# Patient Record
Sex: MALE | Race: WHITE | Employment: OTHER | URBAN - METROPOLITAN AREA
[De-identification: names, ages, dates, MRNs, and addresses within clinical notes are randomized per-mention and may not be internally consistent; named-entity substitution may affect disease eponyms.]

---

## 2017-01-11 ENCOUNTER — TELEPHONE (OUTPATIENT)
Dept: CARDIOLOGY CLINIC | Age: 77
End: 2017-01-11

## 2017-02-28 ENCOUNTER — TELEPHONE (OUTPATIENT)
Dept: CARDIOLOGY CLINIC | Age: 77
End: 2017-02-28

## 2017-03-08 ENCOUNTER — TELEPHONE (OUTPATIENT)
Dept: CARDIOLOGY CLINIC | Age: 77
End: 2017-03-08

## 2017-03-08 DIAGNOSIS — R79.82 CRP ELEVATED: Primary | ICD-10-CM

## 2017-03-09 ENCOUNTER — TELEPHONE (OUTPATIENT)
Dept: CARDIOLOGY CLINIC | Age: 77
End: 2017-03-09

## 2017-03-09 RX ORDER — EVOLOCUMAB 140 MG/ML
INJECTION, SOLUTION SUBCUTANEOUS
Qty: 6 PEN | Refills: 3 | Status: SHIPPED | OUTPATIENT
Start: 2017-03-09 | End: 2018-03-27 | Stop reason: SDUPTHER

## 2017-03-10 ENCOUNTER — TELEPHONE (OUTPATIENT)
Dept: CARDIOLOGY CLINIC | Age: 77
End: 2017-03-10

## 2017-03-16 RX ORDER — NITROGLYCERIN 0.4 MG/1
0.4 TABLET SUBLINGUAL EVERY 5 MIN PRN
Qty: 25 TABLET | Refills: 3 | Status: SHIPPED | OUTPATIENT
Start: 2017-03-16 | End: 2018-06-01 | Stop reason: SDUPTHER

## 2017-05-12 ENCOUNTER — OFFICE VISIT (OUTPATIENT)
Dept: CARDIOLOGY CLINIC | Age: 77
End: 2017-05-12

## 2017-05-12 VITALS
DIASTOLIC BLOOD PRESSURE: 56 MMHG | BODY MASS INDEX: 26.82 KG/M2 | HEIGHT: 72 IN | HEART RATE: 80 BPM | WEIGHT: 198 LBS | SYSTOLIC BLOOD PRESSURE: 124 MMHG

## 2017-05-12 DIAGNOSIS — E78.2 MIXED HYPERLIPIDEMIA: Chronic | ICD-10-CM

## 2017-05-12 DIAGNOSIS — Z01.818 PRE-OP TESTING: Primary | ICD-10-CM

## 2017-05-12 DIAGNOSIS — I25.10 CORONARY ARTERY DISEASE INVOLVING NATIVE CORONARY ARTERY OF NATIVE HEART WITHOUT ANGINA PECTORIS: Chronic | ICD-10-CM

## 2017-05-12 PROCEDURE — 93000 ELECTROCARDIOGRAM COMPLETE: CPT | Performed by: INTERNAL MEDICINE

## 2017-05-12 PROCEDURE — 99214 OFFICE O/P EST MOD 30 MIN: CPT | Performed by: INTERNAL MEDICINE

## 2017-07-31 DIAGNOSIS — E78.2 MIXED HYPERLIPIDEMIA: ICD-10-CM

## 2017-07-31 RX ORDER — CLOPIDOGREL BISULFATE 75 MG/1
75 TABLET ORAL DAILY
Qty: 90 TABLET | Refills: 3 | Status: SHIPPED | OUTPATIENT
Start: 2017-07-31 | End: 2018-07-14 | Stop reason: SDUPTHER

## 2017-08-31 ENCOUNTER — TELEPHONE (OUTPATIENT)
Dept: CARDIOLOGY CLINIC | Age: 77
End: 2017-08-31

## 2017-08-31 DIAGNOSIS — I25.10 CORONARY ARTERY DISEASE INVOLVING NATIVE CORONARY ARTERY OF NATIVE HEART WITHOUT ANGINA PECTORIS: Primary | Chronic | ICD-10-CM

## 2017-09-08 ENCOUNTER — HOSPITAL ENCOUNTER (OUTPATIENT)
Dept: CARDIOLOGY | Facility: CLINIC | Age: 77
Discharge: HOME | End: 2017-09-08
Attending: INTERNAL MEDICINE | Admitting: INTERNAL MEDICINE

## 2017-09-08 LAB
LV EF: 69 %
LVEF MODALITY: NORMAL

## 2017-09-13 ENCOUNTER — TELEPHONE (OUTPATIENT)
Dept: CARDIOLOGY CLINIC | Age: 77
End: 2017-09-13

## 2017-09-14 ENCOUNTER — OFFICE VISIT (OUTPATIENT)
Dept: CARDIOLOGY CLINIC | Age: 77
End: 2017-09-14

## 2017-09-14 VITALS
HEART RATE: 77 BPM | OXYGEN SATURATION: 97 % | BODY MASS INDEX: 27.19 KG/M2 | DIASTOLIC BLOOD PRESSURE: 76 MMHG | WEIGHT: 200.5 LBS | SYSTOLIC BLOOD PRESSURE: 122 MMHG

## 2017-09-14 DIAGNOSIS — I10 ESSENTIAL HYPERTENSION: ICD-10-CM

## 2017-09-14 DIAGNOSIS — E78.2 MIXED HYPERLIPIDEMIA: ICD-10-CM

## 2017-09-14 DIAGNOSIS — I25.10 CORONARY ARTERY DISEASE INVOLVING NATIVE CORONARY ARTERY OF NATIVE HEART WITHOUT ANGINA PECTORIS: Primary | ICD-10-CM

## 2017-09-14 PROCEDURE — 99213 OFFICE O/P EST LOW 20 MIN: CPT | Performed by: INTERNAL MEDICINE

## 2017-11-10 ENCOUNTER — TELEPHONE (OUTPATIENT)
Dept: CARDIOLOGY CLINIC | Age: 77
End: 2017-11-10

## 2018-03-07 ENCOUNTER — TELEPHONE (OUTPATIENT)
Dept: CARDIOLOGY CLINIC | Age: 78
End: 2018-03-07

## 2018-03-13 ENCOUNTER — OFFICE VISIT (OUTPATIENT)
Dept: CARDIOLOGY CLINIC | Age: 78
End: 2018-03-13

## 2018-03-13 VITALS
WEIGHT: 211 LBS | SYSTOLIC BLOOD PRESSURE: 132 MMHG | HEIGHT: 72 IN | OXYGEN SATURATION: 97 % | DIASTOLIC BLOOD PRESSURE: 60 MMHG | BODY MASS INDEX: 28.58 KG/M2 | HEART RATE: 80 BPM

## 2018-03-13 DIAGNOSIS — E78.2 MIXED HYPERLIPIDEMIA: Chronic | ICD-10-CM

## 2018-03-13 DIAGNOSIS — I25.10 CORONARY ARTERY DISEASE INVOLVING NATIVE CORONARY ARTERY OF NATIVE HEART WITHOUT ANGINA PECTORIS: Primary | Chronic | ICD-10-CM

## 2018-03-13 DIAGNOSIS — I10 ESSENTIAL HYPERTENSION: Chronic | ICD-10-CM

## 2018-03-13 PROCEDURE — 99214 OFFICE O/P EST MOD 30 MIN: CPT | Performed by: INTERNAL MEDICINE

## 2018-03-13 NOTE — PROGRESS NOTES
Peak HR:106 bpm       HR/BP product:[id number]  Peak BP:164/83 mmHg  Predicted HR: 146 bpm  % of predicted HR: 73  Test duration: 4 min    CATH: 9/13/12  1. Known CAD without restenosis to previous RCA stents or without   progression and disease from previous assessment 2 years prior. 2. Patent SVG to diagonal branch; patent SVG to OM branch; known atretic   LIMA to LAD. 3. Well-preserved LV systolic function. Carotid doppler: 12/15/14  Summary    1. There is moderate heterogeneous plaque seen in the right internal carotid artery with an estimated diameter reduction of <50%. 2. There is minimal homogeneous plaque seen in the left internal carotid  artery with an estimated diameter reduction of <50%. 3. The vertebral arteries are patent with antegrade flow     Stress:2/18/16  Summary  There is no evidence of stress induced ischemia. The inferior wall is mildly  diminished at both stress and rest c/w possible diaphragm attenuation  artifact. Hyperdynamic LV systolic function with FY>89% with uniform wall  motion. Past Medical History:   has a past medical history of CAD (coronary artery disease); CAD (coronary artery disease); COPD (chronic obstructive pulmonary disease) (HCC); COPD (chronic obstructive pulmonary disease) ([address] GERD (gastroesophageal reflux disease); HTN (hypertension); Hyperlipidemia; IBS (irritable bowel syndrome); and Stented coronary artery. Surgical History:   has a past surgical history that includes Coronary artery bypass graft (2000); Coronary angioplasty with stent (2005); Carotid endarterectomy (01/2014); Colonoscopy (2004); Upper gastrointestinal endoscopy (2004); Cholecystectomy; Cardiac catheterization (2009); Upper gastrointestinal endoscopy (7/15/14); and Diagnostic Cardiac Cath Lab Procedure. Social History:   reports that he quit smoking about 22 years ago. His smoking use included Cigarettes. He has a 15.00 pack-year smoking history.  He has never used smokeless discharge or sore throat  Allergy and Immunology ROS: negative for - hives or itchy/watery eyes  Hematological and Lymphatic ROS: negative for - jaundice or night sweats  Endocrine ROS: negative for - mood swings or temperature intolerance  Breast ROS: deferred  Respiratory ROS: negative for - hemoptysis or stridor  Cardiovascular ROS: negative for - chest pain, dyspnea on exertion or palpitations  Gastrointestinal ROS: no abdominal pain, change in bowel habits, or black or bloody stools  Genito-Urinary ROS: no dysuria, trouble voiding, or hematuria  Musculoskeletal ROS: negative for - gait disturbance, joint pain or joint stiffness  Neurological ROS: negative for - seizures or speech problems  Dermatological ROS: negative for - rash or skin lesion changes     Physical Examination:    Vitals:    03/13/18 1349   BP: 132/60   Pulse: 80   SpO2: 97%    Weight: 211 lb (95.7 kg)     Wt Readings from Last 3 Encounters:   03/13/18 211 lb (95.7 kg)   09/14/17 200 lb 8 oz (90.9 kg)   05/12/17 198 lb (89.8 kg)         General Appearance:  Alert, cooperative, no distress, appears stated age   Head:  Normocephalic, without obvious abnormality, atraumatic   Eyes:  PERRL, conjunctiva/corneas clear       Nose: Nares normal, no drainage or sinus tenderness   Throat: Lips, mucosa, and tongue normal   Neck: Supple, symmetrical, trachea midline, no adenopathy, thyroid: not enlarged, symmetric, no tenderness/mass/nodules, no carotid bruit or JVD       Lungs:   Clear to auscultation bilaterally, respirations unlabored   Chest Wall:  No tenderness or deformity   Heart:  Regular rhythm and normal rate; S1, S2 are normal; no murmur noted; no rub or gallop   Abdomen:   Soft, non-tender, bowel sounds active all four quadrants,  no masses, no organomegaly           Extremities: Extremities normal, atraumatic, no cyanosis or edema   Pulses: 2+ and symmetric   Skin: Skin color, texture, turgor normal, no rashes or lesions   Pysch: Normal mood

## 2018-03-27 NOTE — TELEPHONE ENCOUNTER
Patient's wife called requesting a new prescription for Repatha and prior authorization as of 4/19 to be called to [id number] @ [phone] option 1

## 2018-06-01 RX ORDER — NITROGLYCERIN 0.4 MG/1
0.4 TABLET SUBLINGUAL EVERY 5 MIN PRN
Qty: 25 TABLET | Refills: 3 | Status: SHIPPED | OUTPATIENT
Start: 2018-06-01

## 2018-06-22 ENCOUNTER — TELEPHONE (OUTPATIENT)
Dept: CARDIOLOGY CLINIC | Age: 78
End: 2018-06-22

## 2018-06-22 DIAGNOSIS — I25.10 CORONARY ARTERY DISEASE INVOLVING NATIVE CORONARY ARTERY OF NATIVE HEART WITHOUT ANGINA PECTORIS: Primary | Chronic | ICD-10-CM

## 2018-06-22 DIAGNOSIS — E78.49 OTHER HYPERLIPIDEMIA: Chronic | ICD-10-CM

## 2018-06-25 ENCOUNTER — HOSPITAL ENCOUNTER (OUTPATIENT)
Dept: OTHER | Age: 78
Discharge: HOME | End: 2018-06-25
Attending: INTERNAL MEDICINE | Admitting: INTERNAL MEDICINE

## 2018-06-25 DIAGNOSIS — E78.49 OTHER HYPERLIPIDEMIA: Chronic | ICD-10-CM

## 2018-06-25 DIAGNOSIS — I25.10 CORONARY ARTERY DISEASE INVOLVING NATIVE CORONARY ARTERY OF NATIVE HEART WITHOUT ANGINA PECTORIS: Chronic | ICD-10-CM

## 2018-06-25 LAB
ALBUMIN SERPL-MCNC: 4.4 G/DL (ref 3.4–5)
ALP BLD-CCNC: 88 U/L (ref 40–129)
ALT SERPL-CCNC: 12 U/L (ref 10–40)
AST SERPL-CCNC: 20 U/L (ref 15–37)
BILIRUB SERPL-MCNC: 0.6 MG/DL (ref 0–1)
BILIRUBIN DIRECT: <0.2 MG/DL (ref 0–0.3)
BILIRUBIN, INDIRECT: NORMAL MG/DL (ref 0–1)
CHOLESTEROL, FASTING: 109 MG/DL (ref 0–199)
HDLC SERPL-MCNC: 52 MG/DL (ref 40–60)
LDL CHOLESTEROL CALCULATED: 43 MG/DL
TOTAL PROTEIN: 7.5 G/DL (ref 6.4–8.2)
TRIGLYCERIDE, FASTING: 69 MG/DL (ref 0–150)
VLDLC SERPL CALC-MCNC: 14 MG/DL

## 2018-06-26 ENCOUNTER — TELEPHONE (OUTPATIENT)
Dept: CARDIOLOGY CLINIC | Age: 78
End: 2018-06-26

## 2018-07-14 DIAGNOSIS — E78.2 MIXED HYPERLIPIDEMIA: ICD-10-CM

## 2018-07-16 RX ORDER — CLOPIDOGREL BISULFATE 75 MG/1
TABLET ORAL
Qty: 90 TABLET | Refills: 2 | Status: SHIPPED | OUTPATIENT
Start: 2018-07-16 | End: 2019-05-01 | Stop reason: SDUPTHER

## 2018-07-27 ENCOUNTER — TELEPHONE (OUTPATIENT)
Dept: CARDIOLOGY CLINIC | Age: 78
End: 2018-07-27

## 2019-03-06 ENCOUNTER — TELEPHONE (OUTPATIENT)
Dept: CARDIOLOGY CLINIC | Age: 79
End: 2019-03-06

## 2019-03-06 DIAGNOSIS — I25.10 CORONARY ARTERY DISEASE INVOLVING NATIVE CORONARY ARTERY OF NATIVE HEART WITHOUT ANGINA PECTORIS: Primary | ICD-10-CM

## 2019-03-11 ENCOUNTER — HOSPITAL ENCOUNTER (OUTPATIENT)
Age: 79
Discharge: HOME | End: 2019-03-11
Payer: COMMERCIAL

## 2019-03-11 DIAGNOSIS — I25.10 CORONARY ARTERY DISEASE INVOLVING NATIVE CORONARY ARTERY OF NATIVE HEART WITHOUT ANGINA PECTORIS: ICD-10-CM

## 2019-03-11 LAB
ALBUMIN SERPL-MCNC: 3.9 G/DL (ref 3.4–5)
ALP BLD-CCNC: 96 U/L (ref 40–129)
ALT SERPL-CCNC: 12 U/L (ref 10–40)
AST SERPL-CCNC: 15 U/L (ref 15–37)
BILIRUB SERPL-MCNC: 0.4 MG/DL (ref 0–1)
BILIRUBIN DIRECT: <0.2 MG/DL (ref 0–0.3)
BILIRUBIN, INDIRECT: NORMAL MG/DL (ref 0–1)
CHOLESTEROL, TOTAL: 108 MG/DL (ref 0–199)
HDLC SERPL-MCNC: 55 MG/DL (ref 40–60)
LDL CHOLESTEROL CALCULATED: 38 MG/DL
TOTAL PROTEIN: 7.1 G/DL (ref 6.4–8.2)
TRIGL SERPL-MCNC: 77 MG/DL (ref 0–150)
VLDLC SERPL CALC-MCNC: 15 MG/DL

## 2019-03-11 PROCEDURE — 80076 HEPATIC FUNCTION PANEL: CPT

## 2019-03-11 PROCEDURE — 80061 LIPID PANEL: CPT

## 2019-03-11 PROCEDURE — 36415 COLL VENOUS BLD VENIPUNCTURE: CPT

## 2019-03-12 ENCOUNTER — TELEPHONE (OUTPATIENT)
Dept: CARDIOLOGY CLINIC | Age: 79
End: 2019-03-12

## 2019-03-13 ENCOUNTER — OFFICE VISIT (OUTPATIENT)
Dept: CARDIOLOGY CLINIC | Age: 79
End: 2019-03-13
Payer: COMMERCIAL

## 2019-03-13 VITALS
SYSTOLIC BLOOD PRESSURE: 110 MMHG | DIASTOLIC BLOOD PRESSURE: 78 MMHG | WEIGHT: 215.6 LBS | HEIGHT: 73 IN | BODY MASS INDEX: 28.57 KG/M2 | HEART RATE: 83 BPM | OXYGEN SATURATION: 96 %

## 2019-03-13 DIAGNOSIS — I10 ESSENTIAL HYPERTENSION: Chronic | ICD-10-CM

## 2019-03-13 DIAGNOSIS — I77.9 BILATERAL CAROTID ARTERY DISEASE, UNSPECIFIED TYPE (HCC): Chronic | ICD-10-CM

## 2019-03-13 DIAGNOSIS — E78.49 OTHER HYPERLIPIDEMIA: Chronic | ICD-10-CM

## 2019-03-13 DIAGNOSIS — I25.10 CORONARY ARTERY DISEASE INVOLVING NATIVE CORONARY ARTERY OF NATIVE HEART WITHOUT ANGINA PECTORIS: Primary | Chronic | ICD-10-CM

## 2019-03-13 PROCEDURE — 99214 OFFICE O/P EST MOD 30 MIN: CPT | Performed by: INTERNAL MEDICINE

## 2019-05-01 DIAGNOSIS — E78.2 MIXED HYPERLIPIDEMIA: ICD-10-CM

## 2019-05-01 RX ORDER — CLOPIDOGREL BISULFATE 75 MG/1
TABLET ORAL
Qty: 90 TABLET | Refills: 3 | Status: SHIPPED | OUTPATIENT
Start: 2019-05-01 | End: 2019-08-15 | Stop reason: SDUPTHER

## 2019-05-01 NOTE — TELEPHONE ENCOUNTER
VSP Pt  Last office visit 3/13/2019  Plan:  1. Your labs clearly reflect the effectiveness of Repatha. Continue you this as prescribed. 2. I recommend that the patient continue their currently prescribed medications. Their drug modifiable risk factors appear to be well controlled. I will continue to address the need/dosing of medications in future visits. 3. The patient was seen for >25 minutes. >50% of the time was devoted to giving the patient detailed instructions instructions on addressing diet, regular exercise, weight control, smoking abstention, medication compliance, and stress minimization. The patient was provided written and verbal instructions regarding risk factor modification. 4. Check Lipids and LFTs prior to next office visit.    5. Follow up with me in 1 year.

## 2019-05-21 ENCOUNTER — TELEPHONE (OUTPATIENT)
Dept: CARDIOLOGY CLINIC | Age: 79
End: 2019-05-21

## 2019-05-21 NOTE — TELEPHONE ENCOUNTER
Pt states that he needs a new Christo Malin because his will be ending 6/28/19. He wants to make sure the new one gets done in time. The number to call on the letter he received from Ted Marks is [phone].

## 2019-06-11 ENCOUNTER — TELEPHONE (OUTPATIENT)
Dept: CARDIOLOGY CLINIC | Age: 79
End: 2019-06-11

## 2019-06-11 NOTE — TELEPHONE ENCOUNTER
Spoke to Chrystal, pt's wife regarding PA for Repatha. I re sent the PA in with pt's last OV, Labs, and med list attached.  V/u.

## 2019-06-11 NOTE — TELEPHONE ENCOUNTER
Aleksmeri Chase, pt's wife, called stating pt's Paulina Santamaria is needing another PA per [employer]. Angella Rena stated they will be going out of town next week for an extended period of time. She is requesting to speak directly with SARAH Galdamez today or tomorrow to touch base  at [phone]. I informed Markos Woods that the MA is currently in clinic this afternoon and that she will call when she can.

## 2019-08-14 DIAGNOSIS — E78.2 MIXED HYPERLIPIDEMIA: ICD-10-CM

## 2019-08-14 NOTE — TELEPHONE ENCOUNTER
I do not have any concern about the  of the medication. I would defer to the pharmacist about equivalency of the medications. Alternate options would be to consider changing his Plavix to a more expensive medication such as Brilinta.

## 2019-08-15 RX ORDER — CLOPIDOGREL BISULFATE 75 MG/1
TABLET ORAL
Qty: 90 TABLET | Refills: 3 | Status: SHIPPED | OUTPATIENT
Start: 2019-08-15 | End: 2020-07-20

## 2019-11-18 ENCOUNTER — TELEPHONE (OUTPATIENT)
Dept: CARDIOLOGY CLINIC | Age: 79
End: 2019-11-18

## 2019-11-18 RX ORDER — HYDROCHLOROTHIAZIDE 12.5 MG/1
12.5 TABLET ORAL EVERY MORNING
Qty: 90 TABLET | Refills: 0 | Status: SHIPPED | OUTPATIENT
Start: 2019-11-18 | End: 2022-02-15

## 2020-03-13 ENCOUNTER — OFFICE VISIT (OUTPATIENT)
Dept: CARDIOLOGY CLINIC | Age: 80
End: 2020-03-13
Payer: COMMERCIAL

## 2020-03-13 VITALS
WEIGHT: 217 LBS | BODY MASS INDEX: 28.76 KG/M2 | HEIGHT: 73 IN | HEART RATE: 76 BPM | OXYGEN SATURATION: 96 % | SYSTOLIC BLOOD PRESSURE: 124 MMHG | DIASTOLIC BLOOD PRESSURE: 66 MMHG

## 2020-03-13 PROCEDURE — 99214 OFFICE O/P EST MOD 30 MIN: CPT | Performed by: INTERNAL MEDICINE

## 2020-03-13 NOTE — LETTER
reports that he quit smoking about 24 years ago. His smoking use included cigarettes. He has a 15.00 pack-year smoking history. He has never used smokeless tobacco. He reports that he does not drink alcohol or use drugs. Family History:  No evidence for sudden cardiac death or premature CAD    Home Medications:  Reviewed and are listed in nursing record. and/or listed below  Current Outpatient Medications   Medication Sig Dispense Refill    clopidogrel (PLAVIX) 75 MG tablet TAKE ONE TABLET BY MOUTH DAILY, Pt only tolerates APOTEX  90 tablet 3    Evolocumab (REPATHA SURECLICK) 146 MG/ML SOAJ INJECT (140 MG) SUBCUTANEOUSLY EVERY 2 WEEKS. KEEP REFRIGERATED. SINGLE DOSE DEVICE. 6 pen 3    nitroGLYCERIN (NITROSTAT) 0.4 MG SL tablet Place 1 tablet under the tongue every 5 minutes as needed for Chest pain 25 tablet 3    esomeprazole (NEXIUM) 20 MG delayed release capsule Take 20 mg by mouth      SPIRIVA HANDIHALER 18 MCG inhalation capsule Inhale 1 capsule into the lungs daily 90 capsule 1    albuterol (PROVENTIL HFA) 108 (90 BASE) MCG/ACT inhaler Inhale 2 puffs into the lungs every 4 hours as needed for Wheezing or Shortness of Breath. 1 Inhaler 0    promethazine (PHENERGAN) 25 MG tablet Take 25 mg by mouth every 6 hours as needed.  hydrochlorothiazide (HYDRODIURIL) 12.5 MG tablet Take 1 tablet by mouth every morning (Patient not taking: Reported on 3/13/2020) 90 tablet 0    aspirin 325 MG tablet Take 1 tablet by mouth daily (Patient not taking: Reported on 3/13/2020) 30 tablet 3    Fluticasone-Salmeterol (ADVAIR DISKUS IN) Inhale  into the lungs. No current facility-administered medications for this visit. Allergies:  Crestor [rosuvastatin calcium]; Statins; Amlodipine; Budesonide-formoterol fumarate; Codeine; Dilaudid [hydromorphone hcl]; Nitrofurantoin macrocrystal; Propoxyphene; Zetia [ezetimibe];  Hydromorphone hcl; and Iodoform     Review of Systems:

## 2020-03-13 NOTE — PATIENT INSTRUCTIONS
Plan:  1. I recommend that the patient continue their currently prescribed medications. Their drug modifiable risk factors appear to be well controlled. I will continue to address the need/dosing of medications in future visits. 2. Carotid doppler. 3. The patient was seen for >25 minutes. >50% of the time was devoted to giving the patient detailed instructions instructions on addressing diet, regular exercise, weight control, smoking abstention, medication compliance, and stress minimization. The patient was provided written and verbal instructions regarding risk factor modification. 4. Check Lipid panel. 5. Follow up with me in 1 year. Your provider has ordered testing for further evaluation. An order/prescription has been included in your paper work.  To schedule outpatient testing, contact Central Scheduling by calling [address] [place] [address] ([phone]).

## 2020-03-13 NOTE — PROGRESS NOTES
Lexiscan   Stress Protocol:Pharmacologic    Peak HR:106 bpm       HR/BP product:[id number]  Peak BP:164/83 mmHg  Predicted HR: 146 bpm  % of predicted HR: 73  Test duration: 4 min    CATH: 9/13/12  1. Known CAD without restenosis to previous RCA stents or without   progression and disease from previous assessment 2 years prior. 2. Patent SVG to diagonal branch; patent SVG to OM branch; known atretic   LIMA to LAD. 3. Well-preserved LV systolic function. Carotid doppler: 12/15/14  Summary    1. There is moderate heterogeneous plaque seen in the right internal carotid artery with an estimated diameter reduction of <50%. 2. There is minimal homogeneous plaque seen in the left internal carotid  artery with an estimated diameter reduction of <50%. 3. The vertebral arteries are patent with antegrade flow     Stress:2/18/16  Summary  There is no evidence of stress induced ischemia. The inferior wall is mildly  diminished at both stress and rest c/w possible diaphragm attenuation  artifact. Hyperdynamic LV systolic function with KB>80% with uniform wall  motion. Past Medical History:   has a past medical history of CAD (coronary artery disease), CAD (coronary artery disease), COPD (chronic obstructive pulmonary disease) ([address] COPD (chronic obstructive pulmonary disease) [address] [address] GERD (gastroesophageal reflux disease), HTN (hypertension), Hyperlipidemia, IBS (irritable bowel syndrome), and Stented coronary artery. Surgical History:   has a past surgical history that includes Coronary artery bypass graft (2000); Coronary angioplasty with stent (2005); Carotid endarterectomy (01/2014); Colonoscopy (2004); Upper gastrointestinal endoscopy (2004); Cholecystectomy; Cardiac catheterization (2009); Upper gastrointestinal endoscopy (7/15/14); and Diagnostic Cardiac Cath Lab Procedure. Social History:   reports that he quit smoking about 24 years ago. His smoking use included cigarettes.  He has a 15.00 pack-year smoking history. He has never used smokeless tobacco. He reports that he does not drink alcohol or use drugs. Family History:  No evidence for sudden cardiac death or premature CAD    Home Medications:  Reviewed and are listed in nursing record. and/or listed below  Current Outpatient Medications   Medication Sig Dispense Refill    clopidogrel (PLAVIX) 75 MG tablet TAKE ONE TABLET BY MOUTH DAILY, Pt only tolerates APOTEX  90 tablet 3    Evolocumab (REPATHA SURECLICK) 506 MG/ML SOAJ INJECT (140 MG) SUBCUTANEOUSLY EVERY 2 WEEKS. KEEP REFRIGERATED. SINGLE DOSE DEVICE. 6 pen 3    nitroGLYCERIN (NITROSTAT) 0.4 MG SL tablet Place 1 tablet under the tongue every 5 minutes as needed for Chest pain 25 tablet 3    esomeprazole (NEXIUM) 20 MG delayed release capsule Take 20 mg by mouth      SPIRIVA HANDIHALER 18 MCG inhalation capsule Inhale 1 capsule into the lungs daily 90 capsule 1    albuterol (PROVENTIL HFA) 108 (90 BASE) MCG/ACT inhaler Inhale 2 puffs into the lungs every 4 hours as needed for Wheezing or Shortness of Breath. 1 Inhaler 0    promethazine (PHENERGAN) 25 MG tablet Take 25 mg by mouth every 6 hours as needed.  hydrochlorothiazide (HYDRODIURIL) 12.5 MG tablet Take 1 tablet by mouth every morning (Patient not taking: Reported on 3/13/2020) 90 tablet 0    aspirin 325 MG tablet Take 1 tablet by mouth daily (Patient not taking: Reported on 3/13/2020) 30 tablet 3    Fluticasone-Salmeterol (ADVAIR DISKUS IN) Inhale  into the lungs. No current facility-administered medications for this visit. Allergies:  Crestor [rosuvastatin calcium]; Statins; Amlodipine; Budesonide-formoterol fumarate; Codeine; Dilaudid [hydromorphone hcl]; Nitrofurantoin macrocrystal; Propoxyphene; Zetia [ezetimibe]; Hydromorphone hcl; and Iodoform     Review of Systems:   All 14 point review of symptoms completed.  Pertinent positives identified in the HPI, all other review of symptoms negative as gallop   Abdomen:   Soft, non-tender, bowel sounds active all four quadrants,  no masses, no organomegaly           Extremities: Extremities normal, atraumatic, no cyanosis or edema   Pulses: 2+ and symmetric   Skin: Skin color, texture, turgor normal, no rashes or lesions   Pysch: Normal mood and affect   Neurologic: Normal gross motor and sensory exam.         Labs  CBC:   Lab Results   Component Value Date    WBC 8.6 03/02/2017    RBC 3.65 03/02/2017    HGB 11.9 03/02/2017    HCT 35.0 03/02/2017    MCV 96.0 03/02/2017    RDW 14.1 03/02/2017     03/02/2017     CMP:    Lab Results   Component Value Date     03/02/2017    K 4.2 03/02/2017    CL 99 03/02/2017    CO2 23 03/02/2017    BUN 20 03/02/2017    CREATININE 1.3 03/02/2017    GFRAA >60 03/02/2017    GFRAA >60 07/17/2012    AGRATIO 1.1 02/28/2017    LABGLOM 54 03/02/2017    GLUCOSE 81 03/02/2017    PROT 7.1 03/11/2019    PROT 7.0 07/17/2012    CALCIUM 8.4 03/02/2017    BILITOT 0.4 03/11/2019    ALKPHOS 96 03/11/2019    AST 15 03/11/2019    ALT 12 03/11/2019     PT/INR:    No components found for: PTPATIENT,  PTINR  Lab Results   Component Value Date    TROPONINI <0.01 03/01/2017     No components found for: CHLPL  Lab Results   Component Value Date    TRIG 77 03/11/2019    TRIG 91 03/01/2017    TRIG 114 12/19/2016     Lab Results   Component Value Date    HDL 55 03/11/2019    HDL 52 06/25/2018    HDL 45 03/01/2017     Lab Results   Component Value Date    LDLCALC 38 03/11/2019    LDLCALC 43 06/25/2018    LDLCALC 43 03/01/2017     Lab Results   Component Value Date    LABVLDL 15 03/11/2019    LABVLDL 14 06/25/2018    LABVLDL 18 03/01/2017     Lab Results   Component Value Date    ALT 12 03/11/2019    AST 15 03/11/2019    ALKPHOS 96 03/11/2019    BILITOT 0.4 03/11/2019         Assessment:  78 y.o. patient with:  1.  Periodic acute hypertension/nocturnal hypertension   ~stable in the office today BP: 124/66    ~continue hydralazine as prescribed ~suspect pulmonary component due to patient c/o precipitating SOB   2. Carotid artery disease   ~moderate heterogeneous plaque seen in the right internal carotid artery with an estimated diameter reduction of <50%. ~[facility] at [facility] 2014   ~Carotid doppler 3/2016  3. Remote CAD with bypass. ~cath 2012 stable   ~stress test March 2015 normal.  5. Hyperlipidemia    ~intolerant of high dose statin therapy   ~intolerant to Lescol   ~tolerating Repatha   7. Intolerance to beta-blockers due to hypotension    Plan:  1. I recommend that the patient continue their currently prescribed medications. Their drug modifiable risk factors appear to be well controlled. I will continue to address the need/dosing of medications in future visits. 2. Carotid doppler. 3. The patient was seen for >25 minutes. >50% of the time was devoted to giving the patient detailed instructions instructions on addressing diet, regular exercise, weight control, smoking abstention, medication compliance, and stress minimization. The patient was provided written and verbal instructions regarding risk factor modification. 4. Check Lipid panel. 5. Follow up with me in 1 year. This note was scribed in the presence of Dr. John Crow MD by Ela Hannon RN.      I, Dr. John Crow, personally performed the services described in this documentation, as scribed by the above signed scribe in my presence. It is both accurate and complete to my knowledge. I agree with the details independently gathered by the clinical support staff, while the remaining scribed note accurately describes my personal service to the patient. All questions and concerns were addressed to the patient/family. Alternatives to my treatment were discussed. The note was completed using EMR. Every effort was made to ensure accuracy; however, inadvertent computerized transcription errors may be present.     John Crow MD, [id number], [address] [facility], [address] [facility] [place] office  [phone] Main central  3/13/2020  1:32 PM

## 2020-03-19 ENCOUNTER — TELEPHONE (OUTPATIENT)
Dept: CARDIOLOGY CLINIC | Age: 80
End: 2020-03-19

## 2020-03-20 ENCOUNTER — TELEPHONE (OUTPATIENT)
Dept: CARDIOLOGY CLINIC | Age: 80
End: 2020-03-20

## 2020-03-20 LAB
CHOLESTEROL, TOTAL: 102 MG/DL
CHOLESTEROL: 55 MG/DL
HDLC SERPL-MCNC: 47 MG/DL
LDL CHOLESTEROL CALCULATED: 40 MG/DL
TRIGL SERPL-MCNC: 75 MG/DL

## 2020-04-29 ENCOUNTER — TELEPHONE (OUTPATIENT)
Dept: CARDIOLOGY CLINIC | Age: 80
End: 2020-04-29

## 2020-07-20 RX ORDER — CLOPIDOGREL BISULFATE 75 MG/1
TABLET ORAL
Qty: 90 TABLET | Refills: 3 | Status: SHIPPED | OUTPATIENT
Start: 2020-07-20 | End: 2021-06-30

## 2020-08-03 RX ORDER — EVOLOCUMAB 140 MG/ML
INJECTION, SOLUTION SUBCUTANEOUS
Qty: 6 PEN | Refills: 3 | Status: SHIPPED | OUTPATIENT
Start: 2020-08-03 | End: 2022-08-03

## 2021-03-05 DIAGNOSIS — I77.9 BILATERAL CAROTID ARTERY DISEASE, UNSPECIFIED TYPE (HCC): Primary | ICD-10-CM

## 2021-04-08 DIAGNOSIS — E78.49 OTHER HYPERLIPIDEMIA: Primary | Chronic | ICD-10-CM

## 2021-05-06 ENCOUNTER — OFFICE VISIT (OUTPATIENT)
Dept: CARDIOLOGY CLINIC | Age: 81
End: 2021-05-06
Payer: COMMERCIAL

## 2021-05-06 VITALS
BODY MASS INDEX: 28.43 KG/M2 | WEIGHT: 214.5 LBS | DIASTOLIC BLOOD PRESSURE: 54 MMHG | OXYGEN SATURATION: 96 % | HEART RATE: 86 BPM | SYSTOLIC BLOOD PRESSURE: 114 MMHG | HEIGHT: 73 IN

## 2021-05-06 DIAGNOSIS — I25.10 CORONARY ARTERY DISEASE INVOLVING NATIVE CORONARY ARTERY OF NATIVE HEART WITHOUT ANGINA PECTORIS: Primary | Chronic | ICD-10-CM

## 2021-05-06 DIAGNOSIS — E78.49 OTHER HYPERLIPIDEMIA: Chronic | ICD-10-CM

## 2021-05-06 DIAGNOSIS — R06.02 SOB (SHORTNESS OF BREATH): ICD-10-CM

## 2021-05-06 DIAGNOSIS — I10 ESSENTIAL HYPERTENSION: Chronic | ICD-10-CM

## 2021-05-06 DIAGNOSIS — I65.23 BILATERAL CAROTID ARTERY STENOSIS: ICD-10-CM

## 2021-05-06 PROCEDURE — 99214 OFFICE O/P EST MOD 30 MIN: CPT | Performed by: INTERNAL MEDICINE

## 2021-05-06 NOTE — PROGRESS NOTES
[address] [facility]   Cardiovascular Evaluation    PATIENT: Андрей Martinez  DATE: 2021  MRN: <[id number]>  CSN: [id number]  : 1940    Primary Care Doctor: Inge Castro MD    Reason for evaluation:   1 Year Follow Up    Subjective; History of present illness on initial date of evaluation:   Андрей Martinez is a [de-identified] y.o. patient who presents for follow up. Today he denies cardiac complaints. He is not willing to repeat surgery to history of right carotid endarterectomy with resulted in infection. He is tolerating Repatha medication. Patient Active Problem List   Diagnosis    Coronary artery disease involving native coronary artery of native heart without angina pectoris    Hyperlipidemia    Nausea & vomiting    Carotid disease, bilateral ([address]    Chest pain    COPD (chronic obstructive pulmonary disease) ([address]    HTN (hypertension)    Stented coronary artery    IBS (irritable bowel syndrome)    GERD (gastroesophageal reflux disease)    CRP elevated    Weakness    SOB (shortness of breath)         Cardiac Testing: I have reviewed the findings below. EKG: 10/10/13  Sinus rhythm with 1st degree A-V block Otherwise normal     STRESS TEST: 10/8/13  Small sized inferior fixed defect consistent with infarction in the territory of the distal RCA . -There is no evidence of stress induced ischemia. -Normal LV function with ejection fraction of 65 %. Stress: 3/27/15  Summary  There is normal isotope uptake at stress and rest. There is no evidence of  myocardial ischemia or scar. There are no regional wall motion abnormalities. Normal left ventricular systolic function with ejection fraction of 62 %. Normal myocardial perfusion study. Stress Protocols    Resting ECG  Normal sinus rhythm.     Resting HR:74 bpm   Resting BP:132/84 mmHg    Pre-stress physical exam: Walk Lexiscan   Stress Protocol:Pharmacologic    Peak HR:106 bpm       HR/BP product:12511  Peak BP:164/83 mmHg  Predicted HR: 146 bpm  % of predicted HR: 73  Test duration: 4 min    CATH: 9/13/12  1. Known CAD without restenosis to previous RCA stents or without   progression and disease from previous assessment 2 years prior. 2. Patent SVG to diagonal branch; patent SVG to OM branch; known atretic   LIMA to LAD. 3. Well-preserved LV systolic function. Carotid doppler: 12/15/14  Summary    1. There is moderate heterogeneous plaque seen in the right internal carotid artery with an estimated diameter reduction of <50%. 2. There is minimal homogeneous plaque seen in the left internal carotid  artery with an estimated diameter reduction of <50%. 3. The vertebral arteries are patent with antegrade flow     Stress:2/18/16  Summary  There is no evidence of stress induced ischemia. The inferior wall is mildly  diminished at both stress and rest c/w possible diaphragm attenuation  artifact. Hyperdynamic LV systolic function with LJ>93% with uniform wall  motion. Past Medical History:   has a past medical history of CAD (coronary artery disease), CAD (coronary artery disease), COPD (chronic obstructive pulmonary disease) ([address] COPD (chronic obstructive pulmonary disease) ([address] GERD (gastroesophageal reflux disease), HTN (hypertension), Hyperlipidemia, IBS (irritable bowel syndrome), and Stented coronary artery. Surgical History:   has a past surgical history that includes Coronary artery bypass graft (2000); Coronary angioplasty with stent (2005); Carotid endarterectomy (01/2014); Colonoscopy (2004); Upper gastrointestinal endoscopy (2004); Cholecystectomy; Cardiac catheterization (2009); Upper gastrointestinal endoscopy (7/15/14); and Diagnostic Cardiac Cath Lab Procedure. Social History:   reports that he quit smoking about 25 years ago. His smoking use included cigarettes. He has a 15.00 pack-year smoking history.  He has never used smokeless tobacco. He reports that he does not drink alcohol or use drugs. Family History:  No evidence for sudden cardiac death or premature CAD    Home Medications:  Reviewed and are listed in nursing record. and/or listed below  Current Outpatient Medications   Medication Sig Dispense Refill    Evolocumab (REPATHA SURECLICK) 744 MG/ML SOAJ INJECT (140 MG) SUBCUTANEOUSLY EVERY 2 WEEKS. KEEP REFRIGERATED. SINGLE DOSE DEVICE. 6 pen 3    clopidogrel (PLAVIX) 75 MG tablet TAKE 1 TABLET DAILY *APOTEXMFR* 90 tablet 3    hydrochlorothiazide (HYDRODIURIL) 12.5 MG tablet Take 1 tablet by mouth every morning 90 tablet 0    nitroGLYCERIN (NITROSTAT) 0.4 MG SL tablet Place 1 tablet under the tongue every 5 minutes as needed for Chest pain 25 tablet 3    esomeprazole (NEXIUM) 20 MG delayed release capsule Take 20 mg by mouth      aspirin 325 MG tablet Take 1 tablet by mouth daily 30 tablet 3    SPIRIVA HANDIHALER 18 MCG inhalation capsule Inhale 1 capsule into the lungs daily 90 capsule 1    Fluticasone-Salmeterol (ADVAIR DISKUS IN) Inhale  into the lungs.  albuterol (PROVENTIL HFA) 108 (90 BASE) MCG/ACT inhaler Inhale 2 puffs into the lungs every 4 hours as needed for Wheezing or Shortness of Breath. 1 Inhaler 0    promethazine (PHENERGAN) 25 MG tablet Take 25 mg by mouth every 6 hours as needed. No current facility-administered medications for this visit. Allergies:  Crestor [rosuvastatin calcium], Statins, Amlodipine, Budesonide-formoterol fumarate, Codeine, Dilaudid [hydromorphone hcl], Nitrofurantoin macrocrystal, Propoxyphene, Zetia [ezetimibe], Hydromorphone hcl, and Iodoform     Review of Systems:   All 14 point review of symptoms completed. Pertinent positives identified in the HPI, all other review of symptoms negative as below.     Review of Systems - History obtained from the patient  General ROS: negative for - chills, fever or night sweats  Psychological ROS: negative for - disorientation or hallucinations  Ophthalmic ROS: negative for - dry eyes, eye pain or loss of vision  ENT ROS: negative for - nasal discharge or sore throat  Allergy and Immunology ROS: negative for - hives or itchy/watery eyes  Hematological and Lymphatic ROS: negative for - jaundice or night sweats  Endocrine ROS: negative for - mood swings or temperature intolerance  Breast ROS: deferred  Respiratory ROS: negative for - hemoptysis or stridor  Cardiovascular ROS: negative for - chest pain, dyspnea on exertion or palpitations  Gastrointestinal ROS: no abdominal pain, change in bowel habits, or black or bloody stools  Genito-Urinary ROS: no dysuria, trouble voiding, or hematuria  Musculoskeletal ROS: negative for - gait disturbance, joint pain or joint stiffness  Neurological ROS: negative for - seizures or speech problems  Dermatological ROS: negative for - rash or skin lesion changes     Physical Examination:    Vitals:    05/06/21 1532   BP: (!) 114/54   Pulse:    SpO2:     Weight: 214 lb 8 oz (97.3 kg)     Wt Readings from Last 3 Encounters:   05/06/21 214 lb 8 oz (97.3 kg)   03/13/20 217 lb (98.4 kg)   03/13/19 215 lb 9.6 oz (97.8 kg)         General Appearance:  Alert, cooperative, no distress, appears stated age   Head:  Normocephalic, without obvious abnormality, atraumatic   Eyes:  PERRL, conjunctiva/corneas clear       Nose: Nares normal, no drainage or sinus tenderness   Throat: Lips, mucosa, and tongue normal   Neck: Supple, symmetrical, trachea midline, no adenopathy, thyroid: not enlarged, symmetric, no tenderness/mass/nodules, no carotid bruit or JVD       Lungs:   Clear to auscultation bilaterally, respirations unlabored   Chest Wall:  No tenderness or deformity   Heart:  Regular rhythm and normal rate; S1, S2 are normal; no murmur noted; no rub or gallop   Abdomen:   Soft, non-tender, bowel sounds active all four quadrants,  no masses, no organomegaly           Extremities: Extremities normal, atraumatic, no cyanosis or edema   Pulses: 2+ and symmetric   Skin: Skin color, texture, turgor normal, no rashes or lesions   Pysch: Normal mood and affect   Neurologic: Normal gross motor and sensory exam.         Labs  CBC:   Lab Results   Component Value Date    WBC 8.6 03/02/2017    RBC 3.65 03/02/2017    HGB 11.9 03/02/2017    HCT 35.0 03/02/2017    MCV 96.0 03/02/2017    RDW 14.1 03/02/2017     03/02/2017     CMP:    Lab Results   Component Value Date     03/02/2017    K 4.2 03/02/2017    CL 99 03/02/2017    CO2 23 03/02/2017    BUN 20 03/02/2017    CREATININE 1.3 03/02/2017    GFRAA >60 03/02/2017    GFRAA >60 07/17/2012    AGRATIO 1.1 02/28/2017    LABGLOM 54 03/02/2017    GLUCOSE 81 03/02/2017    PROT 7.1 03/11/2019    PROT 7.0 07/17/2012    CALCIUM 8.4 03/02/2017    BILITOT 0.4 03/11/2019    ALKPHOS 96 03/11/2019    AST 15 03/11/2019    ALT 12 03/11/2019     PT/INR:    No components found for: PTPATIENT,  PTINR  Lab Results   Component Value Date    TROPONINI <0.01 03/01/2017     No components found for: CHLPL  Lab Results   Component Value Date    TRIG 75 03/20/2020    TRIG 77 03/11/2019    TRIG 91 03/01/2017     Lab Results   Component Value Date    HDL 47 03/20/2020    HDL 55 03/11/2019    HDL 52 06/25/2018     Lab Results   Component Value Date    LDLCALC 40 03/20/2020    LDLCALC 38 03/11/2019    LDLCALC 43 06/25/2018     Lab Results   Component Value Date    LABVLDL 15 03/11/2019    LABVLDL 14 06/25/2018    LABVLDL 18 03/01/2017     Lab Results   Component Value Date    ALT 12 03/11/2019    AST 15 03/11/2019    ALKPHOS 96 03/11/2019    BILITOT 0.4 03/11/2019         Assessment:  [de-identified] y.o. patient with:  1. Periodic acute hypertension/nocturnal hypertension   ~stable in the office today BP: (!) 114/54    ~continue hydralazine as prescribed    ~suspect pulmonary component due to patient c/o precipitating SOB   2. Carotid artery disease   ~moderate heterogeneous plaque seen in the right internal carotid artery with an estimated diameter reduction of <50%.    ~CEA at [facility] 2014   ~Carotid doppler 3/2016  3. Remote CAD with bypass. ~cath 2012 stable   ~stress test March 2015 normal.  4. Hyperlipidemia    ~intolerant of high dose statin therapy   ~intolerant to Lescol   ~tolerating Repatha   5. SOB      Plan:  1. I recommend that the patient continue their currently prescribed medications. Their drug modifiable risk factors appear to be well controlled. I will continue to address the need/dosing of medications in future visits. 2. Lipids in Care Everywhere  3. Carotid doppler for carotid stenosis  4. Recommend echocardiogram to evaluate cardiac structure  5. Follow up in 1 year    Scribe's attestation: This note was scribed in the presence of Dr. Lisette Ibarra by Joe Perez RN     I, Dr. Lisette Ibarra, personally performed the services described in this documentation, as scribed by the above signed scribe in my presence. It is both accurate and complete to my knowledge. I agree with the details independently gathered by the clinical support staff, while the remaining scribed note accurately describes my personal service to the patient. All questions and concerns were addressed to the patient/family. Alternatives to my treatment were discussed. The note was completed using EMR. Every effort was made to ensure accuracy; however, inadvertent computerized transcription errors may be present.     Lisette Ibarra MD, [address], [place], [place]  [phone] [facility] office  [phone] [facility] central  5/6/2021  3:49 PM

## 2021-05-06 NOTE — PATIENT INSTRUCTIONS
Plan:  1. I recommend that the patient continue their currently prescribed medications. Their drug modifiable risk factors appear to be well controlled. I will continue to address the need/dosing of medications in future visits. 2. Lipids in Care Everywhere  3. Carotid doppler for carotid stenosis  4. Recommend echocardiogram to evaluate cardiac structure  5. Follow up in 1 year      Your provider has ordered testing for further evaluation. An order/prescription has been included in your paper work.  To schedule outpatient testing, contact Central Scheduling by calling [address] [place] [address] ([phone]).

## 2021-05-06 NOTE — LETTER
[address] [facility]   Cardiovascular Evaluation    PATIENT: Tamara Haq  DATE: 2021  MRN: <[id number]>  CSN: [id number]  : 1940    Primary Care Doctor: Brendon Gan MD    Reason for evaluation:   1 Year Follow Up    Subjective; History of present illness on initial date of evaluation:   Tamara Haq is a [de-identified] y.o. patient who presents for follow up. Today he denies cardiac complaints. He is not willing to repeat surgery to history of right carotid endarterectomy with resulted in infection. He is tolerating Repatha medication. Patient Active Problem List   Diagnosis    Coronary artery disease involving native coronary artery of native heart without angina pectoris    Hyperlipidemia    Nausea & vomiting    Carotid disease, bilateral ([address]    Chest pain    COPD (chronic obstructive pulmonary disease) ([address]    HTN (hypertension)    Stented coronary artery    IBS (irritable bowel syndrome)    GERD (gastroesophageal reflux disease)    CRP elevated    Weakness    SOB (shortness of breath)         Cardiac Testing: I have reviewed the findings below. EKG: 10/10/13  Sinus rhythm with 1st degree A-V block Otherwise normal     STRESS TEST: 10/8/13  Small sized inferior fixed defect consistent with infarction in the territory of the distal RCA . -There is no evidence of stress induced ischemia. -Normal LV function with ejection fraction of 65 %. Stress: 3/27/15  Summary  There is normal isotope uptake at stress and rest. There is no evidence of  myocardial ischemia or scar. There are no regional wall motion abnormalities. Normal left ventricular systolic function with ejection fraction of 62 %. Normal myocardial perfusion study. Stress Protocols    Resting ECG  Normal sinus rhythm.     Resting HR:74 bpm   Resting BP:132/84 mmHg    Pre-stress physical exam: Walk Lexiscan   Stress Protocol:Pharmacologic    Peak HR:106 bpm       HR/BP product:37974  Peak BP:164/83 mmHg  Predicted HR: 146 bpm  % of predicted HR: 73  Test duration: 4 min    CATH: 9/13/12  1. Known CAD without restenosis to previous RCA stents or without   progression and disease from previous assessment 2 years prior. 2. Patent SVG to diagonal branch; patent SVG to OM branch; known atretic   LIMA to LAD. 3. Well-preserved LV systolic function. Carotid doppler: 12/15/14  Summary    1. There is moderate heterogeneous plaque seen in the right internal carotid artery with an estimated diameter reduction of <50%. 2. There is minimal homogeneous plaque seen in the left internal carotid  artery with an estimated diameter reduction of <50%. 3. The vertebral arteries are patent with antegrade flow     Stress:2/18/16  Summary  There is no evidence of stress induced ischemia. The inferior wall is mildly  diminished at both stress and rest c/w possible diaphragm attenuation  artifact. Hyperdynamic LV systolic function with EY>72% with uniform wall  motion. Past Medical History:   has a past medical history of CAD (coronary artery disease), CAD (coronary artery disease), COPD (chronic obstructive pulmonary disease) ([address] COPD (chronic obstructive pulmonary disease) ([address] GERD (gastroesophageal reflux disease), HTN (hypertension), Hyperlipidemia, IBS (irritable bowel syndrome), and Stented coronary artery. Surgical History:   has a past surgical history that includes Coronary artery bypass graft (2000); Coronary angioplasty with stent (2005); Carotid endarterectomy (01/2014); Colonoscopy (2004); Upper gastrointestinal endoscopy (2004); Cholecystectomy; Cardiac catheterization (2009); Upper gastrointestinal endoscopy (7/15/14); and Diagnostic Cardiac Cath Lab Procedure. Social History:   reports that he quit smoking about 25 years ago. His smoking use included cigarettes. He has a 15.00 pack-year smoking history.  He has never used smokeless tobacco. He reports that he does not drink alcohol or use drugs. Family History:  No evidence for sudden cardiac death or premature CAD    Home Medications:  Reviewed and are listed in nursing record. and/or listed below  Current Outpatient Medications   Medication Sig Dispense Refill    Evolocumab (REPATHA SURECLICK) 897 MG/ML SOAJ INJECT (140 MG) SUBCUTANEOUSLY EVERY 2 WEEKS. KEEP REFRIGERATED. SINGLE DOSE DEVICE. 6 pen 3    clopidogrel (PLAVIX) 75 MG tablet TAKE 1 TABLET DAILY *APOTEXMFR* 90 tablet 3    hydrochlorothiazide (HYDRODIURIL) 12.5 MG tablet Take 1 tablet by mouth every morning 90 tablet 0    nitroGLYCERIN (NITROSTAT) 0.4 MG SL tablet Place 1 tablet under the tongue every 5 minutes as needed for Chest pain 25 tablet 3    esomeprazole (NEXIUM) 20 MG delayed release capsule Take 20 mg by mouth      aspirin 325 MG tablet Take 1 tablet by mouth daily 30 tablet 3    SPIRIVA HANDIHALER 18 MCG inhalation capsule Inhale 1 capsule into the lungs daily 90 capsule 1    Fluticasone-Salmeterol (ADVAIR DISKUS IN) Inhale  into the lungs.  albuterol (PROVENTIL HFA) 108 (90 BASE) MCG/ACT inhaler Inhale 2 puffs into the lungs every 4 hours as needed for Wheezing or Shortness of Breath. 1 Inhaler 0    promethazine (PHENERGAN) 25 MG tablet Take 25 mg by mouth every 6 hours as needed. No current facility-administered medications for this visit. Allergies:  Crestor [rosuvastatin calcium], Statins, Amlodipine, Budesonide-formoterol fumarate, Codeine, Dilaudid [hydromorphone hcl], Nitrofurantoin macrocrystal, Propoxyphene, Zetia [ezetimibe], Hydromorphone hcl, and Iodoform     Review of Systems:   All 14 point review of symptoms completed. Pertinent positives identified in the HPI, all other review of symptoms negative as below.     Review of Systems - History obtained from the patient  General ROS: negative for - chills, fever or night sweats  Psychological ROS: negative for - disorientation or hallucinations  Ophthalmic ROS: negative for - dry eyes, eye pain or loss of vision  ENT ROS: negative for - nasal discharge or sore throat  Allergy and Immunology ROS: negative for - hives or itchy/watery eyes  Hematological and Lymphatic ROS: negative for - jaundice or night sweats  Endocrine ROS: negative for - mood swings or temperature intolerance  Breast ROS: deferred  Respiratory ROS: negative for - hemoptysis or stridor  Cardiovascular ROS: negative for - chest pain, dyspnea on exertion or palpitations  Gastrointestinal ROS: no abdominal pain, change in bowel habits, or black or bloody stools  Genito-Urinary ROS: no dysuria, trouble voiding, or hematuria  Musculoskeletal ROS: negative for - gait disturbance, joint pain or joint stiffness  Neurological ROS: negative for - seizures or speech problems  Dermatological ROS: negative for - rash or skin lesion changes     Physical Examination:    Vitals:    05/06/21 1532   BP: (!) 114/54   Pulse:    SpO2:     Weight: 214 lb 8 oz (97.3 kg)     Wt Readings from Last 3 Encounters:   05/06/21 214 lb 8 oz (97.3 kg)   03/13/20 217 lb (98.4 kg)   03/13/19 215 lb 9.6 oz (97.8 kg)         General Appearance:  Alert, cooperative, no distress, appears stated age   Head:  Normocephalic, without obvious abnormality, atraumatic   Eyes:  PERRL, conjunctiva/corneas clear       Nose: Nares normal, no drainage or sinus tenderness   Throat: Lips, mucosa, and tongue normal   Neck: Supple, symmetrical, trachea midline, no adenopathy, thyroid: not enlarged, symmetric, no tenderness/mass/nodules, no carotid bruit or JVD       Lungs:   Clear to auscultation bilaterally, respirations unlabored   Chest Wall:  No tenderness or deformity   Heart:  Regular rhythm and normal rate; S1, S2 are normal; no murmur noted; no rub or gallop   Abdomen:   Soft, non-tender, bowel sounds active all four quadrants,  no masses, no organomegaly           Extremities: Extremities normal, atraumatic, no cyanosis or edema   Pulses: 2+ and symmetric   Skin: Skin at [facility] 2014   ~Carotid doppler 3/2016  3. Remote CAD with bypass. ~cath 2012 stable   ~stress test March 2015 normal.  4. Hyperlipidemia    ~intolerant of high dose statin therapy   ~intolerant to Lescol   ~tolerating Repatha   5. SOB      Plan:  1. I recommend that the patient continue their currently prescribed medications. Their drug modifiable risk factors appear to be well controlled. I will continue to address the need/dosing of medications in future visits. 2. Lipids in Care Everywhere  3. Carotid doppler for carotid stenosis  4. Recommend echocardiogram to evaluate cardiac structure  5. Follow up in 1 year    Scribe's attestation: This note was scribed in the presence of Dr. Yuki Olvera by Lisa Courtney RN     I, Dr. Yuki Olvera, personally performed the services described in this documentation, as scribed by the above signed scribe in my presence. It is both accurate and complete to my knowledge. I agree with the details independently gathered by the clinical support staff, while the remaining scribed note accurately describes my personal service to the patient. All questions and concerns were addressed to the patient/family. Alternatives to my treatment were discussed. The note was completed using EMR. Every effort was made to ensure accuracy; however, inadvertent computerized transcription errors may be present.     Yuki Olvera MD, [address], [place], [place]  [phone] [facility] office  [phone] [facility]  5/6/2021  3:49 PM

## 2021-06-18 ENCOUNTER — HOSPITAL ENCOUNTER (OUTPATIENT)
Dept: VASCULAR LAB | Age: 81
Discharge: HOME | End: 2021-06-18
Payer: COMMERCIAL

## 2021-06-18 ENCOUNTER — HOSPITAL ENCOUNTER (OUTPATIENT)
Dept: CARDIOLOGY | Age: 81
Discharge: HOME | End: 2021-06-18
Payer: COMMERCIAL

## 2021-06-18 DIAGNOSIS — I65.23 BILATERAL CAROTID ARTERY STENOSIS: ICD-10-CM

## 2021-06-18 DIAGNOSIS — R06.02 SOB (SHORTNESS OF BREATH): ICD-10-CM

## 2021-06-18 LAB
LV EF: 53 %
LVEF MODALITY: NORMAL

## 2021-06-18 PROCEDURE — 93306 TTE W/DOPPLER COMPLETE: CPT

## 2021-06-18 PROCEDURE — 93880 EXTRACRANIAL BILAT STUDY: CPT

## 2021-06-21 ENCOUNTER — TELEPHONE (OUTPATIENT)
Dept: CARDIOLOGY CLINIC | Age: 81
End: 2021-06-21

## 2021-06-21 NOTE — TELEPHONE ENCOUNTER
----- Message from Jose Austin MD sent at 6/18/2021  3:02 PM EDT -----  The test does NOT show any major change from prior testing. Please inform the patient of the results.      No major blockage issues

## 2021-06-21 NOTE — TELEPHONE ENCOUNTER
----- Message from Sole Holt MD sent at 6/18/2021  4:35 PM EDT -----  The test does NOT show any major change from prior testing. Please inform the patient of the results.

## 2021-06-30 DIAGNOSIS — E78.2 MIXED HYPERLIPIDEMIA: ICD-10-CM

## 2021-06-30 RX ORDER — CLOPIDOGREL BISULFATE 75 MG/1
TABLET ORAL
Qty: 90 TABLET | Refills: 3 | Status: SHIPPED | OUTPATIENT
Start: 2021-06-30 | End: 2022-05-05 | Stop reason: ALTCHOICE

## 2021-07-22 ENCOUNTER — TELEPHONE (OUTPATIENT)
Dept: CARDIOLOGY CLINIC | Age: 81
End: 2021-07-22

## 2021-07-22 NOTE — TELEPHONE ENCOUNTER
Patients spouse spoke with [facility] today. they stated they cannot ship because they have not received updated paperwork. The authorization  in may 2021. I let her know I would refer message on to the MA that does the [address] [place] [address] paperwork.

## 2021-07-23 NOTE — TELEPHONE ENCOUNTER
Spoke with pt wife, who was very angry. She says labs were completed with PCP, I explained yes we have access to the labs but unless they are sent to P we do not know they are completed. She says she notified [facility] office that labs were completed. She says she will not pay for labs again. I explained that insurance requires lipids 60 days prior to pa initiated. She does not want labs completed again because our office, \"dropped the ball. \"  I told her by using the April labs they may deny request. She still wants PA started with April [facility]. Initiated PA and awaiting response. Once we get response from insurance/avocarrots will scan it in chart.

## 2021-07-23 NOTE — TELEPHONE ENCOUNTER
Left message, pt needs fasting labs that were ordered in April. Once completed we can reprior auth repatha. Pt to call with concerns.

## 2021-07-23 NOTE — TELEPHONE ENCOUNTER
Pt wife called back stating they did lab work back in April at [facility]. Went under [facility] and they are there.

## 2021-07-30 ENCOUNTER — TELEPHONE (OUTPATIENT)
Dept: CARDIOLOGY CLINIC | Age: 81
End: 2021-07-30

## 2021-07-30 NOTE — TELEPHONE ENCOUNTER
[facility] called requesting a refill on Repatha 140 mg. Advised per last telephone encounter we are waiting on PA.  Once PA is completed please send prescription to [address] @ [phone]

## 2022-02-11 NOTE — PROGRESS NOTES
[facility]   Cardiovascular Evaluation    PATIENT: Cherelle Mukherjee  DATE: 2/15/2022  MRN: <[id number]>  CSN: [id number]  : 1940    Primary Care Doctor: Gualberto Foss MD    Reason for evaluation:   1 Year Follow Up and Coronary Artery Disease    Subjective; History of present illness on initial date of evaluation:   Cherelle Mukherjee is a 80 y.o. patient who presents for cardiology follow up. He has a past medical history including coronary artery disease s/p stent placement and coronary artery bypass grafting, hyperlipidemia, hypertension, carotid artery disease, COPD, and former tobacco abuse. Since last office visit he had an Echocardiogram completed on 21 showing ejection fraction 50-55%, mild left ventricular hypertrophy, mild aortic regurgitation, and mild mitral regurgitation. His carotid doppler on 2021 shows right internal carotid artery <50% stenosis. Left internal carotid artery appears normal. Both with normal antegrade flow. Today he states he is doing okay. He has bruising easily. Denies bleeding. Patient currently denies any weight gain, edema, palpitations, chest pain, shortness of breath, dizziness, and syncope. He is taking all medications as prescribed, tolerating well. Patient Active Problem List   Diagnosis    Coronary artery disease involving native coronary artery of native heart without angina pectoris    Hyperlipidemia    Nausea & vomiting    Carotid disease, bilateral ([address]    Chest pain    COPD (chronic obstructive pulmonary disease) ([address]    HTN (hypertension)    Stented coronary artery    IBS (irritable bowel syndrome)    GERD (gastroesophageal reflux disease)    CRP elevated    Weakness    SOB (shortness of breath)         Cardiac Testing: I have reviewed the findings below.   EKG: 10/10/13  Sinus rhythm with 1st degree A-V block Otherwise normal     STRESS TEST: 10/8/13  Small sized inferior fixed defect consistent with infarction in the territory of the distal RCA . -There is no evidence of stress induced ischemia. -Normal LV function with ejection fraction of 65 %. Stress: 3/27/15  Summary  There is normal isotope uptake at stress and rest. There is no evidence of  myocardial ischemia or scar. There are no regional wall motion abnormalities. Normal left ventricular systolic function with ejection fraction of 62 %. Normal myocardial perfusion study. Stress Protocols    Resting ECG  Normal sinus rhythm. Resting HR:74 bpm   Resting BP:132/84 mmHg    Pre-stress physical exam: Walk Lexiscan   Stress Protocol:Pharmacologic    Peak HR:106 bpm       HR/BP product:[id number]  Peak BP:164/83 mmHg  Predicted HR: 146 bpm  % of predicted HR: 73  Test duration: 4 min    CATH: 9/13/12  1. Known CAD without restenosis to previous RCA stents or without   progression and disease from previous assessment 2 years prior. 2. Patent SVG to diagonal branch; patent SVG to OM branch; known atretic   LIMA to LAD. 3. Well-preserved LV systolic function. Carotid doppler: 12/15/14  Summary    1. There is moderate heterogeneous plaque seen in the right internal carotid artery with an estimated diameter reduction of <50%. 2. There is minimal homogeneous plaque seen in the left internal carotid  artery with an estimated diameter reduction of <50%. 3. The vertebral arteries are patent with antegrade flow     Stress:2/18/16  Summary  There is no evidence of stress induced ischemia. The inferior wall is mildly  diminished at both stress and rest c/w possible diaphragm attenuation  artifact. Hyperdynamic LV systolic function with HE>81% with uniform wall  motion.     Past Medical History:   has a past medical history of CAD (coronary artery disease), CAD (coronary artery disease), COPD (chronic obstructive pulmonary disease) ([address] COPD (chronic obstructive pulmonary disease) ([address] GERD (gastroesophageal reflux disease), HTN (hypertension), Hyperlipidemia, IBS (irritable bowel syndrome), and Stented coronary artery. Surgical History:   has a past surgical history that includes Coronary artery bypass graft (2000); Coronary angioplasty with stent (2005); Carotid endarterectomy (01/2014); Colonoscopy (2004); Upper gastrointestinal endoscopy (2004); Cholecystectomy; Cardiac catheterization (2009); Upper gastrointestinal endoscopy (7/15/14); and Diagnostic Cardiac Cath Lab Procedure. Social History:   reports that he quit smoking about 25 years ago. His smoking use included cigarettes. He has a 15.00 pack-year smoking history. He has never used smokeless tobacco. He reports that he does not drink alcohol and does not use drugs. Family History:  No evidence for sudden cardiac death or premature CAD    Home Medications:  Reviewed and are listed in nursing record. and/or listed below  Current Outpatient Medications   Medication Sig Dispense Refill    hydroCHLOROthiazide (HYDRODIURIL) 12.5 MG tablet Take 1 tablet by mouth as needed (Take 1 tablet as needed for BP greater than 130/85) 30 tablet 1    clopidogrel (PLAVIX) 75 MG tablet TAKE 1 TABLET DAILY *APOTEXMFR* 90 tablet 3    Evolocumab (REPATHA SURECLICK) 224 MG/ML SOAJ INJECT (140 MG) SUBCUTANEOUSLY EVERY 2 WEEKS. KEEP REFRIGERATED. SINGLE DOSE DEVICE. 6 pen 3    nitroGLYCERIN (NITROSTAT) 0.4 MG SL tablet Place 1 tablet under the tongue every 5 minutes as needed for Chest pain 25 tablet 3    esomeprazole (NEXIUM) 20 MG delayed release capsule Take 20 mg by mouth      albuterol (PROVENTIL HFA) 108 (90 BASE) MCG/ACT inhaler Inhale 2 puffs into the lungs every 4 hours as needed for Wheezing or Shortness of Breath. 1 Inhaler 0    promethazine (PHENERGAN) 25 MG tablet Take 25 mg by mouth every 6 hours as needed.         SPIRIVA HANDIHALER 18 MCG inhalation capsule Inhale 1 capsule into the lungs daily (Patient not taking: Reported on 2/15/2022) 90 capsule 1    age   Head:  Normocephalic, without obvious abnormality, atraumatic   Eyes:  PERRL, conjunctiva/corneas clear       Nose: Nares normal, no drainage or sinus tenderness   Throat: Lips, mucosa, and tongue normal   Neck: Supple, symmetrical, trachea midline, no adenopathy, thyroid: not enlarged, symmetric, no tenderness/mass/nodules, no carotid bruit or JVD       Lungs:   Clear to auscultation bilaterally, respirations unlabored   Chest Wall:  No tenderness or deformity   Heart:  Regular rhythm and normal rate; S1, S2 are normal; no murmur noted; no rub or gallop   Abdomen:   Soft, non-tender, bowel sounds active all four quadrants,  no masses, no organomegaly           Extremities: Extremities normal, atraumatic, no cyanosis or edema   Pulses: 2+ and symmetric   Skin: Skin color, texture, turgor normal, no rashes or lesions   Pysch: Normal mood and affect   Neurologic: Normal gross motor and sensory exam.         Labs  CBC:   Lab Results   Component Value Date    WBC 8.6 03/02/2017    RBC 3.65 03/02/2017    HGB 11.9 03/02/2017    HCT 35.0 03/02/2017    MCV 96.0 03/02/2017    RDW 14.1 03/02/2017     03/02/2017     CMP:    Lab Results   Component Value Date     03/02/2017    K 4.2 03/02/2017    CL 99 03/02/2017    CO2 23 03/02/2017    BUN 20 03/02/2017    CREATININE 1.3 03/02/2017    GFRAA >60 03/02/2017    GFRAA >60 07/17/2012    AGRATIO 1.1 02/28/2017    LABGLOM 54 03/02/2017    GLUCOSE 81 03/02/2017    PROT 7.1 03/11/2019    PROT 7.0 07/17/2012    CALCIUM 8.4 03/02/2017    BILITOT 0.4 03/11/2019    ALKPHOS 96 03/11/2019    AST 15 03/11/2019    ALT 12 03/11/2019     PT/INR:    No components found for: PTPATIENT,  PTINR  Lab Results   Component Value Date    TROPONINI <0.01 03/01/2017     No components found for: CHLPL  Lab Results   Component Value Date    TRIG 75 03/20/2020    TRIG 77 03/11/2019    TRIG 91 03/01/2017     Lab Results   Component Value Date    HDL 47 03/20/2020    HDL 55 03/11/2019    HDL 52 06/25/2018     Lab Results   Component Value Date    LDLCALC 40 03/20/2020    LDLCALC 38 03/11/2019    LDLCALC 43 06/25/2018     Lab Results   Component Value Date    LABVLDL 15 03/11/2019    LABVLDL 14 06/25/2018    LABVLDL 18 03/01/2017     Lab Results   Component Value Date    ALT 12 03/11/2019    AST 15 03/11/2019    ALKPHOS 96 03/11/2019    BILITOT 0.4 03/11/2019         Assessment:  80 y.o. patient with:  1. Periodic acute hypertension/nocturnal hypertension   ~stable in the office today BP: 132/74    ~continue hydralazine as prescribed    ~suspect pulmonary component due to patient c/o precipitating SOB   2. Carotid artery disease   ~moderate heterogeneous plaque seen in the right internal carotid artery with an estimated diameter reduction of <50%. ~CEA at [facility] 2014   ~Carotid doppler 3/2016  3. Remote CAD with bypass. ~cath 2012 stable   ~stress test March 2015 normal.  4. Hyperlipidemia    ~intolerant of high dose statin therapy   ~intolerant to Lescol   ~tolerating Repatha     Plan:  1. Continue taking clopidogrel (Plavix) 75 mg daily ~ coronary artery disease. 2. Continue Repatha 140 mg/ml injection every 14 days for cholesterol management  3. Order Lipid panel  4. May take hydrochlorothiazide 12.5 mg as needed for elevated BP greater than 130/85. 5.Follow up in one year or sooner if needed. Scribe's attestation: This note was scribed in the presence of Tanya Forte by Jay Jay Munoz RN       I, Dr. Marisol Valentin, personally performed the services described in this documentation, as scribed by the above signed scribe in my presence. It is both accurate and complete to my knowledge. I agree with the details independently gathered by the clinical support staff, while the remaining scribed note accurately describes my personal service to the patient. Medical Decision Making:   The following items were considered in medical decision making:  Independent review of images  Review / order

## 2022-02-15 ENCOUNTER — OFFICE VISIT (OUTPATIENT)
Dept: CARDIOLOGY CLINIC | Age: 82
End: 2022-02-15
Payer: COMMERCIAL

## 2022-02-15 VITALS
OXYGEN SATURATION: 100 % | BODY MASS INDEX: 28.99 KG/M2 | WEIGHT: 214 LBS | HEIGHT: 72 IN | DIASTOLIC BLOOD PRESSURE: 74 MMHG | SYSTOLIC BLOOD PRESSURE: 132 MMHG | HEART RATE: 60 BPM

## 2022-02-15 DIAGNOSIS — I10 PRIMARY HYPERTENSION: ICD-10-CM

## 2022-02-15 DIAGNOSIS — I25.10 CORONARY ARTERY DISEASE INVOLVING NATIVE CORONARY ARTERY OF NATIVE HEART WITHOUT ANGINA PECTORIS: Primary | Chronic | ICD-10-CM

## 2022-02-15 DIAGNOSIS — I77.9 BILATERAL CAROTID ARTERY DISEASE, UNSPECIFIED TYPE (HCC): Chronic | ICD-10-CM

## 2022-02-15 DIAGNOSIS — E78.49 OTHER HYPERLIPIDEMIA: Chronic | ICD-10-CM

## 2022-02-15 PROCEDURE — 99214 OFFICE O/P EST MOD 30 MIN: CPT | Performed by: INTERNAL MEDICINE

## 2022-02-15 RX ORDER — HYDROCHLOROTHIAZIDE 12.5 MG/1
12.5 TABLET ORAL PRN
Qty: 30 TABLET | Refills: 1 | Status: SHIPPED | OUTPATIENT
Start: 2022-02-15 | End: 2022-06-28

## 2022-02-15 NOTE — PATIENT INSTRUCTIONS
Plan:  1. Continue taking clopidogrel (Plavix) 75 mg daily ~ coronary artery disease. 2. Continue Repatha 140 mg/ml injection every 14 days for cholesterol management  3. Order Lipid panel  4. May take hydrochlorothiazide 12.5 mg as needed for elvated BP greater than 130/85. 5.Follow up in one year or sooner if needed.

## 2022-02-15 NOTE — LETTER
Tono Marcum  1940      [address]   Cardiovascular Evaluation    PATIENT: Tono Marcum  DATE: 2/15/2022  MRN: <[id number]>  CSN: [id number]  : 1940    Primary Care Doctor: Laquita Hunter MD    Reason for evaluation:   1 Year Follow Up and Coronary Artery Disease    Subjective; History of present illness on initial date of evaluation:   Tono Marcum is a 80 y.o. patient who presents for cardiology follow up. He has a past medical history including coronary artery disease s/p stent placement and coronary artery bypass grafting, hyperlipidemia, hypertension, carotid artery disease, COPD, and former tobacco abuse. Since last office visit he had an Echocardiogram completed on 21 showing ejection fraction 50-55%, mild left ventricular hypertrophy, mild aortic regurgitation, and mild mitral regurgitation. His carotid doppler on 2021 shows right internal carotid artery <50% stenosis. Left internal carotid artery appears normal. Both with normal antegrade flow. Today he states he is doing okay. He has bruising easily. Denies bleeding. Patient currently denies any weight gain, edema, palpitations, chest pain, shortness of breath, dizziness, and syncope. He is taking all medications as prescribed, tolerating well. Patient Active Problem List   Diagnosis    Coronary artery disease involving native coronary artery of native heart without angina pectoris    Hyperlipidemia    Nausea & vomiting    Carotid disease, bilateral ([address]    Chest pain    COPD (chronic obstructive pulmonary disease) ([address]    HTN (hypertension)    Stented coronary artery    IBS (irritable bowel syndrome)    GERD (gastroesophageal reflux disease)    CRP elevated    Weakness    SOB (shortness of breath)         Cardiac Testing: I have reviewed the findings below.   EKG: 10/10/13  Sinus rhythm with 1st degree A-V block Otherwise normal     STRESS TEST: 10/8/13  Small sized inferior fixed defect consistent with infarction in the territory of the distal RCA . -There is no evidence of stress induced ischemia. -Normal LV function with ejection fraction of 65 %. Stress: 3/27/15  Summary  There is normal isotope uptake at stress and rest. There is no evidence of  myocardial ischemia or scar. There are no regional wall motion abnormalities. Normal left ventricular systolic function with ejection fraction of 62 %. Normal myocardial perfusion study. Stress Protocols    Resting ECG  Normal sinus rhythm. Resting HR:74 bpm   Resting BP:132/84 mmHg    Pre-stress physical exam: Walk Lexiscan   Stress Protocol:Pharmacologic    Peak HR:106 bpm       HR/BP product:90725  Peak BP:164/83 mmHg  Predicted HR: 146 bpm  % of predicted HR: 73  Test duration: 4 min    CATH: 9/13/12  1. Known CAD without restenosis to previous RCA stents or without   progression and disease from previous assessment 2 years prior. 2. Patent SVG to diagonal branch; patent SVG to OM branch; known atretic   LIMA to LAD. 3. Well-preserved LV systolic function. Carotid doppler: 12/15/14  Summary    1. There is moderate heterogeneous plaque seen in the right internal carotid artery with an estimated diameter reduction of <50%. 2. There is minimal homogeneous plaque seen in the left internal carotid  artery with an estimated diameter reduction of <50%. 3. The vertebral arteries are patent with antegrade flow     Stress:2/18/16  Summary  There is no evidence of stress induced ischemia. The inferior wall is mildly  diminished at both stress and rest c/w possible diaphragm attenuation  artifact. Hyperdynamic LV systolic function with AW>46% with uniform wall  motion.     Past Medical History:   has a past medical history of CAD (coronary artery disease), CAD (coronary artery disease), COPD (chronic obstructive pulmonary disease) ([address] COPD (chronic obstructive pulmonary disease) ([address] GERD (gastroesophageal reflux disease), HTN (hypertension), Hyperlipidemia, IBS (irritable bowel syndrome), and Stented coronary artery. Surgical History:   has a past surgical history that includes Coronary artery bypass graft (2000); Coronary angioplasty with stent (2005); Carotid endarterectomy (01/2014); Colonoscopy (2004); Upper gastrointestinal endoscopy (2004); Cholecystectomy; Cardiac catheterization (2009); Upper gastrointestinal endoscopy (7/15/14); and Diagnostic Cardiac Cath Lab Procedure. Social History:   reports that he quit smoking about 25 years ago. His smoking use included cigarettes. He has a 15.00 pack-year smoking history. He has never used smokeless tobacco. He reports that he does not drink alcohol and does not use drugs. Family History:  No evidence for sudden cardiac death or premature CAD    Home Medications:  Reviewed and are listed in nursing record. and/or listed below  Current Outpatient Medications   Medication Sig Dispense Refill    hydroCHLOROthiazide (HYDRODIURIL) 12.5 MG tablet Take 1 tablet by mouth as needed (Take 1 tablet as needed for BP greater than 130/85) 30 tablet 1    clopidogrel (PLAVIX) 75 MG tablet TAKE 1 TABLET DAILY *APOTEXMFR* 90 tablet 3    Evolocumab (REPATHA SURECLICK) 096 MG/ML SOAJ INJECT (140 MG) SUBCUTANEOUSLY EVERY 2 WEEKS. KEEP REFRIGERATED. SINGLE DOSE DEVICE. 6 pen 3    nitroGLYCERIN (NITROSTAT) 0.4 MG SL tablet Place 1 tablet under the tongue every 5 minutes as needed for Chest pain 25 tablet 3    esomeprazole (NEXIUM) 20 MG delayed release capsule Take 20 mg by mouth      albuterol (PROVENTIL HFA) 108 (90 BASE) MCG/ACT inhaler Inhale 2 puffs into the lungs every 4 hours as needed for Wheezing or Shortness of Breath. 1 Inhaler 0    promethazine (PHENERGAN) 25 MG tablet Take 25 mg by mouth every 6 hours as needed.         SPIRIVA HANDIHALER 18 MCG inhalation capsule Inhale 1 capsule into the lungs daily (Patient not taking: Reported on 2/15/2022) 90 capsule 1  Fluticasone-Salmeterol (ADVAIR DISKUS IN) Inhale  into the lungs. (Patient not taking: Reported on 2/15/2022)       No current facility-administered medications for this visit. Allergies:  Crestor [rosuvastatin calcium], Statins, Amlodipine, Budesonide-formoterol fumarate, Codeine, Dilaudid [hydromorphone hcl], Nitrofurantoin macrocrystal, Propoxyphene, Zetia [ezetimibe], Hydromorphone hcl, and Iodoform     Review of Systems:   All 14 point review of symptoms completed. Pertinent positives identified in the HPI, all other review of symptoms negative as below.     Review of Systems - History obtained from the patient  General ROS: negative for - chills, fever or night sweats  Psychological ROS: negative for - disorientation or hallucinations  Ophthalmic ROS: negative for - dry eyes, eye pain or loss of vision  ENT ROS: negative for - nasal discharge or sore throat  Allergy and Immunology ROS: negative for - hives or itchy/watery eyes  Hematological and Lymphatic ROS: negative for - jaundice or night sweats  Endocrine ROS: negative for - mood swings or temperature intolerance  Breast ROS: deferred  Respiratory ROS: negative for - hemoptysis or stridor  Cardiovascular ROS: negative for - chest pain, dyspnea on exertion or palpitations  Gastrointestinal ROS: no abdominal pain, change in bowel habits, or black or bloody stools  Genito-Urinary ROS: no dysuria, trouble voiding, or hematuria  Musculoskeletal ROS: negative for - gait disturbance, joint pain or joint stiffness  Neurological ROS: negative for - seizures or speech problems  Dermatological ROS: negative for - rash or skin lesion changes     Physical Examination:    Vitals:    02/15/22 0804   BP: 132/74   Pulse: 60   SpO2: 100%    Weight: 214 lb (97.1 kg)     Wt Readings from Last 3 Encounters:   02/15/22 214 lb (97.1 kg)   05/06/21 214 lb 8 oz (97.3 kg)   03/13/20 217 lb (98.4 kg)         General Appearance:  Alert, cooperative, no distress, appears stated age   Head:  Normocephalic, without obvious abnormality, atraumatic   Eyes:  PERRL, conjunctiva/corneas clear       Nose: Nares normal, no drainage or sinus tenderness   Throat: Lips, mucosa, and tongue normal   Neck: Supple, symmetrical, trachea midline, no adenopathy, thyroid: not enlarged, symmetric, no tenderness/mass/nodules, no carotid bruit or JVD       Lungs:   Clear to auscultation bilaterally, respirations unlabored   Chest Wall:  No tenderness or deformity   Heart:  Regular rhythm and normal rate; S1, S2 are normal; no murmur noted; no rub or gallop   Abdomen:   Soft, non-tender, bowel sounds active all four quadrants,  no masses, no organomegaly           Extremities: Extremities normal, atraumatic, no cyanosis or edema   Pulses: 2+ and symmetric   Skin: Skin color, texture, turgor normal, no rashes or lesions   Pysch: Normal mood and affect   Neurologic: Normal gross motor and sensory exam.         Labs  CBC:   Lab Results   Component Value Date    WBC 8.6 03/02/2017    RBC 3.65 03/02/2017    HGB 11.9 03/02/2017    HCT 35.0 03/02/2017    MCV 96.0 03/02/2017    RDW 14.1 03/02/2017     03/02/2017     CMP:    Lab Results   Component Value Date     03/02/2017    K 4.2 03/02/2017    CL 99 03/02/2017    CO2 23 03/02/2017    BUN 20 03/02/2017    CREATININE 1.3 03/02/2017    GFRAA >60 03/02/2017    GFRAA >60 07/17/2012    AGRATIO 1.1 02/28/2017    LABGLOM 54 03/02/2017    GLUCOSE 81 03/02/2017    PROT 7.1 03/11/2019    PROT 7.0 07/17/2012    CALCIUM 8.4 03/02/2017    BILITOT 0.4 03/11/2019    ALKPHOS 96 03/11/2019    AST 15 03/11/2019    ALT 12 03/11/2019     PT/INR:    No components found for: PTPATIENT,  PTINR  Lab Results   Component Value Date    TROPONINI <0.01 03/01/2017     No components found for: CHLPL  Lab Results   Component Value Date    TRIG 75 03/20/2020    TRIG 77 03/11/2019    TRIG 91 03/01/2017     Lab Results   Component Value Date    HDL 47 03/20/2020    HDL 55 03/11/2019 HDL 52 06/25/2018     Lab Results   Component Value Date    LDLCALC 40 03/20/2020    LDLCALC 38 03/11/2019    LDLCALC 43 06/25/2018     Lab Results   Component Value Date    LABVLDL 15 03/11/2019    LABVLDL 14 06/25/2018    LABVLDL 18 03/01/2017     Lab Results   Component Value Date    ALT 12 03/11/2019    AST 15 03/11/2019    ALKPHOS 96 03/11/2019    BILITOT 0.4 03/11/2019         Assessment:  80 y.o. patient with:  1. Periodic acute hypertension/nocturnal hypertension   ~stable in the office today BP: 132/74    ~continue hydralazine as prescribed    ~suspect pulmonary component due to patient c/o precipitating SOB   2. Carotid artery disease   ~moderate heterogeneous plaque seen in the right internal carotid artery with an estimated diameter reduction of <50%. ~[facility]A at [facility] 2014   ~Carotid doppler 3/2016  3. Remote CAD with bypass. ~cath 2012 stable   ~stress test March 2015 normal.  4. Hyperlipidemia    ~intolerant of high dose statin therapy   ~intolerant to Lescol   ~tolerating Repatha     Plan:  1. Continue taking clopidogrel (Plavix) 75 mg daily ~ coronary artery disease. 2. Continue Repatha 140 mg/ml injection every 14 days for cholesterol management  3. Order Lipid panel  4. May take hydrochlorothiazide 12.5 mg as needed for elevated BP greater than 130/85. 5.Follow up in one year or sooner if needed. Scribe's attestation: This note was scribed in the presence of Renny Tyson by Marcia Long RN       I, Dr. Andre Win, personally performed the services described in this documentation, as scribed by the above signed scribe in my presence. It is both accurate and complete to my knowledge. I agree with the details independently gathered by the clinical support staff, while the remaining scribed note accurately describes my personal service to the patient. Medical Decision Making:   The following items were considered in medical decision making:  Independent review of images  Review / order clinical lab tests  Review / order radiology tests  Decision to obtain old records  Review and summation of old records as accessed through [facility] (a summary of my findings in these old records)      Time Based Itemization  A total of 30 minutes was spent on today's patient encounter. If applicable, non-patient-facing activities:  (X)Preparing to see the patient and reviewing records  (X) Individual interpretation of results  ( ) Discussion or coordination of care with other health care professionals  () Ordering of unique tests, medications, or procedures  (X) Documentation within the EHR       All questions and concerns were addressed to the patient/family. Alternatives to my treatment were discussed. The note was completed using EMR. Every effort was made to ensure accuracy; however, inadvertent computerized transcription errors may be present.     Marv Vazquez MD, [address], [address], [place]  [phone] [facility]  [phone] [facility]  2/15/2022  8:48 AM

## 2022-02-28 ENCOUNTER — TELEPHONE (OUTPATIENT)
Dept: CARDIOLOGY CLINIC | Age: 82
End: 2022-02-28

## 2022-02-28 DIAGNOSIS — I82.463 DEEP VEIN THROMBOSIS (DVT) OF CALF MUSCLE VEIN OF BOTH LOWER EXTREMITIES, UNSPECIFIED CHRONICITY (HCC): Primary | ICD-10-CM

## 2022-02-28 DIAGNOSIS — M79.604 PAIN IN BOTH LOWER EXTREMITIES: ICD-10-CM

## 2022-02-28 DIAGNOSIS — M79.605 PAIN IN BOTH LOWER EXTREMITIES: ICD-10-CM

## 2022-02-28 DIAGNOSIS — R60.0 LOCALIZED EDEMA: ICD-10-CM

## 2022-02-28 DIAGNOSIS — Z76.89 ENCOUNTER FOR ASSESSMENT FOR DEEP VEIN THROMBOSIS (DVT): ICD-10-CM

## 2022-02-28 NOTE — TELEPHONE ENCOUNTER
Your provider has ordered testing for further evaluation. An order/prescription has been included in your paper work.  To schedule outpatient testing, contact Central Scheduling by calling [employer] ([phone]). Order placed for bilateral venous doppler as patient wife states edema occurs in both lower extremities at times, Right is greater than left. Central scheduling number provided for patient.

## 2022-03-01 ENCOUNTER — TELEPHONE (OUTPATIENT)
Dept: CARDIOLOGY CLINIC | Age: 82
End: 2022-03-01

## 2022-03-01 ENCOUNTER — HOSPITAL ENCOUNTER (OUTPATIENT)
Dept: VASCULAR LAB | Age: 82
Discharge: HOME | End: 2022-03-01
Payer: COMMERCIAL

## 2022-03-01 DIAGNOSIS — M71.20 SYNOVIAL CYST OF POPLITEAL SPACE, UNSPECIFIED LATERALITY: Primary | ICD-10-CM

## 2022-03-01 DIAGNOSIS — Z76.89 ENCOUNTER FOR ASSESSMENT FOR DEEP VEIN THROMBOSIS (DVT): ICD-10-CM

## 2022-03-01 DIAGNOSIS — M79.605 PAIN IN BOTH LOWER EXTREMITIES: ICD-10-CM

## 2022-03-01 DIAGNOSIS — R60.0 LOCALIZED EDEMA: ICD-10-CM

## 2022-03-01 DIAGNOSIS — M79.604 PAIN IN BOTH LOWER EXTREMITIES: ICD-10-CM

## 2022-03-01 PROCEDURE — 93970 EXTREMITY STUDY: CPT

## 2022-03-01 NOTE — TELEPHONE ENCOUNTER
Spoke with patient and relayed results. He VU.  He states he made appointment on 03/18/22 with vascular team.

## 2022-03-01 NOTE — TELEPHONE ENCOUNTER
----- Message from Maryanne Jules MD sent at 3/1/2022  8:55 AM EST -----  Please inform the patient that the test is abnormal.   The test shows - no DVT but does have a cyst.    I recommend that the patient be referred to vascular surgery for consultation.

## 2022-03-01 NOTE — TELEPHONE ENCOUNTER
Referral placed in [facility] for pt to contact the office.

## 2022-03-08 ENCOUNTER — TELEPHONE (OUTPATIENT)
Dept: CARDIOLOGY CLINIC | Age: 82
End: 2022-03-08

## 2022-03-08 DIAGNOSIS — R04.0 BLOODY NOSE: Primary | ICD-10-CM

## 2022-03-08 NOTE — TELEPHONE ENCOUNTER
Wife sts  is having nose bleeds since 3/3/2022. Not continuously. They called their Nurses hotline who said to blow nose then apply pressure to nose. This does stop the bleeding but starts again later or next day. Pt has stopped his clopidogrel (PLAVIX) 75 MG tablet this AM.   Wife hien B/P has been all over the place. Pt takes hydroCHLOROthiazide (HYDRODIURIL) 12.5 MG tablet. Please advise.

## 2022-03-14 ENCOUNTER — TELEPHONE (OUTPATIENT)
Dept: CARDIOLOGY CLINIC | Age: 82
End: 2022-03-14

## 2022-03-14 DIAGNOSIS — M71.20 SYNOVIAL CYST OF POPLITEAL SPACE, UNSPECIFIED LATERALITY: Primary | ICD-10-CM

## 2022-03-14 NOTE — TELEPHONE ENCOUNTER
Pt's wife was told by [facility] Dr Katie Cheek RN that they do not treat Cara Hutson told wife they would need ortho referral. TY

## 2022-05-05 ENCOUNTER — TELEPHONE (OUTPATIENT)
Dept: CARDIOLOGY CLINIC | Age: 82
End: 2022-05-05

## 2022-05-05 ENCOUNTER — OFFICE VISIT (OUTPATIENT)
Dept: CARDIOLOGY CLINIC | Age: 82
End: 2022-05-05
Payer: COMMERCIAL

## 2022-05-05 VITALS
BODY MASS INDEX: 28.44 KG/M2 | OXYGEN SATURATION: 96 % | DIASTOLIC BLOOD PRESSURE: 80 MMHG | HEIGHT: 72 IN | SYSTOLIC BLOOD PRESSURE: 136 MMHG | HEART RATE: 95 BPM | WEIGHT: 210 LBS

## 2022-05-05 DIAGNOSIS — I48.4 ATYPICAL ATRIAL FLUTTER (HCC): ICD-10-CM

## 2022-05-05 DIAGNOSIS — I65.23 BILATERAL CAROTID ARTERY STENOSIS: Chronic | ICD-10-CM

## 2022-05-05 DIAGNOSIS — I10 PRIMARY HYPERTENSION: Chronic | ICD-10-CM

## 2022-05-05 DIAGNOSIS — I25.10 CORONARY ARTERY DISEASE INVOLVING NATIVE CORONARY ARTERY OF NATIVE HEART WITHOUT ANGINA PECTORIS: Primary | Chronic | ICD-10-CM

## 2022-05-05 DIAGNOSIS — J44.9 CHRONIC OBSTRUCTIVE PULMONARY DISEASE, UNSPECIFIED COPD TYPE (HCC): Chronic | ICD-10-CM

## 2022-05-05 DIAGNOSIS — E78.49 OTHER HYPERLIPIDEMIA: Chronic | ICD-10-CM

## 2022-05-05 PROBLEM — J43.2 CENTRILOBULAR EMPHYSEMA (HCC): Status: ACTIVE | Noted: 2020-10-31

## 2022-05-05 PROCEDURE — 99215 OFFICE O/P EST HI 40 MIN: CPT | Performed by: NURSE PRACTITIONER

## 2022-05-05 NOTE — PATIENT INSTRUCTIONS
1. Stop the Plavix  2. Start eliquis 5 mg twice daily  3. 2 week event monitor  4. Echocardiogram - call [address] [place] [address] ([phone]) to schedule   5. Will call you with the results of these tests and any new recommendations  6.  Follow up with Dr. Melani Meyers in 2-3 months

## 2022-05-05 NOTE — PROGRESS NOTES
[address]   Cardiac Evaluation    Primary Care Doctor:  Michela Villanueva MD    Chief Complaint   Patient presents with    Follow-up    Other     possible AFL or AF.  Palpitations     x20     Shortness of Breath     on home o2 at night (COPD).  Fatigue        Assessment:    1. Coronary artery disease involving native coronary artery of native heart without angina pectoris    2. Primary hypertension    3. Other hyperlipidemia    4. Bilateral carotid artery stenosis    5. Chronic obstructive pulmonary disease, unspecified COPD type ([address]        Plan:   1. Stop the Plavix (no hx ACS or CVA, > 10 years since last intervention)  2. Start eliquis 5 mg twice daily  3. 2 week event monitor  4. Echocardiogram - call [address] [place] [address] ([phone]) to schedule   5. Will call you with the results of these tests and any new recommendations  6. Follow up with Dr. Rm Todd in 2-3 months     Vitals:    05/05/22 1301   BP: 136/80   Pulse: 95   SpO2: 96%   Weight: 210 lb (95.3 kg)   Height: 6' (1.829 m)       History of Present Illness:   I had the pleasure of seeing Samra Christie is an urgent visit for new onset A. fib per the PCP. He has a history of CAD with prior PCI to LAD in 1998, followed by CABG x3 in 2000, PCI to RCA in 2009, hypertension, hyperlipidemia, cerebrovascular disease with L CEA in 2014, COPD. No hx of MI or CVA. He saw his PCP this am, was sick. He has nausea that has been ongoing for several years (23 years), between 4-7 am, most mornings. PCP heard an irregularity of heart beat, did ECG showing AFib vs Aflutter. Has multiple bruising on arms and legs. Previously stopped aspirin due to irritation to stomach. Has mottling skin color changes on abdomen and trunk. Has been constant for several years. Thinks was after starting Repatha. Does not take/ use the hydrochlorothiazide often, ~ 5 times in past 3 months.    PCP did blood work this am.   + occasional fluttering palpitation but brief and rare. No dizziness. + fatigue. No chest pain. + shortness of breath but at his baseline. Kalani Gay describes symptoms including palpitations, fatigue, edema but denies chest pain, dyspnea, orthopnea, PND, early saiety, syncope. NYHA:   II  ACC/ AHA Stage:    C    Past Medical History:   has a past medical history of CAD (coronary artery disease), CAD (coronary artery disease), COPD (chronic obstructive pulmonary disease) (HCC), COPD (chronic obstructive pulmonary disease) (HCC), GERD (gastroesophageal reflux disease), HTN (hypertension), Hyperlipidemia, IBS (irritable bowel syndrome), and Stented coronary artery. Surgical History:   has a past surgical history that includes Coronary artery bypass graft (2000); Coronary angioplasty with stent (2005); Carotid endarterectomy (01/2014); Colonoscopy (2004); Upper gastrointestinal endoscopy (2004); Cholecystectomy; Cardiac catheterization (2009); Upper gastrointestinal endoscopy (7/15/14); and Diagnostic Cardiac Cath Lab Procedure. Social History:   reports that he quit smoking about 26 years ago. His smoking use included cigarettes. He has a 15.00 pack-year smoking history. He has never used smokeless tobacco. He reports that he does not drink alcohol and does not use drugs. Family History:   Family History   Problem Relation Age of Onset    Diabetes Mother     Heart Failure Mother     Heart Failure Father     Heart Failure Sister     Pacemaker Brother     Asthma Neg Hx     Cancer Neg Hx     Emphysema Neg Hx     Hypertension Neg Hx        Home Medications:  Prior to Admission medications    Medication Sig Start Date End Date Taking?  Authorizing Provider   Fluticasone-Umeclidin-Vilant ([place]) Inhale into the lungs   Yes Historical Provider, MD   hydroCHLOROthiazide (HYDRODIURIL) 12.5 MG tablet Take 1 tablet by mouth as needed (Take 1 tablet as needed for BP greater than 130/85) 2/15/22  Yes Yordan Yeung MD clopidogrel (PLAVIX) 75 MG tablet TAKE 1 TABLET DAILY *APOTEXMFR* 6/30/21  Yes Joslyn Delcid MD   Evolocumab (REPATHA SURECLICK) 984 MG/ML SOAJ INJECT (140 MG) SUBCUTANEOUSLY EVERY 2 WEEKS. KEEP REFRIGERATED. SINGLE DOSE DEVICE. 8/3/20  Yes Chitra Mike MD   nitroGLYCERIN (NITROSTAT) 0.4 MG SL tablet Place 1 tablet under the tongue every 5 minutes as needed for Chest pain 6/1/18  Yes Joslyn Delcid MD   esomeprazole (NEXIUM) 20 MG delayed release capsule Take 20 mg by mouth daily as needed    Yes Historical Provider, MD   Fluticasone-Salmeterol ([address]) Inhale into the lungs    Yes Historical Provider, MD   albuterol (PROVENTIL HFA) 108 (90 BASE) MCG/ACT inhaler Inhale 2 puffs into the lungs every 4 hours as needed for Wheezing or Shortness of Breath. 10/9/13  Yes Marisa Baca MD   promethazine (PHENERGAN) 25 MG tablet Take 25 mg by mouth every 6 hours as needed. Yes Historical Provider, MD        Allergies:  Crestor [rosuvastatin calcium], Statins, Amlodipine, Budesonide-formoterol fumarate, Codeine, Dilaudid [hydromorphone hcl], Nitrofurantoin macrocrystal, Other, Propoxyphene, Zetia [ezetimibe], Hydromorphone hcl, and Iodoform     Physical Examination:    Vitals:    05/05/22 1301   BP: 136/80   Pulse: 95   SpO2: 96%   Weight: 210 lb (95.3 kg)   Height: 6' (1.829 m)     Constitutional and General Appearance: Warm and dry, no apparent distress, normal coloration  HEENT:  Normocephalic, atraumatic  Respiratory:  · Normal excursion and expansion without use of accessory muscles  · Resp Auscultation: Clear to auscultation   Cardiovascular:  · The apical impulses not displaced  · Heart tones are crisp and normal  · JVP 8 cm H2O  · Irregular rate and rhythm, normal S1S2, no m/g/r  · Peripheral pulses are symmetrical and full  · There is no clubbing, cyanosis of the extremities.   · 1+ pitting BLE edema  · Pedal Pulses: 2+ and equal   Abdomen:  · No masses or tenderness  · Liver/Spleen: No Abnormalities Noted  Neurological/Psychiatric:  · Alert and oriented in all spheres  · Moves all extremities well  · Exhibits normal gait balance and coordination  · No abnormalities of mood, affect, memory, mentation, or behavior are noted    Lab Data:  Most recent lab results below reviewed in office    CBC:   Lab Results   Component Value Date    WBC 8.6 2017    WBC 9.6 2017    WBC 10.5 2017    RBC 3.65 2017    RBC 3.75 2017    RBC 4.41 2017    HGB 11.9 2017    HGB 12.0 2017    HGB 14.2 2017    HCT 35.0 2017    HCT 35.7 2017    HCT 41.8 2017    MCV 96.0 2017    MCV 95.1 2017    MCV 94.9 2017    RDW 14.1 2017    RDW 14.8 2017    RDW 14.4 2017     2017     2017     2017     BMP:  Lab Results   Component Value Date     2017     2017     2017    K 4.2 2017    K 4.2 2017    K 4.4 2017    CL 99 2017     2017     2017    CO2 23 2017    CO2 23 2017    CO2 25 2017    PHOS 2.4 2017    PHOS 3.0 2017    BUN 20 2017    BUN 24 2017    BUN 18 2017    CREATININE 1.3 2017    CREATININE 1.3 2017    CREATININE 1.2 2017     BNP:   Lab Results   Component Value Date    PROBNP 489 2017     LIPID:   Lab Results   Component Value Date    TRIG 75 2020    TRIG 77 2019    HDL 47 2020    HDL 55 2019    LDLCALC 40 2020    LDLCALC 38 2019       Cardiac Imagin lead EKG (from PCP) 22:  2:1 - 4:1 atrial flutter, rate of 72 bpm, left axis deviation    Echocardiogram 2021:   Summary   Low normal systolic function with an estimated ejection fraction of 50-55%. Mild concentric left ventricular hypertrophy. Normal left ventricular diastolic filling pressure.    The right ventricle is normal in size with decreased function. The right atrium is mildly dilated. Mild aortic regurgitation. MIld mitral regurgitation. Stress test September 2017:      Summary    Normal LV size and systolic function. Left ventricular ejection fraction of    69%. Normal wall motion.    There is normal isotope uptake at stress and rest. There is no evidence of    myocardial ischemia or scar. Stress: 3/27/15  Summary  There is normal isotope uptake at stress and rest. There is no evidence of  myocardial ischemia or scar. There are no regional wall motion abnormalities. Normal left ventricular systolic function with ejection fraction of 62 %. Normal myocardial perfusion study. Stress Protocols    Resting ECG  Normal sinus rhythm. Resting HR:74 bpm   Resting BP:132/84 mmHg    Pre-stress physical exam: Walk Lexiscan   Stress Protocol:Pharmacologic    Peak HR:106 bpm       HR/BP product:11259  Peak BP:164/83 mmHg  Predicted HR: 146 bpm  % of predicted HR: 73  Test duration: 4 min      STRESS TEST: 10/8/13  Small sized inferior fixed defect consistent with infarction in the territory of the distal RCA . -There is no evidence of stress induced ischemia. -Normal LV function with ejection fraction of 65 %. CATH: 9/13/12  1. Known CAD without restenosis to previous RCA stents or without progression and disease from previous assessment 2 years prior. 2. Patent SVG to diagonal branch; patent SVG to OM branch; known atretic LIMA to LAD. 3. Well-preserved LV systolic function. More than 45 minutes of time was spent during this visit, more than 50% of which was spent in direct patient contact, counseling and/or coordinating care.      I appreciate the opportunity of cooperating in the care of this individual.    Anita Desouza, LASHONDA - CNP, 5/5/2022, 1:19 PM

## 2022-05-05 NOTE — TELEPHONE ENCOUNTER
Monitor placed by Gabriella Levi, [address] [facility]  Length of monitor 2 weeks  Monitor ordered by Saba Rm NP  Blue tooth ID: [id number]  Activation successful prior to pt leaving office?  Yes

## 2022-05-06 ENCOUNTER — TELEPHONE (OUTPATIENT)
Dept: CARDIOLOGY CLINIC | Age: 82
End: 2022-05-06

## 2022-05-06 RX ORDER — DILTIAZEM HYDROCHLORIDE 120 MG/1
120 CAPSULE, COATED, EXTENDED RELEASE ORAL DAILY
Qty: 30 CAPSULE | Refills: 3 | Status: SHIPPED | OUTPATIENT
Start: 2022-05-06 | End: 2022-06-28

## 2022-05-06 NOTE — TELEPHONE ENCOUNTER
Pt's wife Christian Jc called to report that today pt's BP is high and he cannot get it to come down with BP medication. This morning it was 157/110. After taking HCTZ BP is 140/98. Denies any symptoms or changes in medications etc. Christian Jc stated pt is currently wearing a cardiac event monitor. Please advise.

## 2022-05-06 NOTE — TELEPHONE ENCOUNTER
Pt reporting high BP - [facility] [employer] Event report scanned into media, handling in separate encounter

## 2022-05-06 NOTE — TELEPHONE ENCOUNTER
Spoke to pt to relay message. V/U     Pt states BP has come down slightly - around 140/80. Pt states he is not feeling any better despite BP drop.      Medication pending

## 2022-05-06 NOTE — TELEPHONE ENCOUNTER
Noted, EKG also showed this. Monitor to assess burden and heart rate. No new recommendations.    Johnna Points, APRN - CNP

## 2022-05-09 NOTE — TELEPHONE ENCOUNTER
Confirm no bleed ing issues w/ the Eliquis  Eliquis would not lower BP  Hold HCTZ when SBP < 100  Resume diltiazem

## 2022-05-09 NOTE — TELEPHONE ENCOUNTER
DD CNP and [facility] OOT. Please advise. Spoke to patient wife Alejandra Rogers she states patient blood pressure has been low since starting Eliquis, she states he has episodes of dizziness and lightheadedness. Reports BP this AM 93/47. States patient has chronic nausea. She denies he has coffee ground emesis or any tarry stools. She states he has not been taking diltiazem or hydrochlorothiazide in relation to symptoms and low BP. She recently got another BP reading after walking with patient now 137/74.

## 2022-05-09 NOTE — TELEPHONE ENCOUNTER
Pts wife called to say that pts bp keeps dropping. On 05/09/2022 in the early morning pts bp was 103/60 and at 10:45am on 05/09/2022 pts bp was 93/47. Pt is dizzy and lightheaded. Pts wife, Immanuel Aguayo would prefer we call her.

## 2022-05-09 NOTE — TELEPHONE ENCOUNTER
Spoke with patient and notified of [address] instructions. She states patient is having nausea, she states she is not concerned with patient bleeding she states he is not having any dark stools or any bleeding of concern. She states the patient has not started the diltiazem and refuses to do so in the fear patient BP will go to low and patient would pass out. She states patient is taking BP 5 times a day and ranges fluctuate from 170/100's to as low as 90/70's she is very concerned. I advised patient to have appt with [facility] to discuss concerns and medications in detail, she JEWEL. Did explain importance of patient taking Eliquis as prescribed, in reducing risk of stoke and monitoring for any bleeding, she JEWEL.

## 2022-05-17 ENCOUNTER — OFFICE VISIT (OUTPATIENT)
Dept: CARDIOLOGY CLINIC | Age: 82
End: 2022-05-17
Payer: COMMERCIAL

## 2022-05-17 ENCOUNTER — TELEPHONE (OUTPATIENT)
Dept: CARDIOLOGY CLINIC | Age: 82
End: 2022-05-17

## 2022-05-17 VITALS
DIASTOLIC BLOOD PRESSURE: 70 MMHG | HEART RATE: 108 BPM | BODY MASS INDEX: 27.47 KG/M2 | SYSTOLIC BLOOD PRESSURE: 134 MMHG | OXYGEN SATURATION: 99 % | HEIGHT: 72 IN | WEIGHT: 202.8 LBS

## 2022-05-17 DIAGNOSIS — R94.31 ABNORMAL EKG: ICD-10-CM

## 2022-05-17 DIAGNOSIS — I10 PRIMARY HYPERTENSION: ICD-10-CM

## 2022-05-17 DIAGNOSIS — E78.49 OTHER HYPERLIPIDEMIA: ICD-10-CM

## 2022-05-17 DIAGNOSIS — I48.92 ATRIAL FLUTTER BY ELECTROCARDIOGRAM (HCC): ICD-10-CM

## 2022-05-17 DIAGNOSIS — R06.02 SOB (SHORTNESS OF BREATH): ICD-10-CM

## 2022-05-17 DIAGNOSIS — I25.10 CORONARY ARTERY DISEASE INVOLVING NATIVE CORONARY ARTERY OF NATIVE HEART WITHOUT ANGINA PECTORIS: Primary | ICD-10-CM

## 2022-05-17 PROBLEM — I48.91 ATRIAL FIBRILLATION (HCC): Status: ACTIVE | Noted: 2022-05-17

## 2022-05-17 PROCEDURE — 99215 OFFICE O/P EST HI 40 MIN: CPT | Performed by: INTERNAL MEDICINE

## 2022-05-17 RX ORDER — DIGOXIN 125 MCG
125 TABLET ORAL DAILY
Qty: 30 TABLET | Refills: 3 | Status: SHIPPED | OUTPATIENT
Start: 2022-05-17 | End: 2022-07-21

## 2022-05-17 NOTE — PROGRESS NOTES
[facility]   Cardiovascular Evaluation    PATIENT: Murali Welch  DATE: 2022  MRN: [id number]  [facility]: [id number]  : 1940    Primary Care Doctor: Yaritza Alarcon MD    Reason for evaluation:   Follow-up (discuss medications), Coronary Artery Disease, Hypertension, Hyperlipidemia, and Fatigue (no energy)    Subjective; History of present illness on initial date of evaluation:   Murali Welch is a 80 y.o. patient who presents for cardiology follow up. He has a past medical history including coronary artery disease s/p stent placement and coronary artery bypass grafting, hyperlipidemia, hypertension, carotid artery disease, COPD, and former tobacco abuse. Since last office visit he had an Echocardiogram completed on 21 showing ejection fraction 50-55%, mild left ventricular hypertrophy, mild aortic regurgitation, and mild mitral regurgitation. His carotid doppler on 2021 shows right internal carotid artery <50% stenosis. Left internal carotid artery appears normal. Both with normal antegrade flow. Today he states he went and had a physical examination after abnormal EKG found by his PCP. He followed up with Niranjan Sprague CNP and she placed patient on diltiazem. After being placed on diltiazem and Eliquis and he has been feeling awful with blood pressure fluctuations. He reports extremely tired. He is taking only 2.5 mg Eliquis twice daily. Denies recent issues with bleeding or bruising. Patient currently denies any weight gain, edema, palpitations, chest pain, shortness of breath, and syncope.        Patient Active Problem List   Diagnosis    Coronary artery disease involving native coronary artery of native heart without angina pectoris    Hyperlipidemia    Nausea & vomiting    Carotid disease, bilateral ([facility])    Chest pain    COPD (chronic obstructive pulmonary disease) ([facility])    HTN (hypertension)    Stented coronary artery    IBS (irritable bowel syndrome)    GERD (gastroesophageal reflux disease)    CRP elevated    Weakness    SOB (shortness of breath)    Centrilobular emphysema (HCC)    Atrial fibrillation (HCC)    Atrial flutter by electrocardiogram [facility])         Cardiac Testing: I have reviewed the findings below. EKG: 10/10/13  Sinus rhythm with 1st degree A-V block Otherwise normal     STRESS TEST: 10/8/13  Small sized inferior fixed defect consistent with infarction in the territory of the distal RCA . -There is no evidence of stress induced ischemia. -Normal LV function with ejection fraction of 65 %. Stress: 3/27/15  Summary  There is normal isotope uptake at stress and rest. There is no evidence of  myocardial ischemia or scar. There are no regional wall motion abnormalities. Normal left ventricular systolic function with ejection fraction of 62 %. Normal myocardial perfusion study. Stress Protocols    Resting ECG  Normal sinus rhythm. Resting HR:74 bpm   Resting BP:132/84 mmHg    Pre-stress physical exam: Walk Lexiscan   Stress Protocol:Pharmacologic    Peak HR:106 bpm       HR/BP product:07532  Peak BP:164/83 mmHg  Predicted HR: 146 bpm  % of predicted HR: 73  Test duration: 4 min    CATH: 9/13/12  1. Known CAD without restenosis to previous RCA stents or without   progression and disease from previous assessment 2 years prior. 2. Patent SVG to diagonal branch; patent SVG to OM branch; known atretic   LIMA to LAD. 3. Well-preserved LV systolic function. Carotid doppler: 12/15/14  Summary    1. There is moderate heterogeneous plaque seen in the right internal carotid artery with an estimated diameter reduction of <50%. 2. There is minimal homogeneous plaque seen in the left internal carotid  artery with an estimated diameter reduction of <50%. 3. The vertebral arteries are patent with antegrade flow     Stress:2/18/16  Summary  There is no evidence of stress induced ischemia.  The inferior wall is mildly  diminished at both stress and rest c/w possible diaphragm attenuation  artifact. Hyperdynamic LV systolic function with LJ>01% with uniform wall  motion. Past Medical History:   has a past medical history of CAD (coronary artery disease), CAD (coronary artery disease), COPD (chronic obstructive pulmonary disease) ([facility] [address] COPD (chronic obstructive pulmonary disease) ([facility] [address] GERD (gastroesophageal reflux disease), HTN (hypertension), Hyperlipidemia, IBS (irritable bowel syndrome), and Stented coronary artery. Surgical History:   has a past surgical history that includes Coronary artery bypass graft (2000); Coronary angioplasty with stent (2005); Carotid endarterectomy (01/2014); Colonoscopy (2004); Upper gastrointestinal endoscopy (2004); Cholecystectomy; Cardiac catheterization (2009); Upper gastrointestinal endoscopy (7/15/14); and Diagnostic Cardiac Cath Lab Procedure. Social History:   reports that he quit smoking about 26 years ago. His smoking use included cigarettes. He has a 15.00 pack-year smoking history. He has never used smokeless tobacco. He reports that he does not drink alcohol and does not use drugs. Family History:  No evidence for sudden cardiac death or premature CAD    Home Medications:  Reviewed and are listed in nursing record. and/or listed below  Current Outpatient Medications   Medication Sig Dispense Refill    digoxin (LANOXIN) 125 MCG tablet Take 1 tablet by mouth daily 30 tablet 3    apixaban (ELIQUIS) 5 MG TABS tablet Take 1 tablet by mouth 2 times daily (Patient taking differently: Take 5 mg by mouth 2 times daily 1/2 tablet twice daily) 180 tablet 3    Evolocumab (REPATHA SURECLICK) 862 MG/ML SOAJ INJECT (140 MG) SUBCUTANEOUSLY EVERY 2 WEEKS. KEEP REFRIGERATED. SINGLE DOSE DEVICE.  6 pen 3    nitroGLYCERIN (NITROSTAT) 0.4 MG SL tablet Place 1 tablet under the tongue every 5 minutes as needed for Chest pain 25 tablet 3    Fluticasone-Salmeterol (ADVAIR DISKUS IN) Inhale into the lungs       albuterol (PROVENTIL HFA) 108 (90 BASE) MCG/ACT inhaler Inhale 2 puffs into the lungs every 4 hours as needed for Wheezing or Shortness of Breath. 1 Inhaler 0    promethazine (PHENERGAN) 25 MG tablet Take 25 mg by mouth every 6 hours as needed.  dilTIAZem (DILTIAZEM CD) 120 MG extended release capsule Take 1 capsule by mouth daily (Patient not taking: Reported on 5/17/2022) 30 capsule 3    Fluticasone-Umeclidin-Vilant (TRELEGY ELLIPTA IN) Inhale into the lungs (Patient not taking: Reported on 5/17/2022)      hydroCHLOROthiazide (HYDRODIURIL) 12.5 MG tablet Take 1 tablet by mouth as needed (Take 1 tablet as needed for BP greater than 130/85) (Patient not taking: Reported on 5/17/2022) 30 tablet 1    esomeprazole (NEXIUM) 20 MG delayed release capsule Take 20 mg by mouth daily as needed  (Patient not taking: Reported on 5/17/2022)       No current facility-administered medications for this visit. Allergies:  Crestor [rosuvastatin calcium], Statins, Amlodipine, Budesonide-formoterol fumarate, Codeine, Dilaudid [hydromorphone hcl], Nitrofurantoin macrocrystal, Other, Propoxyphene, Zetia [ezetimibe], Hydromorphone hcl, and Iodoform     Review of Systems:   All 14 point review of symptoms completed. Pertinent positives identified in the HPI, all other review of symptoms negative as below.     Review of Systems - History obtained from the patient  General ROS: negative for - chills, fever or night sweats  Psychological ROS: negative for - disorientation or hallucinations  Ophthalmic ROS: negative for - dry eyes, eye pain or loss of vision  ENT ROS: negative for - nasal discharge or sore throat  Allergy and Immunology ROS: negative for - hives or itchy/watery eyes  Hematological and Lymphatic ROS: negative for - jaundice or night sweats  Endocrine ROS: negative for - mood swings or temperature intolerance  Breast ROS: deferred  Respiratory ROS: negative for - hemoptysis or stridor  Cardiovascular ROS: negative for - chest pain, dyspnea on exertion or palpitations  Gastrointestinal ROS: no abdominal pain, change in bowel habits, or black or bloody stools  Genito-Urinary ROS: no dysuria, trouble voiding, or hematuria  Musculoskeletal ROS: negative for - gait disturbance, joint pain or joint stiffness  Neurological ROS: negative for - seizures or speech problems  Dermatological ROS: negative for - rash or skin lesion changes     Physical Examination:    Vitals:    05/17/22 1307   BP: 134/70   Pulse: 108   SpO2: 99%    Weight: 202 lb 12.8 oz (92 kg)     Wt Readings from Last 3 Encounters:   05/17/22 202 lb 12.8 oz (92 kg)   05/05/22 210 lb (95.3 kg)   02/15/22 214 lb (97.1 kg)         General Appearance:  Alert, cooperative, no distress, appears stated age   Head:  Normocephalic, without obvious abnormality, atraumatic   Eyes:  PERRL, conjunctiva/corneas clear       Nose: Nares normal, no drainage or sinus tenderness   Throat: Lips, mucosa, and tongue normal   Neck: Supple, symmetrical, trachea midline, no adenopathy, thyroid: not enlarged, symmetric, no tenderness/mass/nodules, no carotid bruit or JVD       Lungs:   Clear to auscultation bilaterally, respirations unlabored   Chest Wall:  No tenderness or deformity   Heart:  Regular rhythm and normal rate; S1, S2 are normal; no murmur noted; no rub or gallop   Abdomen:   Soft, non-tender, bowel sounds active all four quadrants,  no masses, no organomegaly           Extremities: Extremities normal, atraumatic, no cyanosis or edema   Pulses: 2+ and symmetric   Skin: Skin color, texture, turgor normal, no rashes or lesions   Pysch: Normal mood and affect   Neurologic: Normal gross motor and sensory exam.         Labs  CBC:   Lab Results   Component Value Date    WBC 8.6 03/02/2017    RBC 3.65 03/02/2017    HGB 11.9 03/02/2017    HCT 35.0 03/02/2017    MCV 96.0 03/02/2017    RDW 14.1 03/02/2017     03/02/2017     CMP:    Lab Results Component Value Date     03/02/2017    K 4.2 03/02/2017    CL 99 03/02/2017    CO2 23 03/02/2017    BUN 20 03/02/2017    CREATININE 1.3 03/02/2017    GFRAA >60 03/02/2017    GFRAA >60 07/17/2012    AGRATIO 1.1 02/28/2017    LABGLOM 54 03/02/2017    GLUCOSE 81 03/02/2017    PROT 7.1 03/11/2019    PROT 7.0 07/17/2012    CALCIUM 8.4 03/02/2017    BILITOT 0.4 03/11/2019    ALKPHOS 96 03/11/2019    AST 15 03/11/2019    ALT 12 03/11/2019     PT/INR:    No components found for: PTPATIENT,  PTINR  Lab Results   Component Value Date    TROPONINI <0.01 03/01/2017     No components found for: CHLPL  Lab Results   Component Value Date    TRIG 75 03/20/2020    TRIG 77 03/11/2019    TRIG 91 03/01/2017     Lab Results   Component Value Date    HDL 47 03/20/2020    HDL 55 03/11/2019    HDL 52 06/25/2018     Lab Results   Component Value Date    LDLCALC 40 03/20/2020    LDLCALC 38 03/11/2019    LDLCALC 43 06/25/2018     Lab Results   Component Value Date    LABVLDL 15 03/11/2019    LABVLDL 14 06/25/2018    LABVLDL 18 03/01/2017     Lab Results   Component Value Date    ALT 12 03/11/2019    AST 15 03/11/2019    ALKPHOS 96 03/11/2019    BILITOT 0.4 03/11/2019         Assessment:  80 y.o. patient with:  1. Atrial flutter/abnormal EKG/SOB   ~new onset   ~rate uncontrolled  2. Periodic acute hypertension/nocturnal hypertension   ~stable in the office today BP: 134/70    ~continue hydralazine as prescribed    ~suspect pulmonary component due to patient c/o precipitating SOB   3. Carotid artery disease   ~moderate heterogeneous plaque seen in the right internal carotid artery with an estimated diameter reduction of <50%. ~CEA at [facility] 2014   ~Carotid doppler 3/2016  3. Remote CAD with bypass. ~cath 2012 stable   ~stress test March 2015 normal.  5. Hyperlipidemia    ~intolerant of high dose statin therapy   ~intolerant to Lescol   ~tolerating Repatha       Plan:  1.  Referral to Electrophysiology ~ atrial fibrillation, anticoagulation   2. Continue taking Eliquis 2.5 mg twice daily ~ CKD  3. Please complete Echocardiogram ~ evaluate heart structure and strength   4. Order Lexiscan Myoview ~ evaluate for coronary blockage, abnormal EKG  ~A small IV will be placed into your arm to administer a radioisotope (myoview) to visualize your heart. . You will then waiting period to allow the tracer to be absorbed by the heart muscle. After the waiting period, we will take pictures of the blood flow to your heart muscle with the nuclear camera. Following your pictures, we will perform your stress test. We can do your stress test by having you walk on the treadmill or by giving you a medication ([facility] [place]) which makes your body think it is exercising. We will monitor you before, during, and after your stress test to make sure you are safe and comfortable. 5. Start Digoxin 125 mcg daily ~ heart rate control, atrial fibrillation    6. Discontinue Diltiazem ~ patient did not start in relation to reported hypotension. 7. Follow up based on testing results, we will call you to discuss. Scribe's attestation: This note was scribed in the presence of Brandon Sánchez by Candelaria Lujan RN     I, Dr. Eugene Olson, personally performed the services described in this documentation, as scribed by the above signed scribe in my presence. It is both accurate and complete to my knowledge. I agree with the details independently gathered by the clinical support staff, while the remaining scribed note accurately describes my personal service to the patient. Medical Decision Making:   The following items were considered in medical decision making:  Independent review of images  Review / order clinical lab tests  Review / order radiology tests  Decision to obtain old records  Review and summation of old records as accessed through [facility] (a summary of my findings in these old records)      Time Based Itemization  A total of 45 minutes was spent on today's patient encounter. If applicable, non-patient-facing activities:  (X)Preparing to see the patient and reviewing records  (X) Individual interpretation of results  ( ) Discussion or coordination of care with other health care professionals  () Ordering of unique tests, medications, or procedures  (X) Documentation within the EHR       All questions and concerns were addressed to the patient/family. Alternatives to my treatment were discussed. The note was completed using EMR. Every effort was made to ensure accuracy; however, inadvertent computerized transcription errors may be present.     Shavonne Gleason MD, [address], [place], [place]  [phone] [facility] office  [phone] [facility]  5/17/2022  1:51 PM

## 2022-05-17 NOTE — PATIENT INSTRUCTIONS
Your provider has ordered testing for further evaluation. An order/prescription has been included in your paper work.  To schedule outpatient testing, contact Central Scheduling by calling [address] [place] [address] ([phone]). Plan:  1. Referral to Electrophysiology ~ atrial fibrillation, anticoagulation   2. Continue taking Eliquis 2.5 mg twice daily ~ CKD   3. Please complete Echocardiogram ~ evaluate heart structure and strength   4. Order Lexiscan Myoview ~ evaluate for coronary blockage, abnormal EKG  ~A small IV will be placed into your arm to administer a radioisotope (myoview) to visualize your heart. . You will then waiting period to allow the tracer to be absorbed by the heart muscle. After the waiting period, we will take pictures of the blood flow to your heart muscle with the nuclear camera. Following your pictures, we will perform your stress test. We can do your stress test by having you walk on the treadmill or by giving you a medication (Link Comber) which makes your body think it is exercising. We will monitor you before, during, and after your stress test to make sure you are safe and comfortable. 5. Start Digoxin 125 mcg daily ~ heart rate control, atrial fibrillation    6. Discontinue Diltiazem ~ patient did not start in relation to reported hypotension. 7. Follow up based on testing results, we will call you to discuss.

## 2022-05-17 NOTE — TELEPHONE ENCOUNTER
Pt is currently wearing a monitor, taking eliquis. Please advise timeframe pt should be seen per [facility].

## 2022-05-18 NOTE — TELEPHONE ENCOUNTER
05/18/22-Called & spoke with pt's wife Willy Snyder. Pt is now scheduled for a new pt appt with [address] @3:30pm on 06/28/22.

## 2022-05-26 ENCOUNTER — TELEPHONE (OUTPATIENT)
Dept: CARDIOLOGY CLINIC | Age: 82
End: 2022-05-26

## 2022-05-26 NOTE — TELEPHONE ENCOUNTER
----- Message from Ulices Rose MD sent at 5/25/2022  5:20 PM EDT -----  Please add this patient on for 6/7 with me. Ok to [employer]. Thanks.  ----- Message -----  From: Cintia Goldman MD  Sent: 5/17/2022   1:47 PM EDT  To: Ulices Rose MD    Partner,    I have a longtime patient of mine with new onset atrial flutter that has been uncontrolled and symptomatic. He was started on eliquis and rate control but still having issues. We made a referral in Epic but if you can see sooner, let me know.     Emilia Becerra

## 2022-05-27 NOTE — TELEPHONE ENCOUNTER
The 06/07/22 slot @11:00am is filled. I also see that pt is scheduled on 06/28/22 already. Will that slot not work? Please advise & thank you.

## 2022-06-03 PROCEDURE — 93272 ECG/REVIEW INTERPRET ONLY: CPT | Performed by: INTERNAL MEDICINE

## 2022-06-13 ENCOUNTER — TELEPHONE (OUTPATIENT)
Dept: CARDIOLOGY CLINIC | Age: 82
End: 2022-06-13

## 2022-06-13 NOTE — TELEPHONE ENCOUNTER
I called and spoke to the wife. I instructed her to have him stop the Eliquis for the next few days. We will see if his symptoms resolve off of the medication. Can you reach out to him later this week and see how he is feeling.

## 2022-06-13 NOTE — TELEPHONE ENCOUNTER
Pts wife stated that since pt has started Eliquis he has been feelling more nausated than normal. Pts wife stated that he has lost 10lbs since being on Eliquis. Pts wife would not elaborate on any other symptoms. If new prescription is needed [facility] is preferred pharmacy [phone]. Per ludy pts wife would like to be called at [phone].

## 2022-06-14 ENCOUNTER — APPOINTMENT (OUTPATIENT)
Dept: CT IMAGING | Age: 82
End: 2022-06-14
Payer: COMMERCIAL

## 2022-06-14 ENCOUNTER — HOSPITAL ENCOUNTER (EMERGENCY)
Age: 82
Discharge: HOME | End: 2022-06-14
Payer: COMMERCIAL

## 2022-06-14 ENCOUNTER — APPOINTMENT (OUTPATIENT)
Dept: GENERAL RADIOLOGY | Age: 82
End: 2022-06-14
Payer: COMMERCIAL

## 2022-06-14 VITALS
TEMPERATURE: 98.6 F | HEART RATE: 64 BPM | OXYGEN SATURATION: 97 % | RESPIRATION RATE: 20 BRPM | DIASTOLIC BLOOD PRESSURE: 65 MMHG | BODY MASS INDEX: 24.38 KG/M2 | WEIGHT: 180 LBS | SYSTOLIC BLOOD PRESSURE: 145 MMHG | HEIGHT: 72 IN

## 2022-06-14 DIAGNOSIS — I71.40 ABDOMINAL AORTIC ANEURYSM (AAA) WITHOUT RUPTURE: ICD-10-CM

## 2022-06-14 DIAGNOSIS — R11.2 NAUSEA AND VOMITING, INTRACTABILITY OF VOMITING NOT SPECIFIED, UNSPECIFIED VOMITING TYPE: Primary | ICD-10-CM

## 2022-06-14 DIAGNOSIS — R10.84 GENERALIZED ABDOMINAL PAIN: ICD-10-CM

## 2022-06-14 LAB
A/G RATIO: 1.6 (ref 1.1–2.2)
ALBUMIN SERPL-MCNC: 4.4 G/DL (ref 3.4–5)
ALP BLD-CCNC: 120 U/L (ref 40–129)
ALT SERPL-CCNC: 18 U/L (ref 10–40)
ANION GAP SERPL CALCULATED.3IONS-SCNC: 13 MMOL/L (ref 3–16)
AST SERPL-CCNC: 25 U/L (ref 15–37)
BASOPHILS ABSOLUTE: 0.1 K/UL (ref 0–0.2)
BASOPHILS RELATIVE PERCENT: 0.6 %
BILIRUB SERPL-MCNC: 0.8 MG/DL (ref 0–1)
BILIRUBIN URINE: NEGATIVE
BLOOD, URINE: ABNORMAL
BUN BLDV-MCNC: 14 MG/DL (ref 7–20)
CALCIUM SERPL-MCNC: 9.2 MG/DL (ref 8.3–10.6)
CHLORIDE BLD-SCNC: 103 MMOL/L (ref 99–110)
CLARITY: CLEAR
CO2: 24 MMOL/L (ref 21–32)
COLOR: YELLOW
CREAT SERPL-MCNC: 1.2 MG/DL (ref 0.8–1.3)
DIGOXIN LEVEL: 1.1 NG/ML (ref 0.8–2)
EKG ATRIAL RATE: 77 BPM
EKG DIAGNOSIS: NORMAL
EKG P AXIS: -81 DEGREES
EKG P-R INTERVAL: 360 MS
EKG Q-T INTERVAL: 376 MS
EKG QRS DURATION: 102 MS
EKG QTC CALCULATION (BAZETT): 425 MS
EKG R AXIS: -72 DEGREES
EKG T AXIS: 12 DEGREES
EKG VENTRICULAR RATE: 77 BPM
EOSINOPHILS ABSOLUTE: 0.3 K/UL (ref 0–0.6)
EOSINOPHILS RELATIVE PERCENT: 3.4 %
GFR AFRICAN AMERICAN: >60
GFR NON-AFRICAN AMERICAN: 58
GLUCOSE BLD-MCNC: 138 MG/DL (ref 70–99)
GLUCOSE URINE: NEGATIVE MG/DL
HCT VFR BLD CALC: 36.4 % (ref 40.5–52.5)
HEMOGLOBIN: 11.9 G/DL (ref 13.5–17.5)
KETONES, URINE: ABNORMAL MG/DL
LEUKOCYTE ESTERASE, URINE: NEGATIVE
LIPASE: 20 U/L (ref 13–60)
LYMPHOCYTES ABSOLUTE: 1.9 K/UL (ref 1–5.1)
LYMPHOCYTES RELATIVE PERCENT: 21.3 %
MAGNESIUM: 2.2 MG/DL (ref 1.8–2.4)
MCH RBC QN AUTO: 29.6 PG (ref 26–34)
MCHC RBC AUTO-ENTMCNC: 32.7 G/DL (ref 31–36)
MCV RBC AUTO: 90.4 FL (ref 80–100)
MICROSCOPIC EXAMINATION: YES
MONOCYTES ABSOLUTE: 1.2 K/UL (ref 0–1.3)
MONOCYTES RELATIVE PERCENT: 14.2 %
NEUTROPHILS ABSOLUTE: 5.2 K/UL (ref 1.7–7.7)
NEUTROPHILS RELATIVE PERCENT: 60.5 %
NITRITE, URINE: NEGATIVE
PDW BLD-RTO: 16 % (ref 12.4–15.4)
PH UA: 7.5 (ref 5–8)
PLATELET # BLD: 312 K/UL (ref 135–450)
PMV BLD AUTO: 7.4 FL (ref 5–10.5)
POTASSIUM REFLEX MAGNESIUM: 4.5 MMOL/L (ref 3.5–5.1)
PRO-BNP: 1176 PG/ML (ref 0–449)
PROTEIN UA: NEGATIVE MG/DL
RBC # BLD: 4.03 M/UL (ref 4.2–5.9)
RBC UA: NORMAL /HPF (ref 0–4)
SODIUM BLD-SCNC: 140 MMOL/L (ref 136–145)
SPECIFIC GRAVITY UA: 1.01 (ref 1–1.03)
TOTAL PROTEIN: 7.2 G/DL (ref 6.4–8.2)
URINE REFLEX TO CULTURE: ABNORMAL
URINE TYPE: ABNORMAL
UROBILINOGEN, URINE: 0.2 E.U./DL
WBC # BLD: 8.7 K/UL (ref 4–11)
WBC UA: NORMAL /HPF (ref 0–5)

## 2022-06-14 PROCEDURE — 85025 COMPLETE CBC W/AUTO DIFF WBC: CPT

## 2022-06-14 PROCEDURE — 93010 ELECTROCARDIOGRAM REPORT: CPT | Performed by: INTERNAL MEDICINE

## 2022-06-14 PROCEDURE — 74177 CT ABD & PELVIS W/CONTRAST: CPT

## 2022-06-14 PROCEDURE — 83880 ASSAY OF NATRIURETIC PEPTIDE: CPT

## 2022-06-14 PROCEDURE — 99285 EMERGENCY DEPT VISIT HI MDM: CPT

## 2022-06-14 PROCEDURE — 93005 ELECTROCARDIOGRAM TRACING: CPT | Performed by: NURSE PRACTITIONER

## 2022-06-14 PROCEDURE — 6360000002 HC RX W HCPCS: Performed by: NURSE PRACTITIONER

## 2022-06-14 PROCEDURE — 6360000004 HC RX CONTRAST MEDICATION: Performed by: NURSE PRACTITIONER

## 2022-06-14 PROCEDURE — 83735 ASSAY OF MAGNESIUM: CPT

## 2022-06-14 PROCEDURE — 81001 URINALYSIS AUTO W/SCOPE: CPT

## 2022-06-14 PROCEDURE — 96374 THER/PROPH/DIAG INJ IV PUSH: CPT

## 2022-06-14 PROCEDURE — 2580000003 HC RX 258: Performed by: NURSE PRACTITIONER

## 2022-06-14 PROCEDURE — 80053 COMPREHEN METABOLIC PANEL: CPT

## 2022-06-14 PROCEDURE — 83690 ASSAY OF LIPASE: CPT

## 2022-06-14 PROCEDURE — 80162 ASSAY OF DIGOXIN TOTAL: CPT

## 2022-06-14 PROCEDURE — 71045 X-RAY EXAM CHEST 1 VIEW: CPT

## 2022-06-14 RX ORDER — ONDANSETRON 2 MG/ML
4 INJECTION INTRAMUSCULAR; INTRAVENOUS EVERY 30 MIN PRN
Status: DISCONTINUED | OUTPATIENT
Start: 2022-06-14 | End: 2022-06-14 | Stop reason: HOSPADM

## 2022-06-14 RX ORDER — ONDANSETRON 4 MG/1
4 TABLET, ORALLY DISINTEGRATING ORAL EVERY 8 HOURS PRN
Qty: 20 TABLET | Refills: 0 | Status: ON HOLD | OUTPATIENT
Start: 2022-06-14 | End: 2022-10-21

## 2022-06-14 RX ORDER — DIPHENHYDRAMINE HYDROCHLORIDE 50 MG/ML
12.5 INJECTION INTRAMUSCULAR; INTRAVENOUS ONCE
Status: DISCONTINUED | OUTPATIENT
Start: 2022-06-14 | End: 2022-06-14 | Stop reason: HOSPADM

## 2022-06-14 RX ORDER — 0.9 % SODIUM CHLORIDE 0.9 %
1000 INTRAVENOUS SOLUTION INTRAVENOUS ONCE
Status: COMPLETED | OUTPATIENT
Start: 2022-06-14 | End: 2022-06-14

## 2022-06-14 RX ORDER — MORPHINE SULFATE 4 MG/ML
4 INJECTION, SOLUTION INTRAMUSCULAR; INTRAVENOUS EVERY 30 MIN PRN
Status: DISCONTINUED | OUTPATIENT
Start: 2022-06-14 | End: 2022-06-14 | Stop reason: HOSPADM

## 2022-06-14 RX ADMIN — ONDANSETRON 4 MG: 2 INJECTION INTRAMUSCULAR; INTRAVENOUS at 09:00

## 2022-06-14 RX ADMIN — IOPAMIDOL 75 ML: 755 INJECTION, SOLUTION INTRAVENOUS at 09:45

## 2022-06-14 RX ADMIN — SODIUM CHLORIDE 1000 ML: 9 INJECTION, SOLUTION INTRAVENOUS at 08:57

## 2022-06-14 ASSESSMENT — PAIN DESCRIPTION - LOCATION
LOCATION: ABDOMEN
LOCATION: ABDOMEN

## 2022-06-14 ASSESSMENT — PAIN - FUNCTIONAL ASSESSMENT
PAIN_FUNCTIONAL_ASSESSMENT: NONE - DENIES PAIN
PAIN_FUNCTIONAL_ASSESSMENT: NONE - DENIES PAIN
PAIN_FUNCTIONAL_ASSESSMENT: 0-10
PAIN_FUNCTIONAL_ASSESSMENT: 0-10

## 2022-06-14 ASSESSMENT — PAIN SCALES - GENERAL
PAINLEVEL_OUTOF10: 5
PAINLEVEL_OUTOF10: 8

## 2022-06-14 ASSESSMENT — PAIN DESCRIPTION - DESCRIPTORS: DESCRIPTORS: ACHING

## 2022-06-14 NOTE — ED NOTES
Morphine 4mg not showing up in [address] Morphine 4 mg wasted and witnessed by Flori Giron.       Denis Silver RN  06/14/22 0585

## 2022-06-14 NOTE — ED NOTES
Pt refused transport on stretcher to CT. Pt amb to CT. Pt does not want any medication at this time.        Christiane Cleveland RN  06/14/22 6539

## 2022-06-14 NOTE — ED PROVIDER NOTES
Evaluated by [id number] [facility] Provider    [facility]  ED    CHIEF COMPLAINT  Emesis (patient reporting during a physical a month ago they found a \"flutter\" and put him on ellequis. reports Aaron Morales been sick ever since\" Pt reports \"I can't eat, I've lost twenty pounds and I throw up everything' ) and Nausea    HISTORY OF PRESENT ILLNESS  Patel July is a 80 y.o. male who presents to the ED complaining of abdominal pain, nausea. Had a physical and found a flutter a month ago. Gave him 2 pills to take and took him off plavix. Since then he has been sick, can't eat anything, no appetite form nausea, has pain in his gut. Points to lizeth-umbilical area. Lost at least 20# in the past month. No diarrhea. Today is the first day he has vomited, reports it was just bile. Feels dehydrated. Cough started about 1 week ago, has not had it before. Cough is not productive, did have an episode of post tussive emesis. Patient reports chills, wife states he has had sweats, no fevers. Denies CP, SOB. Has had gallbladder removed. [facility], cardiology is Dr. Sheila Bean. The patient is currently rating their pain as 5/10 and describes it as an aching type of pain. Treatments tried prior to arrival in the ED: none. The patient arrived to the ED via private car.     PAST MEDICAL HISTORY    Past Medical History:   Diagnosis Date    CAD (coronary artery disease) 5/17/1998    Stent in LAD    CAD (coronary artery disease) 9/17/2009    Stent in RCA    COPD (chronic obstructive pulmonary disease) (HCC)     COPD (chronic obstructive pulmonary disease) ([facility] [address] 2/17/2016    GERD (gastroesophageal reflux disease)     on Nexium    HTN (hypertension) 6/14/2016    Hyperlipidemia     IBS (irritable bowel syndrome)     Stented coronary artery        SURGICAL HISTORY    Past Surgical History:   Procedure Laterality Date    CARDIAC CATHETERIZATION  2009    CAROTID ENDARTERECTOMY  01/2014    CHOLECYSTECTOMY      COLONOSCOPY  2004    CORONARY ANGIOPLASTY WITH STENT PLACEMENT  2005    CORONARY ARTERY BYPASS GRAFT  2000    [facility] to LAD, SVG-D1 and OM    DIAGNOSTIC CARDIAC CATH LAB PROCEDURE      UPPER GASTROINTESTINAL ENDOSCOPY  2004    UPPER GASTROINTESTINAL ENDOSCOPY  7/15/14    gastritis       CURRENT MEDICATIONS    Current Outpatient Rx   Medication Sig Dispense Refill    ondansetron (ZOFRAN ODT) 4 MG disintegrating tablet Take 1 tablet by mouth every 8 hours as needed for Nausea 20 tablet 0    digoxin (LANOXIN) 125 MCG tablet Take 1 tablet by mouth daily 30 tablet 3    dilTIAZem (DILTIAZEM CD) 120 MG extended release capsule Take 1 capsule by mouth daily (Patient not taking: Reported on 5/17/2022) 30 capsule 3    Fluticasone-Umeclidin-Vilant (TRELEGY ELLIPTA IN) Inhale into the lungs (Patient not taking: Reported on 5/17/2022)      hydroCHLOROthiazide (HYDRODIURIL) 12.5 MG tablet Take 1 tablet by mouth as needed (Take 1 tablet as needed for BP greater than 130/85) (Patient not taking: Reported on 5/17/2022) 30 tablet 1    Evolocumab (REPATHA SURECLICK) 150 MG/ML SOAJ INJECT (140 MG) SUBCUTANEOUSLY EVERY 2 WEEKS. KEEP REFRIGERATED. SINGLE DOSE DEVICE. 6 pen 3    nitroGLYCERIN (NITROSTAT) 0.4 MG SL tablet Place 1 tablet under the tongue every 5 minutes as needed for Chest pain 25 tablet 3    esomeprazole (NEXIUM) 20 MG delayed release capsule Take 20 mg by mouth daily as needed  (Patient not taking: Reported on 5/17/2022)      Fluticasone-Salmeterol (ADVAIR DISKUS IN) Inhale into the lungs       albuterol (PROVENTIL HFA) 108 (90 BASE) MCG/ACT inhaler Inhale 2 puffs into the lungs every 4 hours as needed for Wheezing or Shortness of Breath. 1 Inhaler 0    promethazine (PHENERGAN) 25 MG tablet Take 25 mg by mouth every 6 hours as needed.            ALLERGIES    Allergies   Allergen Reactions    Crestor [Rosuvastatin Calcium] Anaphylaxis     Hives, throat closure    Statins Anaphylaxis     Crestor    Amlodipine      High BP    Budesonide-Formoterol Fumarate Swelling    Codeine Itching     Other reaction(s): Other (See Comments)  nightmare    Dilaudid [Hydromorphone Hcl]      Confusion/ anxiety    Nitrofurantoin Macrocrystal      Patient unknown    Other      betablockers- unspecified. Feels unwell     Propoxyphene Other (See Comments)     palpitations    Zetia [Ezetimibe]      Not sure of the reaction    Hydromorphone Hcl Anxiety     agitation    Iodoform Rash     Developed localized rash on neck when Iodoform packing was used in neck wound 2014. FAMILY HISTORY    Family History   Problem Relation Age of Onset    Diabetes Mother     Heart Failure Mother     Heart Failure Father     Heart Failure Sister     Pacemaker Brother     Asthma Neg Hx     Cancer Neg Hx     Emphysema Neg Hx     Hypertension Neg Hx        SOCIAL HISTORY    Social History     Socioeconomic History    Marital status:      Spouse name: None    Number of children: None    Years of education: None    Highest education level: None   Occupational History    None   Tobacco Use    Smoking status: Former Smoker     Packs/day: 0.50     Years: 30.00     Pack years: 15.00     Types: Cigarettes     Quit date: 3/16/1996     Years since quittin.2    Smokeless tobacco: Never Used   Substance and Sexual Activity    Alcohol use: No     Alcohol/week: 0.0 standard drinks     Comment: occ    Drug use: No    Sexual activity: Yes     Partners: Female   Other Topics Concern    None   Social History Narrative    None     Social Determinants of Health     Financial Resource Strain:     Difficulty of Paying Living Expenses: Not on file   Food Insecurity:     Worried About Running Out of Food in the Last Year: Not on file    David of Food in the Last Year: Not on file   Transportation Needs:     Lack of Transportation (Medical): Not on file    Lack of Transportation (Non-Medical):  Not on file   Physical Activity:  Days of Exercise per Week: Not on file    Minutes of Exercise per Session: Not on file   Stress:     Feeling of Stress : Not on file   Social Connections:     Frequency of Communication with Friends and Family: Not on file    Frequency of Social Gatherings with Friends and Family: Not on file    Attends [employer] Services: Not on file    Active Member of [address] [place] [address] [employer] or Organizations: Not on file    Attends Club or Organization Meetings: Not on file    Marital Status: Not on file   Intimate Partner Violence:     Fear of Current or Ex-Partner: Not on file    Emotionally Abused: Not on file    Physically Abused: Not on file    Sexually Abused: Not on file   Housing Stability:     Unable to Pay for Housing in the Last Year: Not on file    Number of [place] in the Last Year: Not on file    Unstable Housing in the Last Year: Not on file       REVIEW OFSYSTEMS    10systems reviewed, pertinent positives per HPI otherwise noted to be negative. PHYSICAL EXAM  Physical Exam  Vitals:    06/14/22 1103   BP: (!) 145/65   Pulse: 64   Resp: 20   Temp:    SpO2: 97%     GENERAL: Patient is elderly, well-nourished. Awake andalert. Cooperative. Resting in bed. No apparent distress. HEENT:  Normocephalic, atraumatic. Conjunctivaappear normal. Sclera is non-icteric. External ears are normal.    NECK: Supple with normal ROM. Tracheamidline  LUNGS: Equal and symmetric chest rise. Breathing is unlabored. Speaking comfortably in fullsentences. Lungs are clear bilaterally to auscultation. Without wheezing, rales, or rhonchi. CADIOVASCULAR:  Regular rate and rhythm. Normal S1-S2 sounds. No murmurs, rubs, or gallops. GI: Soft, upper abdominal tenderness to palpation, nondistended with positive bowel sounds. No rebound tenderness, guarding or rigidity. Negative Barney's sign. Negative Rovsing's sign. No CVAT to palpation. No masses or hepatosplenomegaly to palpation.   MUSCULOSKELETAL:  No gross deformities or trauma noted. Moving all extremities equally and appropriately. Normal ROM. SKIN: Warm/dry. Skin is intact. No rashes or lesions noted. PSYCHIATRIC: Mood and affect appropriate. Speech is clear and articulate. NEUROLOGIC: Alert and oriented. No focal motor or sensory deficits.      LABS   Results for orders placed or performed during the hospital encounter of 06/14/22   CBC with Auto Differential   Result Value Ref Range    WBC 8.7 4.0 - 11.0 K/uL    RBC 4.03 (L) 4.20 - 5.90 M/uL    Hemoglobin 11.9 (L) 13.5 - 17.5 g/dL    Hematocrit 36.4 (L) 40.5 - 52.5 %    MCV 90.4 80.0 - 100.0 fL    MCH 29.6 26.0 - 34.0 pg    MCHC 32.7 31.0 - 36.0 g/dL    RDW 16.0 (H) 12.4 - 15.4 %    Platelets 060 461 - 317 K/uL    MPV 7.4 5.0 - 10.5 fL    Neutrophils % 60.5 %    Lymphocytes % 21.3 %    Monocytes % 14.2 %    Eosinophils % 3.4 %    Basophils % 0.6 %    Neutrophils Absolute 5.2 1.7 - 7.7 K/uL    Lymphocytes Absolute 1.9 1.0 - 5.1 K/uL    Monocytes Absolute 1.2 0.0 - 1.3 K/uL    Eosinophils Absolute 0.3 0.0 - 0.6 K/uL    Basophils Absolute 0.1 0.0 - 0.2 K/uL   Comprehensive Metabolic Panel w/ Reflex to MG   Result Value Ref Range    Sodium 140 136 - 145 mmol/L    Potassium reflex Magnesium 4.5 3.5 - 5.1 mmol/L    Chloride 103 99 - 110 mmol/L    CO2 24 21 - 32 mmol/L    Anion Gap 13 3 - 16    Glucose 138 (H) 70 - 99 mg/dL    BUN 14 7 - 20 mg/dL    CREATININE 1.2 0.8 - 1.3 mg/dL    GFR Non-African American 58 (A) >60    GFR African American >60 >60    Calcium 9.2 8.3 - 10.6 mg/dL    Total Protein 7.2 6.4 - 8.2 g/dL    Albumin 4.4 3.4 - 5.0 g/dL    Albumin/Globulin Ratio 1.6 1.1 - 2.2    Total Bilirubin 0.8 0.0 - 1.0 mg/dL    Alkaline Phosphatase 120 40 - 129 U/L    ALT 18 10 - 40 U/L    AST 25 15 - 37 U/L   Lipase   Result Value Ref Range    Lipase 20.0 13.0 - 60.0 U/L   Urinalysis with Reflex to Culture    Specimen: Urine, clean catch   Result Value Ref Range    Color, UA Yellow Straw/Yellow    Clarity, UA Clear Clear Glucose, Ur Negative Negative mg/dL    Bilirubin Urine Negative Negative    Ketones, Urine TRACE (A) Negative mg/dL    Specific Gravity, UA 1.010 1.005 - 1.030    Blood, Urine TRACE-INTACT (A) Negative    pH, UA 7.5 5.0 - 8.0    Protein, UA Negative Negative mg/dL    Urobilinogen, Urine 0.2 <2.0 E.U./dL    Nitrite, Urine Negative Negative    Leukocyte Esterase, Urine Negative Negative    Microscopic Examination YES     Urine Type NotGiven     Urine Reflex to Culture Not Indicated    Brain Natriuretic Peptide   Result Value Ref Range    Pro-BNP 1,176 (H) 0 - 449 pg/mL   Digoxin Level   Result Value Ref Range    Digoxin Lvl 1.1 0.8 - 2.0 ng/mL   Microscopic Urinalysis   Result Value Ref Range    WBC, UA None seen 0 - 5 /HPF    RBC, UA 0-2 0 - 4 /HPF   Magnesium   Result Value Ref Range    Magnesium 2.20 1.80 - 2.40 mg/dL   EKG 12 Lead   Result Value Ref Range    Ventricular Rate 77 BPM    Atrial Rate 77 BPM    P-R Interval 360 ms    QRS Duration 102 ms    Q-T Interval 376 ms    QTc Calculation (Bazett) 425 ms    P Axis -81 degrees    R Axis -72 degrees    T Axis 12 degrees    Diagnosis       Unusual P axis, possible ectopic atrial rhythmLeft axis deviationInferior infarct , age undeterminedAnterior infarct , age undeterminedAbnormal ECGWhen compared with ECG of 01-MAR-2017 07:44,Ectopic atrial rhythm has replaced Sinus rhythmQuestionable change in QRS durationAnterior infarct is now Present       RADIOLOGY    CT ABDOMEN PELVIS W IV CONTRAST Additional Contrast? None    Result Date: 6/14/2022  EXAMINATION: CT OF THE ABDOMEN AND PELVIS WITH CONTRAST 6/14/2022 9:35 am TECHNIQUE: CT of the abdomen and pelvis was performed with the administration of intravenous contrast. Multiplanar reformatted images are provided for review. Automated exposure control, iterative reconstruction, and/or weight based adjustment of the mA/kV was utilized to reduce the radiation dose to as low as reasonably achievable.  COMPARISON: 2014 HISTORY: ORDERING SYSTEM PROVIDED HISTORY: abdominal pain TECHNOLOGIST PROVIDED HISTORY: Reason for exam:->abdominal pain Additional Contrast?->None Decision Support Exception - unselect if not a suspected or confirmed emergency medical condition->Emergency Medical Condition ([place]) Reason for Exam: abd pain, nausea, gas-belching; feeling bad x 20 days Additional signs and symptoms: poor appetite; loss of weight FINDINGS: Lower Chest: No focal consolidation is seen at the lung bases. Bandlike opacity is seen on the right. Small hiatal hernia is seen. There is nonspecific thickening at the GE junction. Coronary artery calcification is seen. Organs: No splenomegaly. No perisplenic fluid Right adrenal gland is normal.  Left adrenal gland is normal. Multiple cysts are seen in the right kidney, measuring up to 8.4 cm, increased in size compared to prior. No hydronephrosis noted Multiple cysts are seen in the left kidney, measuring up to 4.8 cm, increased in size compared to prior No peripancreatic fluid. No peripancreatic inflammatory change. There are clips from prior cholecystectomy. Scattered circumscribed hypodense nodules are seen throughout the liver, likely cysts, measuring up to 2.6 cm in size GI/Bowel: Mild stool load is seen in the colon. No bowel obstruction noted. Appendix is partially visualized and appears normal.  A few colonic diverticula are seen Pelvis: No free fluid in pelvis. No pelvic adenopathy. Prominent fat seen in inguinal canals Peritoneum/Retroperitoneum: Atherosclerotic change use seen in the abdominal aorta. Abdominal aorta measures up to 3.1 cm. Previously this measured 2.8 cm. Bones/Soft Tissues: Spurring is seen in the spine. Spurring is seen in the hips. .  Tiny periumbilical hernia containing fat is seen     No acute intra-abdominal abnormality Diverticulosis. No diverticulitis. Appendix is identified and normal in caliber.  Slight increase in size of abdominal aorta since 2014.  Based on size, consider 3 year imaging follow-up. Liver cysts and hepatic cysts, slightly increased in size overall compared to 2014     XR CHEST PORTABLE    Result Date: 6/14/2022  EXAMINATION: ONE XRAY VIEW OF THE CHEST 6/14/2022 8:40 am COMPARISON: 02/28/2017 HISTORY: ORDERING SYSTEM PROVIDED HISTORY: cough TECHNOLOGIST PROVIDED HISTORY: Reason for exam:->cough Reason for Exam: cough, emesis, nausea FINDINGS: There is bibasilar scarring. The cardiac silhouette is stable status post median sternotomy and CABG. There is no pneumothorax or pleural effusion. 1.  No acute abnormality. ED COURSE/MDM  Patient seen and evaluated. Old records reviewed. Diagnostic testing reviewed and results discussed. I have evaluated this patient. My supervising physician was available for consultation. Reanna Barrera presented to the ED today with above noted complaints. Arrival vital signs: Afebrile, hemodynamically stable except for blood pressure slightly elevated at 166/82. Well saturated on room air. Physical exam performed at 0835: No adventitious breath sounds on exam.  There is upper abdominal tenderness to palpation. Blood work: No evidence of systemic infection. Stable anemia. No evidence of electrolyte abnormality. No evidence of acute kidney injury or transaminitis. Lipase is normal.  BNP elevated at 1176. Digoxin level normal at 1.1. Magnesium level normal at 2.2. EKG: Shows unusual P axis, possible ectopic atrial rhythm. No acute findings. UA: Shows trace blood and ketones. No signs of infection. Imaging: Chest x-ray is without acute findings. CT of the abdomen and pelvis shows no acute intra-abdominal abnormality. Diverticulosis without diverticulitis. Appendix is identified and normal.  Slight increase in size of abdominal aorta since 2014. Based on size consider 3-year imaging follow-up.   Liver cyst and hepatic cyst slightly increased in size overall compared to 2014.    Medications given in the ED:   Medications   ondansetron (ZOFRAN) injection 4 mg (4 mg IntraVENous Given 6/14/22 0900)   morphine sulfate (PF) injection 4 mg (4 mg IntraVENous Not Given 6/14/22 0952)   diphenhydrAMINE (BENADRYL) injection 12.5 mg (12.5 mg IntraVENous Not Given 6/14/22 0951)   0.9 % sodium chloride bolus (0 mLs IntraVENous Stopped 6/14/22 1034)   iopamidol (ISOVUE-370) 76 % injection 75 mL (75 mLs IntraVENous Given 6/14/22 0945)      Patient's diagnostics are reassuring, however there is no etiology for his symptoms. He has been taken off of the Eliquis by Dr. John Stallworth, this was stopped last night. I advised him that if his symptoms are persisting for more than a week that he should follow-up with primary care and consider following up with GI, referral was provided upon discharge. Patient will be sent home with a prescription for Zofran, he does take Phenergan at home but I will send him with Zofran ODT to see if this might provide some better relief as he stated the Phenergan was not helping. Patient was able to tolerate an oral challenge here in the ER. At this point I do not feel the patient requires further work upand it is reasonable to discharge the patient. Please refer to AVS for further details regarding discharge instructions. A discussion was had with the patient regarding diagnosis, diagnostic testing results,treatment/ plan of care, and follow up. All questions were answered. Patient will follow up as directed for further evaluation/treatment. The patient was given strict return precautions as we discussed symptoms that wouldnecessitate return to the ED. Patient will return to ED for new/worsening symptoms. The patient verbalized their understanding and agreement with the above plan. Patient was sent home with a prescription for below medication/s. I did [place] patient on appropriate use of these medication.   Discharge Medication List as of 6/14/2022 11:31 AM START taking these medications    Details   ondansetron (ZOFRAN ODT) 4 MG disintegrating tablet Take 1 tablet by mouth every 8 hours as needed for Nausea, Disp-20 tablet, R-0Normal             I estimatethere is LOW risk for ACUTE APPENDICITIS, BOWEL OBSTRUCTION, CHOLECYSTITIS, DIVERTICULITIS, INCARCERATED HERNIA, PANCREATITIS, or PERFORATED BOWEL or ULCER, thus I consider the discharge disposition reasonable. Also, thereis no evidence or peritonitis, sepsis, or toxicity. [address] and I have discussed the diagnosis and risks, and we agree with discharging home to follow-up with their primary doctor. We also discussed returning to the EmergencyDepartment immediately if new or worsening symptoms occur. We have discussed the symptoms which are most concerning (e.g., bloody stool, fever, changing or worsening pain, vomiting) that necessitate immediate return. CLINICAL IMPRESSION    1. Nausea and vomiting, intractability of vomiting not specified, unspecified vomiting type    2. Generalized abdominal pain    3. Abdominal aortic aneurysm (AAA) without rupture ([facility] [address]           Discharge Vitals:  Blood pressure (!) 145/65, pulse 64, temperature 98.6 °F (37 °C), temperature source Oral, resp. rate 20, height 6' (1.829 m), weight 180 lb (81.6 kg), SpO2 97 %. FOLLOW UP  Saran Costa MD  [phone]    Call in 1 day  For further evaluation    Annice Gaucher, MD  [address] [facility]  [phone]    Call in 1 day  For further evaluation    William Mackenzie MD  [address] [facility], [address] [place] [address]  [phone]    Call in 1 week  For further evaluation, If symptoms worsen    [facility]  ED  [facility]  [address]  [phone]  Go to   If symptoms worsen      DISPOSITION  Patient was discharged to home in good condition.     Comment: Pleasenote this report has been produced using speech recognition software and may contain errors related to that system including errors in grammar, punctuation, and spelling, as well as words and phrases that may beinappropriate. If there are any questions or concerns please feel free to contact the dictating provider for clarification.        LASHONDA Fernández - CHARANJIT  06/14/22 1664

## 2022-06-16 ENCOUNTER — HOSPITAL ENCOUNTER (OUTPATIENT)
Dept: NON INVASIVE DIAGNOSTICS | Age: 82
Discharge: HOME | End: 2022-06-16
Payer: COMMERCIAL

## 2022-06-16 ENCOUNTER — HOSPITAL ENCOUNTER (OUTPATIENT)
Dept: NUCLEAR MEDICINE | Age: 82
Discharge: HOME | End: 2022-06-16
Payer: COMMERCIAL

## 2022-06-16 ENCOUNTER — HOSPITAL ENCOUNTER (OUTPATIENT)
Dept: NON INVASIVE DIAGNOSTICS | Age: 82
End: 2022-06-16
Payer: COMMERCIAL

## 2022-06-16 DIAGNOSIS — R94.31 ABNORMAL EKG: ICD-10-CM

## 2022-06-16 DIAGNOSIS — I48.4 ATYPICAL ATRIAL FLUTTER (HCC): ICD-10-CM

## 2022-06-16 LAB
LV EF: 53 %
LV EF: 65 %
LVEF MODALITY: NORMAL
LVEF MODALITY: NORMAL

## 2022-06-16 PROCEDURE — 6360000002 HC RX W HCPCS: Performed by: INTERNAL MEDICINE

## 2022-06-16 PROCEDURE — 78452 HT MUSCLE IMAGE SPECT MULT: CPT

## 2022-06-16 PROCEDURE — 93306 TTE W/DOPPLER COMPLETE: CPT

## 2022-06-16 PROCEDURE — 3430000000 HC RX DIAGNOSTIC RADIOPHARMACEUTICAL: Performed by: INTERNAL MEDICINE

## 2022-06-16 PROCEDURE — 93017 CV STRESS TEST TRACING ONLY: CPT

## 2022-06-16 PROCEDURE — A9502 TC99M TETROFOSMIN: HCPCS | Performed by: INTERNAL MEDICINE

## 2022-06-16 RX ADMIN — REGADENOSON 0.4 MG: 0.08 INJECTION, SOLUTION INTRAVENOUS at 13:13

## 2022-06-16 RX ADMIN — TETROFOSMIN 11.1 MILLICURIE: 1.38 INJECTION, POWDER, LYOPHILIZED, FOR SOLUTION INTRAVENOUS at 11:31

## 2022-06-16 RX ADMIN — TETROFOSMIN 32.1 MILLICURIE: 1.38 INJECTION, POWDER, LYOPHILIZED, FOR SOLUTION INTRAVENOUS at 13:13

## 2022-06-17 ENCOUNTER — TELEPHONE (OUTPATIENT)
Dept: CARDIOLOGY CLINIC | Age: 82
End: 2022-06-17

## 2022-06-17 NOTE — TELEPHONE ENCOUNTER
Recommend patient keep EP appointment.    Still can have rhythm issues even though the heart function is normal.   Rony Shabazz CNP, 6/17/2022, 4:36 PM

## 2022-06-17 NOTE — TELEPHONE ENCOUNTER
----- Message from Sherry Sigala MD sent at 6/16/2022  4:00 PM EDT -----  The test is normal. Please call the patient and inform them of the normal result.

## 2022-06-17 NOTE — TELEPHONE ENCOUNTER
I would have him hold the medications for now until he sees EP. In reviewing the findings, I think the digoxin may have been the culprit. I could be wrong but that's my thinking.

## 2022-06-17 NOTE — TELEPHONE ENCOUNTER
Pts wife stated that pt has not been taking the Digoxin and wanted to let vsp know. Pts wife also stated that pt has not been taking the Eliquis per vsp. Pts wife would like to know if pt can restart Plavix. Please advise.

## 2022-06-17 NOTE — TELEPHONE ENCOUNTER
Called and spoke with patient and his wife. He states nausea has significantly improved since stopping Eliquis. He also stopped taking his digoxin the next day. Today HR 79, /66. He is only taking prescribed Repatha. He would like to re-start taking Plavix only, please advise? Upcoming EP appt 06/28/22.

## 2022-06-17 NOTE — TELEPHONE ENCOUNTER
Called and spoke with patient wife, instructed patient to continue to hold off on medications until evaluation with electrophysiology as instructed per VSP. She VU, states will notify patient.

## 2022-06-17 NOTE — RESULT ENCOUNTER NOTE
Please call patient with echocardiogram results. covering for NPDD  Echo shows heart function is normal. Mild aortic valve leak. Continue current regimen.  Keep follow up as planned

## 2022-06-17 NOTE — TELEPHONE ENCOUNTER
Spoke to pt wife (okay per HIPPA) to relay message. V/U     Pt wife wants to know if pt still needs to see EP. Pt wife states pt is feeling much better and all of his symptoms have cleared. Pt wife wants to know if pt still has flutter given normal echo results. Please advise, thank you.       ----- Message from LASHONDA Marie CNP sent at 6/17/2022  3:55 PM EDT -----  Please call patient with echocardiogram results. covering for NPDD  Echo shows heart function is normal. Mild aortic valve leak. Continue current regimen.  Keep follow up as planned

## 2022-06-20 ENCOUNTER — TELEPHONE (OUTPATIENT)
Dept: CARDIOLOGY CLINIC | Age: 82
End: 2022-06-20

## 2022-06-20 DIAGNOSIS — I48.0 PAROXYSMAL ATRIAL FIBRILLATION (HCC): ICD-10-CM

## 2022-06-20 DIAGNOSIS — I48.92 ATRIAL FLUTTER BY ELECTROCARDIOGRAM (HCC): ICD-10-CM

## 2022-06-20 DIAGNOSIS — I48.4 ATYPICAL ATRIAL FLUTTER (HCC): ICD-10-CM

## 2022-06-20 DIAGNOSIS — I25.10 CORONARY ARTERY DISEASE INVOLVING NATIVE CORONARY ARTERY OF NATIVE HEART WITHOUT ANGINA PECTORIS: Chronic | ICD-10-CM

## 2022-06-20 NOTE — TELEPHONE ENCOUNTER
----- Message from LASHONDA Martinez CNP sent at 6/20/2022 11:42 AM EDT -----  Please call patient and let him know his cardiac monitor has been read by electrophysiologist with predominately Afib with controlled rates but with episodes of NSVT. Keep appointment with Dr. Zuri Varner next week as planned.    Thank you, Nora Kessler

## 2022-06-21 NOTE — PROGRESS NOTES
[facility] [place]   Electrophysiology Consult Note    Date: 6/28/22  Patient Name: Reanna Barrera  YOB: 1940    Primary Care Physician: Papa Sanon MD    CHIEF COMPLAINT:   Chief Complaint   Patient presents with    New Patient    Atrial Fibrillation    Coronary Artery Disease    Hyperlipidemia    Leg Pain     pt states his legs and knees have been hurting for past month      HISTORY OF PRESENT ILLNESS: Reanna Barrera is a 80 y.o. male who presents for evaluation for  Evaluation of AF. Patient has a past medical history including coronary artery disease s/p stent placement and coronary artery bypass grafting, hyperlipidemia, hypertension, carotid artery disease, COPD, and former tobacco abuse. Patient was noted to have an abnormal EKG by his PCP. Patient was placed on a cardiac event monitor by LASHONDA Porter. Patient wore a cardiac event monitor from 5/5/2022 to 5/19/2022 which demonstrated predominately AF with an average HR of 75 (). PAC burden 0%, PVC burden 1.30%, AF burden 100%. Monitor noted events of NSVT. Patient was placed on Diltiazem and Eliquis on 5/6/2022. Patient presented to the ER on 6/14/2022 with complaints of nausea and vomiting. He correlated this with starting the new medications. Eliquis was discontinued at this time. Patient was referred to  EP by Dr. Jayna Feliz. Today, 6/28/2022, ECG demonstrates Atrial rhythm (86 BPM). Patient states he was told he had a \"flutter\" by the ER physician and was given Eliquis. He states the eliquis made him sick and he lost 20 pounds. His wife states she cut his dosage in half at her discretion and he was still unable to tolerate it. He states he was told at his office visit that he did not have AF. Patient states he experienced SOB while getting the mail this week. He states he is on oxygen at home. Patient denies current edema, chest pain,  dizziness or syncope.  Patient is taking all cardiac medications as prescribed and tolerates them well. Past Medical History:   has a past medical history of CAD (coronary artery disease), CAD (coronary artery disease), COPD (chronic obstructive pulmonary disease) ([facility] [address] COPD (chronic obstructive pulmonary disease) ([facility] [address] GERD (gastroesophageal reflux disease), HTN (hypertension), Hyperlipidemia, IBS (irritable bowel syndrome), and Stented coronary artery. Past Surgical History:   has a past surgical history that includes Coronary artery bypass graft (2000); Coronary angioplasty with stent (2005); Carotid endarterectomy (01/2014); Colonoscopy (2004); Upper gastrointestinal endoscopy (2004); Cholecystectomy; Cardiac catheterization (2009); Upper gastrointestinal endoscopy (7/15/14); and Diagnostic Cardiac Cath Lab Procedure. Allergies:  Crestor [rosuvastatin calcium], Statins, Amlodipine, Budesonide-formoterol fumarate, Codeine, Dilaudid [hydromorphone hcl], Nitrofurantoin macrocrystal, Other, Propoxyphene, Zetia [ezetimibe], Apixaban, Digoxin, Hydromorphone hcl, and Iodoform    Social History:   reports that he quit smoking about 26 years ago. His smoking use included cigarettes. He has a 15.00 pack-year smoking history. He has never used smokeless tobacco. He reports current alcohol use. He reports that he does not use drugs. Family History: family history includes Diabetes in his mother; Heart Failure in his father, mother, and sister; Pacemaker in his brother. Home Medications:    Prior to Admission medications    Medication Sig Start Date End Date Taking? Authorizing Provider   ferrous gluconate (FERGON) 324 (38 Fe) MG tablet Take 324 mg by mouth 6/21/22  Yes Historical Provider, MD   zolpidem (AMBIEN) 10 MG tablet Take 10 mg by mouth as needed.  6/24/22  Yes Historical Provider, MD   ondansetron (ZOFRAN ODT) 4 MG disintegrating tablet Take 1 tablet by mouth every 8 hours as needed for Nausea 6/14/22  Yes LASHONDA Heart - CNP   Fluticasone-Umeclidin-Vilant (TRELEGY ELLIPTA IN) Inhale into the lungs    Yes Historical Provider, MD   Evolocumab (REPATHA SURECLICK) 795 MG/ML SOAJ INJECT (140 MG) SUBCUTANEOUSLY EVERY 2 WEEKS. KEEP REFRIGERATED. SINGLE DOSE DEVICE. 8/3/20  Yes Abram Fraga MD   nitroGLYCERIN (NITROSTAT) 0.4 MG SL tablet Place 1 tablet under the tongue every 5 minutes as needed for Chest pain 6/1/18  Yes Melecio Bazan MD   albuterol (PROVENTIL HFA) 108 (90 BASE) MCG/ACT inhaler Inhale 2 puffs into the lungs every 4 hours as needed for Wheezing or Shortness of Breath. 10/9/13  Yes Trevon Craig MD   promethazine (PHENERGAN) 25 MG tablet Take 25 mg by mouth every 6 hours as needed. Yes Historical Provider, MD   digoxin (LANOXIN) 125 MCG tablet Take 1 tablet by mouth daily  Patient not taking: Reported on 6/28/2022 5/17/22   Melecio Bazan MD   apixaban (ELIQUIS) 5 MG TABS tablet Take 1 tablet by mouth 2 times daily  Patient taking differently: Take 5 mg by mouth 2 times daily 1/2 tablet twice daily 5/5/22 6/14/22  LASHONDA Knowles - CNP   esomeprazole (NEXIUM) 20 MG delayed release capsule Take 20 mg by mouth daily as needed   Patient not taking: Reported on 5/17/2022    Historical Provider, MD       REVIEW OF SYSTEMS:    All 14-point review of systems are completed and  pertinent positives are mentioned in the history of present illness. Other  systems are reviewed and are negative. Physical Examination:    /60   Pulse 81   Ht 6' (1.829 m)   Wt 197 lb (89.4 kg)   SpO2 95%   BMI 26.72 kg/m²      Constitutional and General Appearance:    alert, cooperative, no distress and appears stated age  [de-identified]:    PERRLA, no cervical lymphadenopathy. No masses palpable.  Normal oral  mucosa  Respiratory:  · Normal excursion and expansion without use of accessory muscles  · Resp Auscultation: Normal breath sounds without dullness or wheezing  Cardiovascular:  · The apical impulse is not displaced  · RRR S1S2 w/o M/G/R  Abdomen:  · No masses or tenderness  · Bowel sounds present  Extremities:  ·  No Cyanosis or Clubbing  ·  Lower extremity edema: No  · Skin: Warm and dry  Neurological:  · Alert and oriented. · Moves all extremities well  · No abnormalities of mood, affect, memory, mentation, or behavior are noted    DATA:    ECG 6/28/22: Personally reviewed. Stress Test 6/16/2022  Summary Negative for evidence of myocardial ischemia or scar. Normal left ventricular systolic function, size, and wall motion. Calculated LVEF of >65%. Overall findings represent a low risk scan. Echo 6/16/2022  Summary   Technically difficult examination. Low normal LVEF 50-55%   Left ventricular function/wall motion is difficult to estimate due to poor   endocardial visualization. Diastolic dysfunction grade and filling pressure are indeterminate. The right atrium is mildly dilated. Mild aortic valve regurgitation. Inadequate tricuspid regurgitation to estimate systolic pulmonary artery   pressure. Subcostal view not well visualized. Pre-mature beats throughout the study. IMPRESSION:    1. Paroxysmal atrial fibrillation ([id number] score 3)  6/28/2022  Patient is a pleasant 80year old male with a medical history significant for ischemic cardiomyopathy status post PCI, COPD, peripheral vascular disease, and GERD who presents from home for evaluation. Patient appears to be largely asymptomatic from his atrial fibrillation. He had some nausea and vomiting and weight loss while on [facility] and rate control and so he discontinued. His EKG shows ectopic atrial rhythm. We discussed anticoagulation (NOAC and warfarin), rate control, rhythm control, AVN + pacemaker, and atrial fibrillation ablation. We reviewed risks and benefits of each approach. We discussed side effects of class IC, class II, class III, and class IV antiarrhythmics. All questions were answered. We discussed Watchman and [facility] AUTHORITY for an extended period.   He would like to try rivaroxaban. We discussed monitoring systems.  - Stop apixaban. - Start rivaroxaban. - Continue digoxin. - Likely will need marvin agent but will hold off for now given symptoms.  - Follow up with me in 2 months to review and answer questions. RECOMMENDATIONS:  1. Discussed at length treatment options for AFIB (patient education given)   - Medications to slow heart rate (goal of less than 90 at rest, less than 110 with mild activity)    -  Medications for rhythm control (AMIODARONE, DRONEDARONE, FLECAINIDE, and TIKOSYN which requires hospital admission for three days). Discussed risks and benefits.    - EP Study and AFIB Ablation to burn the abnormal electrical activity within the heart. Discussed risks and benefits.    - Pace and Ablate to put pacemaker in the heart and burn the electrical activity in the heart. Would be dependent upon pacemaker for the rest of your life. 2. Educated about anticoagulants to prevent stroke. - Discussed Eliquis (did not tolerate), Coumadin, Xarelto (script sent to pharmacy). - Discussed Watchman and PALLAVI to assess the left atrial appendage. 3. Encouraged to purchase [employer] or [address] to monitor for episodes of AF.   - Can be purchased at [employer] or [employer]  4. Discussed implanted loop recorder for long term monitoring to assess AF burden. - Patient would like to proceed with this option  5. Follow up in 2 months with [address]   1. Tobacco Cessation Counseling: NA  2. Retake of BP if >140/90:   NA  3. Documentation to PCP/referring for new patient:  Sent to PCP at close of office visit  4. CAD patient on anti-platelet: No  5. CAD patient on STATIN therapy:  Yes  6. Patient with CHF and aFib on anticoagulation:  No     All questions and concerns were addressed to the patient/family. Alternatives to my treatment were discussed. Dr. Rosa English MD  Electrophysiology  [facility]. [address]. [address].   [place] [address]  Phone: [phone]  Fax: [phone]     NOTE: This report was transcribed using voice recognition software. Every effort was made to ensure accuracy, however, inadvertent computerized transcription errors may be present. Monica Loza RN, am scribing for and in the presence of Dr. Ashley Rodney. 06/28/22 4:19 PM   Shruthi Ponce RN    I reviewed with the resident the medical history and the resident's findings on the physical examination. I discussed with the resident the patient's diagnosis and concur with the plan.

## 2022-06-28 ENCOUNTER — OFFICE VISIT (OUTPATIENT)
Dept: CARDIOLOGY CLINIC | Age: 82
End: 2022-06-28
Payer: COMMERCIAL

## 2022-06-28 VITALS
DIASTOLIC BLOOD PRESSURE: 60 MMHG | BODY MASS INDEX: 26.68 KG/M2 | WEIGHT: 197 LBS | OXYGEN SATURATION: 95 % | HEIGHT: 72 IN | HEART RATE: 81 BPM | SYSTOLIC BLOOD PRESSURE: 132 MMHG

## 2022-06-28 DIAGNOSIS — I25.10 CORONARY ARTERY DISEASE INVOLVING NATIVE CORONARY ARTERY OF NATIVE HEART WITHOUT ANGINA PECTORIS: Chronic | ICD-10-CM

## 2022-06-28 DIAGNOSIS — I48.91 ATRIAL FIBRILLATION, UNSPECIFIED TYPE (HCC): Primary | ICD-10-CM

## 2022-06-28 PROCEDURE — 99204 OFFICE O/P NEW MOD 45 MIN: CPT | Performed by: INTERNAL MEDICINE

## 2022-06-28 PROCEDURE — 93000 ELECTROCARDIOGRAM COMPLETE: CPT | Performed by: INTERNAL MEDICINE

## 2022-06-28 RX ORDER — DOXYCYCLINE HYCLATE 50 MG/1
324 CAPSULE, GELATIN COATED ORAL
COMMUNITY
Start: 2022-06-21 | End: 2022-07-21

## 2022-06-28 RX ORDER — ZOLPIDEM TARTRATE 10 MG/1
10 TABLET ORAL AS NEEDED
COMMUNITY
Start: 2022-06-24

## 2022-06-28 NOTE — PATIENT INSTRUCTIONS
RECOMMENDATIONS:  1. Discussed at length treatment options for AFIB (patient education given)   - Medications to slow heart rate (goal of less than 90 at rest, less than 110 with mild activity)    -  Medications for rhythm control (AMIODARONE, DRONEDARONE, FLECAINIDE, and TIKOSYN which requires hospital admission for three days). Discussed risks and benefits.    - EP Study and AFIB Ablation to burn the abnormal electrical activity within the heart. Discussed risks and benefits.    - Pace and Ablate to put pacemaker in the heart and burn the electrical activity in the heart. Would be dependent upon pacemaker for the rest of your life. 2. Educated about anticoagulants to prevent stroke. - Discussed Eliquis (did not tolerate), Coumadin, Xarelto (script sent to pharmacy). - Discussed Watchman and PALLAVI to assess the left atrial appendage. 3. Encouraged to purchase [employer] or [address] [employer] [employer] [address] to monitor for episodes of AF.   - Can be purchased at [employer] or [employer]  4. Discussed implanted loop recorder for long term monitoring to assess AF burden. - Patient would like to proceed with this option  5.  Follow up in 2 months with [address]

## 2022-06-30 ENCOUNTER — TELEPHONE (OUTPATIENT)
Dept: CARDIOLOGY CLINIC | Age: 82
End: 2022-06-30

## 2022-06-30 NOTE — TELEPHONE ENCOUNTER
Patient was seen in office 6/28/2022. ILR implant discussed and agreed upon by [address] and patient. Medications do not need to be held. Patient will need covid testing prior. Thank you! MEDS:  HOLD iron, xarelto morning of.

## 2022-07-07 NOTE — TELEPHONE ENCOUNTER
Spoke with [employer]. She states they are waiting to discuss possible ablation with [facility] prior to scheduling this. She will call me back when she knows.

## 2022-07-08 ENCOUNTER — PATIENT MESSAGE (OUTPATIENT)
Dept: CARDIOLOGY CLINIC | Age: 82
End: 2022-07-08

## 2022-07-08 NOTE — TELEPHONE ENCOUNTER
Needs to see Liam Courts next week for Watchman. Will go to [facility].  Reaction to rivaroxaban and apixaban. Will call on Monday if haven't heard back.

## 2022-07-08 NOTE — TELEPHONE ENCOUNTER
From: Brando Lepe  To: Dr. Bardales Handler  Sent: 7/8/2022 2:13 PM EDT  Subject: Call didn't connect    we were/are waiting for your call, I don't know why it went to voice mail

## 2022-07-11 ENCOUNTER — PATIENT MESSAGE (OUTPATIENT)
Dept: CARDIOLOGY CLINIC | Age: 82
End: 2022-07-11

## 2022-07-11 NOTE — TELEPHONE ENCOUNTER
From: Cony Christopher  To: Dr. Starla Tamayo  Sent: 7/11/2022 2:30 PM EDT  Subject: Appointment - Chelita Camp to call, which I did if we had not heard from you by noon. Just doing a double follow-up as a reminder for the appointment with the Doctor re: Watchman.   Many Thanks,  Eve Santamaria

## 2022-07-11 NOTE — TELEPHONE ENCOUNTER
Pts wife stated that they were advised to give the office a call by noon on 07/11 if they have not heard from agk and/or rnsc. Pt and pts wife can be reached at [phone].

## 2022-07-11 NOTE — TELEPHONE ENCOUNTER
VIKTORIYA- looks they are working on [email or web address] for patient (potentially scheduling for 7/19/2022. Patient would still like to speak with you. Phone number to call is [phone].

## 2022-07-12 ENCOUNTER — TELEPHONE (OUTPATIENT)
Dept: CARDIOLOGY CLINIC | Age: 82
End: 2022-07-12

## 2022-07-12 NOTE — TELEPHONE ENCOUNTER
There is an ongoing encounter regarding setting up for Watchman- it looks like Merriam Crigler is waiting on an OK to overbook from [employer]. Once date/time is set for appointment, Merriam Crigler will call patient.

## 2022-07-12 NOTE — TELEPHONE ENCOUNTER
Sent message to Manish Bustos to go ahead and double book Dr. Mayank Bermudez for [facility] consult with patient

## 2022-07-12 NOTE — PROGRESS NOTES
[facility]  Cardiology Consult    Lety Lujan  1940 July 12, 2022    Primary Cardiologist:   Referring Physician: Suzi James MD    Reason for Referral: Left atrial appendage closure    CC: ***      Subjective:     History of Present Illness:    Christopher Langston is a 80 y.o. patient with a PMH significant for coronary artery disease s/p PCI (2005) CABG (2000), carotid stenosis s/p endarterectomy (2014), paroxsymal atrial fibrillation, ischemic cardiomyopathy,hypertension and COPD. Patient wore a cardiac event monitor from 5/5/2022 to 5/19/2022 which demonstrated predominately AF with an average HR of 75 (). PAC burden 0%, PVC burden 1.30%, AF burden 100%. Patient presented to the ER on 6/14/2022 with complaints of nausea and vomiting. He correlated this with starting the new medications. Eliquis was discontinued at this time. Patient is being referred for Left Atrial Appendage Closure with WATCHMAN device for management of stroke risk resulting from non-valvular atrial fibrillation. Based on their past history, it has been determined that they are poor candidates for long-term oral-anticoagulation, however may be tolerant of short term treatment with warfarin as necessary. Past Medical History:   has a past medical history of CAD (coronary artery disease), CAD (coronary artery disease), COPD (chronic obstructive pulmonary disease) ([facility] [address] COPD (chronic obstructive pulmonary disease) ([facility] [address] GERD (gastroesophageal reflux disease), HTN (hypertension), Hyperlipidemia, IBS (irritable bowel syndrome), and Stented coronary artery. Surgical History:   has a past surgical history that includes Coronary artery bypass graft (2000); Coronary angioplasty with stent (2005); Carotid endarterectomy (01/2014); Colonoscopy (2004); Upper gastrointestinal endoscopy (2004); Cholecystectomy; Cardiac catheterization (2009);  Upper gastrointestinal endoscopy (7/15/14); and Diagnostic Cardiac Cath Lab Procedure. Social History:   reports that he quit smoking about 26 years ago. His smoking use included cigarettes. He has a 15.00 pack-year smoking history. He has never used smokeless tobacco. He reports current alcohol use. He reports that he does not use drugs. Family History:  family history includes Diabetes in his mother; Heart Failure in his father, mother, and sister; Pacemaker in his brother. Home Medications:  Were reviewed and are listed in nursing record and/or below  Prior to Admission medications    Medication Sig Start Date End Date Taking? Authorizing Provider   ferrous gluconate (FERGON) 324 (38 Fe) MG tablet Take 324 mg by mouth 6/21/22   Historical Provider, MD   zolpidem (AMBIEN) 10 MG tablet Take 10 mg by mouth as needed. 6/24/22   Historical Provider, MD   rivaroxaban (XARELTO) 20 MG TABS tablet Take 1 tablet by mouth daily (with breakfast) 6/28/22   JAMISON Morales MD   ondansetron (ZOFRAN ODT) 4 MG disintegrating tablet Take 1 tablet by mouth every 8 hours as needed for Nausea 6/14/22   Dru Kendall APRN - CNP   digoxin (LANOXIN) 125 MCG tablet Take 1 tablet by mouth daily  Patient not taking: Reported on 6/28/2022 5/17/22   Rebecca Gallegos MD   Fluticasone-Umeclidin-Vilant (Todd Mage ELLIPTA IN) Inhale into the lungs     Historical Provider, MD   apixaban (ELIQUIS) 5 MG TABS tablet Take 1 tablet by mouth 2 times daily  Patient taking differently: Take 5 mg by mouth 2 times daily 1/2 tablet twice daily 5/5/22 6/14/22  LASHONDA Zepeda - CNP   Evolocumab (REPATHA SURECLICK) 892 MG/ML SOAJ INJECT (140 MG) SUBCUTANEOUSLY EVERY 2 WEEKS. KEEP REFRIGERATED.  SINGLE DOSE DEVICE. 8/3/20   Epifania Dancer, MD   nitroGLYCERIN (NITROSTAT) 0.4 MG SL tablet Place 1 tablet under the tongue every 5 minutes as needed for Chest pain 6/1/18   Rebecca Gallegos MD   esomeprazole (NEXIUM) 20 MG delayed release capsule Take 20 mg by mouth daily as needed   Patient not taking: Reported on 5/17/2022    Historical Provider, MD   albuterol (PROVENTIL HFA) 108 (90 BASE) MCG/ACT inhaler Inhale 2 puffs into the lungs every 4 hours as needed for Wheezing or Shortness of Breath. 10/9/13   Kaitlin Thomas MD   promethazine (PHENERGAN) 25 MG tablet Take 25 mg by mouth every 6 hours as needed. Historical Provider, MD        CURRENT Medications:  No current facility-administered medications for this visit. Allergies:  Crestor [rosuvastatin calcium], Statins, Amlodipine, Budesonide-formoterol fumarate, Codeine, Dilaudid [hydromorphone hcl], Nitrofurantoin macrocrystal, Other, Propoxyphene, Zetia [ezetimibe], Apixaban, Digoxin, Hydromorphone hcl, and Iodoform           Review of Systems: All reviewed and refer to HPI  · Constitutional: no unanticipated weight loss. There's been no change in energy level, sleep pattern, or activity level. No fevers, chills. · Eyes: No visual changes or diplopia. No scleral icterus. · ENT: No Headaches, hearing loss or vertigo. No mouth sores or sore throat. · Cardiovascular: No Chest pain, tightness or discomfort.  No Shortness of breath. No Dyspnea on exertion, Orthopnea, Paroxysmal nocturnal dyspnea or breathlessness at rest.   No Palpitations.  No Syncope ('blackouts', 'faints', 'collapse') or dizziness. · Respiratory: No cough or wheezing, no sputum production. No hematemesis. · Gastrointestinal: No abdominal pain, appetite loss, blood in stools. No change in bowel or bladder habits. · Genitourinary: No dysuria, trouble voiding, or hematuria. · Musculoskeletal:  No gait disturbance, no joint complaints. · Integumentary: No rash or pruritis. · Neurological: No headache, diplopia, change in muscle strength, numbness or tingling. · Psychiatric: No anxiety or depression. · Endocrine: No temperature intolerance. No excessive thirst, fluid intake, or urination. No tremor.   · Hematologic/Lymphatic: No abnormal bruising or bleeding, blood clots or swollen lymph nodes. · Allergic/Immunologic: No nasal congestion or hives. Objective: all reveiwed      PHYSICAL EXAM:      There were no vitals filed for this visit. General Appearance:  Alert, cooperative, no distress, appears stated age. Head:  Normocephalic, without obvious abnormality, atraumatic. Eyes:  Pupils equal and round. No scleral icterus. Mouth: Moist mucosa, no pharyngeal erythema. Nose: Nares normal. No drainage or sinus tenderness. Neck: Supple, symmetrical, trachea midline. No adenopathy. No tenderness/mass/nodules. No carotid bruit or elevated JVD. Lungs:   Respiratory Effort: Normal   Auscultation: Clear to auscultation bilaterally, respirations unlabored. No wheeze, rales   Chest Wall:  No tenderness or deformity. Cardiovascular:    Pulses  Palpation: normal   Ascultation: Regular rate, S1/ S2 normal. No murmur, rub, or gallop. 2+ radial and pedal pulses, symmetric  Carotid  Femoral   Abdomen and Gastrointestinal:   Soft, non-tender, bowel sounds active. Liver and Spleen  Masses   Musculoskeletal: No muscle wasting  Back  Gait   Extremities: Extremities normal, atraumatic. No cyanosis or edema. No cyanosis clubbing       Skin: Inspection and palpation performed, no rashes or lesions. Pysch: Normal mood and affect.  Alert and oriented to time place person   Neurologic: Normal gross motor and sensory exam.       Labs: all labs have been reviewed      Lab Results   Component Value Date/Time    WBC 8.7 06/14/2022 08:26 AM    RBC 4.03 06/14/2022 08:26 AM    HGB 11.9 06/14/2022 08:26 AM    HCT 36.4 06/14/2022 08:26 AM    MCV 90.4 06/14/2022 08:26 AM    RDW 16.0 06/14/2022 08:26 AM     06/14/2022 08:26 AM     Lab Results   Component Value Date/Time     06/14/2022 08:26 AM    K 4.5 06/14/2022 08:26 AM     06/14/2022 08:26 AM    CO2 24 06/14/2022 08:26 AM    BUN 14 06/14/2022 08:26 AM    CREATININE 1.2 06/14/2022 08:26 AM    GFRAA >60 06/14/2022 08:26 AM    GFRAA >60 07/17/2012 05:52 AM    AGRATIO 1.6 06/14/2022 08:26 AM    LABGLOM 58 06/14/2022 08:26 AM    GLUCOSE 138 06/14/2022 08:26 AM    PROT 7.2 06/14/2022 08:26 AM    PROT 7.0 07/17/2012 05:52 AM    CALCIUM 9.2 06/14/2022 08:26 AM    BILITOT 0.8 06/14/2022 08:26 AM    ALKPHOS 120 06/14/2022 08:26 AM    AST 25 06/14/2022 08:26 AM    ALT 18 06/14/2022 08:26 AM     No results found for: PTINR  Lab Results   Component Value Date    LABA1C 6.1 03/01/2017     Lab Results   Component Value Date    TROPONINI <0.01 03/01/2017       Cardiac, Vascular and Imaging Data: All Personally Reviewed in Detail by Myself      EKG:     Echocardiogram:   ECHO 6/16/2022  Technically difficult examination. Low normal LVEF 50-55%  Left ventricular function/wall motion is difficult to estimate due to poor  endocardial visualization. Diastolic dysfunction grade and filling pressure are indeterminate. The right atrium is mildly dilated. Mild aortic valve regurgitation. Inadequate tricuspid regurgitation to estimate systolic pulmonary artery  pressure. Subcostal view not well visualized. Pre-mature beats throughout the study. ECHO 6/18/2021  Low normal systolic function with an estimated ejection fraction of 50-55%. Mild concentric left ventricular hypertrophy. Normal left ventricular diastolic filling pressure. The right ventricle is normal in size with decreased function. The right atrium is mildly dilated. Mild aortic regurgitation. MIld mitral regurgitation. Stress Test:   Stress test 6/16/2022   Negative for evidence of myocardial ischemia or scar.    Normal left ventricular systolic function, size, and wall motion.    Calculated LVEF of >65%.    Overall findings represent a low risk scan. Cath:     Other imaging:     Assessment and Plan     Atrial Fibrillation, paroxsymal    Primary Cardiologist:   Referring Physician:   Referring Reason: Medication intolerance    CHADSvasc is at least 5 (Age,CAD,CHF,HTN)  HASamrita is at least 1***     Currently on Xarelto. He is a poor candidate for chronic anticoagulation with his history of medication intolerance with nausea and vomiting. He would be an appropriate candidate for the watchman device. Patient is agreeable to proceed with the Watchman procedure and instructed Maria Elena Melendez, the coordinator will get in contact to facilitate scheduling. Specifically regarding risk of anticoagulation they have demonstrated:  ? Intolerance oral anticoagulation  ? High risk of recurrent falls    We have discussed their unique stroke and bleeding risk both on and off oral-anticoagulation, and the rationale for this referral.  Based on both stroke and bleeding risk, a shared decision has been made to pursue closure of the left atrial appendage as an alternative to oral anticoagulant therapy for stroke prophylaxis and to reduce their long term risk of incidence of bleeding. Discussed Watchman LAAC at length with patient, heart model, device model and video. We gave educational materials. We discussed pre-procedure workup, timing of restarting AC, AC length, procedure and post procedure follow up/management. No Iodine Allergy. No Nickel/Titanium Allergy. He/She is agreeable to short term AC, proceeding with PALLAVI, 2nd opinion/shared decision making  and will follow up with me post workup to discussing timing of the procedure. I had a detail discussion with the patient and family regarding the risk and benefit of the procedures. I explained to them the details of the procedure. I explained to them that the procedure will be performed under general anesthesia. I explained any risk of bleeding, pericardial effusion and tamponade, perforation of the vessel, stroke, myocardial infarction or death. The patient and family understood the risk and benefits and the details of procedure and would like to go ahead with the procedure. The patient gave informed consent.     All questions and concerns answered at this time. Confirmed per patient before leaving the office. I have also asked her to call the office and speak with the [address] [place] [address] with any questions or concerns moving forward. Thank you for allowing us to participate in the care of Teri Conklin. Please do not hesitate to contact me if you have any questions.     Adam Tapia MD, MPH    [facility], [address] [place] [address]  Ph: [phone]  Fax: [phone]      ***    ***

## 2022-07-19 ENCOUNTER — TELEPHONE (OUTPATIENT)
Dept: CARDIOLOGY CLINIC | Age: 82
End: 2022-07-19

## 2022-07-19 ENCOUNTER — OFFICE VISIT (OUTPATIENT)
Dept: CARDIOLOGY CLINIC | Age: 82
End: 2022-07-19
Payer: COMMERCIAL

## 2022-07-19 VITALS
OXYGEN SATURATION: 97 % | SYSTOLIC BLOOD PRESSURE: 142 MMHG | DIASTOLIC BLOOD PRESSURE: 70 MMHG | WEIGHT: 198 LBS | HEIGHT: 72 IN | BODY MASS INDEX: 26.82 KG/M2 | HEART RATE: 70 BPM

## 2022-07-19 DIAGNOSIS — I48.0 PAROXYSMAL ATRIAL FIBRILLATION (HCC): Primary | ICD-10-CM

## 2022-07-19 PROCEDURE — 93000 ELECTROCARDIOGRAM COMPLETE: CPT | Performed by: INTERNAL MEDICINE

## 2022-07-19 PROCEDURE — 1123F ACP DISCUSS/DSCN MKR DOCD: CPT | Performed by: INTERNAL MEDICINE

## 2022-07-19 PROCEDURE — 99214 OFFICE O/P EST MOD 30 MIN: CPT | Performed by: INTERNAL MEDICINE

## 2022-07-19 NOTE — PROGRESS NOTES
[address]  Cardiology Consult    Traci Hiltonlogio Face  1940 July 19, 2022    Primary Cardiologist:   Referring Physician: Marilyn Lowery MD    Reason for Referral: Left atrial appendage closure    CC: medication intolerance      Subjective:     History of Present Illness:    Rahul Clifton is a 80 y.o. patient with a PMH significant for coronary artery disease s/p PCI (2005) CABG (2000), carotid stenosis s/p endarterectomy (2014), paroxsymal atrial fibrillation, ischemic cardiomyopathy,hypertension and COPD. Patient wore a cardiac event monitor from 5/5/2022 to 5/19/2022 which demonstrated predominately AF with an average HR of 75 (). PAC burden 0%, PVC burden 1.30%, AF burden 100%. Patient presented to the ER on 6/14/2022 with complaints of nausea and vomiting. He correlated this with starting the new medications. Eliquis was discontinued at this time. Patient is being referred for Left Atrial Appendage Closure with WATCHMAN device for management of stroke risk resulting from non-valvular atrial fibrillation. Based on their past history, it has been determined that they are poor candidates for long-term oral-anticoagulation, however may be tolerant of short term treatment with warfarin as necessary. Today, 7/19/2022, he has complaints of allergic reactions to both Eliquis and Xarelto. His reaction to Xarelto is less severe, so he is currently taking this. Past Medical History:   has a past medical history of CAD (coronary artery disease), CAD (coronary artery disease), COPD (chronic obstructive pulmonary disease) ([address] COPD (chronic obstructive pulmonary disease) ([facility] [address] GERD (gastroesophageal reflux disease), HTN (hypertension), Hyperlipidemia, IBS (irritable bowel syndrome), and Stented coronary artery. Surgical History:   has a past surgical history that includes Coronary artery bypass graft (2000); Coronary angioplasty with stent (2005);  Carotid endarterectomy (01/2014); Colonoscopy (2004); Upper gastrointestinal endoscopy (2004); Cholecystectomy; Cardiac catheterization (2009); Upper gastrointestinal endoscopy (7/15/14); and Diagnostic Cardiac Cath Lab Procedure. Social History:   reports that he quit smoking about 26 years ago. His smoking use included cigarettes. He has a 15.00 pack-year smoking history. He has never used smokeless tobacco. He reports current alcohol use. He reports that he does not use drugs. Family History:  family history includes Diabetes in his mother; Heart Failure in his father, mother, and sister; Pacemaker in his brother. Home Medications:  Were reviewed and are listed in nursing record and/or below  Prior to Admission medications    Medication Sig Start Date End Date Taking? Authorizing Provider   zolpidem (AMBIEN) 10 MG tablet Take 10 mg by mouth as needed. 6/24/22  Yes Historical Provider, MD   rivaroxaban (XARELTO) 20 MG TABS tablet Take 1 tablet by mouth daily (with breakfast) 6/28/22  Yes Rivas Jeronimo MD   ondansetron (ZOFRAN ODT) 4 MG disintegrating tablet Take 1 tablet by mouth every 8 hours as needed for Nausea 6/14/22  Yes LASHONDA Billy - CNP   Fluticasone-Umeclidin-Vilant ([facility]) Inhale into the lungs    Yes Historical Provider, MD   Evolocumab (REPATHA SURECLICK) 725 MG/ML SOAJ INJECT (140 MG) SUBCUTANEOUSLY EVERY 2 WEEKS. KEEP REFRIGERATED. SINGLE DOSE DEVICE. 8/3/20  Yes Prashant Ramirez MD   albuterol (PROVENTIL HFA) 108 (90 BASE) MCG/ACT inhaler Inhale 2 puffs into the lungs every 4 hours as needed for Wheezing or Shortness of Breath. 10/9/13  Yes Fausto Valadez MD   promethazine (PHENERGAN) 25 MG tablet Take 25 mg by mouth every 6 hours as needed.      Yes Historical Provider, MD   ferrous gluconate (FERGON) 324 (38 Fe) MG tablet Take 324 mg by mouth  Patient not taking: Reported on 7/19/2022 6/21/22   Historical Provider, MD   digoxin (LANOXIN) 125 MCG tablet Take 1 tablet by mouth daily  Patient not taking: No sig reported 5/17/22   Noel Evans MD   apixaban (ELIQUIS) 5 MG TABS tablet Take 1 tablet by mouth 2 times daily  Patient taking differently: Take 5 mg by mouth 2 times daily 1/2 tablet twice daily 5/5/22 6/14/22  Patricia CruzLASHONDA travis - CNP   nitroGLYCERIN (NITROSTAT) 0.4 MG SL tablet Place 1 tablet under the tongue every 5 minutes as needed for Chest pain  Patient not taking: Reported on 7/19/2022 6/1/18   Noel Evans MD   esomeprazole (NEXIUM) 20 MG delayed release capsule Take 20 mg by mouth daily as needed   Patient not taking: No sig reported    Historical Provider, MD        CURRENT Medications:  No current facility-administered medications for this visit. Allergies:  Crestor [rosuvastatin calcium], Statins, Amlodipine, Budesonide-formoterol fumarate, Codeine, Dilaudid [hydromorphone hcl], Nitrofurantoin macrocrystal, Other, Propoxyphene, Zetia [ezetimibe], Apixaban, Digoxin, Hydromorphone hcl, and Iodoform           Review of Systems: All reviewed and refer to HPI  Constitutional: no unanticipated weight loss. There's been no change in energy level, sleep pattern, or activity level. No fevers, chills. Eyes: No visual changes or diplopia. No scleral icterus. ENT: No Headaches, hearing loss or vertigo. No mouth sores or sore throat. Cardiovascular: No Chest pain, tightness or discomfort. No Shortness of breath. No Dyspnea on exertion, Orthopnea, Paroxysmal nocturnal dyspnea or breathlessness at rest.  No Palpitations. No Syncope ('blackouts', 'faints', 'collapse') or dizziness. Respiratory: No cough or wheezing, no sputum production. No hematemesis. Gastrointestinal: No abdominal pain, appetite loss, blood in stools. No change in bowel or bladder habits. Genitourinary: No dysuria, trouble voiding, or hematuria. Musculoskeletal:  No gait disturbance, no joint complaints. Integumentary: No rash or pruritis.   Neurological: No headache, diplopia, change in muscle strength, numbness or tingling. Psychiatric: No anxiety or depression. Endocrine: No temperature intolerance. No excessive thirst, fluid intake, or urination. No tremor. Hematologic/Lymphatic: No abnormal bruising or bleeding, blood clots or swollen lymph nodes. Allergic/Immunologic: No nasal congestion or hives. Objective: all reveiwed      PHYSICAL EXAM:      Vitals:    07/19/22 1207   BP: (!) 142/70   Pulse: 70   SpO2: 97%    Weight: 198 lb (89.8 kg)       General Appearance:  Alert, cooperative, no distress, appears stated age. Head:  Normocephalic, without obvious abnormality, atraumatic. Eyes:  Pupils equal and round. No scleral icterus. Mouth: Moist mucosa, no pharyngeal erythema. Nose: Nares normal. No drainage or sinus tenderness. Neck: Supple, symmetrical, trachea midline. No adenopathy. No tenderness/mass/nodules. No carotid bruit or elevated JVD. Lungs:   Respiratory Effort: Normal   Auscultation: Clear to auscultation bilaterally, respirations unlabored. No wheeze, rales   Chest Wall:  No tenderness or deformity. Cardiovascular:    Pulses  Palpation: normal   Ascultation: Regular rate, S1/ S2 normal. No murmur, rub, or gallop. 2+ radial and pedal pulses, symmetric  Carotid  Femoral   Abdomen and Gastrointestinal:   Soft, non-tender, bowel sounds active. Liver and Spleen  Masses   Musculoskeletal: No muscle wasting  Back  Gait   Extremities: Extremities normal, atraumatic. No cyanosis or edema. No cyanosis clubbing       Skin: Inspection and palpation performed, no rashes or lesions. Pysch: Normal mood and affect.  Alert and oriented to time place person   Neurologic: Normal gross motor and sensory exam.       Labs: all labs have been reviewed      Lab Results   Component Value Date/Time    WBC 8.7 06/14/2022 08:26 AM    RBC 4.03 06/14/2022 08:26 AM    HGB 11.9 06/14/2022 08:26 AM    HCT 36.4 06/14/2022 08:26 AM    MCV 90.4 06/14/2022 08:26 AM    RDW 16.0 06/14/2022 08:26 AM     06/14/2022 08:26 AM     Lab Results   Component Value Date/Time     06/14/2022 08:26 AM    K 4.5 06/14/2022 08:26 AM     06/14/2022 08:26 AM    CO2 24 06/14/2022 08:26 AM    BUN 14 06/14/2022 08:26 AM    CREATININE 1.2 06/14/2022 08:26 AM    GFRAA >60 06/14/2022 08:26 AM    GFRAA >60 07/17/2012 05:52 AM    AGRATIO 1.6 06/14/2022 08:26 AM    LABGLOM 58 06/14/2022 08:26 AM    GLUCOSE 138 06/14/2022 08:26 AM    PROT 7.2 06/14/2022 08:26 AM    PROT 7.0 07/17/2012 05:52 AM    CALCIUM 9.2 06/14/2022 08:26 AM    BILITOT 0.8 06/14/2022 08:26 AM    ALKPHOS 120 06/14/2022 08:26 AM    AST 25 06/14/2022 08:26 AM    ALT 18 06/14/2022 08:26 AM     No results found for: PTINR  Lab Results   Component Value Date    LABA1C 6.1 03/01/2017     Lab Results   Component Value Date    TROPONINI <0.01 03/01/2017       Cardiac, Vascular and Imaging Data: All Personally Reviewed in Detail by Myself      EKG:     Echocardiogram:   ECHO 6/16/2022  Technically difficult examination. Low normal LVEF 50-55%  Left ventricular function/wall motion is difficult to estimate due to poor  endocardial visualization. Diastolic dysfunction grade and filling pressure are indeterminate. The right atrium is mildly dilated. Mild aortic valve regurgitation. Inadequate tricuspid regurgitation to estimate systolic pulmonary artery  pressure. Subcostal view not well visualized. Pre-mature beats throughout the study. ECHO 6/18/2021  Low normal systolic function with an estimated ejection fraction of 50-55%. Mild concentric left ventricular hypertrophy. Normal left ventricular diastolic filling pressure. The right ventricle is normal in size with decreased function. The right atrium is mildly dilated. Mild aortic regurgitation. MIld mitral regurgitation. Stress Test:   Stress test 6/16/2022   Negative for evidence of myocardial ischemia or scar.     Normal left ventricular systolic function, size, and wall motion. Calculated LVEF of >65%. Overall findings represent a low risk scan. Cath: Other imaging:     Assessment and Plan     Atrial Fibrillation, paroxsymal    Referring Reason: Medication intolerance    CHADSvasc is at least 5 (Age,CAD,CHF,HTN)  HASbled is at least 1     Although the patient seems intolerant to his antithrombotic therapy of Eliquis and Xarelto he wants to still try taking Xarelto. He also wants to discuss with electrophysiology regarding management of atrial fibrillation including ablation therapy. Will refer him back to electrophysiology for further discussion regarding ablation. Specifically regarding risk of anticoagulation they have demonstrated:  Intolerance oral anticoagulation      We have discussed their unique stroke and bleeding risk both on and off oral-anticoagulation, and the rationale for this referral.  Based on both stroke and bleeding risk, a shared decision has been made to pursue closure of the left atrial appendage as an alternative to oral anticoagulant therapy for stroke prophylaxis and to reduce their long term risk of incidence of bleeding. All questions and concerns answered at this time. Confirmed per patient before leaving the office. I have also asked her to call the office and speak with the [address] [place] [address] with any questions or concerns moving forward. Thank you for allowing us to participate in the care of Susanna Arroyo. Please do not hesitate to contact me if you have any questions. Leonor mSith MD, MPH    [address]   [facility]  Ph: [phone]  Fax: [phone]      Peggi Holter, RN, am scribing for and in the presence of Dr. Leonor Smith. 07/19/22 12:25 PM   Venus Boston RN    Physician Attestation:  The scribes documentation has been prepared under my direction and personally reviewed by me in its entirety.      I confirm that the note above accurately reflects all work, treatment, procedures, and medical decision making performed by me.

## 2022-07-19 NOTE — TELEPHONE ENCOUNTER
The pt had his  appointment today to discuss watchman. The pt does not want to go thru with the [address]. They want to speak with Calvin Espinosa about possibly having a CV. They tried moving his September followup appt up to a sooner date but I can't find anything sooner. Please send message to [facility]D/[facility]SC for a sooner date than September. They are aware that [address] [place] [address],[address] does not have clinic for the first two weeks of August. Please contact pt/wife at [phone].

## 2022-07-19 NOTE — TELEPHONE ENCOUNTER
Spoke with patient today regarding Watchman procedure.   Patient was shown video on Watchman and given Educational material.  Patient states not ready at this time wants to alea to EP about other options of getting rid of A-fib

## 2022-07-21 ENCOUNTER — OFFICE VISIT (OUTPATIENT)
Dept: CARDIOLOGY CLINIC | Age: 82
End: 2022-07-21
Payer: COMMERCIAL

## 2022-07-21 VITALS
HEART RATE: 70 BPM | HEIGHT: 72 IN | DIASTOLIC BLOOD PRESSURE: 50 MMHG | BODY MASS INDEX: 27.09 KG/M2 | SYSTOLIC BLOOD PRESSURE: 124 MMHG | OXYGEN SATURATION: 98 % | WEIGHT: 200 LBS

## 2022-07-21 DIAGNOSIS — I48.91 ATRIAL FIBRILLATION, UNSPECIFIED TYPE (HCC): ICD-10-CM

## 2022-07-21 DIAGNOSIS — I10 PRIMARY HYPERTENSION: ICD-10-CM

## 2022-07-21 DIAGNOSIS — R53.1 WEAKNESS: ICD-10-CM

## 2022-07-21 DIAGNOSIS — I25.10 CORONARY ARTERY DISEASE INVOLVING NATIVE CORONARY ARTERY OF NATIVE HEART WITHOUT ANGINA PECTORIS: Primary | Chronic | ICD-10-CM

## 2022-07-21 DIAGNOSIS — I48.0 PAROXYSMAL ATRIAL FIBRILLATION (HCC): ICD-10-CM

## 2022-07-21 DIAGNOSIS — E78.49 OTHER HYPERLIPIDEMIA: Chronic | ICD-10-CM

## 2022-07-21 PROCEDURE — 1123F ACP DISCUSS/DSCN MKR DOCD: CPT | Performed by: INTERNAL MEDICINE

## 2022-07-21 PROCEDURE — 99214 OFFICE O/P EST MOD 30 MIN: CPT | Performed by: INTERNAL MEDICINE

## 2022-07-21 NOTE — PATIENT INSTRUCTIONS
Plan: 1. Patient is tolerating Xarelto 20 mg daily with breakfast, at this time he reports to defer Watchman procedure. 2. Recommend continued follow up with Electrophysiology for atrial fibrillation treatment options ~ may alleviate fatigue/weakness. 3. I recommend that the patient continue their currently prescribed medications. Their drug modifiable risk factors appear to be well controlled. I will continue to address the need/dosing of medications in future visits. 4. Follow up in 6 months.

## 2022-07-21 NOTE — PROGRESS NOTES
[address] [facility]   Cardiovascular Evaluation    PATIENT: Darryle Canterbury  DATE: 2022  MRN: [id number]  CSN: [id number]  : 1940    Primary Care Doctor: Melita Duane, MD    Reason for evaluation:   Follow-up, Coronary Artery Disease, Hypertension, Hyperlipidemia, and Other (Fatigue/)    Subjective; History of present illness on initial date of evaluation:   Darryle Canterbury is a 80 y.o. patient who presents for cardiology follow up. He has a past medical history including coronary artery disease s/p stent placement and coronary artery bypass grafting, hyperlipidemia, hypertension, carotid artery disease, COPD, and former tobacco abuse. Echocardiogram completed on 21 showing ejection fraction 50-55%, mild left ventricular hypertrophy, mild aortic regurgitation, and mild mitral regurgitation. His carotid doppler on 2021 shows right internal carotid artery <50% stenosis. Left internal carotid artery appears normal. Both with normal antegrade flow. Since last office visit he wore a cardiac event monitor from -22 showing persistent atrial fibrillation with adequate HR control and NSVT. Echocardiogram 22 ejection fraction 50-55% and mild aortic regurgitation. Lexiscan Stress Myoview 22 no ischemia or scarring. He presented to ED on 22 with nausea and vomiting. Eliquis was discontinued as presumed as side effect from medication. He followed up with [facility] outpatient for watchman evaluation in relation to anticoagulation intolerance. He is currently prescribed Xarelto for [facility] for atrial fibrillation. Today he states he is currently tired all the time. He reports fatigue with walking to mailbox at home. He reports he is taking all medications as prescribed, tolerating well. He states he is tolerating Xarelto at this time. Denies GI symptoms. Denies bleeding. Bruising present.  Patient currently denies any weight gain, edema, palpitations, chest pain, shortness of breath, dizziness, and syncope. Patient Active Problem List   Diagnosis    Coronary artery disease involving native coronary artery of native heart without angina pectoris    Hyperlipidemia    Nausea & vomiting    Carotid disease, bilateral (HCC)    Chest pain    COPD (chronic obstructive pulmonary disease) (HCC)    HTN (hypertension)    Stented coronary artery    IBS (irritable bowel syndrome)    GERD (gastroesophageal reflux disease)    CRP elevated    Weakness    SOB (shortness of breath)    Centrilobular emphysema (HCC)    Atrial fibrillation (HCC)    Atrial flutter by electrocardiogram [facility])         Cardiac Testing: I have reviewed the findings below. EKG: 10/10/13  Sinus rhythm with 1st degree A-V block Otherwise normal     STRESS TEST: 10/8/13  Small sized inferior fixed defect consistent with infarction in the territory of the distal RCA . -There is no evidence of stress induced ischemia. -Normal LV function with ejection fraction of 65 %. Stress: 3/27/15  Summary  There is normal isotope uptake at stress and rest. There is no evidence of  myocardial ischemia or scar. There are no regional wall motion abnormalities. Normal left ventricular systolic function with ejection fraction of 62 %. Normal myocardial perfusion study. Stress Protocols    Resting ECG  Normal sinus rhythm. Resting HR:74 bpm   Resting BP:132/84 mmHg    Pre-stress physical exam: Walk Lexiscan   Stress Protocol:Pharmacologic    Peak HR:106 bpm       HR/BP product:25139  Peak BP:164/83 mmHg  Predicted HR: 146 bpm  % of predicted HR: 73  Test duration: 4 min    CATH: 9/13/12  1. Known CAD without restenosis to previous RCA stents or without   progression and disease from previous assessment 2 years prior. 2. Patent SVG to diagonal branch; patent SVG to OM branch; known atretic   LIMA to LAD. 3. Well-preserved LV systolic function. Carotid doppler: 12/15/14  Summary    1.  There is moderate heterogeneous plaque seen in the right internal carotid artery with an estimated diameter reduction of <50%. 2. There is minimal homogeneous plaque seen in the left internal carotid  artery with an estimated diameter reduction of <50%. 3. The vertebral arteries are patent with antegrade flow     Stress:2/18/16  Summary  There is no evidence of stress induced ischemia. The inferior wall is mildly  diminished at both stress and rest c/w possible diaphragm attenuation  artifact. Hyperdynamic LV systolic function with JI>38% with uniform wall  motion. Past Medical History:   has a past medical history of CAD (coronary artery disease), CAD (coronary artery disease), COPD (chronic obstructive pulmonary disease) ([facility] [address] COPD (chronic obstructive pulmonary disease) ([facility] [address] GERD (gastroesophageal reflux disease), HTN (hypertension), Hyperlipidemia, IBS (irritable bowel syndrome), and Stented coronary artery. Surgical History:   has a past surgical history that includes Coronary artery bypass graft (2000); Coronary angioplasty with stent (2005); Carotid endarterectomy (01/2014); Colonoscopy (2004); Upper gastrointestinal endoscopy (2004); Cholecystectomy; Cardiac catheterization (2009); Upper gastrointestinal endoscopy (7/15/14); and Diagnostic Cardiac Cath Lab Procedure. Social History:   reports that he quit smoking about 26 years ago. His smoking use included cigarettes. He has a 15.00 pack-year smoking history. He has never used smokeless tobacco. He reports current alcohol use. He reports that he does not use drugs. Family History:  No evidence for sudden cardiac death or premature CAD    Home Medications:  Reviewed and are listed in nursing record. and/or listed below  Current Outpatient Medications   Medication Sig Dispense Refill    rivaroxaban (XARELTO) 20 MG TABS tablet Take 1 tablet by mouth daily (with breakfast) 90 tablet 3    zolpidem (AMBIEN) 10 MG tablet Take 10 mg by mouth as needed. ondansetron (ZOFRAN ODT) 4 MG disintegrating tablet Take 1 tablet by mouth every 8 hours as needed for Nausea 20 tablet 0    Fluticasone-Umeclidin-Vilant (TRELEGY ELLIPTA IN) Inhale into the lungs       Evolocumab (REPATHA SURECLICK) 903 MG/ML SOAJ INJECT (140 MG) SUBCUTANEOUSLY EVERY 2 WEEKS. KEEP REFRIGERATED. SINGLE DOSE DEVICE. 6 pen 3    nitroGLYCERIN (NITROSTAT) 0.4 MG SL tablet Place 1 tablet under the tongue every 5 minutes as needed for Chest pain 25 tablet 3    esomeprazole (NEXIUM) 20 MG delayed release capsule Take 20 mg by mouth daily as needed      albuterol (PROVENTIL HFA) 108 (90 BASE) MCG/ACT inhaler Inhale 2 puffs into the lungs every 4 hours as needed for Wheezing or Shortness of Breath. 1 Inhaler 0    promethazine (PHENERGAN) 25 MG tablet Take 25 mg by mouth every 6 hours as needed. No current facility-administered medications for this visit. Allergies:  Crestor [rosuvastatin calcium], Statins, Amlodipine, Budesonide-formoterol fumarate, Codeine, Dilaudid [hydromorphone hcl], Nitrofurantoin macrocrystal, Other, Propoxyphene, Zetia [ezetimibe], Apixaban, Digoxin, Hydromorphone hcl, and Iodoform     Review of Systems:   All 14 point review of symptoms completed. Pertinent positives identified in the HPI, all other review of symptoms negative as below.     Review of Systems - History obtained from the patient  General ROS: negative for - chills, fever or night sweats  Psychological ROS: negative for - disorientation or hallucinations  Ophthalmic ROS: negative for - dry eyes, eye pain or loss of vision  ENT ROS: negative for - nasal discharge or sore throat  Allergy and Immunology ROS: negative for - hives or itchy/watery eyes  Hematological and Lymphatic ROS: negative for - jaundice or night sweats  Endocrine ROS: negative for - mood swings or temperature intolerance  Breast ROS: deferred  Respiratory ROS: negative for - hemoptysis or stridor  Cardiovascular ROS: negative for - chest pain, dyspnea on exertion or palpitations  Gastrointestinal ROS: no abdominal pain, change in bowel habits, or black or bloody stools  Genito-Urinary ROS: no dysuria, trouble voiding, or hematuria  Musculoskeletal ROS: negative for - gait disturbance, joint pain or joint stiffness  Neurological ROS: negative for - seizures or speech problems  Dermatological ROS: negative for - rash or skin lesion changes     Physical Examination:    Vitals:    07/21/22 1326   BP: (!) 124/50   Pulse: 70   SpO2: 98%      Weight: 200 lb (90.7 kg)     Wt Readings from Last 3 Encounters:   07/21/22 200 lb (90.7 kg)   07/19/22 198 lb (89.8 kg)   06/28/22 197 lb (89.4 kg)         General Appearance:  Alert, cooperative, no distress, appears stated age   Head:  Normocephalic, without obvious abnormality, atraumatic   Eyes:  PERRL, conjunctiva/corneas clear       Nose: Nares normal, no drainage or sinus tenderness   Throat: Lips, mucosa, and tongue normal   Neck: Supple, symmetrical, trachea midline, no adenopathy, thyroid: not enlarged, symmetric, no tenderness/mass/nodules, no carotid bruit or JVD       Lungs:   Clear to auscultation bilaterally, respirations unlabored   Chest Wall:  No tenderness or deformity   Heart:  Regular rhythm and normal rate; S1, S2 are normal; no murmur noted; no rub or gallop   Abdomen:   Soft, non-tender, bowel sounds active all four quadrants,  no masses, no organomegaly           Extremities: Extremities normal, atraumatic, no cyanosis or edema   Pulses: 2+ and symmetric   Skin: Skin color, texture, turgor normal, no rashes or lesions   Pysch: Normal mood and affect   Neurologic: Normal gross motor and sensory exam.         Labs  CBC:   Lab Results   Component Value Date/Time    WBC 8.7 06/14/2022 08:26 AM    RBC 4.03 06/14/2022 08:26 AM    HGB 11.9 06/14/2022 08:26 AM    HCT 36.4 06/14/2022 08:26 AM    MCV 90.4 06/14/2022 08:26 AM    RDW 16.0 06/14/2022 08:26 AM     06/14/2022 08:26 AM     CMP: Lab Results   Component Value Date/Time     06/14/2022 08:26 AM    K 4.5 06/14/2022 08:26 AM     06/14/2022 08:26 AM    CO2 24 06/14/2022 08:26 AM    BUN 14 06/14/2022 08:26 AM    CREATININE 1.2 06/14/2022 08:26 AM    GFRAA >60 06/14/2022 08:26 AM    GFRAA >60 07/17/2012 05:52 AM    AGRATIO 1.6 06/14/2022 08:26 AM    LABGLOM 58 06/14/2022 08:26 AM    GLUCOSE 138 06/14/2022 08:26 AM    PROT 7.2 06/14/2022 08:26 AM    PROT 7.0 07/17/2012 05:52 AM    CALCIUM 9.2 06/14/2022 08:26 AM    BILITOT 0.8 06/14/2022 08:26 AM    ALKPHOS 120 06/14/2022 08:26 AM    AST 25 06/14/2022 08:26 AM    ALT 18 06/14/2022 08:26 AM     PT/INR:    No components found for: PTPATIENT,  PTINR  Lab Results   Component Value Date    TROPONINI <0.01 03/01/2017     No components found for: CHLPL  Lab Results   Component Value Date    TRIG 75 03/20/2020    TRIG 77 03/11/2019    TRIG 91 03/01/2017     Lab Results   Component Value Date    HDL 47 03/20/2020    HDL 55 03/11/2019    HDL 52 06/25/2018     Lab Results   Component Value Date    LDLCALC 40 03/20/2020    LDLCALC 38 03/11/2019    LDLCALC 43 06/25/2018     Lab Results   Component Value Date    LABVLDL 15 03/11/2019    LABVLDL 14 06/25/2018    LABVLDL 18 03/01/2017     Lab Results   Component Value Date    ALT 18 06/14/2022    AST 25 06/14/2022    ALKPHOS 120 06/14/2022    BILITOT 0.8 06/14/2022         Assessment:  80 y.o. patient with:  1. Atrial flutter/abnormal EKG/SOB/weakness   ~new onset   ~rate uncontrolled  2. Periodic acute hypertension/nocturnal hypertension   ~stable in the office today BP: (!) 124/50    ~continue hydralazine as prescribed    ~suspect pulmonary component due to patient c/o precipitating SOB   3. Carotid artery disease   ~moderate heterogeneous plaque seen in the right internal carotid artery with an estimated diameter reduction of <50%. ~[facility] 2014   ~Carotid doppler 3/2016  4. Remote CAD with bypass.    ~cath 2012 stable   ~stress test March 2015 normal.  5. Hyperlipidemia    ~intolerant of high dose statin therapy   ~intolerant to Lescol   ~tolerating Repatha       Plan:  Patient is tolerating Xarelto 20 mg daily with breakfast, at this time he reports to defer Watchman procedure. 2. Recommend continued follow up with Electrophysiology for atrial fibrillation treatment options ~ may alleviate fatigue/weakness. 3. I recommend that the patient continue their currently prescribed medications. Their drug modifiable risk factors appear to be well controlled. I will continue to address the need/dosing of medications in future visits. 4. Follow up in 6 months. Scribe's attestation: This note was scribed in the presence of Candice Shore by Kyle Salgado RN     I, Dr. Robbie Fox, personally performed the services described in this documentation, as scribed by the above signed scribe in my presence. It is both accurate and complete to my knowledge. I agree with the details independently gathered by the clinical support staff, while the remaining scribed note accurately describes my personal service to the patient. Medical Decision Making: The following items were considered in medical decision making:  Independent review of images  Review / order clinical lab tests  Review / order radiology tests  Decision to obtain old records  Review and summation of old records as accessed through [facility] (a summary of my findings in these old records)      Time Based Itemization  A total of 30 minutes was spent on today's patient encounter. If applicable, non-patient-facing activities:  (X)Preparing to see the patient and reviewing records  (X) Individual interpretation of results  ( ) Discussion or coordination of care with other health care professionals  () Ordering of unique tests, medications, or procedures  (X) Documentation within the EHR       All questions and concerns were addressed to the patient/family.  Alternatives to my treatment

## 2022-07-31 DIAGNOSIS — E78.49 OTHER HYPERLIPIDEMIA: Primary | ICD-10-CM

## 2022-08-03 RX ORDER — EVOLOCUMAB 140 MG/ML
INJECTION, SOLUTION SUBCUTANEOUS
Qty: 2 ML | Refills: 3 | Status: SHIPPED | OUTPATIENT
Start: 2022-08-03

## 2022-08-04 ENCOUNTER — TELEPHONE (OUTPATIENT)
Dept: CARDIOLOGY CLINIC | Age: 82
End: 2022-08-04

## 2022-08-04 DIAGNOSIS — Z01.818 PRE-OP EXAM: Primary | ICD-10-CM

## 2022-08-04 NOTE — TELEPHONE ENCOUNTER
Spoke with pt's wife Sonal Quijano regarding message above. Sonal Quijano stated that pt is fine now. But Sonal Quijano also stated that pt would like to proceed with afib ablation and waichman device. Sonal Quijano would like to know if these two procedures can be done together. I did inform Sonal Quijano that [address] is currently not office. She v/u.       [address] OOT

## 2022-08-04 NOTE — TELEPHONE ENCOUNTER
Pts wife stated that pts heart rate is ranging from 122-140. Pt went to his pcp on 08/03 and his hr was 104. In the evening on 08/03 pts hr was 110. Pt is sob. Pts wife is uncertain of any other symptoms. Please advise.

## 2022-08-05 NOTE — TELEPHONE ENCOUNTER
Spoke with pt's wife Eve Santamaria, per hipaa I was able to speak with her. Relayed message from Dr. Adam Tuttle. Anne singh/lynsey.

## 2022-08-05 NOTE — TELEPHONE ENCOUNTER
Please advise message from [address] [place] [address].      ----- Message from Candy Heard sent at 8/5/2022 12:11 PM EDT -----     ABlation has to be done first     Watchman afterwards     WIll need to be seen in the [facility]     Thanks     Philip Trujillo

## 2022-08-08 NOTE — TELEPHONE ENCOUNTER
There is another encounter the pt made via [facility] to [facility] staff to help get an appt arranged. That message was sent to the [facility] staff. Please advise that message prior to calling pt.

## 2022-08-09 ENCOUNTER — TELEPHONE (OUTPATIENT)
Dept: CARDIOLOGY CLINIC | Age: 82
End: 2022-08-09

## 2022-08-11 NOTE — TELEPHONE ENCOUNTER
Pts wife called and wanted to know when the ablation could be scheduled. Please advise. Pt has an upcoming ov with 08/23/2022 michael.

## 2022-08-11 NOTE — TELEPHONE ENCOUNTER
Please reach out to schedule AF ablation. Was discussed at [address] with [address] on 6/28/2022.

## 2022-08-11 NOTE — TELEPHONE ENCOUNTER
08/11-Spoke to [facility] over the phone at [phone], per [facility] pt is to get ablation before talks of Arely Vazquez. Currently holding off on Watchmen clinic until after [address] [facility],[address] appt on 08/23.

## 2022-08-11 NOTE — TELEPHONE ENCOUNTER
Spoke with patient. Patient is scheduled with Dr. Maurice Newton for Afib Ablation with Carto and anesthesia on 9/19/22 at 7:30am [facility], arrival time of 6:30am to the Cath Lab. Please have patient arrive to the main entrance of [facility] and check in with the registration desk. Please advise patient regarding medication instructions. Remind patient to be NPO after midnight (8 hours prior). Do not apply lotions/creams on skin the day of procedure. COVID testing - please put order in chart. Appt 8/23 with [facility] - advised to write questions and concerns down and bring to appt.

## 2022-08-11 NOTE — TELEPHONE ENCOUNTER
08/11-Spoke to [facility] over the phone at [phone], per [facility] pt is to get ablation before talks of Arely Vazquez. Currently holding off on Watchmen clinic until after [address] [facility],[address] appt on 08/23.

## 2022-08-18 ENCOUNTER — TELEPHONE (OUTPATIENT)
Dept: CARDIOLOGY CLINIC | Age: 82
End: 2022-08-18

## 2022-08-18 NOTE — TELEPHONE ENCOUNTER
Spoke with patient regarding Watchman consult last week, patient would like to schedule consult for Watchman later after his ablation.   At this point patient is scheduled 10/27/2022 at [facility]

## 2022-08-19 ENCOUNTER — TELEPHONE (OUTPATIENT)
Dept: CARDIOLOGY CLINIC | Age: 82
End: 2022-08-19

## 2022-08-19 NOTE — TELEPHONE ENCOUNTER
Spoke with pt's wife Evens Castano stated that Faith Weiner is not having a colonoscopy per pt and wife request it was cancelled.

## 2022-08-19 NOTE — TELEPHONE ENCOUNTER
Please help me generate letter. Patient at intermediate risk for MACE during a low risk procedure. No further cardiac testing. Risk benefit favors holding rivaroxaban as recommended by GI team.  Thanks.

## 2022-08-19 NOTE — TELEPHONE ENCOUNTER
Received fax from [address] [place] [address] requesting cardiac clearance for pt. Is having a colonoscopy on 08/31/2022. Pt needs to hold xarelto for 2-3 days.      Last OV 06/28/2022  EKG 07/19/2022

## 2022-09-07 ENCOUNTER — TELEPHONE (OUTPATIENT)
Dept: CARDIOLOGY CLINIC | Age: 82
End: 2022-09-07

## 2022-09-07 NOTE — TELEPHONE ENCOUNTER
Pt is having ablation on 9/19/2022. Pt needs instruction on meds he should and should not take. Pt wants to know when should he have Covid testing done. Please advise.

## 2022-09-14 ENCOUNTER — HOSPITAL ENCOUNTER (OUTPATIENT)
Age: 82
Discharge: HOME | End: 2022-09-14
Payer: COMMERCIAL

## 2022-09-14 DIAGNOSIS — Z01.818 PRE-OP EXAM: ICD-10-CM

## 2022-09-14 PROCEDURE — U0003 INFECTIOUS AGENT DETECTION BY NUCLEIC ACID (DNA OR RNA); SEVERE ACUTE RESPIRATORY SYNDROME CORONAVIRUS 2 (SARS-COV-2) (CORONAVIRUS DISEASE [COVID-19]), AMPLIFIED PROBE TECHNIQUE, MAKING USE OF HIGH THROUGHPUT TECHNOLOGIES AS DESCRIBED BY CMS-2020-01-R: HCPCS

## 2022-09-14 PROCEDURE — U0005 INFEC AGEN DETEC AMPLI PROBE: HCPCS

## 2022-09-15 LAB — SARS-COV-2, PCR: NOT DETECTED

## 2022-09-19 ENCOUNTER — ANESTHESIA (OUTPATIENT)
Dept: CARDIAC CATH/INVASIVE PROCEDURES | Age: 82
DRG: 813 | End: 2022-09-19
Payer: MEDICARE

## 2022-09-19 ENCOUNTER — HOSPITAL ENCOUNTER (INPATIENT)
Dept: CARDIAC CATH/INVASIVE PROCEDURES | Age: 82
LOS: 3 days | Discharge: HOME | DRG: 813 | End: 2022-09-22
Attending: INTERNAL MEDICINE | Admitting: INTERNAL MEDICINE
Payer: MEDICARE

## 2022-09-19 ENCOUNTER — ANESTHESIA EVENT (OUTPATIENT)
Dept: CARDIAC CATH/INVASIVE PROCEDURES | Age: 82
DRG: 813 | End: 2022-09-19
Payer: MEDICARE

## 2022-09-19 DIAGNOSIS — D64.9 ANEMIA, UNSPECIFIED TYPE: ICD-10-CM

## 2022-09-19 LAB
ABO/RH: NORMAL
ANION GAP SERPL CALCULATED.3IONS-SCNC: 10 MMOL/L (ref 3–16)
ANTIBODY SCREEN: NORMAL
BUN BLDV-MCNC: 20 MG/DL (ref 7–20)
CALCIUM SERPL-MCNC: 9.3 MG/DL (ref 8.3–10.6)
CHLORIDE BLD-SCNC: 103 MMOL/L (ref 99–110)
CO2: 25 MMOL/L (ref 21–32)
CREAT SERPL-MCNC: 1.3 MG/DL (ref 0.8–1.3)
EKG ATRIAL RATE: 78 BPM
EKG DIAGNOSIS: NORMAL
EKG P AXIS: -83 DEGREES
EKG P-R INTERVAL: 216 MS
EKG Q-T INTERVAL: 410 MS
EKG QRS DURATION: 106 MS
EKG QTC CALCULATION (BAZETT): 467 MS
EKG R AXIS: -81 DEGREES
EKG T AXIS: 34 DEGREES
EKG VENTRICULAR RATE: 78 BPM
FERRITIN: 23.6 NG/ML (ref 30–400)
FOLATE: 16.07 NG/ML (ref 4.78–24.2)
GFR AFRICAN AMERICAN: >60
GFR NON-AFRICAN AMERICAN: 53
GLUCOSE BLD-MCNC: 110 MG/DL (ref 70–99)
HCT VFR BLD CALC: 25.7 % (ref 40.5–52.5)
HCT VFR BLD CALC: 26.3 % (ref 40.5–52.5)
HCT VFR BLD CALC: 26.7 % (ref 40.5–52.5)
HCT VFR BLD CALC: 28.2 % (ref 40.5–52.5)
HEMOGLOBIN: 7.8 G/DL (ref 13.5–17.5)
HEMOGLOBIN: 8.2 G/DL (ref 13.5–17.5)
HEMOGLOBIN: 8.2 G/DL (ref 13.5–17.5)
HEMOGLOBIN: 8.6 G/DL (ref 13.5–17.5)
INR BLD: 1.32 (ref 0.87–1.14)
IRON SATURATION: 7 % (ref 20–50)
IRON: 30 UG/DL (ref 59–158)
MCH RBC QN AUTO: 26.2 PG (ref 26–34)
MCH RBC QN AUTO: 26.3 PG (ref 26–34)
MCHC RBC AUTO-ENTMCNC: 30.6 G/DL (ref 31–36)
MCHC RBC AUTO-ENTMCNC: 31.1 G/DL (ref 31–36)
MCV RBC AUTO: 84.7 FL (ref 80–100)
MCV RBC AUTO: 85.5 FL (ref 80–100)
PDW BLD-RTO: 18.3 % (ref 12.4–15.4)
PDW BLD-RTO: 18.5 % (ref 12.4–15.4)
PLATELET # BLD: 271 K/UL (ref 135–450)
PLATELET # BLD: 292 K/UL (ref 135–450)
PMV BLD AUTO: 6.8 FL (ref 5–10.5)
PMV BLD AUTO: 7.2 FL (ref 5–10.5)
POTASSIUM SERPL-SCNC: 4.1 MMOL/L (ref 3.5–5.1)
PROTHROMBIN TIME: 16.2 SEC (ref 11.7–14.5)
RBC # BLD: 3.11 M/UL (ref 4.2–5.9)
RBC # BLD: 3.3 M/UL (ref 4.2–5.9)
SODIUM BLD-SCNC: 138 MMOL/L (ref 136–145)
TOTAL IRON BINDING CAPACITY: 432 UG/DL (ref 260–445)
VITAMIN B-12: 539 PG/ML (ref 211–911)
WBC # BLD: 5.5 K/UL (ref 4–11)
WBC # BLD: 5.6 K/UL (ref 4–11)

## 2022-09-19 PROCEDURE — 82728 ASSAY OF FERRITIN: CPT

## 2022-09-19 PROCEDURE — 85027 COMPLETE CBC AUTOMATED: CPT

## 2022-09-19 PROCEDURE — 1200000000 HC SEMI PRIVATE

## 2022-09-19 PROCEDURE — 6360000002 HC RX W HCPCS: Performed by: HOSPITALIST

## 2022-09-19 PROCEDURE — 2580000003 HC RX 258: Performed by: INTERNAL MEDICINE

## 2022-09-19 PROCEDURE — 94640 AIRWAY INHALATION TREATMENT: CPT

## 2022-09-19 PROCEDURE — C9113 INJ PANTOPRAZOLE SODIUM, VIA: HCPCS | Performed by: HOSPITALIST

## 2022-09-19 PROCEDURE — 86850 RBC ANTIBODY SCREEN: CPT

## 2022-09-19 PROCEDURE — 2700000000 HC OXYGEN THERAPY PER DAY

## 2022-09-19 PROCEDURE — 36415 COLL VENOUS BLD VENIPUNCTURE: CPT

## 2022-09-19 PROCEDURE — 99223 1ST HOSP IP/OBS HIGH 75: CPT | Performed by: INTERNAL MEDICINE

## 2022-09-19 PROCEDURE — 93005 ELECTROCARDIOGRAM TRACING: CPT | Performed by: INTERNAL MEDICINE

## 2022-09-19 PROCEDURE — 86901 BLOOD TYPING SEROLOGIC RH(D): CPT

## 2022-09-19 PROCEDURE — 94761 N-INVAS EAR/PLS OXIMETRY MLT: CPT

## 2022-09-19 PROCEDURE — 82607 VITAMIN B-12: CPT

## 2022-09-19 PROCEDURE — 85610 PROTHROMBIN TIME: CPT

## 2022-09-19 PROCEDURE — 85018 HEMOGLOBIN: CPT

## 2022-09-19 PROCEDURE — 6370000000 HC RX 637 (ALT 250 FOR IP): Performed by: HOSPITALIST

## 2022-09-19 PROCEDURE — 80048 BASIC METABOLIC PNL TOTAL CA: CPT

## 2022-09-19 PROCEDURE — 86923 COMPATIBILITY TEST ELECTRIC: CPT

## 2022-09-19 PROCEDURE — 83540 ASSAY OF IRON: CPT

## 2022-09-19 PROCEDURE — 85014 HEMATOCRIT: CPT

## 2022-09-19 PROCEDURE — 6360000002 HC RX W HCPCS

## 2022-09-19 PROCEDURE — 82746 ASSAY OF FOLIC ACID SERUM: CPT

## 2022-09-19 PROCEDURE — 86900 BLOOD TYPING SEROLOGIC ABO: CPT

## 2022-09-19 PROCEDURE — 83550 IRON BINDING TEST: CPT

## 2022-09-19 PROCEDURE — 2500000003 HC RX 250 WO HCPCS

## 2022-09-19 RX ORDER — SODIUM CHLORIDE 0.9 % (FLUSH) 0.9 %
5-40 SYRINGE (ML) INJECTION EVERY 12 HOURS SCHEDULED
Status: DISCONTINUED | OUTPATIENT
Start: 2022-09-19 | End: 2022-09-22 | Stop reason: HOSPADM

## 2022-09-19 RX ORDER — ZOLPIDEM TARTRATE 5 MG/1
5 TABLET ORAL NIGHTLY PRN
Status: DISCONTINUED | OUTPATIENT
Start: 2022-09-19 | End: 2022-09-22 | Stop reason: HOSPADM

## 2022-09-19 RX ORDER — PROMETHAZINE HYDROCHLORIDE 25 MG/1
25 TABLET ORAL EVERY 6 HOURS PRN
Status: DISCONTINUED | OUTPATIENT
Start: 2022-09-19 | End: 2022-09-22 | Stop reason: HOSPADM

## 2022-09-19 RX ORDER — ONDANSETRON 2 MG/ML
4 INJECTION INTRAMUSCULAR; INTRAVENOUS EVERY 10 MIN PRN
Status: CANCELLED | OUTPATIENT
Start: 2022-09-19

## 2022-09-19 RX ORDER — SODIUM CHLORIDE 0.9 % (FLUSH) 0.9 %
5-40 SYRINGE (ML) INJECTION EVERY 12 HOURS SCHEDULED
Status: CANCELLED | OUTPATIENT
Start: 2022-09-19

## 2022-09-19 RX ORDER — SODIUM CHLORIDE 9 MG/ML
INJECTION, SOLUTION INTRAVENOUS PRN
Status: DISCONTINUED | OUTPATIENT
Start: 2022-09-19 | End: 2022-09-22 | Stop reason: HOSPADM

## 2022-09-19 RX ORDER — ALBUTEROL SULFATE 90 UG/1
2 AEROSOL, METERED RESPIRATORY (INHALATION) EVERY 4 HOURS PRN
Status: DISCONTINUED | OUTPATIENT
Start: 2022-09-19 | End: 2022-09-22 | Stop reason: HOSPADM

## 2022-09-19 RX ORDER — HYDRALAZINE HYDROCHLORIDE 20 MG/ML
5 INJECTION INTRAMUSCULAR; INTRAVENOUS
Status: CANCELLED | OUTPATIENT
Start: 2022-09-19

## 2022-09-19 RX ORDER — DIPHENHYDRAMINE HYDROCHLORIDE 50 MG/ML
12.5 INJECTION INTRAMUSCULAR; INTRAVENOUS
Status: CANCELLED | OUTPATIENT
Start: 2022-09-19 | End: 2022-09-19

## 2022-09-19 RX ORDER — MORPHINE SULFATE 4 MG/ML
2 INJECTION, SOLUTION INTRAMUSCULAR; INTRAVENOUS EVERY 5 MIN PRN
Status: CANCELLED | OUTPATIENT
Start: 2022-09-19

## 2022-09-19 RX ORDER — SODIUM CHLORIDE 0.9 % (FLUSH) 0.9 %
5-40 SYRINGE (ML) INJECTION PRN
Status: DISCONTINUED | OUTPATIENT
Start: 2022-09-19 | End: 2022-09-22 | Stop reason: HOSPADM

## 2022-09-19 RX ORDER — MIDAZOLAM HYDROCHLORIDE 1 MG/ML
1 INJECTION INTRAMUSCULAR; INTRAVENOUS EVERY 5 MIN PRN
Status: CANCELLED | OUTPATIENT
Start: 2022-09-19

## 2022-09-19 RX ORDER — SODIUM CHLORIDE 0.9 % (FLUSH) 0.9 %
5-40 SYRINGE (ML) INJECTION PRN
Status: CANCELLED | OUTPATIENT
Start: 2022-09-19

## 2022-09-19 RX ORDER — SODIUM CHLORIDE 9 MG/ML
25 INJECTION, SOLUTION INTRAVENOUS PRN
Status: CANCELLED | OUTPATIENT
Start: 2022-09-19

## 2022-09-19 RX ORDER — PANTOPRAZOLE SODIUM 40 MG/10ML
40 INJECTION, POWDER, LYOPHILIZED, FOR SOLUTION INTRAVENOUS 2 TIMES DAILY
Status: DISCONTINUED | OUTPATIENT
Start: 2022-09-19 | End: 2022-09-22 | Stop reason: HOSPADM

## 2022-09-19 RX ORDER — MORPHINE SULFATE 4 MG/ML
1 INJECTION, SOLUTION INTRAMUSCULAR; INTRAVENOUS EVERY 5 MIN PRN
Status: CANCELLED | OUTPATIENT
Start: 2022-09-19

## 2022-09-19 RX ADMIN — TIOTROPIUM BROMIDE INHALATION SPRAY 2 PUFF: 3.12 SPRAY, METERED RESPIRATORY (INHALATION) at 15:34

## 2022-09-19 RX ADMIN — Medication 2 PUFF: at 19:43

## 2022-09-19 RX ADMIN — PROMETHAZINE HYDROCHLORIDE 25 MG: 25 TABLET ORAL at 18:39

## 2022-09-19 RX ADMIN — PANTOPRAZOLE SODIUM 40 MG: 40 INJECTION, POWDER, FOR SOLUTION INTRAVENOUS at 21:17

## 2022-09-19 RX ADMIN — Medication 10 ML: at 21:17

## 2022-09-19 RX ADMIN — Medication 10 ML: at 15:52

## 2022-09-19 RX ADMIN — PANTOPRAZOLE SODIUM 40 MG: 40 INJECTION, POWDER, FOR SOLUTION INTRAVENOUS at 15:49

## 2022-09-19 ASSESSMENT — LIFESTYLE VARIABLES
HOW OFTEN DO YOU HAVE A DRINK CONTAINING ALCOHOL: NEVER
HOW MANY STANDARD DRINKS CONTAINING ALCOHOL DO YOU HAVE ON A TYPICAL DAY: PATIENT DOES NOT DRINK

## 2022-09-19 ASSESSMENT — ENCOUNTER SYMPTOMS: SHORTNESS OF BREATH: 1

## 2022-09-19 ASSESSMENT — COPD QUESTIONNAIRES: CAT_SEVERITY: MODERATE

## 2022-09-19 NOTE — PLAN OF CARE
Problem: Safety - Adult  Goal: Free from fall injury  9/19/2022 1941 by Karena Rich RN  Outcome: Progressing  Note: Pt will remain free from falls throughout hospital stay. Fall precautions in place, bed alarm on, bed in lowest position with wheels locked and side rails 2/4 up. Room door open and hourly rounding completed. Will continue to monitor throughout shift.

## 2022-09-19 NOTE — RT PROTOCOL NOTE
RT Inhaler-Nebulizer Bronchodilator Protocol Note    There is a bronchodilator order in the chart from a provider indicating to follow the RT Bronchodilator Protocol and there is an Initiate RT Inhaler-Nebulizer Bronchodilator Protocol order as well (see protocol at bottom of note). CXR Findings:  No results found. The findings from the last RT Protocol Assessment were as follows:   History Pulmonary Disease: Chronic pulmonary disease  Respiratory Pattern: Regular pattern and RR 12-20 bpm  Breath Sounds: Clear breath sounds  Cough: Strong, spontaneous, non-productive  Indication for Bronchodilator Therapy:    Bronchodilator Assessment Score: 2    Aerosolized bronchodilator medication orders have been revised according to the RT Inhaler-Nebulizer Bronchodilator Protocol below. Respiratory Therapist to perform RT Therapy Protocol Assessment initially then follow the protocol. Repeat RT Therapy Protocol Assessment PRN for score 0-3 or on second treatment, BID, and PRN for scores above 3. No Indications - adjust the frequency to every 6 hours PRN wheezing or bronchospasm, if no treatments needed after 48 hours then discontinue using Per Protocol order mode. If indication present, adjust the RT bronchodilator orders based on the Bronchodilator Assessment Score as indicated below. Use Inhaler orders unless patient has one or more of the following: on home nebulizer, not able to hold breath for 10 seconds, is not alert and oriented, cannot activate and use MDI correctly, or respiratory rate 25 breaths per minute or more, then use the equivalent nebulizer order(s) with same Frequency and PRN reasons based on the score. If a patient is on this medication at home then do not decrease Frequency below that used at home.     0-3 - enter or revise RT bronchodilator order(s) to equivalent RT Bronchodilator order with Frequency of every 4 hours PRN for wheezing or increased work of breathing using Per Protocol order mode. 4-6 - enter or revise RT Bronchodilator order(s) to two equivalent RT bronchodilator orders with one order with BID Frequency and one order with Frequency of every 4 hours PRN wheezing or increased work of breathing using Per Protocol order mode. 7-10 - enter or revise RT Bronchodilator order(s) to two equivalent RT bronchodilator orders with one order with TID Frequency and one order with Frequency of every 4 hours PRN wheezing or increased work of breathing using Per Protocol order mode. 11-13 - enter or revise RT Bronchodilator order(s) to one equivalent RT bronchodilator order with QID Frequency and an Albuterol order with Frequency of every 4 hours PRN wheezing or increased work of breathing using Per Protocol order mode. Greater than 13 - enter or revise RT Bronchodilator order(s) to one equivalent RT bronchodilator order with every 4 hours Frequency and an Albuterol order with Frequency of every 2 hours PRN wheezing or increased work of breathing using Per Protocol order mode. RT to enter RT Home Evaluation for COPD & MDI Assessment order using Per Protocol order mode.     Electronically signed by Adrien Sousa RCP on 9/19/2022 at 7:48 PM

## 2022-09-19 NOTE — CONSULTS
Electrophysiology Consultation   Date: 9/19/2022  Admit Date:  9/19/2022  Reason for Consultation: Symptomatic atrial fibrillation. Consult Requesting Physician: Rivas Jeronimo MD     No chief complaint on file. HPI:   Mr. Terence Fong is a pleasant 80year old anxious male with a medical history significant for paroxysmal atrial fibrillation, ischemic cardiomyopathy status post PCI, COPD, peripheral vascular disease, and GERD who presents from home for atrial fibrillation ablation and was found to be markedly anemic. Patient has also been having some symptoms with his [facility] for his atrial fibrillation. Unclear if his lethargy is related to [facility], anemia or atrial fibrillation. Patient reports shortness of breath and dyspnea on exertion. He reports having melena in 08/2022 however no recurrence this month.       Patient denies fevers, chest pain, orthopnea, PND, lower extremity edema, abdominal swelling, chills, visual changes, headaches, sore throat, cough, abdominal pain, nausea, vomiting, bleeding, bruising, dysuria, muscle/joint pain, confusion, depression, anxiety, skin lesions, etc.    Past Medical History:   Diagnosis Date    Anemia 9/19/2022    CAD (coronary artery disease) 5/17/1998    Stent in LAD    CAD (coronary artery disease) 9/17/2009    Stent in RCA    COPD (chronic obstructive pulmonary disease) ([facility])     COPD [facility]ch[facility] obstructive [facility] disease) ([facility] 2/17/2016    GERD (gastroesophageal reflux disease)     on Nexium    HTN (hypertension) 6/14/2016    Hyperlipidemia     IBS (irritable bowel syndrome)     Stented coronary artery         Past Surgical History:   Procedure Laterality Date    CARDIAC CATHETERIZATION  2009    CAROTID ENDARTERECTOMY  01/2014    CHOLECYSTECTOMY      COLONOSCOPY  2004    CORONARY ANGIOPLASTY WITH STENT PLACEMENT  2005    CORONARY ARTERY BYPASS GRAFT  2000    at [facility] to LAD, SVG-D1 and OM    DIAGNOSTIC CARDIAC CATH LAB PROCEDURE      UPPER GASTROINTESTINAL ENDOSCOPY  2004    UPPER GASTROINTESTINAL ENDOSCOPY  7/15/14    gastritis       Allergies   Allergen Reactions    Crestor [Rosuvastatin Calcium] Anaphylaxis     Hives, throat closure    Statins Anaphylaxis     Crestor    Amlodipine      High BP    Budesonide-Formoterol Fumarate Swelling    Codeine Itching     Other reaction(s): Other (See Comments)  nightmare    Dilaudid [Hydromorphone Hcl]      Confusion/ anxiety    Nitrofurantoin Macrocrystal      Patient unknown    Other      betablockers- unspecified. Feels unwell     Propoxyphene Other (See Comments)     palpitations    Zetia [Ezetimibe]      Not sure of the reaction    Apixaban Nausea And Vomiting    Digoxin Nausea And Vomiting    Hydromorphone Hcl Anxiety     agitation    Iodoform Rash     Developed localized rash on neck when Iodoform packing was used in neck wound 2/2014. Social History:  Reviewed. reports that he quit smoking about 26 years ago. His smoking use included cigarettes. He has a 15.00 pack-year smoking history. He has never used smokeless tobacco. He reports current alcohol use. He reports that he does not use drugs. Family History:  Reviewed. family history includes Diabetes in his mother; Heart Failure in his father, mother, and sister; Pacemaker in his brother. No premature CAD. Review of System:  All other systems reviewed except for that noted above.  Pertinent negatives and positives are:     General: negative for fever, chills   Ophthalmic ROS: negative for - eye pain or loss of vision  ENT ROS: negative for - headaches, sore throat   Respiratory: negative for - cough, sputum  Cardiovascular: Reviewed in HPI  Gastrointestinal: negative for - abdominal pain, diarrhea, N/V  Hematology: negative for - bleeding, blood clots, bruising or jaundice  Genito-Urinary:  negative for - Dysuria or incontinence  Musculoskeletal: negative for - Joint swelling, muscle pain  Neurological: negative for - confusion, dizziness, headaches Psychiatric: No anxiety, no depression. Dermatological: negative for - rash    Physical Examination:  There were no vitals filed for this visit. No intake or output data in the 24 hours ending 09/19/22 0921  No intake/output data recorded. Wt Readings from Last 3 Encounters:   09/19/22 195 lb (88.5 kg)   07/21/22 200 lb (90.7 kg)   07/19/22 198 lb (89.8 kg)     No data recorded    Constitutional: Alert. Oriented to person, place, and time. No distress. Head: Normocephalic and atraumatic. Mouth/Throat: Lips appear moist. Oropharynx is clear and moist.  Eyes: Conjunctivae normal. EOM are normal.   Neck: Neck supple. No lymphadenopathy. No rigidity. No JVD present. Cardiovascular: Normal rate, regular rhythm. Normal S1&S2. Carotid pulse 2+ bilaterally. Pulmonary/Chest: Bilateral respiratory sounds present. No respiratory accessory muscle use. No wheezes, No rhonchi. Abdominal: Soft. Normal bowel sounds present. No distension, No tenderness. No splenomegaly. No hernia. Musculoskeletal: No tenderness. No edema    Neurological: Alert and oriented. Cranial nerve II-XII grossly intact. Skin: Skin is warm and dry. No rash, lesions, ulcerations noted. Psychiatric: No anxiety nor agitation. Labs:  Reviewed.    Recent Labs     09/19/22  0706      K 4.1      CO2 25   BUN 20   CREATININE 1.3     Recent Labs     09/19/22  0706 09/19/22  0750   WBC 5.5 5.6   HGB 8.6* 8.2*   HCT 28.2* 26.3*   MCV 85.5 84.7    271     Lab Results   Component Value Date/Time    TROPONINI <0.01 03/01/2017 05:56 AM     Lab Results   Component Value Date/Time    BNP 30 10/09/2013 04:40 AM     Lab Results   Component Value Date/Time    PROTIME 16.2 09/19/2022 07:06 AM    PROTIME 12.9 02/28/2017 05:25 PM    PROTIME 11.7 07/11/2014 03:56 PM    INR 1.32 09/19/2022 07:06 AM    INR 1.14 02/28/2017 05:25 PM    INR 1.05 07/11/2014 03:56 PM     Lab Results   Component Value Date/Time    CHOL 102 03/20/2020 07:08 AM CHOL 55 03/20/2020 07:08 AM    HDL 47 03/20/2020 07:08 AM    TRIG 75 03/20/2020 07:08 AM       Diagnostic and imaging results reviewed. ECG: Sinus rhythm with PACs. Intraventricular conduction delay. Echo: 06/16/2022   Summary   Technically difficult examination. Low normal LVEF 50-55%   Left ventricular function/wall motion is difficult to estimate due to poor   endocardial visualization. Diastolic dysfunction grade and filling pressure are indeterminate. The right atrium is mildly dilated. Mild aortic valve regurgitation. Inadequate tricuspid regurgitation to estimate systolic pulmonary artery   pressure. Subcostal view not well visualized. Pre-mature beats throughout the study. Cath: 06/16/2022   Summary    Negative for evidence of myocardial ischemia or scar. Normal left ventricular systolic function, size, and wall motion. Calculated LVEF of >65%. Overall findings represent a low risk scan. I independently reviewed the ECG and telemetry.     Scheduled Meds:   sodium chloride flush  5-40 mL IntraVENous 2 times per day     Continuous Infusions:   sodium chloride      sodium chloride       PRN Meds:.sodium chloride flush, sodium chloride, sodium chloride     Assessment:   Patient Active Problem List    Diagnosis Date Noted    Nausea & vomiting 03/16/2012    Anemia 09/19/2022    Atrial fibrillation ([address] 05/17/2022    Atrial flutter by electrocardiogram ([address] 05/17/2022    Centrilobular emphysema ([address] 10/31/2020    Coronary artery disease involving native coronary artery of native heart without angina pectoris     Hyperlipidemia     SOB (shortness of breath) 05/06/2021    CRP elevated     Weakness     Stented coronary artery     IBS (irritable bowel syndrome)     GERD (gastroesophageal reflux disease)     HTN (hypertension) 06/14/2016    COPD (chronic obstructive pulmonary disease) ([address] 02/17/2016    Chest pain 03/22/2015    Carotid disease, bilateral ([address] 01/16/2014 Active Hospital Problems    Diagnosis Date Noted    Anemia [D64.9] 09/19/2022     Priority: Medium     Mr. Jessy Vargas is a pleasant 80year old anxious male with a medical history significant for paroxysmal atrial fibrillation, ischemic cardiomyopathy status post PCI, COPD, peripheral vascular disease, and GERD who presents from home for atrial fibrillation ablation and was found to be markedly anemic. Problem List:  1. Anemia in setting of [address] use. 2. Paroxysmal atrial fibrillation. Assessment and Plan:  1. Anemia in setting of [address] use. Patient is a pleasant 70-year-old male with a medical history significant paroxysmal atrial fibrillation, ischemic cardiomyopathy status post PCI, COPD, prophylaxis, and GERD who presented from home for an atrial fibrillation ablation however was found to be profoundly anemic. He reported having some melena in his stools and the middle of August however this resolved. He is scheduled for colonoscopy as an outpatient by his PCP but his wife deferred because of some concern the patient would not be able to tolerate the prep. They are now ready to consider watchman moving forward and I will help arrange this. Patient will need a GI evaluation and hopefully an EGD and colonoscopy given his profound anemia. I will defer this however to [facility] expert care. We will continue to follow to help with anticoagulation management given his potential reactions to both apixaban and rivaroxaban. We will try edoxaban and then Pradaxa however I am a little leery of Pradaxa given his age. - No [address] pending GI work up.  - Hold rivaroxaban and consider edoxaban or digibatran. - We will continue to follow along with you peripherally. Please let us know when patient nearly discharge and cleared to restart [address] and we will [place] back and assist with [address] selection. I have reached out to [facility] team and they are aware patient ready for Watchman.     2. Paroxysmal atrial fibrillation.  - Plan as per above. - No marvin agents. Thank you for allowing me to participate in the care of Chloé Perla . If you have any questions/comments, please do not hesitate to contact us.     Lesly Gómez MD  Cardiac Electrophysiology  [address] [facility]  [phone] [facility]

## 2022-09-19 NOTE — PROGRESS NOTES
4 Eyes Skin Assessment     The patient is being assess for  Admission    I agree that 2 RN's have performed a thorough Head to Toe Skin Assessment on the patient. ALL assessment sites listed below have been assessed. Areas assessed by both nurses: Estefany Amezcua  [x]   Head, Face, and Ears   [x]   Shoulders, Back, and Chest  [x]   Arms, Elbows, and Hands   [x]   Coccyx, Sacrum, and Ischum  [x]   Legs, Feet, and Heels        Does the Patient have Skin Breakdown?   No         Daniel Prevention initiated:  No   Wound Care Orders initiated:  No      [facility] nurse consulted for Pressure Injury (Stage 3,4, Unstageable, DTI, NWPT, and Complex wounds):  No      Nurse 1 eSignature: Electronically signed by Jean Olmos RN on 9/19/22 at 3:03 PM EDT    **SHARE this note so that the co-signing nurse is able to place an eSignature**    Nurse 2 eSignature: Electronically signed by Danika Tineo RN on 9/19/22 at 3:06 PM EDT

## 2022-09-19 NOTE — PROGRESS NOTES
Patient admitted to room 204 from cath lab. Patient oriented to room, call light, bed rails, phone, lights and bathroom. Patient instructed about the schedule of the day including: vital sign frequency, lab draws, possible tests, frequency of MD and staff rounds, including RN/MD rounding together at bedside, daily weights, and I &O's. Patient instructed about prescribed diet, how to use [address], and television. Bed alarm in place, patient aware of placement and reason. Telemetry box in place, patient aware of placement and reason. Bed locked, in lowest position, side rails up 2/4, call light within reach. Will continue to monitor.

## 2022-09-19 NOTE — H&P
Hospital Medicine History & Physical      PCP: Autumn Mora MD    Date of Admission: 9/19/2022    Date of Service: Pt seen/examined on 9/19/22 and Admitted to Inpatient with expected LOS greater than two midnights due to medical therapy. Chief Complaint: Anemia      History Of Present Illness:      80 y.o. male with history of CAD s/p PCI/stent, hypertension, COPD, GERD, paroxysmal atrial fibrillation presented from home for an outpatient ablation for his A. Fib. .  Patient has been on anticoagulation since 5/22. .  Prior to the procedure, he is blood work revealed hemoglobin of 8.6-8.2.. Of note, his hemoglobin was 11.9 in May/22. .  The patient reports an episode of melena stools in June and again in July but has since resolved. .. He had been taking oral iron intermittently for the last 3 months but stopped taking it recently because of melena. .  His PCP had recommended a referral for colonoscopy but he and his wife declined. .  The patient reports fatigue, generalized weakness, exertional shortness of breath over the past several weeks. .  No leg swelling, cough, fevers chills  The patient reports chronic nausea, altered bowel habits for several years and had multiple EGDs and colonoscopies in the past    Past Medical History:          Diagnosis Date    Anemia 9/19/2022    CAD (coronary artery disease) 5/17/1998    Stent in LAD    CAD (coronary artery disease) 9/17/2009    Stent in RCA    COPD (chronic obstructive pulmonary disease) ([facility])     COPD ([facility]) [facility] 2/17/2016    GERD (gastroesophageal reflux disease)     on Nexium    HTN (hypertension) 6/14/2016    Hyperlipidemia     IBS (irritable bowel syndrome)     Stented coronary artery        Past Surgical History:          Procedure Laterality Date    CARDIAC CATHETERIZATION  2009    CAROTID ENDARTERECTOMY  01/2014    CHOLECYSTECTOMY      COLONOSCOPY  2004    CORONARY ANGIOPLASTY WITH STENT PLACEMENT  2005    CORONARY ARTERY BYPASS GRAFT  2000    at [facility] to LAD, SVG-D1 and OM    DIAGNOSTIC CARDIAC CATH LAB PROCEDURE      UPPER GASTROINTESTINAL ENDOSCOPY  2004    UPPER GASTROINTESTINAL ENDOSCOPY  7/15/14    gastritis       Medications Prior to Admission:      Prior to Admission medications    Medication Sig Start Date End Date Taking? Authorizing Provider   Evolocumab (REPATHA SURECLICK) 274 MG/ML SOAJ INJECT ONE MILLILITER UNDER THE SKIN ONCE EVERY 2 WEEKS 8/3/22   Tigre Doex DO   rivaroxaban (XARELTO) 20 MG TABS tablet Take 1 tablet by mouth daily (with breakfast) 7/21/22   Teri Chan MD   zolpidem (AMBIEN) 10 MG tablet Take 10 mg by mouth as needed. 6/24/22   Historical Provider, MD   ondansetron (ZOFRAN ODT) 4 MG disintegrating tablet Take 1 tablet by mouth every 8 hours as needed for Nausea 6/14/22   LASHONDA Avila CNP   Fluticasone-Umeclidin-Vilant ([facility]) Inhale into the lungs     Historical Provider, MD   apixaban (ELIQUIS) 5 MG TABS tablet Take 1 tablet by mouth 2 times daily  Patient taking differently: Take 5 mg by mouth 2 times daily 1/2 tablet twice daily 5/5/22 6/14/22  LASHONDA Alvarez CNP   nitroGLYCERIN (NITROSTAT) 0.4 MG SL tablet Place 1 tablet under the tongue every 5 minutes as needed for Chest pain 6/1/18   Teri Chan MD   esomeprazole (NEXIUM) 20 MG delayed release capsule Take 20 mg by mouth daily as needed    Historical Provider, MD   albuterol (PROVENTIL HFA) 108 (90 BASE) MCG/ACT inhaler Inhale 2 puffs into the lungs every 4 hours as needed for Wheezing or Shortness of Breath. 10/9/13   Apryl Islas MD   promethazine (PHENERGAN) 25 MG tablet Take 25 mg by mouth every 6 hours as needed.       Historical Provider, MD       Allergies:  Crestor [rosuvastatin calcium], Statins, Amlodipine, Budesonide-formoterol fumarate, Codeine, Dilaudid [hydromorphone hcl], Nitrofurantoin macrocrystal, Other, Propoxyphene, Zetia [ezetimibe], Apixaban, Digoxin, Hydromorphone hcl, and Iodoform    Social History:      The patient currently lives     TOBACCO:   reports that he quit smoking about 26 years ago. His smoking use included cigarettes. He has a 15.00 pack-year smoking history. He has never used smokeless tobacco.  ETOH:   reports current alcohol use. Family History:      Reviewed in detail and negative for DM, CAD, Cancer, CVA. Positive as follows:        Problem Relation Age of Onset    Diabetes Mother     Heart Failure Mother     Heart Failure Father     Heart Failure Sister     Pacemaker Brother     Asthma Neg Hx     Cancer Neg Hx     Emphysema Neg Hx     Hypertension Neg Hx        REVIEW OF SYSTEMS:   Pertinent positives as noted in the HPI. All other systems reviewed and negative. PHYSICAL EXAM:    Ht 5' 11\" (1.803 m)   Wt 195 lb (88.5 kg)   BMI 27.20 kg/m²     General appearance:  No apparent distress, appears stated age and cooperative. HEENT:  Normal cephalic, atraumatic without obvious deformity. Pupils equal, round, and reactive to light. Extra ocular muscles intact. Conjunctivae/corneas clear. Neck: Supple, with full range of motion. No jugular venous distention. Trachea midline. Respiratory:  Normal respiratory effort. Clear to auscultation, bilaterally without Rales/Wheezes/Rhonchi. Cardiovascular:  Regular rate and rhythm with normal S1/S2 without murmurs, rubs or gallops. Abdomen: Soft, non-tender, non-distended with normal bowel sounds. Musculoskeletal:  No clubbing, cyanosis or edema bilaterally. Full range of motion without deformity. Skin: Skin color, texture, turgor normal.  No rashes or lesions. Neurologic:  Neurovascularly intact without any focal sensory/motor deficits.  Cranial nerves: II-XII intact, grossly non-focal.  Psychiatric:  Alert and oriented, thought content appropriate, normal insight  Capillary Refill: Brisk,< 3 seconds   Peripheral Pulses: +2 palpable, equal bilaterally       CXR:  I have reviewed the CXR with the following interpretation:   EKG:  I have reviewed the EKG with the following interpretation:     Labs:     Recent Labs     09/19/22  0706 09/19/22  0750   WBC 5.5 5.6   HGB 8.6* 8.2*   HCT 28.2* 26.3*    271     Recent Labs     09/19/22  0706      K 4.1      CO2 25   BUN 20   CREATININE 1.3   CALCIUM 9.3     No results for input(s): AST, ALT, BILIDIR, BILITOT, ALKPHOS in the last 72 hours. Recent Labs     09/19/22  0706   INR 1.32*     No results for input(s): Kathyrn Belch in the last 72 hours. Urinalysis:      Lab Results   Component Value Date/Time    NITRU Negative 06/14/2022 09:46 AM    WBCUA None seen 06/14/2022 09:46 AM    BACTERIA Rare 10/04/2014 12:17 PM    RBCUA 0-2 06/14/2022 09:46 AM    BLOODU TRACE-INTACT 06/14/2022 09:46 AM    SPECGRAV 1.010 06/14/2022 09:46 AM    GLUCOSEU Negative 06/14/2022 09:46 AM    GLUCOSEU NEGATIVE 03/16/2012 05:00 PM         ASSESSMENT:      -Acute on chronic anemia-suspect chronic blood loss anemia associated with anticoagulation--hemoglobin decreased from 11.9-8.2 over the last 3 months. .  Reports intermittent dark stools but was on oral iron     Discontinued Xarelto. .  Monitor H&H, obtain iron studies , B12 and folate . consult GI. Mari Anguiano Start PPI empirically    -Paroxysmal atrial fibrillation--stable--Xarelto discontinued until cleared by GI--May consider watchman device outpatient for    -Ischemic cardiomyopathy/CAD-stable-has been on Repatha,xarelto    -COPD-stable    -GERD-c/w PPI    -Essential hypertension    -Generalized weakness likely due to anemia    -IBS-reports chronic nausea and altered bowel habits. .  Last EGD/colonoscopy 2014            DVT Prophylaxis: SCDS  Diet: Diet NPO Exceptions are: Sips of Water with Meds  Code Status: Full Code    PT/OT Eval Status: ordered           Jose Walker MD    Thank you Eric Hart MD for the opportunity to be involved in this patient's care.  If you have any questions or concerns please feel free to contact me at [phone].

## 2022-09-19 NOTE — ANESTHESIA PRE PROCEDURE
5-40 mL  5-40 mL IntraVENous 2 times per day Minnie Miller MD        sodium chloride flush 0.9 % injection 5-40 mL  5-40 mL IntraVENous PRN Minnie Miller MD        0.9 % sodium chloride infusion   IntraVENous PRN JAMISON Clarke MD           Allergies: Allergies   Allergen Reactions    Crestor [Rosuvastatin Calcium] Anaphylaxis     Hives, throat closure    Statins Anaphylaxis     Crestor    Amlodipine      High BP    Budesonide-Formoterol Fumarate Swelling    Codeine Itching     Other reaction(s): Other (See Comments)  nightmare    Dilaudid [Hydromorphone Hcl]      Confusion/ anxiety    Nitrofurantoin Macrocrystal      Patient unknown    Other      betablockers- unspecified. Feels unwell     Propoxyphene Other (See Comments)     palpitations    Zetia [Ezetimibe]      Not sure of the reaction    Apixaban Nausea And Vomiting    Digoxin Nausea And Vomiting    Hydromorphone Hcl Anxiety     agitation    Iodoform Rash     Developed localized rash on neck when Iodoform packing was used in neck wound 2/2014.        Problem List:    Patient Active Problem List   Diagnosis Code    Coronary artery disease involving native coronary artery of native heart without angina pectoris I25.10    Hyperlipidemia E78.5    Nausea & vomiting R11.2    Carotid disease, bilateral (HCC) I77.9    Chest pain R07.9    COPD (chronic obstructive pulmonary disease) ([facility] [address] J44.9    HTN (hypertension) I10    Stented coronary artery Z95.5    IBS (irritable bowel syndrome) K58.9    GERD (gastroesophageal reflux disease) K21.9    CRP elevated R79.82    Weakness R53.1    SOB (shortness of breath) R06.02    Centrilobular emphysema (HCC) J43.2    Atrial fibrillation (HCC) I48.91    Atrial flutter by electrocardiogram ([facility] [address] I48.92       Past Medical History:        Diagnosis Date    CAD (coronary artery disease) 5/17/1998    Stent in LAD    CAD (coronary artery disease) 9/17/2009    Stent in RCA    COPD (chronic obstructive pulmonary disease) ([facility] [address]     [facility]    GERD (gastroesophageal reflux disease)     on Nexium    HTN (hypertension) 2016    Hyperlipidemia     IBS (irritable bowel syndrome)     Stented coronary artery        Past Surgical History:        Procedure Laterality Date    CARDIAC CATHETERIZATION      CAROTID ENDARTERECTOMY  2014    CHOLECYSTECTOMY      COLONOSCOPY      CORONARY ANGIOPLASTY WITH STENT PLACEMENT      CORONARY ARTERY BYPASS GRAFT  2000    at [facility] to LAD, SVG-D1 and OM    DIAGNOSTIC CARDIAC CATH LAB PROCEDURE      UPPER GASTROINTESTINAL ENDOSCOPY      UPPER GASTROINTESTINAL ENDOSCOPY  7/15/14    gastritis       Social History:    Social History     Tobacco Use    Smoking status: Former     Packs/day: 0.50     Years: 30.00     Pack years: 15.00     Types: Cigarettes     Quit date: 3/16/1996     Years since quittin.5    Smokeless tobacco: Never   Substance Use Topics    Alcohol use: Yes     Alcohol/week: 0.0 standard drinks     Comment: occ                                Counseling given: Not Answered      Vital Signs (Current): There were no vitals filed for this visit.                                            BP Readings from Last 3 Encounters:   22 (!) 124/50   22 (!) 142/70   22 132/60       NPO Status:                                                                                 BMI:   Wt Readings from Last 3 Encounters:   22 200 lb (90.7 kg)   22 198 lb (89.8 kg)   22 197 lb (89.4 kg)     There is no height or weight on file to calculate BMI.    CBC:   Lab Results   Component Value Date/Time    WBC 8.7 2022 08:26 AM    RBC 4.03 2022 08:26 AM    HGB 11.9 2022 08:26 AM    HCT 36.4 2022 08:26 AM    MCV 90.4 2022 08:26 AM    RDW 16.0 2022 08:26 AM     2022 08:26 AM       CMP:   Lab Results Component Value Date/Time     06/14/2022 08:26 AM    K 4.5 06/14/2022 08:26 AM     06/14/2022 08:26 AM    CO2 24 06/14/2022 08:26 AM    BUN 14 06/14/2022 08:26 AM    CREATININE 1.2 06/14/2022 08:26 AM    GFRAA >60 06/14/2022 08:26 AM    GFRAA >60 07/17/2012 05:52 AM    AGRATIO 1.6 06/14/2022 08:26 AM    LABGLOM 58 06/14/2022 08:26 AM    GLUCOSE 138 06/14/2022 08:26 AM    PROT 7.2 06/14/2022 08:26 AM    PROT 7.0 07/17/2012 05:52 AM    CALCIUM 9.2 06/14/2022 08:26 AM    BILITOT 0.8 06/14/2022 08:26 AM    ALKPHOS 120 06/14/2022 08:26 AM    AST 25 06/14/2022 08:26 AM    ALT 18 06/14/2022 08:26 AM       POC Tests: No results for input(s): POCGLU, POCNA, POCK, POCCL, POCBUN, POCHEMO, POCHCT in the last 72 hours. Coags:   Lab Results   Component Value Date/Time    PROTIME 12.9 02/28/2017 05:25 PM    INR 1.14 02/28/2017 05:25 PM    APTT 31.7 02/28/2017 05:25 PM       HCG (If Applicable): No results found for: PREGTESTUR, PREGSERUM, HCG, HCGQUANT     ABGs: No results found for: PHART, PO2ART, SFY2DLJ, WNW2ESM, BEART, Y9CWIMKL     Type & Screen (If Applicable):  No results found for: LABABO, LABRH    Drug/Infectious Status (If Applicable):  No results found for: HIV, HEPCAB    COVID-19 Screening (If Applicable):   Lab Results   Component Value Date/Time    COVID19 Not Detected 09/14/2022 11:44 AM           Anesthesia Evaluation  Patient summary reviewed and Nursing notes reviewed no history of anesthetic complications:   Airway: Mallampati: II  TM distance: >3 FB   Neck ROM: full  Mouth opening: > = 3 FB   Dental:          Pulmonary:   (+) COPD: moderate,  shortness of breath:                             Cardiovascular:    (+) hypertension:, CAD:, dysrhythmias: atrial fibrillation,     CABG/stent: stent, CABG.                 Neuro/Psych:   Negative Neuro/Psych ROS              GI/Hepatic/Renal:   (+) GERD: well controlled, renal disease: CRI,      (-) liver disease       Endo/Other: Negative Endo/Other ROS (-) diabetes mellitus               Abdominal:             Vascular:   + PVD, aortic or cerebral, . Other Findings:           Anesthesia Plan      general     ASA 3     (I discussed with the patient the risks and benefits of PIV, general anesthesia, IV Narcotics, PACU. All questions were answered the patient agrees with the plan. Poss. Arterial line for monitoring.)  Induction: intravenous. MIPS: Prophylactic antiemetics administered. Anesthetic plan and risks discussed with patient and spouse. Plan discussed with CRNA.                     Iveth Patel MD   9/19/2022

## 2022-09-19 NOTE — PROGRESS NOTES
GI NOTE:   Consult received for anemia. Chart reviewed. Full consult to follow. Presented to the hospital as an outpatient for cardiac ablation but noted to have worsening anemia. Hgb 11.9 in June, down to 8.2 now. Normal MCV. Plan:   Monitor H/H and overt signs of GI bleeding  Continue PPI  Okay for clear liquid diet from GI standpoint  Please call with questions or concerns. [phone]. Also available via perfect serve.

## 2022-09-20 ENCOUNTER — ANESTHESIA (OUTPATIENT)
Dept: ENDOSCOPY | Age: 82
DRG: 813 | End: 2022-09-20
Payer: MEDICARE

## 2022-09-20 ENCOUNTER — ANESTHESIA EVENT (OUTPATIENT)
Dept: ENDOSCOPY | Age: 82
DRG: 813 | End: 2022-09-20
Payer: MEDICARE

## 2022-09-20 LAB
ANION GAP SERPL CALCULATED.3IONS-SCNC: 14 MMOL/L (ref 3–16)
BUN BLDV-MCNC: 15 MG/DL (ref 7–20)
CALCIUM SERPL-MCNC: 9.8 MG/DL (ref 8.3–10.6)
CHLORIDE BLD-SCNC: 105 MMOL/L (ref 99–110)
CO2: 21 MMOL/L (ref 21–32)
CREAT SERPL-MCNC: 1.2 MG/DL (ref 0.8–1.3)
GFR AFRICAN AMERICAN: >60
GFR NON-AFRICAN AMERICAN: 58
GLUCOSE BLD-MCNC: 116 MG/DL (ref 70–99)
HCT VFR BLD CALC: 27.6 % (ref 40.5–52.5)
HCT VFR BLD CALC: 28 % (ref 40.5–52.5)
HEMOGLOBIN: 8.6 G/DL (ref 13.5–17.5)
HEMOGLOBIN: 8.7 G/DL (ref 13.5–17.5)
OCCULT BLOOD SCREENING: ABNORMAL
POTASSIUM SERPL-SCNC: 4.2 MMOL/L (ref 3.5–5.1)
SODIUM BLD-SCNC: 140 MMOL/L (ref 136–145)

## 2022-09-20 PROCEDURE — C9113 INJ PANTOPRAZOLE SODIUM, VIA: HCPCS | Performed by: HOSPITALIST

## 2022-09-20 PROCEDURE — 94640 AIRWAY INHALATION TREATMENT: CPT

## 2022-09-20 PROCEDURE — 7100000011 HC PHASE II RECOVERY - ADDTL 15 MIN: Performed by: INTERNAL MEDICINE

## 2022-09-20 PROCEDURE — 6370000000 HC RX 637 (ALT 250 FOR IP): Performed by: REGISTERED NURSE

## 2022-09-20 PROCEDURE — 6360000002 HC RX W HCPCS: Performed by: HOSPITALIST

## 2022-09-20 PROCEDURE — 7100000010 HC PHASE II RECOVERY - FIRST 15 MIN: Performed by: INTERNAL MEDICINE

## 2022-09-20 PROCEDURE — 2709999900 HC NON-CHARGEABLE SUPPLY: Performed by: INTERNAL MEDICINE

## 2022-09-20 PROCEDURE — 3609017100 HC EGD: Performed by: INTERNAL MEDICINE

## 2022-09-20 PROCEDURE — 85018 HEMOGLOBIN: CPT

## 2022-09-20 PROCEDURE — 2580000003 HC RX 258: Performed by: REGISTERED NURSE

## 2022-09-20 PROCEDURE — 36415 COLL VENOUS BLD VENIPUNCTURE: CPT

## 2022-09-20 PROCEDURE — 80048 BASIC METABOLIC PNL TOTAL CA: CPT

## 2022-09-20 PROCEDURE — 2500000003 HC RX 250 WO HCPCS: Performed by: REGISTERED NURSE

## 2022-09-20 PROCEDURE — 3700000000 HC ANESTHESIA ATTENDED CARE: Performed by: INTERNAL MEDICINE

## 2022-09-20 PROCEDURE — 0DJ08ZZ INSPECTION OF UPPER INTESTINAL TRACT, VIA NATURAL OR ARTIFICIAL OPENING ENDOSCOPIC: ICD-10-PCS | Performed by: INTERNAL MEDICINE

## 2022-09-20 PROCEDURE — 97161 PT EVAL LOW COMPLEX 20 MIN: CPT

## 2022-09-20 PROCEDURE — 2580000003 HC RX 258: Performed by: INTERNAL MEDICINE

## 2022-09-20 PROCEDURE — 97165 OT EVAL LOW COMPLEX 30 MIN: CPT

## 2022-09-20 PROCEDURE — 6360000002 HC RX W HCPCS: Performed by: REGISTERED NURSE

## 2022-09-20 PROCEDURE — 1200000000 HC SEMI PRIVATE

## 2022-09-20 PROCEDURE — 2580000003 HC RX 258: Performed by: ANESTHESIOLOGY

## 2022-09-20 PROCEDURE — 85014 HEMATOCRIT: CPT

## 2022-09-20 PROCEDURE — 82270 OCCULT BLOOD FECES: CPT

## 2022-09-20 RX ORDER — SODIUM CHLORIDE, SODIUM LACTATE, POTASSIUM CHLORIDE, CALCIUM CHLORIDE 600; 310; 30; 20 MG/100ML; MG/100ML; MG/100ML; MG/100ML
INJECTION, SOLUTION INTRAVENOUS CONTINUOUS PRN
Status: DISCONTINUED | OUTPATIENT
Start: 2022-09-20 | End: 2022-09-20 | Stop reason: SDUPTHER

## 2022-09-20 RX ORDER — SODIUM CHLORIDE, SODIUM LACTATE, POTASSIUM CHLORIDE, CALCIUM CHLORIDE 600; 310; 30; 20 MG/100ML; MG/100ML; MG/100ML; MG/100ML
INJECTION, SOLUTION INTRAVENOUS CONTINUOUS
Status: DISCONTINUED | OUTPATIENT
Start: 2022-09-20 | End: 2022-09-20 | Stop reason: HOSPADM

## 2022-09-20 RX ORDER — LIDOCAINE HYDROCHLORIDE 20 MG/ML
INJECTION, SOLUTION INFILTRATION; PERINEURAL PRN
Status: DISCONTINUED | OUTPATIENT
Start: 2022-09-20 | End: 2022-09-20 | Stop reason: SDUPTHER

## 2022-09-20 RX ORDER — POLYETHYLENE GLYCOL 3350 17 G/17G
238 POWDER, FOR SOLUTION ORAL ONCE
Status: COMPLETED | OUTPATIENT
Start: 2022-09-20 | End: 2022-09-20

## 2022-09-20 RX ORDER — PROPOFOL 10 MG/ML
INJECTION, EMULSION INTRAVENOUS PRN
Status: DISCONTINUED | OUTPATIENT
Start: 2022-09-20 | End: 2022-09-20 | Stop reason: SDUPTHER

## 2022-09-20 RX ADMIN — PANTOPRAZOLE SODIUM 40 MG: 40 INJECTION, POWDER, FOR SOLUTION INTRAVENOUS at 08:56

## 2022-09-20 RX ADMIN — SODIUM CHLORIDE, POTASSIUM CHLORIDE, SODIUM LACTATE AND CALCIUM CHLORIDE: 600; 310; 30; 20 INJECTION, SOLUTION INTRAVENOUS at 11:45

## 2022-09-20 RX ADMIN — Medication 2 PUFF: at 08:02

## 2022-09-20 RX ADMIN — SODIUM CHLORIDE, SODIUM LACTATE, POTASSIUM CHLORIDE, AND CALCIUM CHLORIDE: .6; .31; .03; .02 INJECTION, SOLUTION INTRAVENOUS at 13:05

## 2022-09-20 RX ADMIN — PROPOFOL 40 MG: 10 INJECTION, EMULSION INTRAVENOUS at 13:13

## 2022-09-20 RX ADMIN — TIOTROPIUM BROMIDE INHALATION SPRAY 2 PUFF: 3.12 SPRAY, METERED RESPIRATORY (INHALATION) at 08:02

## 2022-09-20 RX ADMIN — BISACODYL 10 MG: 5 TABLET, COATED ORAL at 15:31

## 2022-09-20 RX ADMIN — PROPOFOL 80 MG: 10 INJECTION, EMULSION INTRAVENOUS at 13:15

## 2022-09-20 RX ADMIN — PROPOFOL 80 MG: 10 INJECTION, EMULSION INTRAVENOUS at 13:11

## 2022-09-20 RX ADMIN — Medication 2 PUFF: at 20:06

## 2022-09-20 RX ADMIN — PANTOPRAZOLE SODIUM 40 MG: 40 INJECTION, POWDER, FOR SOLUTION INTRAVENOUS at 20:27

## 2022-09-20 RX ADMIN — LIDOCAINE HYDROCHLORIDE 100 MG: 20 INJECTION, SOLUTION INFILTRATION; PERINEURAL at 13:11

## 2022-09-20 RX ADMIN — POLYETHYLENE GLYCOL 3350 238 G: 17 POWDER, FOR SOLUTION ORAL at 20:27

## 2022-09-20 RX ADMIN — Medication 10 ML: at 08:55

## 2022-09-20 RX ADMIN — Medication 10 ML: at 20:27

## 2022-09-20 ASSESSMENT — PAIN SCALES - GENERAL
PAINLEVEL_OUTOF10: 0

## 2022-09-20 ASSESSMENT — ENCOUNTER SYMPTOMS: SHORTNESS OF BREATH: 1

## 2022-09-20 ASSESSMENT — PAIN - FUNCTIONAL ASSESSMENT: PAIN_FUNCTIONAL_ASSESSMENT: 0-10

## 2022-09-20 NOTE — PLAN OF CARE
Problem: Safety - Adult  Goal: Free from fall injury  9/19/2022 2212 by Donya Aceves RN  Outcome: Progressing  Note: Pt will remain free from falls throughout hospital stay. Fall precautions in place, bed in lowest position with wheels locked and side rails 2/4 up. Room door open and hourly rounding completed. Will continue to monitor throughout shift.

## 2022-09-20 NOTE — PROCEDURES
EGD PROCEDURE NOTE         Esophagogastroduodenoscopy Procedure Note     Patient: Brandan Fulton MRN: [id number]   YOB: 1940 Age: 80 y.o. Sex: male   Unit: [facility] ENDO Room/Bed: [facility] Endo Pool/NONE Location: [address]    Admitting Physician:      Primary Care Physician: Gifty Orozco MD      DATE OF PROCEDURE: 9/20/2022  PROCEDURE: Esophagogastroduodenoscopy  INDICATION: This is a 80y.o. year old male who presents today with anemia and possible melena. Previous history of anemia work-up extensively many years ago. ENDOSCOPIST: Faizan Jerome DO    POSTOPERATIVE DIAGNOSIS: Normal EGD    PLAN: Colonoscopy    INFORMED CONSENT:  Informed consent for esophagogastoduodenoscopy was obtained. The benefits and risks including adverse medicine reaction have been explained. The patient's questions were answered and the patient agreed to proceed. ASA:  ASA 3 - Patient with moderate systemic disease with functional limitations    SEDATION: MAC     The patient's vital signs, cardiac status, pulmonary status, abdominal status and mental status were stable for the procedure. The patient's vitals signs and respiratory function as monitored by oxygen saturation were stable throughout    Procedure Details: The Olympus videoendoscope was inserted into the mouth and carefully passed into the esophagus, through the stomach and to the distal duodenum. Antegrade and retrograde examination of the upper gi tract was carefully performed. Findings: The esophagus is normal.  The z-line is distinct without lesions or irregularities. There is no evidence of Cevallos's esophagus. The scope easily passed into the stomach. The mucosa of the cardia, fundus, body and antrum of the stomach is normal.  The pylorus is patent and of normal contour. The scope easily advanced into the duodenal bulb and down to the distal duodenum.   Ante-and retrograde exam of the duodenum is normal.    Gastric or Duodenal ulcer present: No    Estimated Blood Loss: None      Signed By: Volodymyr Boudreaux,

## 2022-09-20 NOTE — ANESTHESIA POSTPROCEDURE EVALUATION
Department of Anesthesiology  Postprocedure Note    Patient: Teri Conklin  MRN: [id number]  YOB: 1940  Date of evaluation: 9/19/2022      Procedure Summary     Date: 09/19/22 Room / Location: [facility] Cardiac Cath Lab    Anesthesia Start:  Anesthesia Stop:     Procedure: ABLATION Diagnosis:       Unspecified atrial fibrillation      Anemia      Unspecified atrial fibrillation    Scheduled Providers:  Responsible Provider:     Anesthesia Type: general ASA Status: 3          Anesthesia Type: No value filed. [place] Phase I:      [place] Phase II:        Anesthesia Post Evaluation    Patient location during evaluation: PACU  Patient participation: complete - patient participated  Level of consciousness: awake and alert  Airway patency: patent  Nausea & Vomiting: no nausea and no vomiting  Complications: no  Cardiovascular status: blood pressure returned to baseline  Respiratory status: acceptable  Hydration status: euvolemic  Comments: VSS on transfer to phase 2 recovery. No anesthetic complications.

## 2022-09-20 NOTE — PROGRESS NOTES
Hospitalist Progress Note      PCP: Bobo Patel MD    Date of Admission: 9/19/2022    Chief Complaint: Anemia, GI bleed    Hospital Course:   80 y.o. male with history of CAD s/p PCI/stent, hypertension, COPD, GERD, paroxysmal atrial fibrillation presented from home for an outpatient ablation for his A. Fib. .  Patient has been on anticoagulation since 5/22. .  Prior to the procedure, he is blood work revealed hemoglobin of 8.6-8.2.. Of note, his hemoglobin was 11.9 in May/22. .  The patient reports an episode of melena stools in June and again in July but has since resolved. .. He had been taking oral iron intermittently for the last 3 months but stopped taking it recently because of melena. .  His PCP had recommended a referral for colonoscopy but he and his wife declined. .     Subjective:   Patient returned from EGD. He had one melanic stool. No chest pain, fever, chills, shortness of breath, diarrhea or constipation       Medications:  Reviewed    Infusion Medications    sodium chloride      sodium chloride       Scheduled Medications    polyethylene glycol  238 g Oral Once    sodium chloride flush  5-40 mL IntraVENous 2 times per day    pantoprazole  40 mg IntraVENous BID    mometasone-formoterol  2 puff Inhalation BID    And    tiotropium  2 puff Inhalation Daily     PRN Meds: sodium chloride flush, sodium chloride, sodium chloride, zolpidem, promethazine, albuterol sulfate HFA      Intake/Output Summary (Last 24 hours) at 9/20/2022 1626  Last data filed at 9/20/2022 0855  Gross per 24 hour   Intake 230 ml   Output 675 ml   Net -445 ml       Physical Exam Performed:    BP (!) 162/69   Pulse 92   Temp 98.3 °F (36.8 °C) (Oral)   Resp 18   Ht 6' (1.829 m)   Wt 195 lb (88.5 kg)   SpO2 98%   BMI 26.45 kg/m²     General appearance: No apparent distress, appears stated age and cooperative. HEENT: Pupils equal, round, and reactive to light. Conjunctivae/corneas clear.   Neck: Supple, with full range of Anemia [D64.9]      Priority: Medium     -Acute on chronic anemia-suspect chronic blood loss anemia associated with anticoagulation--hemoglobin decreased from 11.9-8.2 over the last 3 months. .  Reports intermittent dark stools but was on oral iron     Discontinued Xarelto. Monitor H&H  obtain iron studies , B12 and folate . EGD normal  Colonoscopy tomorrow      Paroxysmal atrial fibrillation--stable-- in sinus rhythm  Xarelto discontinued until cleared by GI--May consider watchman device outpatient      Ischemic cardiomyopathy/CAD-stable-has been on Repatha     -COPD not in exacerbation  Inhaler prn     -GERD-IV ppi ordered     -Essential hypertension - not currently on home meds. Monitor with vitals     -Generalized weakness likely due to anemia     -IBS-reports chronic nausea and altered bowel habits. .  Last EGD/colonoscopy 2014    DVT Prophylaxis: SCD  Diet: ADULT DIET;  Clear Liquid  Diet NPO Exceptions are: Sips of Water with Meds  Code Status: Full Code  PT/OT Eval Status: Not indicated    Dispo - Pending GI evaluation     Appropriate for A1 Discharge Unit: No [place], DO

## 2022-09-20 NOTE — PROGRESS NOTES
Physical Therapy  Facility/Department: [facility] A2 CARD TELEMETRY  Physical Therapy Initial Assessment/Discharge    Name: Gregory Dieter  : 1940  MRN: [id number]  Date of Service: 2022    Discharge Recommendations:  Home with assist PRN   PT Equipment Recommendations  Equipment Needed: No      Patient Diagnosis(es): There were no encounter diagnoses. Past Medical History:  has a past medical history of Anemia, CAD (coronary artery disease), CAD (coronary artery disease), COPD (chronic obstructive pulmonary disease) ([facility] [address] COPD (chronic obstructive pulmonary disease) ([facility] [address] GERD (gastroesophageal reflux disease), HTN (hypertension), Hyperlipidemia, IBS (irritable bowel syndrome), and Stented coronary artery. Past Surgical History:  has a past surgical history that includes Coronary artery bypass graft (); Coronary angioplasty with stent (); Carotid endarterectomy (2014); Colonoscopy (); Upper gastrointestinal endoscopy (); Cholecystectomy; Cardiac catheterization (); Upper gastrointestinal endoscopy (7/15/14); and Diagnostic Cardiac Cath Lab Procedure. Assessment   Assessment: Pt. admitted to [facility] for anemia post outpatient cardiac ablation procedure. Pt. became vbery agitated during socail function questions and questioned why he needed by after max education on the purpose of PT. Shabbir stood up indpendenlty and walked in hallway to [place] us he could walk and leave him alone\". Pt demonstrates independence with all bed mobility, transfers, and ambulation. Pt. is safe to reurn home once deemed medically appropriate. Therapy Prognosis: Good  Decision Making: Low Complexity  No Skilled PT:  At baseline function (Refused to work with therapy again)     Plan   Plan  Plan: Discharge with evaluation only  Safety Devices  Type of Devices: Left in bed, Nurse notified, Call light within reach  Restraints  Restraints Initially in Place: No Restrictions  Restrictions/Precautions  Restrictions/Precautions: Fall Risk  Position Activity Restriction  Other position/activity restrictions: up with assistance     Subjective   Pain: 2/10 pain on L trunk. General  Chart Reviewed: Yes  Patient assessed for rehabilitation services?: Yes  Response To Previous Treatment: Not applicable  Family / Caregiver Present: No  Referring Practitioner: Juliocesar Johnson MD  Referral Date : 09/19/22  Diagnosis: Anemia  Follows Commands: Within Functional Limits  General Comment  Comments: pt. in bed upon arrival of therapy team.  Subjective  Subjective: Pt. intally agreeable to PT josh but repeatedly questioned why he needed PT. Became very agitated during social function questions and did not want to participate.          Social/Functional History  Social/Functional History  Lives With: Significant other  Type of Home: [facility]  Home Layout: One level  Home Access: Level entry  Bathroom Shower/Tub: Tub/Shower unit, Walk-in shower  Bathroom Toilet: Standard  Bathroom Equipment: Grab bars in shower, Shower chair, Hand-held shower  Has the patient had two or more falls in the past year or any fall with injury in the past year?: No  ADL Assistance: [address] [place] [address] Avenue: Independent  Homemaking Responsibilities: Yes  Ambulation Assistance: Independent  Transfer Assistance: Independent  Active : Yes  Mode of Transportation: Car  Occupation: Retired  Type of Occupation: Work for [employer]: Impaired  Vision Exceptions: Wears glasses at all times  Hearing  Hearing: Within functional limits    Cognition   Orientation  Overall Orientation Status: Within Normal Limits  Orientation Level: Oriented X4     Objective   Heart Rate: 92  Heart Rate Source: Monitor  BP: (!) 158/70  Patient Position: Supine  MAP (Calculated): 99.33  Resp: 18  SpO2: 98 %  O2 Device: None (Room air)  Comment: Unable to take vitals, pt agitated and stated nurse had just taken them and did not need them taken again        Gross Assessment  AROM: Within functional limits  PROM: Within functional limits  Strength:  Within functional limits  Coordination: Within functional limits        Bed Mobility Training  Bed Mobility Training: Yes  Overall Level of Assistance: Independent  Supine to Sit: Independent  Sit to Supine: Independent  Balance  Sitting: Intact  Standing: Intact  Transfer Training  Transfer Training: Yes  Overall Level of Assistance: Independent  Sit to Stand: Independent  Stand to Sit: Independent  Gait Training  Gait Training: Yes  Gait  Overall Level of Assistance: Independent  Interventions: Demonstration  Speed/Telma: Accelerated  Distance (ft): 150 Feet  Assistive Device:  (pt. impulsive and agitated stood up without gaitbelt and began ambulating \"show us he can walk\")     OutComes Score     AM-PAC Score  AM-PAC Inpatient Mobility Raw Score : 24 (09/20/22 1109)  AM-PAC Inpatient T-Scale Score : 61.14 (09/20/22 1109)  Mobility Inpatient CMS 0-100% Score: 0 (09/20/22 1109)  Mobility Inpatient CMS G-Code Modifier : [address] (09/20/22 1109)        Goals  Short Term Goals  Time Frame for Short term goals: 9/27/22  Short term goal 1: pt will ambulate 150 ft independenlty with no Ad -- MET 9/20/22  Additional Goals?: No  Patient Goals   Patient goals : \"to go home\"       Education  Patient Education  Education Given To: Patient  Education Provided: Role of Therapy;Plan of Care  Education Method: Verbal  Barriers to Learning: Other (Comment) (Unwilling)      Therapy Time   Individual Concurrent Group Co-treatment   Time In 0935         Time Out 0945         Minutes 10                 Aleida Hutchins, PT, DPT

## 2022-09-20 NOTE — CONSULTS
Consultation Note    Patient Name: Jayson Zhang  : 1940  Age: 80 y.o. Admitting Physician: Luis Goldberg DO   Date of Admission: 2022  6:31 AM   Primary Care Physician: Titi Saul MD        Jayson Zhang is being seen at the request of Luis Goldberg DO for anemia. History of Present Illness:  80year old M with PMH significant for anemia, CAD, COPD, GERD, HTN, hyperlipidemia, and IBS. Abdominal surgeries include cholecystectomy. Patient presented to the hospital yesterday for a cardiac ablation, but was found to have worsening anemia. He was admitted to the hospital for further evaluation. He reports an episode of melena approximately a month ago, but nothing further. He denies any rectal bleeding or nausea/vomiting. He reports having and EGD/Colonoscopy approximately 7 years ago for anemia and anemia attributed to antibiotics (Tetracycline) and aspirin. Since May he has had a slow decline in his Hgb. He has been on Eliquis during this time. Denies use of NSAIDs. Denies abdominal pain or unintentional weight loss. He reports some mild heartburn. Denies dysphagia, diarrhea, or constipation. His wife reports that his appetite has been poor for the last month or more.      GI History:  7/15/2014 EGD  Findings:  -antral gastritis    Past Medical History:  Past Medical History:   Diagnosis Date    Anemia 2022    CAD (coronary artery disease) 1998    Stent in LAD    CAD (coronary artery disease) 2009    Stent in RCA    COPD (chronic obstructive pulmonary disease) ([facility])     COPD (chronic obstructive pulmonary disease) ([facility] [address] 2016    GERD (gastroesophageal reflux disease)     on Nexium    HTN (hypertension) 2016    Hyperlipidemia     IBS (irritable bowel syndrome)     Stented coronary artery         Past Surgical History:  Past Surgical History:   Procedure Laterality Date    CARDIAC CATHETERIZATION      CAROTID ENDARTERECTOMY  2014 CHOLECYSTECTOMY      COLONOSCOPY  2004    CORONARY ANGIOPLASTY WITH STENT PLACEMENT  2005    CORONARY ARTERY BYPASS GRAFT  2000    at [facility] to LAD, SVG-D1 and OM    DIAGNOSTIC CARDIAC CATH LAB PROCEDURE      UPPER GASTROINTESTINAL ENDOSCOPY  2004    UPPER GASTROINTESTINAL ENDOSCOPY  7/15/14    gastritis        Historical Medications:  Prior to Visit Medications    Medication Sig Taking? Authorizing Provider   Evolocumab (REPATHA SURECLICK) 198 MG/ML SOAJ INJECT ONE MILLILITER UNDER THE SKIN ONCE EVERY 2 WEEKS  Tigre Howell DO   rivaroxaban (XARELTO) 20 MG TABS tablet Take 1 tablet by mouth daily (with breakfast)  Cat Anthony MD   zolpidem (AMBIEN) 10 MG tablet Take 10 mg by mouth as needed. Historical Provider, MD   ondansetron (ZOFRAN ODT) 4 MG disintegrating tablet Take 1 tablet by mouth every 8 hours as needed for Nausea  LASHONDA Quiles CNP   Fluticasone-Umeclidin-Vilant ([facility]) Inhale into the lungs   Historical Provider, MD   apixaban (ELIQUIS) 5 MG TABS tablet Take 1 tablet by mouth 2 times daily  Patient taking differently: Take 5 mg by mouth 2 times daily 1/2 tablet twice daily  LASHONDA Holguin CNP   nitroGLYCERIN (NITROSTAT) 0.4 MG SL tablet Place 1 tablet under the tongue every 5 minutes as needed for Chest pain  Cat Anthony MD   esomeprazole (NEXIUM) 20 MG delayed release capsule Take 20 mg by mouth daily as needed  Historical Provider, MD   albuterol (PROVENTIL HFA) 108 (90 BASE) MCG/ACT inhaler Inhale 2 puffs into the lungs every 4 hours as needed for Wheezing or Shortness of Breath. Jose Guadalupe Hamlin MD   promethazine (PHENERGAN) 25 MG tablet Take 25 mg by mouth every 6 hours as needed.     Historical Provider, MD        Hospital Medications:  Current Facility-Administered Medications: sodium chloride flush 0.9 % injection 5-40 mL, 5-40 mL, IntraVENous, 2 times per day  sodium chloride flush 0.9 % injection 5-40 mL, 5-40 mL, IntraVENous, PRN  0.9 % sodium chloride infusion, , IntraVENous, PRN  0.9 % sodium chloride infusion, , IntraVENous, PRN  zolpidem (AMBIEN) tablet 5 mg, 5 mg, Oral, Nightly PRN  promethazine (PHENERGAN) tablet 25 mg, 25 mg, Oral, Q6H PRN  albuterol sulfate HFA (PROVENTIL;VENTOLIN;PROAIR) 108 (90 Base) MCG/ACT inhaler 2 puff, 2 puff, Inhalation, Q4H PRN  pantoprazole (PROTONIX) injection 40 mg, 40 mg, IntraVENous, BID  mometasone-formoterol (DULERA) 200-5 MCG/ACT inhaler 2 puff, 2 puff, Inhalation, BID **AND** tiotropium (SPIRIVA RESPIMAT) 2.5 MCG/ACT inhaler 2 puff, 2 puff, Inhalation, Daily     Social History:   Social History       Tobacco History       Smoking Status  Former Quit Date  3/16/1996 Smoking Frequency  0.50 packs/day for 30.00 years (15.00 pk-yrs) Smoking Tobacco Type  Cigarettes quit in 3/16/1996      Smokeless Tobacco Use  Never              Alcohol History       Alcohol Use Status  Yes Drinks/Week  0 Standard drinks or equivalent per week Amount  0.0 standard drinks of alcohol/wk Comment  occ              Drug Use       Drug Use Status  No              Sexual Activity       Sexually Active  Yes Partners  Female                     Family History:  Family History   Problem Relation Age of Onset    Diabetes Mother     Heart Failure Mother     Heart Failure Father     Heart Failure Sister     Pacemaker Brother     Asthma Neg Hx     Cancer Neg Hx     Emphysema Neg Hx     Hypertension Neg Hx         Allergies: Allergies   Allergen Reactions    Crestor [Rosuvastatin Calcium] Anaphylaxis     Hives, throat closure    Statins Anaphylaxis     Crestor    Amlodipine      High BP    Budesonide-Formoterol Fumarate Swelling    Codeine Itching     Other reaction(s): Other (See Comments)  nightmare    Dilaudid [Hydromorphone Hcl]      Confusion/ anxiety    Nitrofurantoin Macrocrystal      Patient unknown    Other      betablockers- unspecified.  Feels unwell     Propoxyphene Other (See Comments)     palpitations    Zetia [Ezetimibe]      Not sure of the reaction    Apixaban Nausea And Vomiting    Digoxin Nausea And Vomiting    Hydromorphone Hcl Anxiety     agitation    Iodoform Rash     Developed localized rash on neck when Iodoform packing was used in neck wound 2/2014. ROS:   General: No fever or weight change  Hematologic: No unexpected submucosal bleeding or bruising  HEENT: No sore throat or facial pain  Respiratory: No cough or dyspnea  Cardiovascular: No angina or dependent edema  Gastrointestinal: See HPI  Musculoskeletal: No usual joint pain or stiffness  Skin: No skin eruptions or changing lesions  Neurologic: No focal weakness or numbness  Psychiatric: No anxiety or sleep disturbance    Physical Exam:  Vital Signs:   Vitals:    09/20/22 0438   BP: 134/66   Pulse: 91   Resp: 16   Temp: 98.2 °F (36.8 °C)   SpO2: 99%       General: Well-nourished, well-developed  HEENT: Sclera anicteric, mucosal membranes moist  Cardiovascular: Regular rate and rhythm. No murmurs. Respiratory: Respirations nonlabored, no crepitus  GI: Abdomen nondistended, soft, and nontender. Normal active bowel sounds. Rectal: Deferred  Musculoskeletal: No pitting edema of the lower legs. Neurological: Gross memory appears intact.   Patient is alert and oriented      Recent Imaging:   NM Cardiac Stress Test Nuclear Imaging  Cardiac Perfusion Imaging     Demographics      Patient Name       Taylor Modi      Date of Study      06/16/2022         Gender               Male      Patient Number     [id number]         Date of Birth        1940      Visit Number       [id number]          Age                  80 year(s)      Accession Number   [id number]         Room Number          OP      Corporate ID       [id number]           NM Technician        Blade Dapper      Nurse              [place], [address] [facility],  [address] [facility],[address] [facility]                 Physician            Brianna Rob DO      Ordering Physician Heather Lundberg MD ARSLAN, Lu Stain      The procedure was explained in detail to the patient. Risks,   complications and alternative treatments were reviewed. Written consent   was obtained. Procedure  Procedure Type:      Nuclear Stress Test:Pharmacological, NM MYOCARDIAL SPECT REST EXERCISE OR   RX      Study location: [address] [place] [address] - Nuclear Medicine      Indications: Abnormal rest ECG. Hospital Status: Outpatient. Height: 72 inches Weight: 180 pounds     Risk Factors      The patient risk factors include:prior PCI;prior CABG;former tobacco use,   treated hypercholesterolemia, treated hypertension, family history of   premature CAD, chronic lung disease and (pack years: 13). Conclusions      Summary   Negative for evidence of myocardial ischemia or scar. Normal left ventricular systolic function, size, and wall motion. Calculated LVEF of >65%. Overall findings represent a low risk scan. Stress Protocols      Resting ECG   Sinus rhythm with first-degree AV block,   left axis deviation, left anterior   fascicular block, non-specific T wave   abnormality      Resting HR:75 bpm  Resting BP:157/70 mmHg     Stress Protocol:Pharmacologic - Lexiscan's     Peak HR:84 bpm                           HR/BP product:46568   Peak BP:146/72 mmHg   Predicted HR: 139 bpm   % of predicted HR: 60   Test duration: 4 min   Reason for termination:Completed      ECG Findings   There is less than 0.5 mm of ST segment depression during stress. Overall, the pharmacologic stress EKG is non-diagnostic for   evidence of ischemia due to failure to achieve target heart rate. Arrhythmias   Isolated PAC. Symptoms   Patient developed shortness of breath, likely related to lexiscan. Symptoms resolved with rest.      Complications   Procedure complication was none.       Stress Interpretation   Overall, the pharmacologic stress EKG is non-diagnostic for   evidence of ischemia due to failure to achieve target heart rate. Procedure Medications    - Lexiscan I.V. 0.4 mg.     Imaging Protocols      - One Day      Rest                          Stress      Isotope:Myoview/Tetrofosmin   Isotope: Myoview/Tetrofosmin   Isotope dose:11.1 mCi         Isotope dose:32.1 mCi   Administration Route:I.V. Administration Route:I.V.   Date:06/16/2022 11:35         Date:06/16/2022 13:50                                    Technique:      Gated     Imaging Results      Applied corrections      - Attenuation correction   applied        Stress ejection     Ejection fraction:75 %     EDV :112 ml     ESV :28 ml     Stroke volume :84 ml     LV mass :146 gr     Medical History      Additional Medical History      CAD   GERD      Signatures      ------------------------------------------------------------------   Electronically signed by Teresa Lees DO (Interpreting   physician) on 06/16/2022 at 15:40   ------------------------------------------------------------------     Echo 2D w doppler w color complete  Transthoracic Echocardiography Report (TTE)     Demographics      Patient Name       Luciana Aase      Date of Study      06/16/2022         Gender               Male      Patient Number     [id number]         Date of Birth        1940      Visit Number       [id number]          Age                  80 year(s)      Accession Number   [id number]         Room Number          OP      Corporate ID       [id number]           Sonographer          Ishaan Blas      Ordering Physician Nya Feng CNP        Physician            Alejandra Andrade MD     Procedure    Type of Study      TTE procedure:ECHOCARDIOGRAM COMPLETE 2D W DOPPLER W COLOR. Procedure Date  Date: 06/16/2022 Start: 10:41 AM    Study Location: [address] [place] [address] - Echo Lab  Technical Quality: Adequate visualization    Indications:Atrial flutter.     Patient Status: Routine    Height: 72 inches Weight: 180 pounds BSA: 2.04 m2 BMI: 24.41 kg/m2    HR: 82 bpm BP: 145/65 mmHg     Conclusions      Summary   Technically difficult examination. Low normal LVEF 50-55%   Left ventricular function/wall motion is difficult to estimate due to poor   endocardial visualization. Diastolic dysfunction grade and filling pressure are indeterminate. The right atrium is mildly dilated. Mild aortic valve regurgitation. Inadequate tricuspid regurgitation to estimate systolic pulmonary artery   pressure. Subcostal view not well visualized. Pre-mature beats throughout the study. Signature      ------------------------------------------------------------------   Electronically signed by Flo Augustin MD (Interpreting   physician) on 06/16/2022 at 03:49 PM   ------------------------------------------------------------------      Findings      Left Ventricle   Low normal LVEF 50-55%   Mild LVH. Left ventricular function/wall motion is difficult to estimate due to poor   endocardial visualization. Diastolic dysfunction grade and filling pressure are indeterminate. Pre-mature beats throughout the study. Mitral Valve   The mitral valve is normal in structure. No evidence of mitral regurgitation. Left Atrium   The left atrium is normal in size. Aortic Valve   The aortic valve is normal in structure   Mild aortic valve regurgitation. Aorta   The aortic root is normal in size. Right Ventricle   Upper normal size with normal function. TAPSE is measured at 11 mm and likely underestimates rv function. S\" Prime Velocity is measured at 9.36 cm/s. Tricuspid Valve   The tricuspid valve is normal in structure and function. There is no evidence of tricuspid valve regurgitation. Inadequate tricuspid regurgitation to estimate systolic pulmonary artery   pressure. Right Atrium   The right atrium is mildly dilated.    Right atrial area is 21.7 cm2.      Pulmonic Valve   The pulmonic valve is not well visualized. Pericardial Effusion   No pericardial effusion noted. Pleural Effusion   No pleural effusion. Miscellaneous   No obvious masses, thrombi or vegetations are noted. Subcostal view not well visualized. M-Mode/2D Measurements (cm)      LV Diastolic Dimension: 0.09 cm LV Systolic Dimension: 0.30 cm   LV Septum Diastolic: 1.18 cm   LV PW Diastolic: 0.44 cm        AO Root Dimension: 3.2 cm                                   AV Cusp Separation: 1.6 cm                                   LA Dimension: 3.8 cm   LVOT: 2 cm                      LA Area: 17.5 cm2                                   LA volume/Index: 44.6 ml /22 ml/m2     Doppler Measurements                                    MV Peak E-Wave: 81 cm/s                                 MV Peak A-Wave: 82.7 cm/s                                 MV E/A Ratio: 0.98                                 MV P1/2t: 65 msec      E' Septal Velocity: 5.98 cm/s   E' Lateral Velocity: 7.4 cm/s   E/E' ratio: 12.2              MV Deceleration Time: 222 msec                                 MV Area (PHT): 3.38 cm2      Aortic Valve      Cusp Separation: 1.6 cm     Aorta      Aortic Root: 3.2 cm   Ascending Aorta: 3.2 cm   LVOT Diameter: 2 cm          Labs:   Recent Labs     09/19/22  0706 09/19/22  0750 09/19/22  1815 09/19/22  2348 09/20/22  0639   HGB 8.6* 8.2* 8.2* 7.8* 8.7*   WBC 5.5 5.6  --   --   --         Assessment:    80year old M with PMH significant for anemia, CAD, COPD, GERD, HTN, hyperlipidemia, and IBS. Presented for cardiac ablation but found to have worsening anemia and admitted for further evaluation. Hgb down to 8.6 yesterday from 11.9 in June. Reports one questionable episode of melena but otherwise denies overt GI bleeding. He has been on Eliquis. Denies use of NSAIDs.      Plan:  Monitor H/H and overt signs of GI bleeding  Keep NPO  Plan for EGD today  Continue IV PPI  If EGD unremarkable will discuss colonoscopy with patient  Supportive care       LASHONDA Huertas CNP    [phone].  Also available via [employer]

## 2022-09-20 NOTE — PROGRESS NOTES
Occupational Therapy  Facility/Department: [facility] A2 CARD TELEMETRY  Occupational Therapy Initial Assessment/Discharge Summary  1x only    Name: Blake Jesus  : 1940  MRN: [id number]  Date of Service: 2022    Discharge Recommendations:  Home with assist PRN          Patient Diagnosis(es): There were no encounter diagnoses. Past Medical History:  has a past medical history of Anemia, CAD (coronary artery disease), CAD (coronary artery disease), COPD (chronic obstructive pulmonary disease) ([facility] [address] COPD (chronic obstructive pulmonary disease) ([facility] [address] GERD (gastroesophageal reflux disease), HTN (hypertension), Hyperlipidemia, IBS (irritable bowel syndrome), and Stented coronary artery. Past Surgical History:  has a past surgical history that includes Coronary artery bypass graft (); Coronary angioplasty with stent (); Carotid endarterectomy (2014); Colonoscopy (); Upper gastrointestinal endoscopy (); Cholecystectomy; Cardiac catheterization (); Upper gastrointestinal endoscopy (7/15/14); and Diagnostic Cardiac Cath Lab Procedure. Assessment   Assessment: Patient seen w/ dx of Acute on chronic anemia, s/p ablation on 22. Pt independent with transfers and mobility. No further OT needs. Prognosis: Good  Decision Making: Low Complexity  No Skilled OT: At baseline function; No OT goals identified  REQUIRES OT FOLLOW-UP: No  Activity Tolerance  Activity Tolerance: Patient Tolerated treatment well        Plan   Plan  Times per Week: 1x only     Restrictions  Restrictions/Precautions  Restrictions/Precautions: Fall Risk  Position Activity Restriction  Other position/activity restrictions: up with assistance    Subjective   General  Chart Reviewed: Yes, Orders, Progress Notes, History and Physical  Patient assessed for rehabilitation services?: Yes  Family / Caregiver Present: No  Referring Practitioner: Xuan Hirsch MD 22  Diagnosis: anemia  Subjective  Subjective: Pt in bed upon entry, talking on speaker phone with spouse. Pt c/o not wanting to be at [facility], would have preferred to go to [facility]. \"I can't walk I don't know why I'm doing this\" \"  2/10 pain on L trunk.   Social/Functional History  Social/Functional History  Lives With: Significant other  Type of Home: [facility]  Home Layout: One level  Home Access: Level entry  Bathroom Shower/Tub: Tub/Shower unit, Walk-in shower  Bathroom Toilet: Standard  Bathroom Equipment: Grab bars in shower, Shower chair, Hand-held shower  Has the patient had two or more falls in the past year or any fall with injury in the past year?: No  ADL Assistance: [address] [place] [address] Avenue: Independent  Homemaking Responsibilities: Yes  Ambulation Assistance: Independent  Transfer Assistance: Independent  Active : Yes  Mode of Transportation: Car  Occupation: Retired  Type of Occupation: Work for [employer] [facility] [employer] [facility] [address]: Monitor  BP: 134/66  BP Location: Right upper arm  Patient Position: Supine  MAP (Calculated): 88.67  Resp: 16  SpO2: 99 %  O2 Device: None (Room air)  Comment: Unable to take vitals, pt agitated and stated nurse had just taken them and did not need them taken again             Safety Devices  Type of Devices: Left in bed;Nurse notified;Call light within reach  Bed Mobility Training  Bed Mobility Training: Yes  Overall Level of Assistance: Independent  Supine to Sit: Independent  Sit to Supine: Independent  Balance  Sitting: Intact  Standing: Intact  Transfer Training  Transfer Training: Yes  Overall Level of Assistance: Independent  Sit to Stand: Independent  Stand to Sit: Independent  Gait Training  Gait Training: Yes  Gait  Overall Level of Assistance: Independent  Interventions: Demonstration  Speed/Telma: Accelerated  Distance (ft): 150 Feet  Assistive Device:  (pt. impulsive and agitated stood up without gaitbelt and began ambulating \"show us he can walk\")     AROM: Within functional limits  PROM: Within functional limits  Strength: Within functional limits  Coordination: Within functional limits  Tone: Normal  ADL  LE Dressing: Independent              Vision  Vision: Impaired  Vision Exceptions: Wears glasses at all times  Hearing  Hearing: Within functional limits  Cognition  Overall Cognitive Status: Exceptions  Arousal/Alertness: Appropriate responses to stimuli  Following Commands: Follows all commands without difficulty  Attention Span: Difficulty attending to directions  Memory: Appears intact  Insights: Decreased awareness of deficits  Initiation: Does not require cues  Sequencing: Does not require cues  Orientation  Overall Orientation Status: Within Normal Limits  Orientation Level: Oriented X4                  Education Given To: Patient  Education Provided: Role of Therapy;Plan of Care  Education Method: Demonstration;Verbal  Barriers to Learning: Cognition; Other (Comment) (increased agitation regarding current hospitalization)  Education Outcome: Verbalized understanding;Demonstrated understanding     Pt educated on benefits of OOB activity to decrease risks associated with prolonged bedrest while in hospital. Pt verb understanding.             AM-PAC Score        AM-PAC Inpatient Daily Activity Raw Score: 24 (09/20/22 1104)  AM-PAC Inpatient ADL T-Scale Score : 57.54 (09/20/22 1104)  ADL Inpatient CMS 0-100% Score: 0 (09/20/22 1104)  ADL Inpatient CMS G-Code Modifier : [address] (09/20/22 1104)      Goals  Short Term Goals  Time Frame for Short term goals: 1 session  Short Term Goal 1: Pt will complete functional transfers with supervision-stg met 9/20  Patient Goals   Patient goals : \"to go home\"       Therapy Time   Individual Concurrent Group Co-treatment   Time In 0938         Time Out 0950         Minutes 12         Timed Code Treatment Minutes: 0 Minutes       Lucita Choudhury OTR/L

## 2022-09-20 NOTE — H&P
[facility] A2 CARD TELEMETRY  Procedure H&P    Patient: Tim Pope MRN: [id number]     YOB: 1940  Age: 80 y.o. Sex: male    Unit: [facility] A2 CARD TELEMETRY Room/Bed: 0204/0204-01 Location: [address]     Procedure: Procedure(s):  EGD DIAGNOSTIC ONLY    Indication:anemia, melena, hx of anemia in past  Referring  Physician:  Snow Carroll Brief history: xeralto, eliquis    Nurses past medical history notes reviewed and agreed. Medications reviewed.     Allergies: Crestor [rosuvastatin calcium], Statins, Amlodipine, Budesonide-formoterol fumarate, Codeine, Dilaudid [hydromorphone hcl], Nitrofurantoin macrocrystal, Other, Propoxyphene, Xarelto [rivaroxaban], Zetia [ezetimibe], Apixaban, Benadryl [diphenhydramine], Digoxin, Hydromorphone hcl, and Iodoform     Allergies noted: Yes     Past Medical History:   Past Medical History:   Diagnosis Date    Anemia 9/19/2022    CAD (coronary artery disease) 5/17/1998    Stent in LAD    CAD (coronary artery disease) 9/17/2009    Stent in RCA    COPD (chronic obstructive pulmonary disease) ([facility])     COPD (chronic obstructive pulmonary disease) ([facility] [address] 2/17/2016    GERD (gastroesophageal reflux disease)     on Nexium    HTN (hypertension) 6/14/2016    Hyperlipidemia     IBS (irritable bowel syndrome)     Stented coronary artery        Past Surgical History:   Past Surgical History:   Procedure Laterality Date    CARDIAC CATHETERIZATION  2009    CAROTID ENDARTERECTOMY  01/2014    CHOLECYSTECTOMY      COLONOSCOPY  2004    CORONARY ANGIOPLASTY WITH STENT PLACEMENT  2005    CORONARY ARTERY BYPASS GRAFT  2000    at [facility] to LAD, SVG-D1 and OM    DIAGNOSTIC CARDIAC CATH LAB PROCEDURE      UPPER GASTROINTESTINAL ENDOSCOPY  2004    UPPER GASTROINTESTINAL ENDOSCOPY  7/15/14    gastritis       Social History:   Social History     Socioeconomic History    Marital status:      Spouse name: Not on file    Number of children: Not on file Years of education: Not on file    Highest education level: Not on file   Occupational History    Not on file   Tobacco Use    Smoking status: Former     Packs/day: 0.50     Years: 30.00     Pack years: 15.00     Types: Cigarettes     Quit date: 3/16/1996     Years since quittin.5    Smokeless tobacco: Never   Substance and Sexual Activity    Alcohol use: Yes     Alcohol/week: 0.0 standard drinks     Comment: occ    Drug use: No    Sexual activity: Yes     Partners: Female   Other Topics Concern    Not on file   Social History Narrative    Not on file     Social Determinants of Health     Financial Resource Strain: Not on file   Food Insecurity: Not on file   Transportation Needs: Not on file   Physical Activity: Not on file   Stress: Not on file   Social Connections: Not on file   Intimate Partner Violence: Not on file   Housing Stability: Not on file       Family History:   Family History   Problem Relation Age of Onset    Diabetes Mother     Heart Failure Mother     Heart Failure Father     Heart Failure Sister     Pacemaker Brother     Asthma Neg Hx     Cancer Neg Hx     Emphysema Neg Hx     Hypertension Neg Hx        Home Medications:   Prior to Admission medications    Medication Sig Start Date End Date Taking? Authorizing Provider   Evolocumab (REPATHA SURECLICK) 567 MG/ML SOAJ INJECT ONE MILLILITER UNDER THE SKIN ONCE EVERY 2 WEEKS 8/3/22   Tigre Howell,    rivaroxaban (XARELTO) 20 MG TABS tablet Take 1 tablet by mouth daily (with breakfast) 22   James Fu MD   zolpidem (AMBIEN) 10 MG tablet Take 10 mg by mouth as needed.  22   Historical Provider, MD   ondansetron (ZOFRAN ODT) 4 MG disintegrating tablet Take 1 tablet by mouth every 8 hours as needed for Nausea 22   LASHONDA Fajardo - CHARANJIT   Fluticasone-Umeclidin-Vilant ([facility]) Inhale into the lungs     Historical Provider, MD   apixaban (ELIQUIS) 5 MG TABS tablet Take 1 tablet by mouth 2 times daily  Patient taking differently: Take 5 mg by mouth 2 times daily 1/2 tablet twice daily 5/5/22 6/14/22  LASHONDA Zepeda - CNP   nitroGLYCERIN (NITROSTAT) 0.4 MG SL tablet Place 1 tablet under the tongue every 5 minutes as needed for Chest pain 6/1/18   Rebecca Gallegos MD   esomeprazole (NEXIUM) 20 MG delayed release capsule Take 20 mg by mouth daily as needed    Historical Provider, MD   albuterol (PROVENTIL HFA) 108 (90 BASE) MCG/ACT inhaler Inhale 2 puffs into the lungs every 4 hours as needed for Wheezing or Shortness of Breath. 10/9/13   Saad Fraga MD   promethazine (PHENERGAN) 25 MG tablet Take 25 mg by mouth every 6 hours as needed.       Historical Provider, MD       Review of Systems:  Weight Loss: No  Dysphagia: No  Dyspepsia: No    Physical Exam:   Vital Signs: BP (!) 158/70   Pulse 92   Temp 98.9 °F (37.2 °C) (Temporal)   Resp 18   Ht 6' (1.829 m)   Wt 195 lb (88.5 kg)   SpO2 98%   BMI 26.45 kg/m²   Vital signs reviewed:Yes    HEENT:Normal  Cardiac:Normal  Chest:Normal  Abdomen:Normal  Exts: Normal  Neuro:Normal    Labs:  Admission on 09/19/2022   Component Date Value Ref Range Status    WBC 09/19/2022 5.5  4.0 - 11.0 K/uL Final    RBC 09/19/2022 3.30 (A) 4.20 - 5.90 M/uL Final    Hemoglobin 09/19/2022 8.6 (A) 13.5 - 17.5 g/dL Final    Hematocrit 09/19/2022 28.2 (A) 40.5 - 52.5 % Final    MCV 09/19/2022 85.5  80.0 - 100.0 fL Final    MCH 09/19/2022 26.2  26.0 - 34.0 pg Final    MCHC 09/19/2022 30.6 (A) 31.0 - 36.0 g/dL Final    RDW 09/19/2022 18.5 (A) 12.4 - 15.4 % Final    Platelets 74/46/0048 292  135 - 450 K/uL Final    MPV 09/19/2022 7.2  5.0 - 10.5 fL Final    Sodium 09/19/2022 138  136 - 145 mmol/L Final    Potassium 09/19/2022 4.1  3.5 - 5.1 mmol/L Final    Chloride 09/19/2022 103  99 - 110 mmol/L Final    CO2 09/19/2022 25  21 - 32 mmol/L Final    Anion Gap 09/19/2022 10  3 - 16 Final    Glucose 09/19/2022 110 (A) 70 - 99 mg/dL Final    BUN 09/19/2022 20  7 - 20 mg/dL Final    Creatinine 09/19/2022 1.3  0.8 - 1.3 mg/dL Final    GFR Non- 09/19/2022 53 (A) >60 Final    GFR  09/19/2022 >60  >60 Final    Calcium 09/19/2022 9.3  8.3 - 10.6 mg/dL Final    Protime 09/19/2022 16.2 (A) 11.7 - 14.5 sec Final    INR 09/19/2022 1.32 (A) 0.87 - 1.14 Final    Ventricular Rate 09/19/2022 78  BPM Final    Atrial Rate 09/19/2022 78  BPM Final    P-R Interval 09/19/2022 216  ms Final    QRS Duration 09/19/2022 106  ms Final    Q-T Interval 09/19/2022 410  ms Final    QTc Calculation (Bazett) 09/19/2022 467  ms Final    P Axis 09/19/2022 -83  degrees Final    R Axis 09/19/2022 -81  degrees Final    T Axis 09/19/2022 34  degrees Final    Diagnosis 09/19/2022    Final                    Value:Normal sinus rhythm with 1st degree A-V block with occasional with Premature supraventricular complexesvs. possible ectopical atrial rhythmLeft axis deviationInferior infarct (cited on or before 14-JUN-2022)Anterior infarct (cited on or before 14-JUN-2022)Abnormal ECGWhen compared with ECG of 14-JUN-2022 08:49,Premature supraventricular complexes are now PresentPR interval has decreasedConfirmed by Loyd Chang ([id number]) on 9/19/2022 7:58:20 AM      ABO/Rh 09/19/2022 O POS   Final    Antibody Screen 09/19/2022 NEG   Final    Product Code Blood Bank 09/19/2022 [id number]   Preliminary    Description Blood Bank 09/19/2022 Red Blood Cells, Leuko-reduced   Preliminary    Unit Number 09/19/2022 [id number]   Preliminary    Dispense Status Blood Bank 09/19/2022 selected   Preliminary    Product Code Blood Bank 09/19/2022 [id number]   Preliminary    Description Blood Bank 09/19/2022 Red Blood Cells, Leuko-reduced   Preliminary    Unit Number 09/19/2022 [id number]   Preliminary    Dispense Status Blood Bank 09/19/2022 selected   Preliminary    WBC 09/19/2022 5.6  4.0 - 11.0 K/uL Final    RBC 09/19/2022 3.11 (A) 4.20 - 5.90 M/uL Final    Hemoglobin 09/19/2022 8.2 (A) 13.5 - 17.5 g/dL Final    Hematocrit 09/19/2022 26.3 (A) 40.5 - 52.5 % Final    MCV 09/19/2022 84.7  80.0 - 100.0 fL Final    MCH 09/19/2022 26.3  26.0 - 34.0 pg Final    MCHC 09/19/2022 31.1  31.0 - 36.0 g/dL Final    RDW 09/19/2022 18.3 (A) 12.4 - 15.4 % Final    Platelets 70/46/5814 271  135 - 450 K/uL Final    MPV 09/19/2022 6.8  5.0 - 10.5 fL Final    Vitamin B-12 09/19/2022 539  211 - 911 pg/mL Final    Folate 09/19/2022 16.07  4.78 - 24.20 ng/mL Final    Iron 09/19/2022 30 (A) 59 - 158 ug/dL Final    TIBC 09/19/2022 432  260 - 445 ug/dL Final    Iron Saturation 09/19/2022 7 (A) 20 - 50 % Final    Ferritin 09/19/2022 23.6 (A) 30.0 - 400.0 ng/mL Final    Hemoglobin 09/19/2022 8.2 (A) 13.5 - 17.5 g/dL Final    Hematocrit 09/19/2022 26.7 (A) 40.5 - 52.5 % Final    Hemoglobin 09/19/2022 7.8 (A) 13.5 - 17.5 g/dL Final    Hematocrit 09/19/2022 25.7 (A) 40.5 - 52.5 % Final    Hemoglobin 09/20/2022 8.7 (A) 13.5 - 17.5 g/dL Final    Hematocrit 09/20/2022 27.6 (A) 40.5 - 52.5 % Final    Sodium 09/20/2022 140  136 - 145 mmol/L Final    Potassium 09/20/2022 4.2  3.5 - 5.1 mmol/L Final    Chloride 09/20/2022 105  99 - 110 mmol/L Final    CO2 09/20/2022 21  21 - 32 mmol/L Final    Anion Gap 09/20/2022 14  3 - 16 Final    Glucose 09/20/2022 116 (A) 70 - 99 mg/dL Final    BUN 09/20/2022 15  7 - 20 mg/dL Final    Creatinine 09/20/2022 1.2  0.8 - 1.3 mg/dL Final    GFR Non- 09/20/2022 58 (A) >60 Final    GFR  09/20/2022 >60  >60 Final    Calcium 09/20/2022 9.8  8.3 - 10.6 mg/dL Final   Hospital Outpatient Visit on 09/14/2022   Component Date Value Ref Range Status    SARS-CoV-2, PCR 09/14/2022 Not Detected  Not Detected Final        Imaging:  No orders to display       ASA:3    Mallampati Score: III    Sedation planned:MAC    Patient in acceptable condition for procedure:Yes    1:06 PM 9/20/2022    Reanna Jerome, DO      Please note that some or all of this record was generated using voice recognition software.  If there are any questions about the content of this document, please contact the author as some errors in transcription may have occurred. The patient and I discussed that this is an elective procedure/surgery. We discussed the risks of the procedure/surgery, including but not limited to what is outlined in the signed informed consent. We also discussed the risk of sagrario COVID 19 while in the facility. We discussed the increased risk of a bad outcome should the patient contract COVID 19 during the post-procedural/post-operative period, given the patients current health condition, chronic conditions, and the added risk of COVID 19 in light of these conditions. The patient and I also discussed the risk of further postponing the procedure/surgery and other treatment alternatives, including non-procedural/surgical treatments. Understanding all of the risks, benefits, and alternatives, the patient made an informed decision to proceed with the procedure/surgery.

## 2022-09-20 NOTE — ANESTHESIA PRE PROCEDURE
Department of Anesthesiology  Preprocedure Note       Name:  Jessica Al   Age:  80 y.o.  :  1940                                          MRN:  [id number]         Date:  2022      Surgeon: Juventino Calloway):  Corazon Pascual DO    Procedure: Procedure(s):  EGD DIAGNOSTIC ONLY    Medications prior to admission:   Prior to Admission medications    Medication Sig Start Date End Date Taking? Authorizing Provider   Evolocumab (REPATHA SURECLICK) 068 MG/ML SOAJ INJECT ONE MILLILITER UNDER THE SKIN ONCE EVERY 2 WEEKS 8/3/22   Tigre Howell DO   rivaroxaban (XARELTO) 20 MG TABS tablet Take 1 tablet by mouth daily (with breakfast) 22   Agnes Hunt MD   zolpidem (AMBIEN) 10 MG tablet Take 10 mg by mouth as needed. 22   Historical Provider, MD   ondansetron (ZOFRAN ODT) 4 MG disintegrating tablet Take 1 tablet by mouth every 8 hours as needed for Nausea 22   LASHONDA Hill CNP   Fluticasone-Umeclidin-Vilant ([facility]) Inhale into the lungs     Historical Provider, MD   apixaban (ELIQUIS) 5 MG TABS tablet Take 1 tablet by mouth 2 times daily  Patient taking differently: Take 5 mg by mouth 2 times daily 1/2 tablet twice daily 22  LASHONDA Rivas CNP   nitroGLYCERIN (NITROSTAT) 0.4 MG SL tablet Place 1 tablet under the tongue every 5 minutes as needed for Chest pain 18   Agnes Hunt MD   esomeprazole (NEXIUM) 20 MG delayed release capsule Take 20 mg by mouth daily as needed    Historical Provider, MD   albuterol (PROVENTIL HFA) 108 (90 BASE) MCG/ACT inhaler Inhale 2 puffs into the lungs every 4 hours as needed for Wheezing or Shortness of Breath. 10/9/13   Dimitry Davis MD   promethazine (PHENERGAN) 25 MG tablet Take 25 mg by mouth every 6 hours as needed.       Historical Provider, MD       Current medications:    Current Facility-Administered Medications   Medication Dose Route Frequency Provider Last Rate Last Admin    sodium chloride flush 0.9 % injection 5-40 mL  5-40 mL IntraVENous 2 times per day JAMISON Spring MD   10 mL at 09/20/22 0855    sodium chloride flush 0.9 % injection 5-40 mL  5-40 mL IntraVENous PRN Sophie Tomas MD        0.9 % sodium chloride infusion   IntraVENous PRN Sophie Tomas MD        0.9 % sodium chloride infusion   IntraVENous PRN JAMISON Spring MD        zolpidem [facility]) tablet 5 mg  5 mg Oral Nightly PRN Marii Michael MD        promethazine [facility]) tablet 25 mg  25 mg Oral Q6H PRN Marii Michael MD   25 mg at 09/19/22 1839    albuterol sulfate HFA (PROVENTIL;VENTOLIN;PROAIR) 108 (90 Base) MCG/ACT inhaler 2 puff  2 puff Inhalation Q4H PRN Marii Michael MD        pantoprazole (PROTONIX) injection 40 mg  40 mg IntraVENous BID Marii Michael MD   40 mg at 09/20/22 0856    mometasone-formoterol (DULERA) 200-5 MCG/ACT inhaler 2 puff  2 puff Inhalation BID Marii Michael MD   2 puff at 09/20/22 0802    And    tiotropium (SPIRIVA RESPIMAT) 2.5 MCG/ACT inhaler 2 puff  2 puff Inhalation Daily Marii Michael MD   2 puff at 09/20/22 0802       Allergies: Allergies   Allergen Reactions    Crestor [Rosuvastatin Calcium] Anaphylaxis     Hives, throat closure    Statins Anaphylaxis     Crestor    Amlodipine      High BP    Budesonide-Formoterol Fumarate Swelling    Codeine Itching     Other reaction(s): Other (See Comments)  nightmare    Dilaudid [Hydromorphone Hcl]      Confusion/ anxiety    Nitrofurantoin Macrocrystal      Patient unknown    Other      betablockers- unspecified. Feels unwell     Propoxyphene Other (See Comments)     palpitations    Zetia [Ezetimibe]      Not sure of the reaction    Apixaban Nausea And Vomiting    Benadryl [Diphenhydramine] Anxiety    Digoxin Nausea And Vomiting    Hydromorphone Hcl Anxiety     agitation    Iodoform Rash     Developed localized rash on neck when Iodoform packing was used in neck wound 2/2014. Problem List:    Patient Active Problem List   Diagnosis Code    Coronary artery disease involving native coronary artery of native heart without angina pectoris I25.10    Hyperlipidemia E78.5    Nausea & vomiting R11.2    Carotid disease, bilateral (HCC) I77.9    Chest pain R07.9    COPD (chronic obstructive pulmonary disease) ([facility] [address] J44.9    HTN (hypertension) I10    Stented coronary artery Z95.5    IBS (irritable bowel syndrome) K58.9    GERD (gastroesophageal reflux disease) K21.9    CRP elevated R79.82    Weakness R53.1    SOB (shortness of breath) R06.02    Centrilobular emphysema ([facility]) J43.2    Atrial fibrillation ([facility]) I48.91    Atrial flutter by electrocardiogram ([facility]) I48.92    Anemia D64.9       Past Medical History:        Diagnosis Date    Anemia 2022    CAD (coronary artery disease) 1998    Stent in LAD    CAD (coronary artery disease) 2009    Stent in RCA    COPD (chronic obstructive pulmonary disease) ([facility] [address]     COPD (chronic obstructive pulmonary disease) ([facility] [address] 2016    GERD (gastroesophageal reflux disease)     on Nexium    HTN (hypertension) 2016    Hyperlipidemia     IBS (irritable bowel syndrome)     Stented coronary artery        Past Surgical History:        Procedure Laterality Date    CARDIAC CATHETERIZATION      CAROTID ENDARTERECTOMY  2014    CHOLECYSTECTOMY      COLONOSCOPY  2004    CORONARY ANGIOPLASTY WITH STENT PLACEMENT      CORONARY ARTERY BYPASS GRAFT  2000    at [facility] to LAD, SVG-D1 and OM    DIAGNOSTIC CARDIAC CATH LAB PROCEDURE      UPPER GASTROINTESTINAL ENDOSCOPY      UPPER GASTROINTESTINAL ENDOSCOPY  7/15/14    gastritis       Social History:    Social History     Tobacco Use    Smoking status: Former     Packs/day: 0.50     Years: 30.00     Pack years: 15.00     Types: Cigarettes     Quit date: 3/16/1996     Years since quittin.5    Smokeless tobacco: Never   Substance Use Topics    Alcohol use: Yes     Alcohol/week: 0.0 standard drinks     Comment: occ                                Counseling given: Not Answered      Vital Signs (Current):   Vitals:    09/19/22 1439 09/19/22 2115 09/20/22 0017 09/20/22 0438   BP: (!) 152/78 (!) 140/69 (!) 110/57 134/66   Pulse: 80 88 89 91   Resp: 16 16 17 16   Temp: 98.6 °F (37 °C) 98.1 °F (36.7 °C) 98.6 °F (37 °C) 98.2 °F (36.8 °C)   TempSrc: Oral Oral Oral Oral   SpO2: 100% 99% 99% 99%   Weight:       Height:                                                  BP Readings from Last 3 Encounters:   09/20/22 134/66   07/21/22 (!) 124/50   07/19/22 (!) 142/70       NPO Status:                                                                                 BMI:   Wt Readings from Last 3 Encounters:   09/19/22 195 lb (88.5 kg)   07/21/22 200 lb (90.7 kg)   07/19/22 198 lb (89.8 kg)     Body mass index is 27.2 kg/m². CBC:   Lab Results   Component Value Date/Time    WBC 5.6 09/19/2022 07:50 AM    RBC 3.11 09/19/2022 07:50 AM    HGB 8.7 09/20/2022 06:39 AM    HCT 27.6 09/20/2022 06:39 AM    MCV 84.7 09/19/2022 07:50 AM    RDW 18.3 09/19/2022 07:50 AM     09/19/2022 07:50 AM       CMP:   Lab Results   Component Value Date/Time     09/20/2022 06:39 AM    K 4.2 09/20/2022 06:39 AM    K 4.5 06/14/2022 08:26 AM     09/20/2022 06:39 AM    CO2 21 09/20/2022 06:39 AM    BUN 15 09/20/2022 06:39 AM    CREATININE 1.2 09/20/2022 06:39 AM    GFRAA >60 09/20/2022 06:39 AM    GFRAA >60 07/17/2012 05:52 AM    AGRATIO 1.6 06/14/2022 08:26 AM    LABGLOM 58 09/20/2022 06:39 AM    GLUCOSE 116 09/20/2022 06:39 AM    PROT 7.2 06/14/2022 08:26 AM    PROT 7.0 07/17/2012 05:52 AM    CALCIUM 9.8 09/20/2022 06:39 AM    BILITOT 0.8 06/14/2022 08:26 AM    ALKPHOS 120 06/14/2022 08:26 AM    AST 25 06/14/2022 08:26 AM    ALT 18 06/14/2022 08:26 AM       POC Tests: No results for input(s): POCGLU, POCNA, POCK, POCCL, POCBUN, POCHEMO, POCHCT in the last 72 hours.     Coags: Lab Results   Component Value Date/Time    PROTIME 16.2 09/19/2022 07:06 AM    INR 1.32 09/19/2022 07:06 AM    APTT 31.7 02/28/2017 05:25 PM       HCG (If Applicable): No results found for: PREGTESTUR, PREGSERUM, HCG, HCGQUANT     ABGs: No results found for: PHART, PO2ART, AXG8FMD, PPH9AKR, BEART, M2PMXHQM     Type & Screen (If Applicable):  No results found for: LABABO, LABRH    Drug/Infectious Status (If Applicable):  No results found for: HIV, HEPCAB    COVID-19 Screening (If Applicable):   Lab Results   Component Value Date/Time    COVID19 Not Detected 09/14/2022 11:44 AM           Anesthesia Evaluation  Patient summary reviewed and Nursing notes reviewed  Airway: Mallampati: II          Dental:    (+) upper dentures      Pulmonary:normal exam    (+) COPD:  shortness of breath:                             Cardiovascular:    (+) hypertension:, CAD:,                   Neuro/Psych:   Negative Neuro/Psych ROS              GI/Hepatic/Renal:   (+) GERD:,           Endo/Other: Negative Endo/Other ROS                    Abdominal:             Vascular: negative vascular ROS. Other Findings:           Anesthesia Plan      MAC     ASA 3       Induction: intravenous. Anesthetic plan and risks discussed with patient. Plan discussed with CRNA.     Attending anesthesiologist reviewed and agrees with Preprocedure content                CORAL Mackay MD   9/20/2022

## 2022-09-20 NOTE — PROGRESS NOTES
Report called to Jaycee ROMAN, pt name placed in transport system, waiting for transport to take pt back to inpatient room. Katja Mauricio

## 2022-09-20 NOTE — CARE COORDINATION
CASE MANAGEMENT INITIAL ASSESSMENT      Reviewed chart and completed assessment with patient  Family present: none      Health Care Decision Maker :   Primary Decision MakeDe Ratliff - Spouse - [phone]    Secondary Decision Maker: Noemi Polanco Child - [phone]    Supplemental (Other) Decision Maker: Becki Tipton - [phone]          Can this person be reached and be able to respond quickly, such as within a few minutes or hours? Yes      Admit date/status: 9/19/22 Inpatient   Diagnosis: afib    Is this a Readmission?:  No      Insurance: Medicare, [place]   Precert required for SNF: No       3 night stay required: No    Living arrangements, Adls, care needs, prior to admission: lives in a condo with his wife     Durable Medical Equipment at home:  Walker__Cane__RTS__ BSC__Shower Chair__  02_X (Donalyhipolito Saunders) nightly only _ HHN__ CPAP__  BiPap__  Hospital Bed__ W/C___ Other_____    Services in the home and/or outpatient, prior to admission: none    Current PCP: Jessie Molina MD                     Transportation needs:  patient drives     Dialysis Facility (if applicable)   Name:  Address:  Dialysis Schedule:  Phone:  Fax:    PT/OT recs: none    Hospital Exemption Notification (HEN): not initiated     Barriers to discharge: none    Plan/comments: Patient is independent at home. He states he doubts he will need anything at discharge. Please notify CM should any needs arise.

## 2022-09-20 NOTE — ANESTHESIA POSTPROCEDURE EVALUATION
Department of Anesthesiology  Postprocedure Note    Patient: Jessica Al  MRN: [id number]  YOB: 1940  Date of evaluation: 9/20/2022      Procedure Summary     Date: 09/20/22 Room / Location: [address] [place] [address]    Anesthesia Start: 9979 Anesthesia Stop: 1862    Procedure: EGD DIAGNOSTIC ONLY Diagnosis:       Anemia, unspecified type      (Anemia, unspecified type [D64.9])    Surgeons: Corazon Pascual DO Responsible Provider: Agatha Joe MD    Anesthesia Type: General, TIVA ASA Status: 3          Anesthesia Type: General, TIVA    India Phase I: India Score: 10    India Phase II:        Anesthesia Post Evaluation    Patient location during evaluation: PACU  Patient participation: complete - patient participated  Level of consciousness: awake  Pain score: 0  Airway patency: patent  Nausea & Vomiting: no nausea  Cardiovascular status: blood pressure returned to baseline  Respiratory status: acceptable  Hydration status: euvolemic

## 2022-09-21 ENCOUNTER — ANESTHESIA EVENT (OUTPATIENT)
Dept: ENDOSCOPY | Age: 82
DRG: 813 | End: 2022-09-21
Payer: MEDICARE

## 2022-09-21 ENCOUNTER — TELEPHONE (OUTPATIENT)
Dept: CARDIOLOGY CLINIC | Age: 82
End: 2022-09-21

## 2022-09-21 ENCOUNTER — ANESTHESIA (OUTPATIENT)
Dept: ENDOSCOPY | Age: 82
DRG: 813 | End: 2022-09-21
Payer: MEDICARE

## 2022-09-21 LAB
ANION GAP SERPL CALCULATED.3IONS-SCNC: 13 MMOL/L (ref 3–16)
BASOPHILS ABSOLUTE: 0.1 K/UL (ref 0–0.2)
BASOPHILS RELATIVE PERCENT: 1.3 %
BUN BLDV-MCNC: 15 MG/DL (ref 7–20)
CALCIUM SERPL-MCNC: 8.8 MG/DL (ref 8.3–10.6)
CHLORIDE BLD-SCNC: 103 MMOL/L (ref 99–110)
CO2: 22 MMOL/L (ref 21–32)
CREAT SERPL-MCNC: 1.3 MG/DL (ref 0.8–1.3)
EOSINOPHILS ABSOLUTE: 0.2 K/UL (ref 0–0.6)
EOSINOPHILS RELATIVE PERCENT: 2.8 %
GFR AFRICAN AMERICAN: >60
GFR NON-AFRICAN AMERICAN: 53
GLUCOSE BLD-MCNC: 114 MG/DL (ref 70–99)
HCT VFR BLD CALC: 27.1 % (ref 40.5–52.5)
HCT VFR BLD CALC: 27.7 % (ref 40.5–52.5)
HCT VFR BLD CALC: 27.8 % (ref 40.5–52.5)
HCT VFR BLD CALC: 28.8 % (ref 40.5–52.5)
HEMOGLOBIN: 8.5 G/DL (ref 13.5–17.5)
HEMOGLOBIN: 8.6 G/DL (ref 13.5–17.5)
HEMOGLOBIN: 8.7 G/DL (ref 13.5–17.5)
HEMOGLOBIN: 8.8 G/DL (ref 13.5–17.5)
LYMPHOCYTES ABSOLUTE: 1.6 K/UL (ref 1–5.1)
LYMPHOCYTES RELATIVE PERCENT: 24.6 %
MCH RBC QN AUTO: 25.9 PG (ref 26–34)
MCHC RBC AUTO-ENTMCNC: 31.2 G/DL (ref 31–36)
MCV RBC AUTO: 83.1 FL (ref 80–100)
MONOCYTES ABSOLUTE: 1 K/UL (ref 0–1.3)
MONOCYTES RELATIVE PERCENT: 15.2 %
NEUTROPHILS ABSOLUTE: 3.6 K/UL (ref 1.7–7.7)
NEUTROPHILS RELATIVE PERCENT: 56.1 %
PDW BLD-RTO: 18.6 % (ref 12.4–15.4)
PLATELET # BLD: 292 K/UL (ref 135–450)
PMV BLD AUTO: 7.5 FL (ref 5–10.5)
POTASSIUM SERPL-SCNC: 3.8 MMOL/L (ref 3.5–5.1)
RBC # BLD: 3.34 M/UL (ref 4.2–5.9)
SODIUM BLD-SCNC: 138 MMOL/L (ref 136–145)
WBC # BLD: 6.4 K/UL (ref 4–11)

## 2022-09-21 PROCEDURE — C9113 INJ PANTOPRAZOLE SODIUM, VIA: HCPCS | Performed by: HOSPITALIST

## 2022-09-21 PROCEDURE — 3609010600 HC COLONOSCOPY POLYPECTOMY SNARE/COLD BIOPSY: Performed by: INTERNAL MEDICINE

## 2022-09-21 PROCEDURE — 0DBK8ZX EXCISION OF ASCENDING COLON, VIA NATURAL OR ARTIFICIAL OPENING ENDOSCOPIC, DIAGNOSTIC: ICD-10-PCS | Performed by: INTERNAL MEDICINE

## 2022-09-21 PROCEDURE — 85025 COMPLETE CBC W/AUTO DIFF WBC: CPT

## 2022-09-21 PROCEDURE — 88305 TISSUE EXAM BY PATHOLOGIST: CPT

## 2022-09-21 PROCEDURE — 3700000001 HC ADD 15 MINUTES (ANESTHESIA): Performed by: INTERNAL MEDICINE

## 2022-09-21 PROCEDURE — 7100000010 HC PHASE II RECOVERY - FIRST 15 MIN: Performed by: INTERNAL MEDICINE

## 2022-09-21 PROCEDURE — 6360000002 HC RX W HCPCS: Performed by: HOSPITALIST

## 2022-09-21 PROCEDURE — 80048 BASIC METABOLIC PNL TOTAL CA: CPT

## 2022-09-21 PROCEDURE — 6360000002 HC RX W HCPCS: Performed by: INTERNAL MEDICINE

## 2022-09-21 PROCEDURE — 1200000000 HC SEMI PRIVATE

## 2022-09-21 PROCEDURE — 36415 COLL VENOUS BLD VENIPUNCTURE: CPT

## 2022-09-21 PROCEDURE — 7100000011 HC PHASE II RECOVERY - ADDTL 15 MIN: Performed by: INTERNAL MEDICINE

## 2022-09-21 PROCEDURE — 3700000000 HC ANESTHESIA ATTENDED CARE: Performed by: INTERNAL MEDICINE

## 2022-09-21 PROCEDURE — 6370000000 HC RX 637 (ALT 250 FOR IP): Performed by: HOSPITALIST

## 2022-09-21 PROCEDURE — 2580000003 HC RX 258: Performed by: INTERNAL MEDICINE

## 2022-09-21 PROCEDURE — 85018 HEMOGLOBIN: CPT

## 2022-09-21 PROCEDURE — 3609019000 HC EGD CAPSULE ENDOSCOPY: Performed by: INTERNAL MEDICINE

## 2022-09-21 PROCEDURE — 94640 AIRWAY INHALATION TREATMENT: CPT

## 2022-09-21 PROCEDURE — 2500000003 HC RX 250 WO HCPCS: Performed by: NURSE ANESTHETIST, CERTIFIED REGISTERED

## 2022-09-21 PROCEDURE — 2709999900 HC NON-CHARGEABLE SUPPLY: Performed by: INTERNAL MEDICINE

## 2022-09-21 PROCEDURE — 2580000003 HC RX 258: Performed by: NURSE ANESTHETIST, CERTIFIED REGISTERED

## 2022-09-21 PROCEDURE — 6360000002 HC RX W HCPCS: Performed by: NURSE ANESTHETIST, CERTIFIED REGISTERED

## 2022-09-21 PROCEDURE — 85014 HEMATOCRIT: CPT

## 2022-09-21 RX ORDER — LIDOCAINE HYDROCHLORIDE 10 MG/ML
INJECTION, SOLUTION EPIDURAL; INFILTRATION; INTRACAUDAL; PERINEURAL PRN
Status: DISCONTINUED | OUTPATIENT
Start: 2022-09-21 | End: 2022-09-21 | Stop reason: SDUPTHER

## 2022-09-21 RX ORDER — PROPOFOL 10 MG/ML
INJECTION, EMULSION INTRAVENOUS PRN
Status: DISCONTINUED | OUTPATIENT
Start: 2022-09-21 | End: 2022-09-21 | Stop reason: SDUPTHER

## 2022-09-21 RX ORDER — SODIUM CHLORIDE, SODIUM LACTATE, POTASSIUM CHLORIDE, CALCIUM CHLORIDE 600; 310; 30; 20 MG/100ML; MG/100ML; MG/100ML; MG/100ML
INJECTION, SOLUTION INTRAVENOUS CONTINUOUS PRN
Status: DISCONTINUED | OUTPATIENT
Start: 2022-09-21 | End: 2022-09-21 | Stop reason: SDUPTHER

## 2022-09-21 RX ADMIN — PANTOPRAZOLE SODIUM 40 MG: 40 INJECTION, POWDER, FOR SOLUTION INTRAVENOUS at 21:31

## 2022-09-21 RX ADMIN — Medication 2 PUFF: at 07:29

## 2022-09-21 RX ADMIN — IRON SUCROSE 100 MG: 20 INJECTION, SOLUTION INTRAVENOUS at 09:15

## 2022-09-21 RX ADMIN — TIOTROPIUM BROMIDE INHALATION SPRAY 2 PUFF: 3.12 SPRAY, METERED RESPIRATORY (INHALATION) at 07:30

## 2022-09-21 RX ADMIN — Medication 10 ML: at 21:31

## 2022-09-21 RX ADMIN — PROPOFOL 250 MG: 10 INJECTION, EMULSION INTRAVENOUS at 11:36

## 2022-09-21 RX ADMIN — Medication 10 ML: at 09:05

## 2022-09-21 RX ADMIN — PANTOPRAZOLE SODIUM 40 MG: 40 INJECTION, POWDER, FOR SOLUTION INTRAVENOUS at 09:05

## 2022-09-21 RX ADMIN — SODIUM CHLORIDE, SODIUM LACTATE, POTASSIUM CHLORIDE, AND CALCIUM CHLORIDE: .6; .31; .03; .02 INJECTION, SOLUTION INTRAVENOUS at 11:31

## 2022-09-21 RX ADMIN — PROMETHAZINE HYDROCHLORIDE 25 MG: 25 TABLET ORAL at 00:13

## 2022-09-21 RX ADMIN — LIDOCAINE HYDROCHLORIDE 60 MG: 10 INJECTION, SOLUTION EPIDURAL; INFILTRATION; INTRACAUDAL; PERINEURAL at 11:36

## 2022-09-21 ASSESSMENT — PAIN SCALES - GENERAL
PAINLEVEL_OUTOF10: 0

## 2022-09-21 ASSESSMENT — ENCOUNTER SYMPTOMS: SHORTNESS OF BREATH: 1

## 2022-09-21 NOTE — TELEPHONE ENCOUNTER
Pts wife called i and is requesting to speak with vsp regarding the next steps after pts colonoscopy and Endoscopy. Pts wife can be reached at [phone].

## 2022-09-21 NOTE — ANESTHESIA PRE PROCEDURE
Department of Anesthesiology  Preprocedure Note       Name:  Jayson Zhang   Age:  80 y.o.  :  1940                                          MRN:  [id number]         Date:  2022      Surgeon: Jose Chowdhury):  Ansley Polk DO    Procedure: Procedure(s):  COLONOSCOPY DIAGNOSTIC    Medications prior to admission:   Prior to Admission medications    Medication Sig Start Date End Date Taking? Authorizing Provider   Evolocumab (REPATHA SURECLICK) 815 MG/ML SOAJ INJECT ONE MILLILITER UNDER THE SKIN ONCE EVERY 2 WEEKS 8/3/22   Tigre Howell DO   rivaroxaban (XARELTO) 20 MG TABS tablet Take 1 tablet by mouth daily (with breakfast) 22   Inge Mazariegos MD   zolpidem (AMBIEN) 10 MG tablet Take 10 mg by mouth as needed. 22   Historical Provider, MD   ondansetron (ZOFRAN ODT) 4 MG disintegrating tablet Take 1 tablet by mouth every 8 hours as needed for Nausea 22   LASHONDA Pritchett CNP   Fluticasone-Umeclidin-Vilant ([facility]) Inhale into the lungs     Historical Provider, MD   apixaban (ELIQUIS) 5 MG TABS tablet Take 1 tablet by mouth 2 times daily  Patient taking differently: Take 5 mg by mouth 2 times daily 1/2 tablet twice daily 22  LASHONDA Toribio CNP   nitroGLYCERIN (NITROSTAT) 0.4 MG SL tablet Place 1 tablet under the tongue every 5 minutes as needed for Chest pain 18   Inge Mazariegos MD   esomeprazole (NEXIUM) 20 MG delayed release capsule Take 20 mg by mouth daily as needed    Historical Provider, MD   albuterol (PROVENTIL HFA) 108 (90 BASE) MCG/ACT inhaler Inhale 2 puffs into the lungs every 4 hours as needed for Wheezing or Shortness of Breath. 10/9/13   Luz Bacon MD   promethazine (PHENERGAN) 25 MG tablet Take 25 mg by mouth every 6 hours as needed.       Historical Provider, MD       Current medications:    Current Facility-Administered Medications   Medication Dose Route Frequency Provider Last Rate Last Admin    iron sucrose (VENOFER) 100 mg in sodium chloride 0.9 % 100 mL IVPB  100 mg IntraVENous Q24H Harshal Duane, DO   Stopped at 09/21/22 1010    sodium chloride flush 0.9 % injection 5-40 mL  5-40 mL IntraVENous 2 times per day Katerin Smiley MD   10 mL at 09/21/22 0905    sodium chloride flush 0.9 % injection 5-40 mL  5-40 mL IntraVENous PRN Katerin Smiley MD        0.9 % sodium chloride infusion   IntraVENous PRN Katerin Smiley MD        0.9 % sodium chloride infusion   IntraVENous PRN Katerin Smiley MD        zolpidem [facility]) tablet 5 mg  5 mg Oral Nightly PRN Jorge Noel MD        promethazine (PHENERGAN) tablet 25 mg  25 mg Oral Q6H PRN Jorge Noel MD   25 mg at 09/21/22 0013    albuterol sulfate HFA (PROVENTIL;VENTOLIN;PROAIR) 108 (90 Base) MCG/ACT inhaler 2 puff  2 puff Inhalation Q4H PRN Jorge Noel MD        pantoprazole (PROTONIX) injection 40 mg  40 mg IntraVENous BID Jorge Noel MD   40 mg at 09/21/22 0905    mometasone-formoterol (DULERA) 200-5 MCG/ACT inhaler 2 puff  2 puff Inhalation BID Jorge Noel MD   2 puff at 09/21/22 0729    And    tiotropium (SPIRIVA RESPIMAT) 2.5 MCG/ACT inhaler 2 puff  2 puff Inhalation Daily Jorge Noel MD   2 puff at 09/21/22 0730       Allergies: Allergies   Allergen Reactions    Crestor [Rosuvastatin Calcium] Anaphylaxis     Hives, throat closure    Statins Anaphylaxis     Crestor    Amlodipine      High BP    Budesonide-Formoterol Fumarate Swelling    Codeine Itching     Other reaction(s): Other (See Comments)  nightmare    Dilaudid [Hydromorphone Hcl]      Confusion/ anxiety    Nitrofurantoin Macrocrystal      Patient unknown    Other      betablockers- unspecified.  Feels unwell     Propoxyphene Other (See Comments)     palpitations    Xarelto [Rivaroxaban]     Zetia [Ezetimibe]      Not sure of the reaction    Apixaban Nausea And Vomiting    Benadryl [Diphenhydramine] Anxiety     Hyperactivity \"not an allergy\" per pt    Digoxin Nausea And Vomiting    Hydromorphone Hcl Anxiety     agitation    Iodoform Rash     Developed localized rash on neck when Iodoform packing was used in neck wound 2/2014.        Problem List:    Patient Active Problem List   Diagnosis Code    Coronary artery disease involving native coronary artery of native heart without angina pectoris I25.10    Hyperlipidemia E78.5    Nausea & vomiting R11.2    Carotid disease, bilateral (HCC) I77.9    Chest pain R07.9    COPD (chronic obstructive pulmonary disease) ([address] J44.9    HTN (hypertension) I10    Stented coronary artery Z95.5    IBS (irritable bowel syndrome) K58.9    GERD (gastroesophageal reflux disease) K21.9    CRP elevated R79.82    Weakness R53.1    SOB (shortness of breath) R06.02    Centrilobular emphysema (HCC) J43.2    Atrial fibrillation (HCC) I48.91    Atrial flutter by electrocardiogram ([address] I48.92    Anemia D64.9       Past Medical History:        Diagnosis Date    Anemia 9/19/2022    CAD (coronary artery disease) 5/17/1998    Stent in LAD    CAD (coronary artery disease) 9/17/2009    Stent in RCA    COPD (chronic obstructive pulmonary disease) ([address]     COPD (chronic obstructive pulmonary disease) ([address] 2/17/2016    GERD (gastroesophageal reflux disease)     on Nexium    HTN (hypertension) 6/14/2016    Hyperlipidemia     IBS (irritable bowel syndrome)     Stented coronary artery        Past Surgical History:        Procedure Laterality Date    CARDIAC CATHETERIZATION  2009    CAROTID ENDARTERECTOMY  01/2014    CHOLECYSTECTOMY      COLONOSCOPY  2004    CORONARY ANGIOPLASTY WITH STENT PLACEMENT  2005    CORONARY ARTERY BYPASS GRAFT  2000    at [facility] to LAD, SVG-D1 and OM    DIAGNOSTIC CARDIAC CATH LAB PROCEDURE      UPPER GASTROINTESTINAL ENDOSCOPY  2004    UPPER GASTROINTESTINAL ENDOSCOPY  7/15/14    gastritis    UPPER GASTROINTESTINAL ENDOSCOPY N/A 9/20/2022    EGD DIAGNOSTIC ONLY performed by Sherif Erickson DO at [address] [place] [address] History:    Social History     Tobacco Use    Smoking status: Former     Packs/day: 0.50     Years: 30.00     Pack years: 15.00     Types: Cigarettes     Quit date: 3/16/1996     Years since quittin.5    Smokeless tobacco: Never   Substance Use Topics    Alcohol use: Yes     Alcohol/week: 0.0 standard drinks     Comment: occ                                Counseling given: Not Answered      Vital Signs (Current):   Vitals:    22 0013 22 0433 22 0729 22 0816   BP: (!) 141/65 136/65  (!) 121/56   Pulse: 84 87  80   Resp: 16 16  17   Temp: 97.7 °F (36.5 °C) 98.2 °F (36.8 °C)  97.9 °F (36.6 °C)   TempSrc: Oral Oral  Oral   SpO2: 98% 97% 97% 96%   Weight:       Height:                                                  BP Readings from Last 3 Encounters:   22 (!) 121/56   22 (!) 124/50   22 (!) 142/70       NPO Status: Time of last liquid consumption: 2350                        Time of last solid consumption: 1200                        Date of last liquid consumption: 22                        Date of last solid food consumption: 22    BMI:   Wt Readings from Last 3 Encounters:   22 195 lb (88.5 kg)   22 200 lb (90.7 kg)   22 198 lb (89.8 kg)     Body mass index is 26.45 kg/m².     CBC:   Lab Results   Component Value Date/Time    WBC 6.4 2022 06:59 AM    RBC 3.34 2022 06:59 AM    HGB 8.7 2022 06:59 AM    HCT 27.8 2022 06:59 AM    MCV 83.1 2022 06:59 AM    RDW 18.6 2022 06:59 AM     2022 06:59 AM       CMP:   Lab Results   Component Value Date/Time     2022 06:59 AM    K 3.8 2022 06:59 AM    K 4.5 2022 08:26 AM     2022 06:59 AM    CO2 22 2022 06:59 AM    BUN 15 2022 06:59 AM    CREATININE 1.3 2022 06:59 AM    GFRAA >60 2022 06:59 AM    GFRAA >60 2012 05:52 AM AGRATIO 1.6 06/14/2022 08:26 AM    LABGLOM 53 09/21/2022 06:59 AM    GLUCOSE 114 09/21/2022 06:59 AM    PROT 7.2 06/14/2022 08:26 AM    PROT 7.0 07/17/2012 05:52 AM    CALCIUM 8.8 09/21/2022 06:59 AM    BILITOT 0.8 06/14/2022 08:26 AM    ALKPHOS 120 06/14/2022 08:26 AM    AST 25 06/14/2022 08:26 AM    ALT 18 06/14/2022 08:26 AM       POC Tests: No results for input(s): POCGLU, POCNA, POCK, POCCL, POCBUN, POCHEMO, POCHCT in the last 72 hours. Coags:   Lab Results   Component Value Date/Time    PROTIME 16.2 09/19/2022 07:06 AM    INR 1.32 09/19/2022 07:06 AM    APTT 31.7 02/28/2017 05:25 PM       HCG (If Applicable): No results found for: PREGTESTUR, PREGSERUM, HCG, HCGQUANT     ABGs: No results found for: PHART, PO2ART, CSE2ZZM, TOS9UOK, BEART, D6NZIOLK     Type & Screen (If Applicable):  No results found for: LABABO, LABRH    Drug/Infectious Status (If Applicable):  No results found for: HIV, HEPCAB    COVID-19 Screening (If Applicable):   Lab Results   Component Value Date/Time    COVID19 Not Detected 09/14/2022 11:44 AM           Anesthesia Evaluation  Patient summary reviewed and Nursing notes reviewed  Airway:           Dental:          Pulmonary:   (+) COPD:  shortness of breath:                             Cardiovascular:    (+) hypertension:, CAD:,       ECG reviewed      Echocardiogram reviewed                  Neuro/Psych:   Negative Neuro/Psych ROS              GI/Hepatic/Renal:   (+) GERD:,           Endo/Other:                     Abdominal:             Vascular: negative vascular ROS.          Other Findings:           Anesthesia Plan        CORAL Finn MD   9/21/2022

## 2022-09-21 NOTE — ANESTHESIA POSTPROCEDURE EVALUATION
Department of Anesthesiology  Postprocedure Note    Patient: Constantin Haider  MRN: [id number]  YOB: 1940  Date of evaluation: 9/21/2022      Procedure Summary     Date: 09/21/22 Room / Location: [facility] [address]    Anesthesia Start: 7119 Anesthesia Stop: 1203    Procedures:       COLONOSCOPY POLYPECTOMY SNARE/HOT BIOPSY      BOWEL SMALL CAPSULE ENDOSCOPY Diagnosis:       Anemia, unspecified type      (Anemia, unspecified type [D64.9])    Surgeons: Selvin Murillo DO Responsible Provider: Tessa Brand MD    Anesthesia Type: MAC ASA Status: 3          Anesthesia Type: No value filed.     [place] Phase I: India Score: 10    India Phase II: India Score: 10      Anesthesia Post Evaluation    Patient location during evaluation: PACU  Patient participation: complete - patient participated  Level of consciousness: awake  Pain score: 0  Airway patency: patent  Nausea & Vomiting: no nausea  Complications: no  Cardiovascular status: blood pressure returned to baseline  Respiratory status: acceptable  Hydration status: euvolemic

## 2022-09-21 NOTE — PROCEDURES
COLONOSCOPY     Patient: Lexx Cortez MRN: [id number]   YOB: 1940 Age: 80 y.o. Sex: male   Unit: [facility] ENDO Room/Bed: [facility] Endo Pool/NONE Location: [address]    Admitting Physician: [facility]     Primary Care Physician: Dung Qiu MD      DATE OF PROCEDURE: 9/21/2022  PROCEDURE: Colonoscopy    PREOPERATIVE DIAGNOSIS: Anemia, unspecified type [D64.9]  HPI: This is a 80y.o. year old male who presents today with Anemia. ENDOSCOPIST: Reanna Jerome DO    POSTOPERATIVE DIAGNOSIS:    Mild to moderate sigmoid diverticulosis  2 small 4 to 5 mm polyps in the ascending colon  4 mm AVM in the ascending colon  Internal hemorrhoids    PLAN:   Await results of biopsies  High-fiber diet  Small bowel camera capsule    INFORMED CONSENT:  Informed consent for colonoscopy was obtained. The benefits and risks including adverse medicine reaction and perforation have been explained. The patient's questions were answered and the patient agreed to proceed. ASA: ASA 3 - Patient with moderate systemic disease with functional limitations     SEDATION: MAC    The patient's vital signs, cardiac status, pulmonary status, abdominal status and mental status were stable for the procedure. The patient's vital signs and respiratory function as monitored by oxygen saturation remained stable. COLON PREPARATION:  The patient was given a split colon preparation and the preparation was adequate. Procedure Details: An anal exam was performed and this was unremarkable. A digital rectal exam was performed and no masses palpated. The Olympus videocolonoscope  was inserted in the rectum and carefully advanced to the cecum as identified by IC valve, crow's foot appearance and appendix. The cecum was photodocumented.   Scope was introduced into the terminal ileum which appeared normal.  The colonoscope was slowly withdrawn and retrograde examination of the colon was carefully

## 2022-09-21 NOTE — ANESTHESIA PRE PROCEDURE
Department of Anesthesiology  Preprocedure Note       Name:  Carmne Santizo   Age:  80 y.o.  :  1940                                          MRN:  [id number]         Date:  2022      Surgeon: Shirley Curtis):  Iam Rodriguez DO    Procedure: Procedure(s):  COLONOSCOPY DIAGNOSTIC    Medications prior to admission:   Prior to Admission medications    Medication Sig Start Date End Date Taking? Authorizing Provider   Evolocumab (REPATHA SURECLICK) 114 MG/ML SOAJ INJECT ONE MILLILITER UNDER THE SKIN ONCE EVERY 2 WEEKS 8/3/22   Tigre Howell DO   rivaroxaban (XARELTO) 20 MG TABS tablet Take 1 tablet by mouth daily (with breakfast) 22   Rebecca Gallegos MD   zolpidem (AMBIEN) 10 MG tablet Take 10 mg by mouth as needed. 22   Historical Provider, MD   ondansetron (ZOFRAN ODT) 4 MG disintegrating tablet Take 1 tablet by mouth every 8 hours as needed for Nausea 22   LASHONDA Hall - CNP   Fluticasone-Umeclidin-Vilant ([facility]) Inhale into the lungs     Historical Provider, MD   apixaban (ELIQUIS) 5 MG TABS tablet Take 1 tablet by mouth 2 times daily  Patient taking differently: Take 5 mg by mouth 2 times daily 1/2 tablet twice daily 22  LASHONDA Zepeda CNP   nitroGLYCERIN (NITROSTAT) 0.4 MG SL tablet Place 1 tablet under the tongue every 5 minutes as needed for Chest pain 18   Rebecca Gallegos MD   esomeprazole (NEXIUM) 20 MG delayed release capsule Take 20 mg by mouth daily as needed    Historical Provider, MD   albuterol (PROVENTIL HFA) 108 (90 BASE) MCG/ACT inhaler Inhale 2 puffs into the lungs every 4 hours as needed for Wheezing or Shortness of Breath. 10/9/13   Saad Fraga MD   promethazine (PHENERGAN) 25 MG tablet Take 25 mg by mouth every 6 hours as needed.       Historical Provider, MD       Current medications:    Current Facility-Administered Medications   Medication Dose Route Frequency Provider Last Rate Last Admin    iron sucrose (VENOFER) 100 mg in sodium chloride 0.9 % 100 mL IVPB  100 mg IntraVENous Q24H Harshal Duane, DO   Stopped at 09/21/22 1010    sodium chloride flush 0.9 % injection 5-40 mL  5-40 mL IntraVENous 2 times per day Duke Phillips MD   10 mL at 09/21/22 0905    sodium chloride flush 0.9 % injection 5-40 mL  5-40 mL IntraVENous PRN Duke Phillips MD        0.9 % sodium chloride infusion   IntraVENous PRN Duke Phillips MD        0.9 % sodium chloride infusion   IntraVENous PRN Duke Phillips MD        zolpidem [facility]) tablet 5 mg  5 mg Oral Nightly PRN Richard Figueroa MD        promethazine (PHENERGAN) tablet 25 mg  25 mg Oral Q6H PRN Richard Figueroa MD   25 mg at 09/21/22 0013    albuterol sulfate HFA (PROVENTIL;VENTOLIN;PROAIR) 108 (90 Base) MCG/ACT inhaler 2 puff  2 puff Inhalation Q4H PRN Richard Figueroa MD        pantoprazole (PROTONIX) injection 40 mg  40 mg IntraVENous BID Richard Figueroa MD   40 mg at 09/21/22 0905    mometasone-formoterol (DULERA) 200-5 MCG/ACT inhaler 2 puff  2 puff Inhalation BID Richard Figueroa MD   2 puff at 09/21/22 0729    And    tiotropium (SPIRIVA RESPIMAT) 2.5 MCG/ACT inhaler 2 puff  2 puff Inhalation Daily Richard Figueroa MD   2 puff at 09/21/22 0730       Allergies: Allergies   Allergen Reactions    Crestor [Rosuvastatin Calcium] Anaphylaxis     Hives, throat closure    Statins Anaphylaxis     Crestor    Amlodipine      High BP    Budesonide-Formoterol Fumarate Swelling    Codeine Itching     Other reaction(s): Other (See Comments)  nightmare    Dilaudid [Hydromorphone Hcl]      Confusion/ anxiety    Nitrofurantoin Macrocrystal      Patient unknown    Other      betablockers- unspecified.  Feels unwell     Propoxyphene Other (See Comments)     palpitations    Xarelto [Rivaroxaban]     Zetia [Ezetimibe]      Not sure of the reaction    Apixaban Nausea And Vomiting    Benadryl [Diphenhydramine] Anxiety     Hyperactivity \"not an allergy\" per pt    Digoxin Nausea And Vomiting    Hydromorphone Hcl Anxiety     agitation    Iodoform Rash     Developed localized rash on neck when Iodoform packing was used in neck wound 2/2014.        Problem List:    Patient Active Problem List   Diagnosis Code    Coronary artery disease involving native coronary artery of native heart without angina pectoris I25.10    Hyperlipidemia E78.5    Nausea & vomiting R11.2    Carotid disease, bilateral (HCC) I77.9    Chest pain R07.9    COPD (chronic obstructive pulmonary disease) ([address] J44.9    HTN (hypertension) I10    Stented coronary artery Z95.5    IBS (irritable bowel syndrome) K58.9    GERD (gastroesophageal reflux disease) K21.9    CRP elevated R79.82    Weakness R53.1    SOB (shortness of breath) R06.02    Centrilobular emphysema (HCC) J43.2    Atrial fibrillation (HCC) I48.91    Atrial flutter by electrocardiogram ([address] I48.92    Anemia D64.9       Past Medical History:        Diagnosis Date    Anemia 9/19/2022    CAD (coronary artery disease) 5/17/1998    Stent in LAD    CAD (coronary artery disease) 9/17/2009    Stent in RCA    COPD (chronic obstructive pulmonary disease) ([address]     COPD (chronic obstructive pulmonary disease) ([address] 2/17/2016    GERD (gastroesophageal reflux disease)     on Nexium    HTN (hypertension) 6/14/2016    Hyperlipidemia     IBS (irritable bowel syndrome)     Stented coronary artery        Past Surgical History:        Procedure Laterality Date    CARDIAC CATHETERIZATION  2009    CAROTID ENDARTERECTOMY  01/2014    CHOLECYSTECTOMY      COLONOSCOPY  2004    CORONARY ANGIOPLASTY WITH STENT PLACEMENT  2005    CORONARY ARTERY BYPASS GRAFT  2000    at [facility] to LAD, SVG-D1 and OM    DIAGNOSTIC CARDIAC CATH LAB PROCEDURE      UPPER GASTROINTESTINAL ENDOSCOPY  2004    UPPER GASTROINTESTINAL ENDOSCOPY  7/15/14    gastritis    UPPER GASTROINTESTINAL ENDOSCOPY N/A 9/20/2022    EGD DIAGNOSTIC ONLY performed by Brody Ballesteros DO at [address] [place] [address] History:    Social History     Tobacco Use    Smoking status: Former     Packs/day: 0.50     Years: 30.00     Pack years: 15.00     Types: Cigarettes     Quit date: 3/16/1996     Years since quittin.5    Smokeless tobacco: Never   Substance Use Topics    Alcohol use: Yes     Alcohol/week: 0.0 standard drinks     Comment: occ                                Counseling given: Not Answered      Vital Signs (Current):   Vitals:    22 0013 22 0433 22 0729 22 0816   BP: (!) 141/65 136/65  (!) 121/56   Pulse: 84 87  80   Resp: 16 16  17   Temp: 97.7 °F (36.5 °C) 98.2 °F (36.8 °C)  97.9 °F (36.6 °C)   TempSrc: Oral Oral  Oral   SpO2: 98% 97% 97% 96%   Weight:       Height:                                                  BP Readings from Last 3 Encounters:   22 (!) 121/56   22 (!) 124/50   22 (!) 142/70       NPO Status: Time of last liquid consumption: 2350                        Time of last solid consumption: 1200                        Date of last liquid consumption: 22                        Date of last solid food consumption: 22    BMI:   Wt Readings from Last 3 Encounters:   22 195 lb (88.5 kg)   22 200 lb (90.7 kg)   22 198 lb (89.8 kg)     Body mass index is 26.45 kg/m².     CBC:   Lab Results   Component Value Date/Time    WBC 6.4 2022 06:59 AM    RBC 3.34 2022 06:59 AM    HGB 8.7 2022 06:59 AM    HCT 27.8 2022 06:59 AM    MCV 83.1 2022 06:59 AM    RDW 18.6 2022 06:59 AM     2022 06:59 AM       CMP:   Lab Results   Component Value Date/Time     2022 06:59 AM    K 3.8 2022 06:59 AM    K 4.5 2022 08:26 AM     2022 06:59 AM    CO2 22 2022 06:59 AM    BUN 15 2022 06:59 AM    CREATININE 1.3 2022 06:59 AM    GFRAA >60 2022 06:59 AM    GFRAA >60 2012 05:52 AM AGRATIO 1.6 06/14/2022 08:26 AM    LABGLOM 53 09/21/2022 06:59 AM    GLUCOSE 114 09/21/2022 06:59 AM    PROT 7.2 06/14/2022 08:26 AM    PROT 7.0 07/17/2012 05:52 AM    CALCIUM 8.8 09/21/2022 06:59 AM    BILITOT 0.8 06/14/2022 08:26 AM    ALKPHOS 120 06/14/2022 08:26 AM    AST 25 06/14/2022 08:26 AM    ALT 18 06/14/2022 08:26 AM       POC Tests: No results for input(s): POCGLU, POCNA, POCK, POCCL, POCBUN, POCHEMO, POCHCT in the last 72 hours. Coags:   Lab Results   Component Value Date/Time    PROTIME 16.2 09/19/2022 07:06 AM    INR 1.32 09/19/2022 07:06 AM    APTT 31.7 02/28/2017 05:25 PM       HCG (If Applicable): No results found for: PREGTESTUR, PREGSERUM, HCG, HCGQUANT     ABGs: No results found for: PHART, PO2ART, IZZ3WXM, HXR8KFY, BEART, M7VLDGNG     Type & Screen (If Applicable):  No results found for: LABABO, LABRH    Drug/Infectious Status (If Applicable):  No results found for: HIV, HEPCAB    COVID-19 Screening (If Applicable):   Lab Results   Component Value Date/Time    COVID19 Not Detected 09/14/2022 11:44 AM           Anesthesia Evaluation  Patient summary reviewed and Nursing notes reviewed  Airway: Mallampati: II          Dental: normal exam         Pulmonary:normal exam    (+) COPD:  shortness of breath:                             Cardiovascular:    (+) hypertension:, CAD:,       ECG reviewed      Echocardiogram reviewed                  Neuro/Psych:   Negative Neuro/Psych ROS              GI/Hepatic/Renal:   (+) GERD:,           Endo/Other:                     Abdominal:             Vascular: negative vascular ROS. Other Findings:           Anesthesia Plan      MAC     ASA 3       Induction: intravenous. Anesthetic plan and risks discussed with patient. Plan discussed with CRNA.     Attending anesthesiologist reviewed and agrees with Preprocedure content                CORAL De Leon MD   9/21/2022

## 2022-09-21 NOTE — PLAN OF CARE
Problem: Safety - Adult  Goal: Free from fall injury  Outcome: Progressing   Pt will remain free from falls throughout hospital stay. Fall precautions in place, bed alarm on, bed in lowest position with wheels locked and side rails 2/4 up. Room door open and hourly rounding completed. Will continue to monitor throughout shift. Problem: Pain  Goal: Verbalizes/displays adequate comfort level or baseline comfort level  Outcome: Progressing   Pt will be satisfied with pain control. Pt uses numeric pain rating scale with reassessments after pain med administration. Will continue to monitor progression throughout shift.

## 2022-09-21 NOTE — PROGRESS NOTES
Hospitalist Progress Note      PCP: Smitha Elam MD    Date of Admission: 9/19/2022    Chief Complaint: Anemia, GI bleed    Hospital Course:   80 y.o. male with history of CAD s/p PCI/stent, hypertension, COPD, GERD, paroxysmal atrial fibrillation presented from home for an outpatient ablation for his A. Fib. .  Patient has been on anticoagulation since 5/22. .  Prior to the procedure, he is blood work revealed hemoglobin of 8.6-8.2.. Of note, his hemoglobin was 11.9 in May/22. .  The patient reports an episode of melena stools in June and again in July but has since resolved. .. He had been taking oral iron intermittently for the last 3 months but stopped taking it recently because of melena. .  His PCP had recommended a referral for colonoscopy but he and his wife declined. .     Subjective:   Patient finished prep last night. Stool clear. No chest pain, shortness of breath, fever, chills, abdominal pain or headache. Medications:  Reviewed    Infusion Medications    sodium chloride      sodium chloride       Scheduled Medications    sodium chloride flush  5-40 mL IntraVENous 2 times per day    pantoprazole  40 mg IntraVENous BID    mometasone-formoterol  2 puff Inhalation BID    And    tiotropium  2 puff Inhalation Daily     PRN Meds: sodium chloride flush, sodium chloride, sodium chloride, zolpidem, promethazine, albuterol sulfate HFA      Intake/Output Summary (Last 24 hours) at 9/21/2022 0725  Last data filed at 9/20/2022 2027  Gross per 24 hour   Intake 975 ml   Output 0 ml   Net 975 ml         Physical Exam Performed:    /65   Pulse 87   Temp 98.2 °F (36.8 °C) (Oral)   Resp 16   Ht 6' (1.829 m)   Wt 195 lb (88.5 kg)   SpO2 97%   BMI 26.45 kg/m²     General appearance: No apparent distress, appears stated age and cooperative. HEENT: Pupils equal, round, and reactive to light. Conjunctivae/corneas clear. Neck: Supple, with full range of motion. No jugular venous distention.  Trachea

## 2022-09-21 NOTE — H&P
[facility] ENDO  Procedure H&P    Patient: Jenifer Conn MRN: [id number]     YOB: 1940  Age: 80 y.o. Sex: male    Unit: [facility] ENDO Room/Bed: [facility] Endo Pool/NONE Location: [address]     Procedure: Procedure(s):  COLONOSCOPY DIAGNOSTIC    Indication:Anemia, normal EGD  Referring  Physician:  Zachary Camacho Brief history: W/U many yrs ago for Anemia    Nurses past medical history notes reviewed and agreed. Medications reviewed.     Allergies: Crestor [rosuvastatin calcium], Statins, Amlodipine, Budesonide-formoterol fumarate, Codeine, Dilaudid [hydromorphone hcl], Nitrofurantoin macrocrystal, Other, Propoxyphene, Xarelto [rivaroxaban], Zetia [ezetimibe], Apixaban, Benadryl [diphenhydramine], Digoxin, Hydromorphone hcl, and Iodoform     Allergies noted: Yes     Past Medical History:   Past Medical History:   Diagnosis Date    Anemia 9/19/2022    CAD (coronary artery disease) 5/17/1998    Stent in LAD    CAD (coronary artery disease) 9/17/2009    Stent in RCA    COPD [facility]chronic obstructive pulmonary disease) ([facility])     COPD (chronic obstructive pulmonary disease) ([facility] [address] 2/17/2016    GERD (gastroesophageal reflux disease)     on Nexium    HTN (hypertension) 6/14/2016    Hyperlipidemia     IBS (irritable bowel syndrome)     Stented coronary artery        Past Surgical History:   Past Surgical History:   Procedure Laterality Date    CARDIAC CATHETERIZATION  2009    CAROTID ENDARTERECTOMY  01/2014    CHOLECYSTECTOMY      COLONOSCOPY  2004    CORONARY ANGIOPLASTY WITH STENT PLACEMENT  2005    CORONARY ARTERY BYPASS GRAFT  2000    at [facility] to LAD, SVG-D1 and OM    DIAGNOSTIC CARDIAC CATH LAB PROCEDURE      UPPER GASTROINTESTINAL ENDOSCOPY  2004    UPPER GASTROINTESTINAL ENDOSCOPY  7/15/14    gastritis    UPPER GASTROINTESTINAL ENDOSCOPY N/A 9/20/2022    EGD DIAGNOSTIC ONLY performed by Sherif Erickson DO at [address] [place] [address] History:   Social History Socioeconomic History    Marital status:      Spouse name: Not on file    Number of children: Not on file    Years of education: Not on file    Highest education level: Not on file   Occupational History    Not on file   Tobacco Use    Smoking status: Former     Packs/day: 0.50     Years: 30.00     Pack years: 15.00     Types: Cigarettes     Quit date: 3/16/1996     Years since quittin.5    Smokeless tobacco: Never   Substance and Sexual Activity    Alcohol use: Yes     Alcohol/week: 0.0 standard drinks     Comment: occ    Drug use: No    Sexual activity: Yes     Partners: Female   Other Topics Concern    Not on file   Social History Narrative    Not on file     Social Determinants of Health     Financial Resource Strain: Not on file   Food Insecurity: Not on file   Transportation Needs: Not on file   Physical Activity: Not on file   Stress: Not on file   Social Connections: Not on file   Intimate Partner Violence: Not on file   Housing Stability: Not on file       Family History:   Family History   Problem Relation Age of Onset    Diabetes Mother     Heart Failure Mother     Heart Failure Father     Heart Failure Sister     Pacemaker Brother     Asthma Neg Hx     Cancer Neg Hx     Emphysema Neg Hx     Hypertension Neg Hx        Home Medications:   Prior to Admission medications    Medication Sig Start Date End Date Taking? Authorizing Provider   Evolocumab (REPATHA SURECLICK) 303 MG/ML SOAJ INJECT ONE MILLILITER UNDER THE SKIN ONCE EVERY 2 WEEKS 8/3/22   Tigre Haddox,    rivaroxaban (XARELTO) 20 MG TABS tablet Take 1 tablet by mouth daily (with breakfast) 22   Vera Salinas MD   zolpidem (AMBIEN) 10 MG tablet Take 10 mg by mouth as needed.  22   Historical Provider, MD   ondansetron (ZOFRAN ODT) 4 MG disintegrating tablet Take 1 tablet by mouth every 8 hours as needed for Nausea 22   Bonni Lefort, APRN - CNP   Fluticasone-Umeclidin-Vilant (TRELEGY ELLIPTA IN) Inhale into the lungs     Historical Provider, MD   apixaban (ELIQUIS) 5 MG TABS tablet Take 1 tablet by mouth 2 times daily  Patient taking differently: Take 5 mg by mouth 2 times daily 1/2 tablet twice daily 5/5/22 6/14/22  LASHONDA Toribio - CNP   nitroGLYCERIN (NITROSTAT) 0.4 MG SL tablet Place 1 tablet under the tongue every 5 minutes as needed for Chest pain 6/1/18   Inge Mazariegos MD   esomeprazole (NEXIUM) 20 MG delayed release capsule Take 20 mg by mouth daily as needed    Historical Provider, MD   albuterol (PROVENTIL HFA) 108 (90 BASE) MCG/ACT inhaler Inhale 2 puffs into the lungs every 4 hours as needed for Wheezing or Shortness of Breath. 10/9/13   Luz Bacon MD   promethazine (PHENERGAN) 25 MG tablet Take 25 mg by mouth every 6 hours as needed.       Historical Provider, MD       Review of Systems:  Weight Loss: No  Dysphagia: No  Dyspepsia: No    Physical Exam:   Vital Signs: BP (!) 121/56   Pulse 80   Temp 97.9 °F (36.6 °C) (Oral)   Resp 17   Ht 6' (1.829 m)   Wt 195 lb (88.5 kg)   SpO2 96%   BMI 26.45 kg/m²   Vital signs reviewed:Yes    HEENT:Normal  Cardiac:Normal  Chest:Normal  Abdomen:Normal  Exts: Normal  Neuro:Normal    Labs:  Admission on 09/19/2022   Component Date Value Ref Range Status    WBC 09/19/2022 5.5  4.0 - 11.0 K/uL Final    RBC 09/19/2022 3.30 (A) 4.20 - 5.90 M/uL Final    Hemoglobin 09/19/2022 8.6 (A) 13.5 - 17.5 g/dL Final    Hematocrit 09/19/2022 28.2 (A) 40.5 - 52.5 % Final    MCV 09/19/2022 85.5  80.0 - 100.0 fL Final    MCH 09/19/2022 26.2  26.0 - 34.0 pg Final    MCHC 09/19/2022 30.6 (A) 31.0 - 36.0 g/dL Final    RDW 09/19/2022 18.5 (A) 12.4 - 15.4 % Final    Platelets 67/56/0249 292  135 - 450 K/uL Final    MPV 09/19/2022 7.2  5.0 - 10.5 fL Final    Sodium 09/19/2022 138  136 - 145 mmol/L Final    Potassium 09/19/2022 4.1  3.5 - 5.1 mmol/L Final    Chloride 09/19/2022 103  99 - 110 mmol/L Final    CO2 09/19/2022 25  21 - 32 mmol/L Final    Anion Gap 09/19/2022 10  3 - 09/19/2022 3.11 (A) 4.20 - 5.90 M/uL Final    Hemoglobin 09/19/2022 8.2 (A) 13.5 - 17.5 g/dL Final    Hematocrit 09/19/2022 26.3 (A) 40.5 - 52.5 % Final    MCV 09/19/2022 84.7  80.0 - 100.0 fL Final    MCH 09/19/2022 26.3  26.0 - 34.0 pg Final    MCHC 09/19/2022 31.1  31.0 - 36.0 g/dL Final    RDW 09/19/2022 18.3 (A) 12.4 - 15.4 % Final    Platelets 08/54/5293 271  135 - 450 K/uL Final    MPV 09/19/2022 6.8  5.0 - 10.5 fL Final    Vitamin B-12 09/19/2022 539  211 - 911 pg/mL Final    Folate 09/19/2022 16.07  4.78 - 24.20 ng/mL Final    Occult Blood Screening 09/20/2022  (A)  Final                    Value:Result: POSITIVE  Normal range: Negative      Iron 09/19/2022 30 (A) 59 - 158 ug/dL Final    TIBC 09/19/2022 432  260 - 445 ug/dL Final    Iron Saturation 09/19/2022 7 (A) 20 - 50 % Final    Ferritin 09/19/2022 23.6 (A) 30.0 - 400.0 ng/mL Final    Hemoglobin 09/19/2022 8.2 (A) 13.5 - 17.5 g/dL Final    Hematocrit 09/19/2022 26.7 (A) 40.5 - 52.5 % Final    Hemoglobin 09/19/2022 7.8 (A) 13.5 - 17.5 g/dL Final    Hematocrit 09/19/2022 25.7 (A) 40.5 - 52.5 % Final    Hemoglobin 09/20/2022 8.7 (A) 13.5 - 17.5 g/dL Final    Hematocrit 09/20/2022 27.6 (A) 40.5 - 52.5 % Final    Sodium 09/20/2022 140  136 - 145 mmol/L Final    Potassium 09/20/2022 4.2  3.5 - 5.1 mmol/L Final    Chloride 09/20/2022 105  99 - 110 mmol/L Final    CO2 09/20/2022 21  21 - 32 mmol/L Final    Anion Gap 09/20/2022 14  3 - 16 Final    Glucose 09/20/2022 116 (A) 70 - 99 mg/dL Final    BUN 09/20/2022 15  7 - 20 mg/dL Final    Creatinine 09/20/2022 1.2  0.8 - 1.3 mg/dL Final    GFR Non- 09/20/2022 58 (A) >60 Final    GFR  09/20/2022 >60  >60 Final    Calcium 09/20/2022 9.8  8.3 - 10.6 mg/dL Final    Hemoglobin 09/20/2022 8.6 (A) 13.5 - 17.5 g/dL Final    Hematocrit 09/20/2022 28.0 (A) 40.5 - 52.5 % Final    Sodium 09/21/2022 138  136 - 145 mmol/L Final    Potassium 09/21/2022 3.8  3.5 - 5.1 mmol/L Final    Chloride 09/21/2022 103  99 - 110 mmol/L Final    CO2 09/21/2022 22  21 - 32 mmol/L Final    Anion Gap 09/21/2022 13  3 - 16 Final    Glucose 09/21/2022 114 (A) 70 - 99 mg/dL Final    BUN 09/21/2022 15  7 - 20 mg/dL Final    Creatinine 09/21/2022 1.3  0.8 - 1.3 mg/dL Final    GFR Non- 09/21/2022 53 (A) >60 Final    GFR  09/21/2022 >60  >60 Final    Calcium 09/21/2022 8.8  8.3 - 10.6 mg/dL Final    Hemoglobin 09/21/2022 8.7 (A) 13.5 - 17.5 g/dL Final    Hematocrit 09/21/2022 27.8 (A) 40.5 - 52.5 % Final    WBC 09/21/2022 6.4  4.0 - 11.0 K/uL Final    RBC 09/21/2022 3.34 (A) 4.20 - 5.90 M/uL Final    MCV 09/21/2022 83.1  80.0 - 100.0 fL Final    MCH 09/21/2022 25.9 (A) 26.0 - 34.0 pg Final    MCHC 09/21/2022 31.2  31.0 - 36.0 g/dL Final    RDW 09/21/2022 18.6 (A) 12.4 - 15.4 % Final    Platelets 48/54/8777 292  135 - 450 K/uL Final    MPV 09/21/2022 7.5  5.0 - 10.5 fL Final    Neutrophils % 09/21/2022 56.1  % Final    Lymphocytes % 09/21/2022 24.6  % Final    Monocytes % 09/21/2022 15.2  % Final    Eosinophils % 09/21/2022 2.8  % Final    Basophils % 09/21/2022 1.3  % Final    Neutrophils Absolute 09/21/2022 3.6  1.7 - 7.7 K/uL Final    Lymphocytes Absolute 09/21/2022 1.6  1.0 - 5.1 K/uL Final    Monocytes Absolute 09/21/2022 1.0  0.0 - 1.3 K/uL Final    Eosinophils Absolute 09/21/2022 0.2  0.0 - 0.6 K/uL Final    Basophils Absolute 09/21/2022 0.1  0.0 - 0.2 K/uL Final   Hospital Outpatient Visit on 09/14/2022   Component Date Value Ref Range Status    SARS-CoV-2, PCR 09/14/2022 Not Detected  Not Detected Final        Imaging:  No orders to display       ASA:3    Mallampati Score: III    Sedation planned:MAC    Patient in acceptable condition for procedure:Yes    11:30 AM 9/21/2022    Julia Arshad, DO      Please note that some or all of this record was generated using voice recognition software.  If there are any questions about the content of this document, please contact the author as some errors in transcription may have occurred. The patient and I discussed that this is an elective procedure/surgery. We discussed the risks of the procedure/surgery, including but not limited to what is outlined in the signed informed consent. We also discussed the risk of sagrario COVID 19 while in the facility. We discussed the increased risk of a bad outcome should the patient contract COVID 19 during the post-procedural/post-operative period, given the patients current health condition, chronic conditions, and the added risk of COVID 19 in light of these conditions. The patient and I also discussed the risk of further postponing the procedure/surgery and other treatment alternatives, including non-procedural/surgical treatments. Understanding all of the risks, benefits, and alternatives, the patient made an informed decision to proceed with the procedure/surgery.

## 2022-09-21 NOTE — TELEPHONE ENCOUNTER
Please advise. Patient plan from last OV 07/21/2022:  Plan:  Patient is tolerating Xarelto 20 mg daily with breakfast, at this time he reports to defer Watchman procedure. 2. Recommend continued follow up with Electrophysiology for atrial fibrillation treatment options ~ may alleviate fatigue/weakness. 3. I recommend that the patient continue their currently prescribed medications. Their drug modifiable risk factors appear to be well controlled. I will continue to address the need/dosing of medications in future visits. 4. Follow up in 6 months.

## 2022-09-21 NOTE — PROGRESS NOTES
Report received from [facility]. Patient awake alert no complaints ready to go to room waiting on transport.

## 2022-09-22 ENCOUNTER — TELEPHONE (OUTPATIENT)
Dept: CARDIOLOGY CLINIC | Age: 82
End: 2022-09-22

## 2022-09-22 VITALS
HEIGHT: 72 IN | WEIGHT: 195 LBS | TEMPERATURE: 97.9 F | OXYGEN SATURATION: 97 % | BODY MASS INDEX: 26.41 KG/M2 | RESPIRATION RATE: 18 BRPM | DIASTOLIC BLOOD PRESSURE: 73 MMHG | SYSTOLIC BLOOD PRESSURE: 142 MMHG | HEART RATE: 87 BPM

## 2022-09-22 LAB
HCT VFR BLD CALC: 28.1 % (ref 40.5–52.5)
HEMOGLOBIN: 8.7 G/DL (ref 13.5–17.5)

## 2022-09-22 PROCEDURE — 85014 HEMATOCRIT: CPT

## 2022-09-22 PROCEDURE — 36415 COLL VENOUS BLD VENIPUNCTURE: CPT

## 2022-09-22 PROCEDURE — 2580000003 HC RX 258: Performed by: INTERNAL MEDICINE

## 2022-09-22 PROCEDURE — 85018 HEMOGLOBIN: CPT

## 2022-09-22 PROCEDURE — 6360000002 HC RX W HCPCS: Performed by: HOSPITALIST

## 2022-09-22 PROCEDURE — C9113 INJ PANTOPRAZOLE SODIUM, VIA: HCPCS | Performed by: HOSPITALIST

## 2022-09-22 PROCEDURE — 6360000002 HC RX W HCPCS: Performed by: INTERNAL MEDICINE

## 2022-09-22 RX ADMIN — PANTOPRAZOLE SODIUM 40 MG: 40 INJECTION, POWDER, FOR SOLUTION INTRAVENOUS at 08:58

## 2022-09-22 RX ADMIN — Medication 10 ML: at 08:58

## 2022-09-22 RX ADMIN — IRON SUCROSE 100 MG: 20 INJECTION, SOLUTION INTRAVENOUS at 08:51

## 2022-09-22 ASSESSMENT — PAIN SCALES - GENERAL
PAINLEVEL_OUTOF10: 0

## 2022-09-22 NOTE — PROGRESS NOTES
Physician Progress Note      PATIENT:               Jeff Miguel  CSN #:                  [id number]  :                       1940  ADMIT DATE:       2022 6:31 AM  DISCH DATE:  RESPONDING  PROVIDER #:        Veena Roth DO          QUERY TEXT:    Patient admitted with anemia, noted to have paroxysmal atrial fibrillation and   is maintained on Xarelto. If possible, please document in progress notes and   discharge summary if you are evaluating and/or treating any of the following: The medical record reflects the following:  Risk Factors: GI bleed, ischemic cardiomyopathy, CAD  Clinical Indicators: Per H&P \"Paroxysmal atrial fibrillation--stable--Xarelto   discontinued until cleared by GI. May consider watchman device\". Treatment: Xarelto, serial labs, supportive care    Thank you,  Ryland Shape RN BSN  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     Query created by: Kellie Kc on 2022 11:38 AM      Electronically signed by:  Veena Roth DO 2022 8:33 AM

## 2022-09-22 NOTE — PLAN OF CARE
Problem: Safety - Adult  Goal: Free from fall injury  9/22/2022 0936 by Gurwinder Harry RN  Outcome: Progressing  Note: Pt high fall risk. Instructed to use call light before getting out of bed. Call light within reach. Bed in low position. Bed alarm on. Will continue to monitor. Problem: Pain  Goal: Verbalizes/displays adequate comfort level or baseline comfort level  9/22/2022 0936 by Gurwinder Harry RN  Outcome: Progressing  Note: Pt will be satisfied with pain control. Pt uses numeric pain rating scale with reassessments after pain med administration. Will continue to monitor progression throughout shift.

## 2022-09-22 NOTE — PROGRESS NOTES
Discharge instructions provided, pt verbalized understanding. All questions answered. IV removed with no complications, tip intact. Denies needs prior to discharge. Discharged home via personal vehicle.

## 2022-09-22 NOTE — PROGRESS NOTES
Left wrist PIV removed due to redness, tenderness and inflammation at site. Ice pack applied to affected area.

## 2022-09-22 NOTE — PROGRESS NOTES
Report called to [facility] BRANDI NOBLES RN on B3 for impending transfer to room 368. Patient and spouse aware of transfer.

## 2022-09-22 NOTE — DISCHARGE SUMMARY
Hospital Medicine Discharge Summary    Patient ID: Tim Pope      Patient's PCP: Chad Mondragon MD    Admit Date: 9/19/2022     Discharge Date: 9/22/2022      Admitting Provider: DO Jada Edward Provider: Danielle Jackson DO     Discharge Diagnoses: Active Hospital Problems    Diagnosis     Anemia [D64.9]      Priority: Medium   Acute on chronic anemia-suspect chronic blood loss anemia associated with anticoagulation    Paroxysmal atrial fibrillation; Secondary hypercoagulable state in patient with atrial fibrillation      The patient was seen and examined on day of discharge and this discharge summary is in conjunction with any daily progress note from day of discharge. Hospital Course:     80 y.o. male with history of CAD s/p PCI/stent, hypertension, COPD, GERD, paroxysmal atrial fibrillation presented from home for an outpatient ablation for his A. Fib. .  Patient has been on anticoagulation since 5/22. Prior to the procedure, he is blood work revealed hemoglobin of 8.6-8.2.. Of note, his hemoglobin was 11.9 in May/22. .  The patient reports an episode of melena stools in June and again in July but has since resolved. EGD normal. Colonoscopy with 2 small polyps in ascending colon and AVM in ascending colon. Small bowel camera capsule results pending. His hemoglobin has been stable since holding xarelto. He was okay to discharge from GI standpoint and will contact him on results of capsule. He is to have ablation at a later date outpatient. Due to GI bleed, his AC is on hold. He was advised of the risk of having stroke due to history of afib but with active GI bleed it was elected his AC to be on hold. Physical Exam Performed:     BP (!) 142/73   Pulse 87   Temp 97.9 °F (36.6 °C) (Oral)   Resp 18   Ht 6' (1.829 m)   Wt 195 lb (88.5 kg)   SpO2 97%   BMI 26.45 kg/m²       General appearance:  No apparent distress, appears stated age and cooperative.   HEENT: Normal cephalic, atraumatic without obvious deformity. Pupils equal, round, and reactive to light. Extra ocular muscles intact. Conjunctivae/corneas clear. Neck: Supple, with full range of motion. No jugular venous distention. Trachea midline. Respiratory:  Normal respiratory effort. Clear to auscultation, bilaterally without Rales/Wheezes/Rhonchi. Cardiovascular:  Regular rate and rhythm with normal S1/S2 without murmurs, rubs or gallops. Abdomen: Soft, non-tender, non-distended with normal bowel sounds. Musculoskeletal:  No clubbing, cyanosis or edema bilaterally. Full range of motion without deformity. Skin: Skin color, texture, turgor normal.  No rashes or lesions. Neurologic:  Neurovascularly intact without any focal sensory/motor deficits. Cranial nerves: II-XII intact, grossly non-focal.  Psychiatric:  Alert and oriented, thought content appropriate, normal insight  Capillary Refill: Brisk,< 3 seconds   Peripheral Pulses: +2 palpable, equal bilaterally       Labs:  For convenience and continuity at follow-up the following most recent labs are provided:      CBC:    Lab Results   Component Value Date/Time    WBC 6.4 09/21/2022 06:59 AM    HGB 8.7 09/22/2022 07:37 AM    HCT 28.1 09/22/2022 07:37 AM     09/21/2022 06:59 AM       Renal:    Lab Results   Component Value Date/Time     09/21/2022 06:59 AM    K 3.8 09/21/2022 06:59 AM    K 4.5 06/14/2022 08:26 AM     09/21/2022 06:59 AM    CO2 22 09/21/2022 06:59 AM    BUN 15 09/21/2022 06:59 AM    CREATININE 1.3 09/21/2022 06:59 AM    CALCIUM 8.8 09/21/2022 06:59 AM    PHOS 2.4 03/02/2017 07:22 AM         Significant Diagnostic Studies    Radiology:   No orders to display          Consults:     IP CONSULT TO GI    Disposition:  Home     Condition at Discharge: Stable    Discharge Instructions/Follow-up:    GI f/u pending capsule results  Cardiology f/u  PCP in 1 week     Code Status:  Prior     Activity: activity as tolerated    Diet: regular diet      Discharge Medications:     Discharge Medication List as of 9/22/2022  2:53 PM             Details   Evolocumab (REPATHA SURECLICK) 309 MG/ML SOAJ INJECT ONE MILLILITER UNDER THE SKIN ONCE EVERY 2 WEEKS, Disp-2 mL, R-3Normal      zolpidem (AMBIEN) 10 MG tablet Take 10 mg by mouth as needed. Historical Med      ondansetron (ZOFRAN ODT) 4 MG disintegrating tablet Take 1 tablet by mouth every 8 hours as needed for Nausea, Disp-20 tablet, R-0Normal      Fluticasone-Umeclidin-Vilant (TRELEGY ELLIPTA IN) Inhale into the lungs Historical Med      nitroGLYCERIN (NITROSTAT) 0.4 MG SL tablet Place 1 tablet under the tongue every 5 minutes as needed for Chest pain, Disp-25 tablet, R-3Normal      esomeprazole (NEXIUM) 20 MG delayed release capsule Take 20 mg by mouth daily as neededHistorical Med      albuterol (PROVENTIL HFA) 108 (90 BASE) MCG/ACT inhaler Inhale 2 puffs into the lungs every 4 hours as needed for Wheezing or Shortness of Breath., Disp-1 Inhaler, R-0      promethazine (PHENERGAN) 25 MG tablet Take 25 mg by mouth every 6 hours as needed. Historical Med             Time Spent on discharge is more than 45 minutes in the examination, evaluation, counseling and review of medications and discharge plan. Signed:    Ariel Fontanez DO   9/22/2022      Thank you Mckenzie Iraheta MD for the opportunity to be involved in this patient's care. If you have any questions or concerns, please feel free to contact me at [phone].

## 2022-09-22 NOTE — PROGRESS NOTES
Writer placed call to [facility]ado and left message with  for Dr. Ansley Reyes in regards to plan of care. Call back number provided. Awaiting call back.

## 2022-09-22 NOTE — PROGRESS NOTES
Hospitalist Progress Note      PCP: Matthias Greenberg MD    Date of Admission: 9/19/2022    Chief Complaint: Anemia, GI bleed    Hospital Course:   80 y.o. male with history of CAD s/p PCI/stent, hypertension, COPD, GERD, paroxysmal atrial fibrillation presented from home for an outpatient ablation for his A. Fib. .  Patient has been on anticoagulation since 5/22. Prior to the procedure, he is blood work revealed hemoglobin of 8.6-8.2.. Of note, his hemoglobin was 11.9 in May/22. .  The patient reports an episode of melena stools in June and again in July but has since resolved. EGD normal. Colonoscopy with 2 small polyps in ascending colon and AVM in ascending colon. Small bowel camera capsule results pending    Subjective:   Patient without chest pain, shortness of breath, fever, chills, headache, abdominal pain. He had the colonoscopy yesterday. He was given small bowel camera capsule. Medications:  Reviewed    Infusion Medications    sodium chloride      sodium chloride       Scheduled Medications    iron sucrose (VENOFER) iv piggyback 100 mL (Admin over 60 minutes)  100 mg IntraVENous Q24H    sodium chloride flush  5-40 mL IntraVENous 2 times per day    pantoprazole  40 mg IntraVENous BID    mometasone-formoterol  2 puff Inhalation BID    And    tiotropium  2 puff Inhalation Daily     PRN Meds: sodium chloride flush, sodium chloride, sodium chloride, zolpidem, promethazine, albuterol sulfate HFA      Intake/Output Summary (Last 24 hours) at 9/22/2022 0826  Last data filed at 9/21/2022 2131  Gross per 24 hour   Intake 665 ml   Output 0 ml   Net 665 ml         Physical Exam Performed:    /65   Pulse 82   Temp 98.6 °F (37 °C) (Oral)   Resp 16   Ht 6' (1.829 m)   Wt 195 lb (88.5 kg)   SpO2 97%   BMI 26.45 kg/m²     General appearance: No apparent distress, appears stated age and cooperative. HEENT: Pupils equal, round, and reactive to light. Conjunctivae/corneas clear.   Neck: Supple, with full range of motion. No jugular venous distention. Trachea midline. Respiratory:  Normal respiratory effort. Clear to auscultation, bilaterally without Rales/Wheezes/Rhonchi. Cardiovascular: Regular rate and rhythm with normal S1/S2 without murmurs, rubs or gallops. Abdomen: Soft, non-tender, non-distended with normal bowel sounds. Musculoskeletal: No clubbing, cyanosis or edema bilaterally. Full range of motion without deformity. Skin: Skin color, texture, turgor normal.  No rashes or lesions. Neurologic:  Neurovascularly intact without any focal sensory/motor deficits. Cranial nerves: II-XII intact, grossly non-focal.  Psychiatric: Alert and oriented, thought content appropriate, normal insight  Capillary Refill: Brisk, 3 seconds, normal   Peripheral Pulses: +2 palpable, equal bilaterally       Labs:   Recent Labs     09/21/22  0659 09/21/22  1312 09/21/22  1934 09/21/22  2342 09/22/22  0737   WBC 6.4  --   --   --   --    HGB 8.7*   < > 8.6* 8.5* 8.7*   HCT 27.8*   < > 27.7* 27.1* 28.1*     --   --   --   --     < > = values in this interval not displayed. Recent Labs     09/20/22  0639 09/21/22  0659    138   K 4.2 3.8    103   CO2 21 22   BUN 15 15   CREATININE 1.2 1.3   CALCIUM 9.8 8.8       No results for input(s): AST, ALT, BILIDIR, BILITOT, ALKPHOS in the last 72 hours. No results for input(s): INR in the last 72 hours. No results for input(s): Saige Comber in the last 72 hours.     Urinalysis:      Lab Results   Component Value Date/Time    NITRU Negative 06/14/2022 09:46 AM    WBCUA None seen 06/14/2022 09:46 AM    BACTERIA Rare 10/04/2014 12:17 PM    RBCUA 0-2 06/14/2022 09:46 AM    BLOODU TRACE-INTACT 06/14/2022 09:46 AM    SPECGRAV 1.010 06/14/2022 09:46 AM    GLUCOSEU Negative 06/14/2022 09:46 AM    GLUCOSEU NEGATIVE 03/16/2012 05:00 PM       Radiology:  No orders to display           Assessment/Plan:    Active Hospital Problems    Diagnosis     Anemia [D64.9] Priority: Medium     -Acute on chronic anemia-suspect chronic blood loss anemia associated with anticoagulation  hemoglobin decreased from 11.9-8.2 over the last 3 months. .  Reports intermittent dark stools but was on oral iron     Discontinued Xarelto. Monitor H&H  Iron levels low, normal folate and B 12  Will give venofer x 3 days  EGD normal  Colonoscopy with 2 small polyps and ascending colon AVM and internal hemorrhoids  Small bowel camera capsule      Paroxysmal atrial fibrillation--stable-- in sinus rhythm  Secondary hypercoagulable state in patient with atrial fibrillation  Xarelto discontinued until cleared by GI--May consider watchman device outpatient      Ischemic cardiomyopathy/CAD-stable-has been on Repatha     -COPD not in exacerbation  Inhaler prn     -GERD-IV ppi ordered     -Essential hypertension - not currently on home meds. Monitor with vitals     -Generalized weakness likely due to anemia     -IBS-reports chronic nausea and altered bowel habits. .  Last EGD/colonoscopy 2014    DVT Prophylaxis: SCD  Diet: ADULT DIET;  Regular; High Fiber  Code Status: Full Code  PT/OT Eval Status: Not indicated    Dispo - Pending GI evaluation     Appropriate for A1 Discharge Unit: Michelle Callaway DO

## 2022-09-22 NOTE — PROGRESS NOTES
Progress Note    Patient Susanna Arroyo  MRN: [id number]  YOB: 1940 Age: 80 y.o. Sex: male  Room: [address] [place] [address]       Admitting Physician: Nick Jama DO   Date of Admission: 9/19/2022  6:31 AM   Primary Care Physician: Saundra Russell MD     Subjective:  Susanna Arroyo was seen and examined. We are following for anemia. -- No acute events overnight  -- Overall feeling well today  -- Denies overt GI bleeding  -- Tolerating diet    ROS:  Constitutional: Denies fever, no change in appetite  Respiratory: Denies cough or shortness of breath  Cardiovascular: Denies chest pain or edema    Objective:  Vital Signs:   Vitals:    09/22/22 1100   BP: (!) 142/73   Pulse: 87   Resp: 18   Temp: 97.9 °F (36.6 °C)   SpO2: 97%         Physical Exam:  Constitutional: Alert and oriented x 4. No acute distress. Respiratory: Respirations nonlabored, no crepitus  GI: Abdomen nondistended, soft, and nontender. Neurological: No focal deficits noted. No asterixis.     Intake/Output:    Intake/Output Summary (Last 24 hours) at 9/22/2022 1452  Last data filed at 9/22/2022 0831  Gross per 24 hour   Intake 255 ml   Output 0 ml   Net 255 ml        Current Medications:  Current Facility-Administered Medications   Medication Dose Route Frequency Provider Last Rate Last Admin    iron sucrose (VENOFER) 100 mg in sodium chloride 0.9 % 100 mL IVPB  100 mg IntraVENous Q24H Harshal Zepeda DO   Stopped at 09/22/22 1028    sodium chloride flush 0.9 % injection 5-40 mL  5-40 mL IntraVENous 2 times per day Kinga Bojorquez MD   10 mL at 09/22/22 0858    sodium chloride flush 0.9 % injection 5-40 mL  5-40 mL IntraVENous PRN MD Leno Sanchez9 % sodium chloride infusion   IntraVENous PRN MD Teresa Sanchez % sodium chloride infusion   IntraVENous PRN Kinga Bojorquez MD        zolpidem [facility] - [facility]) tablet 5 mg  5 mg Oral Nightly PRN Star Valdez MD        [facility]) tablet 25 mg  25 mg Oral Q6H PRN Papa Turcios MD   25 mg at 09/21/22 0013    albuterol sulfate HFA (PROVENTIL;VENTOLIN;PROAIR) 108 (90 Base) MCG/ACT inhaler 2 puff  2 puff Inhalation Q4H PRN Papa Turcios MD        pantoprazole (PROTONIX) injection 40 mg  40 mg IntraVENous BID Papa Turcios MD   40 mg at 09/22/22 0858    mometasone-formoterol (DULERA) 200-5 MCG/ACT inhaler 2 puff  2 puff Inhalation BID Papa Turcios MD   2 puff at 09/21/22 0729    And    tiotropium (SPIRIVA RESPIMAT) 2.5 MCG/ACT inhaler 2 puff  2 puff Inhalation Daily Papa Turcios MD   2 puff at 09/21/22 0730         Recent Imaging:   NM Cardiac Stress Test Nuclear Imaging  Cardiac Perfusion Imaging     Demographics      Patient Name       Padmini Newton      Date of Study      06/16/2022         Gender               Male      Patient Number     [id number]         Date of Birth        1940      Visit Number       [id number]          Age                  80 year(s)      Accession Number   [id number]         Room Number          OP      Corporate ID       [id number]           NM Technician        Kilo Schmitz      Nurse              Yoly Mcleod,  Interpreting         [address]                 Physician            Triston Huffman DO      Ordering Physician Aline Elias MD, Breanne Madrid      The procedure was explained in detail to the patient. Risks,   complications and alternative treatments were reviewed. Written consent   was obtained. Procedure  Procedure Type:      Nuclear Stress Test:Pharmacological, NM MYOCARDIAL SPECT REST EXERCISE OR   RX      Study location: [facility] - Nuclear Medicine      Indications: Abnormal rest ECG. Hospital Status: Outpatient.      Height: 72 inches Weight: 180 pounds     Risk Factors      The patient risk factors include:prior PCI;prior CABG;former tobacco use,   treated hypercholesterolemia, Stroke volume :84 ml     LV mass :146 gr     Medical History      Additional Medical History      CAD   GERD      Signatures      ------------------------------------------------------------------   Electronically signed by Tashi Adhikari DO (Interpreting   physician) on 06/16/2022 at 15:40   ------------------------------------------------------------------     Echo 2D w doppler w color complete  Transthoracic Echocardiography Report (TTE)     Demographics      Patient Name       Augusta Contreras      Date of Study      06/16/2022         Gender               Male      Patient Number     [id number]         Date of Birth        1940      Visit Number       [id number]          Age                  80 year(s)      Accession Number   [id number]         Room Number          OP      Corporate ID       [id number]           Sonographer          Sunni Mccormack      Ordering Physician Kyle Cortez, CNP        Physician            Indra Wilkes MD     Procedure    Type of Study      TTE procedure:ECHOCARDIOGRAM COMPLETE 2D W DOPPLER W COLOR. Procedure Date  Date: 06/16/2022 Start: 10:41 AM    Study Location: [address] [place] [address] - Echo Lab  Technical Quality: Adequate visualization    Indications:Atrial flutter. Patient Status: Routine    Height: 72 inches Weight: 180 pounds BSA: 2.04 m2 BMI: 24.41 kg/m2    HR: 82 bpm BP: 145/65 mmHg     Conclusions      Summary   Technically difficult examination. Low normal LVEF 50-55%   Left ventricular function/wall motion is difficult to estimate due to poor   endocardial visualization. Diastolic dysfunction grade and filling pressure are indeterminate. The right atrium is mildly dilated. Mild aortic valve regurgitation. Inadequate tricuspid regurgitation to estimate systolic pulmonary artery   pressure. Subcostal view not well visualized. Pre-mature beats throughout the study. Signature      ------------------------------------------------------------------   Electronically signed by Flo Augustin MD (Interpreting   physician) on 06/16/2022 at 03:49 PM   ------------------------------------------------------------------      Findings      Left Ventricle   Low normal LVEF 50-55%   Mild LVH. Left ventricular function/wall motion is difficult to estimate due to poor   endocardial visualization. Diastolic dysfunction grade and filling pressure are indeterminate. Pre-mature beats throughout the study. Mitral Valve   The mitral valve is normal in structure. No evidence of mitral regurgitation. Left Atrium   The left atrium is normal in size. Aortic Valve   The aortic valve is normal in structure   Mild aortic valve regurgitation. Aorta   The aortic root is normal in size. Right Ventricle   Upper normal size with normal function. TAPSE is measured at 11 mm and likely underestimates rv function. S\" Prime Velocity is measured at 9.36 cm/s. Tricuspid Valve   The tricuspid valve is normal in structure and function. There is no evidence of tricuspid valve regurgitation. Inadequate tricuspid regurgitation to estimate systolic pulmonary artery   pressure. Right Atrium   The right atrium is mildly dilated. Right atrial area is 21.7 cm2. Pulmonic Valve   The pulmonic valve is not well visualized. Pericardial Effusion   No pericardial effusion noted. Pleural Effusion   No pleural effusion. Miscellaneous   No obvious masses, thrombi or vegetations are noted. Subcostal view not well visualized.      M-Mode/2D Measurements (cm)      LV Diastolic Dimension: 2.21 cm LV Systolic Dimension: 4.56 cm   LV Septum Diastolic: 0.60 cm   LV PW Diastolic: 7.44 cm        AO Root Dimension: 3.2 cm                                   AV Cusp Separation: 1.6 cm                                   LA Dimension: 3.8 cm   LVOT: 2 cm                      LA Area: 17.5 cm2                                   LA volume/Index: 44.6 ml /22 ml/m2     Doppler Measurements                                    MV Peak E-Wave: 81 cm/s                                 MV Peak A-Wave: 82.7 cm/s                                 MV E/A Ratio: 0.98                                 MV P1/2t: 65 msec      E' Septal Velocity: 5.98 cm/s   E' Lateral Velocity: 7.4 cm/s   E/E' ratio: 12.2              MV Deceleration Time: 222 msec                                 MV Area (PHT): 3.38 cm2      Aortic Valve      Cusp Separation: 1.6 cm     Aorta      Aortic Root: 3.2 cm   Ascending Aorta: 3.2 cm   LVOT Diameter: 2 cm          Labs:   Recent Labs     09/21/22  0659 09/21/22  1312 09/21/22  1934 09/21/22  2342 09/22/22  0737   HGB 8.7* 8.8* 8.6* 8.5* 8.7*   WBC 6.4  --   --   --   --           Assessment:  80year old M with PMH significant for anemia, CAD, COPD, GERD, HTN, hyperlipidemia, and IBS. Presented for cardiac ablation but found to have worsening anemia and admitted for further evaluation. Hgb 8.6 on admission down from 11.9 in June. Reports one questionable episode of melena but otherwise denies overt GI bleeding. He has been on Eliquis. Denies use of NSAIDs.    9/20 EGD:  normal  9/21 Colonoscopy: Mild to moderate sigmoid diverticulosis. 2 small 4 to 5 mm polyps in the ascending colon. 4 mm AVM in the ascending colon. Internal hemorrhoids. 9/21 Capsule Endoscopy: PENDING     Hgb stable at 8.7. Denies overt GI bleeding. Vitals are stable. Plan:  Monitor H/H and overt signs of GI bleeding  High fiber diet  Follow up small bowel capsule results  Okay to discharged from GI standpoint. Will contact patient with capsule results once available  Follow up with GI pending the above  Supportive care       LASHONDA Calderón - CNP    [facility]    [phone].  Also available via [employer]

## 2022-09-22 NOTE — PROGRESS NOTES
Pt and family questioning why capsule study has not been picked up yet this morning from unit. Writer placed call to Endo team. Endo team assured writer they would be by shortly to  study. Pt and family updated.

## 2022-09-22 NOTE — CARE COORDINATION
Discharge order noted. Pt from home with wife. IPTA. Denies dcp needs. CM will sign off, but remains available should needs arise.  Sangita Shelby RN

## 2022-09-23 LAB
BLOOD BANK DISPENSE STATUS: NORMAL
BLOOD BANK DISPENSE STATUS: NORMAL
BLOOD BANK PRODUCT CODE: NORMAL
BLOOD BANK PRODUCT CODE: NORMAL
BPU ID: NORMAL
BPU ID: NORMAL
DESCRIPTION BLOOD BANK: NORMAL
DESCRIPTION BLOOD BANK: NORMAL

## 2022-09-27 ENCOUNTER — TELEPHONE (OUTPATIENT)
Dept: CARDIOLOGY CLINIC | Age: 82
End: 2022-09-27

## 2022-09-27 NOTE — TELEPHONE ENCOUNTER
Pts wife called mhi and stated pt is supposed to get a procedure done on 10/21/2022 @ [address] [place] [address] for a enteroscopy and pts wife wondering if pt needs to be back on plavix, please advise.

## 2022-09-27 NOTE — TELEPHONE ENCOUNTER
I think the patient can go ahead and stop the Plavix until he is stabilized from a bleeding standpoint. Patient's anticoagulation and bleeding issues are mainly driven by her EP service. Please direct any further questions about anticoagulation to them.

## 2022-09-27 NOTE — TELEPHONE ENCOUNTER
Spoke with pt wife, she says pt was to have ablation last week, but it was cancelled. He was taken off blood thinners for internal bleeding. Since he has been off blood thinners he has been feeling better. Blood count is still low. He is still bleeding internal. He wants to know if he should be on Plavix? Pt want to know exactly what is Plavix doing for Ardyce Cheadle? On Oct 21 he is having a cauterization procedure. No cardiac clearance is needed.

## 2022-09-28 ENCOUNTER — TELEPHONE (OUTPATIENT)
Dept: CARDIOLOGY CLINIC | Age: 82
End: 2022-09-28

## 2022-09-28 NOTE — TELEPHONE ENCOUNTER
09/28 Pt wife called into office and stated she wanted to know if [address] [facility] recommends the Withing Scan watch for pt. She states it is supposed to read ECG and SPO2 but she cannot navigate watch properly and is needing assistance. Please advise and contact pt wife.

## 2022-09-28 NOTE — TELEPHONE ENCOUNTER
Spoke with pt's wife Lizett Evans, informed pt that we do not set those watches up Lizett Cristina v/u. Lizett Evans has an e-mail out to the company asking for help.

## 2022-10-20 ENCOUNTER — TELEPHONE (OUTPATIENT)
Dept: CARDIOLOGY CLINIC | Age: 82
End: 2022-10-20

## 2022-10-20 ENCOUNTER — ANESTHESIA EVENT (OUTPATIENT)
Dept: ENDOSCOPY | Age: 82
End: 2022-10-20
Payer: COMMERCIAL

## 2022-10-20 NOTE — PROGRESS NOTES
ENDOSCOPY PREOP INSTRUCTIONS      You are scheduled for a procedure at [facility] [employer] [employer] on 10-21 @ 1130. You will need to arrival by: 1000 (at least an hour & a half prior to planned start time)  Report to the MAIN entrance on [address] and register at the surgery center on the left-hand side of the lobby  You will need your insurance card and photo id    For your procedure:     PLEASE FOLLOW ALL INSTRUCTIONS & PREPS GIVEN TO YOU BY YOUR DOCTOR'S OFFICE. If you have not received these instructions yet, please call the office immediately. Make sure to read them as soon as received. Bring an accurate list of any medications, including the dose/ frequency, with you on the day of the procedure. Make sure to include over the counter medications. If you are taking blood thinners, Aspirin or diabetic medication, make sure to call your doctor as soon as possible for instructions prior to your procedure. Please dress comfortably and do not wear any lotion, powders or jewelry  Arrange for someone to be with you and sign you out & drive you home after your procedure. We allow 2 adults visitors with you in the hospital & everyone is required to wear a mask.     WOMEN ONLY OF CHILDBEARING AGE: Please make sure to be able to give a urine sample on arrival      If you have further questions, you may contact your Endoscopist's office or Pre Admission Testing staff at [phone]

## 2022-10-21 ENCOUNTER — ANESTHESIA (OUTPATIENT)
Dept: ENDOSCOPY | Age: 82
End: 2022-10-21
Payer: COMMERCIAL

## 2022-10-21 ENCOUNTER — HOSPITAL ENCOUNTER (OUTPATIENT)
Age: 82
Setting detail: OUTPATIENT SURGERY
Discharge: HOME | End: 2022-10-21
Attending: INTERNAL MEDICINE | Admitting: INTERNAL MEDICINE
Payer: COMMERCIAL

## 2022-10-21 VITALS
WEIGHT: 194 LBS | SYSTOLIC BLOOD PRESSURE: 129 MMHG | DIASTOLIC BLOOD PRESSURE: 60 MMHG | RESPIRATION RATE: 16 BRPM | TEMPERATURE: 97.1 F | HEART RATE: 61 BPM | HEIGHT: 72 IN | BODY MASS INDEX: 26.28 KG/M2 | OXYGEN SATURATION: 98 %

## 2022-10-21 LAB
GLUCOSE BLD-MCNC: 98 MG/DL (ref 70–99)
HCT VFR BLD CALC: 37.3 % (ref 40.5–52.5)
HEMOGLOBIN: 11.7 G/DL (ref 13.5–17.5)
MCH RBC QN AUTO: 28 PG (ref 26–34)
MCHC RBC AUTO-ENTMCNC: 31.3 G/DL (ref 31–36)
MCV RBC AUTO: 89.3 FL (ref 80–100)
PDW BLD-RTO: 25.9 % (ref 12.4–15.4)
PERFORMED ON: NORMAL
PLATELET # BLD: 196 K/UL (ref 135–450)
PMV BLD AUTO: 8.4 FL (ref 5–10.5)
RBC # BLD: 4.18 M/UL (ref 4.2–5.9)
WBC # BLD: 8.2 K/UL (ref 4–11)

## 2022-10-21 PROCEDURE — 7100000010 HC PHASE II RECOVERY - FIRST 15 MIN: Performed by: INTERNAL MEDICINE

## 2022-10-21 PROCEDURE — 85027 COMPLETE CBC AUTOMATED: CPT

## 2022-10-21 PROCEDURE — 3609155800 HC ENTEROSCOPY WITH INTERVENTION: Performed by: INTERNAL MEDICINE

## 2022-10-21 PROCEDURE — 3700000000 HC ANESTHESIA ATTENDED CARE: Performed by: INTERNAL MEDICINE

## 2022-10-21 PROCEDURE — 36415 COLL VENOUS BLD VENIPUNCTURE: CPT

## 2022-10-21 PROCEDURE — 3700000001 HC ADD 15 MINUTES (ANESTHESIA): Performed by: INTERNAL MEDICINE

## 2022-10-21 PROCEDURE — 2580000003 HC RX 258: Performed by: ANESTHESIOLOGY

## 2022-10-21 PROCEDURE — 6360000002 HC RX W HCPCS: Performed by: NURSE ANESTHETIST, CERTIFIED REGISTERED

## 2022-10-21 PROCEDURE — 2720000010 HC SURG SUPPLY STERILE: Performed by: INTERNAL MEDICINE

## 2022-10-21 PROCEDURE — 7100000011 HC PHASE II RECOVERY - ADDTL 15 MIN: Performed by: INTERNAL MEDICINE

## 2022-10-21 PROCEDURE — 2709999900 HC NON-CHARGEABLE SUPPLY: Performed by: INTERNAL MEDICINE

## 2022-10-21 RX ORDER — SODIUM CHLORIDE, SODIUM LACTATE, POTASSIUM CHLORIDE, CALCIUM CHLORIDE 600; 310; 30; 20 MG/100ML; MG/100ML; MG/100ML; MG/100ML
INJECTION, SOLUTION INTRAVENOUS CONTINUOUS
Status: DISCONTINUED | OUTPATIENT
Start: 2022-10-21 | End: 2022-10-21 | Stop reason: HOSPADM

## 2022-10-21 RX ORDER — PROPOFOL 10 MG/ML
INJECTION, EMULSION INTRAVENOUS CONTINUOUS PRN
Status: DISCONTINUED | OUTPATIENT
Start: 2022-10-21 | End: 2022-10-21 | Stop reason: SDUPTHER

## 2022-10-21 RX ORDER — SODIUM CHLORIDE 9 MG/ML
INJECTION, SOLUTION INTRAVENOUS PRN
Status: DISCONTINUED | OUTPATIENT
Start: 2022-10-21 | End: 2022-10-21 | Stop reason: HOSPADM

## 2022-10-21 RX ORDER — SODIUM CHLORIDE 0.9 % (FLUSH) 0.9 %
5-40 SYRINGE (ML) INJECTION PRN
Status: DISCONTINUED | OUTPATIENT
Start: 2022-10-21 | End: 2022-10-21 | Stop reason: HOSPADM

## 2022-10-21 RX ORDER — PROPOFOL 10 MG/ML
INJECTION, EMULSION INTRAVENOUS PRN
Status: DISCONTINUED | OUTPATIENT
Start: 2022-10-21 | End: 2022-10-21 | Stop reason: SDUPTHER

## 2022-10-21 RX ORDER — SODIUM CHLORIDE 0.9 % (FLUSH) 0.9 %
5-40 SYRINGE (ML) INJECTION EVERY 12 HOURS SCHEDULED
Status: DISCONTINUED | OUTPATIENT
Start: 2022-10-21 | End: 2022-10-21 | Stop reason: HOSPADM

## 2022-10-21 RX ORDER — FERROUS SULFATE 325(65) MG
325 TABLET ORAL
COMMUNITY

## 2022-10-21 RX ADMIN — PROPOFOL 140 MCG/KG/MIN: 10 INJECTION, EMULSION INTRAVENOUS at 13:02

## 2022-10-21 RX ADMIN — PROPOFOL 70 MG: 10 INJECTION, EMULSION INTRAVENOUS at 13:02

## 2022-10-21 RX ADMIN — SODIUM CHLORIDE, POTASSIUM CHLORIDE, SODIUM LACTATE AND CALCIUM CHLORIDE: 600; 310; 30; 20 INJECTION, SOLUTION INTRAVENOUS at 11:19

## 2022-10-21 RX ADMIN — SODIUM CHLORIDE, POTASSIUM CHLORIDE, SODIUM LACTATE AND CALCIUM CHLORIDE: 600; 310; 30; 20 INJECTION, SOLUTION INTRAVENOUS at 13:22

## 2022-10-21 ASSESSMENT — PAIN DESCRIPTION - DIRECTION: RADIATING_TOWARDS: A

## 2022-10-21 ASSESSMENT — PAIN DESCRIPTION - ONSET: ONSET: GRADUAL

## 2022-10-21 ASSESSMENT — PAIN DESCRIPTION - DESCRIPTORS
DESCRIPTORS: SORE
DESCRIPTORS: SORE

## 2022-10-21 ASSESSMENT — PAIN DESCRIPTION - ORIENTATION
ORIENTATION: INNER
ORIENTATION: INNER

## 2022-10-21 ASSESSMENT — ENCOUNTER SYMPTOMS: SHORTNESS OF BREATH: 1

## 2022-10-21 ASSESSMENT — PAIN SCALES - GENERAL
PAINLEVEL_OUTOF10: 4
PAINLEVEL_OUTOF10: 2
PAINLEVEL_OUTOF10: 0

## 2022-10-21 ASSESSMENT — PAIN - FUNCTIONAL ASSESSMENT: PAIN_FUNCTIONAL_ASSESSMENT: 0-10

## 2022-10-21 ASSESSMENT — PAIN DESCRIPTION - LOCATION
LOCATION: THROAT
LOCATION: THROAT

## 2022-10-21 ASSESSMENT — PAIN DESCRIPTION - PAIN TYPE: TYPE: ACUTE PAIN

## 2022-10-21 ASSESSMENT — PAIN DESCRIPTION - FREQUENCY: FREQUENCY: INTERMITTENT

## 2022-10-21 NOTE — DISCHARGE INSTRUCTIONS
ENDOSCOPY DISCHARGE INSTRUCTIONS:    Call the physician that did your procedure for any questions or concern:    [facility]: [phone]  DR. IVELISSE KIRK       ACTIVITY:    There are potential side effects to the medications used for sedation and anesthesia during your procedure. These include:  Dizziness or light-headedness, confusion or memory loss, delayed reaction times, loss of coordination, nausea and vomiting. Because of your increased risk for injury, we ask that you observe the following precautions: For the next 24 hours,  DO NOT operate an automobile, bicycle, motorcycle, , power tools or large equipment of any kind. Do not drink alcohol, sign any legal documents or make any legal decisions for 24 hours. Do not bend your head over lower than your heart. DO sit on the side of bed/couch awhile before getting up. Plan on bedrest or quiet relaxation today. You may resume normal activities in 24 hours. DIET:    Your first meal today should be light, avoiding spicy and fatty foods. If you tolerate this first meal, then you may advance to your regular diet unless otherwise advised by your physician. NORMAL SYMPTOMS:  -Mild sore throat if youve had an EGD   -Gaseous discomfort    NOTIFY YOUR PHYSICIAN IF THESE SYMPTOMS OCCUR:  1. Fever (greater than 100)  5. Increased abdominal bloating  2. Severe pain    6. Excessive bleeding  3. Nausea and vomiting  7. Chest pain                                                                    4. Chills    8.  Shortness of breath    ADDITIONAL INSTRUCTIONS:    Biopsy results: Call [address],[facility] for biopsy results in 1 week    Educational Information:  Follow up with Dr. Adan May had no bleeding source     Hiatal hernia 2 cm     Stomach the patient had what appears to be some mild gastric vascular ectasia which we did do argon plasma coagulation to     Duodenum we found 1 large AVM which we did argon plasma coagulation     Jejunum we found about 5 different small AVMs that we did argon plasma coagulation to     Retroflexion showed the hiatal hernia          Gastric or Duodenal ulcer present: No        The patient tolerated the procedure well and was taken to the post anesthesia care unit in good condition. 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 Ene Tomas     Current hemoglobin today was 11      Please review these discharge instructions this evening or tomorrow for  information you may have forgotten. We want to thank you for choosing the [facility] as your health care provider. We always strive to provide you with excellent care while you are here. You may receive a survey in the mail regarding your care. We would appreciate you taking a few minutes of your time to complete this survey.

## 2022-10-21 NOTE — ANESTHESIA POSTPROCEDURE EVALUATION
Department of Anesthesiology  Postprocedure Note    Patient: Valentina Romero  MRN: [id number]  YOB: 1940  Date of evaluation: 10/21/2022      Procedure Summary     Date: 10/21/22 Room / Location: [address] [place] [address] Elizabeth Fowler 01 / [facility]    Anesthesia Start: 3600 Anesthesia Stop: 4120    Procedures:       ENTEROSCOPY WITH INTERVENTION      . Diagnosis:       AVM (arteriovenous malformation)      (AVM (arteriovenous malformation) [Q27.30])    Surgeons: Linda Kate MD Responsible Provider: Randall Mckeon MD    Anesthesia Type: MAC ASA Status: 3          Anesthesia Type: No value filed.     India Phase I: India Score: 10    India Phase II: India Score: 6      Anesthesia Post Evaluation    Patient location during evaluation: PACU  Level of consciousness: awake  Complications: no  Multimodal analgesia pain management approach

## 2022-10-21 NOTE — PROCEDURES
Endoscopy Note    Patient: Keyana Bermudez  : 1940  CSN:     Procedure: Esophagogastroduodenoscopy with enteroscopy with argon plasma coagulation    Date:  10/21/2022     Surgeon:  Julissa Ponce MD     Referring Physician: Genaro Pringle and Lennox Seo    Preoperative Diagnosis: Abnormal capsule endoscopy    Postoperative Diagnosis: Several AVMs of the jejunum cauterized  1 AVM of the duodenum cauterized  Gastric vascular ectasia cauterized    Anesthesia: Monitored anesthesia care    EBL: <5 mL    Indications: This is a 80y.o. year old male who presents today with came in today because he has been having problems with anemia especially when he is on a blood thinner Dr. Gabriele Garza did a capsule endoscopy and he was found to have several AVMs    Description of Procedure:  Informed consent was obtained from the patient after explanation of indications, benefits and possible risks and complications of the procedure. The patient was then taken to the endoscopy suite, placed in the left lateral decubitus position and the above IV sedation was administrered. The Olympus pediatric colonoscope was placed over his tongue and into his esophagus    Esophagus had no bleeding source    Hiatal hernia 2 cm    Stomach the patient had what appears to be some mild gastric vascular ectasia which we did do argon plasma coagulation to    Duodenum we found 1 large AVM which we did argon plasma coagulation    Jejunum we found about 5 different small AVMs that we did argon plasma coagulation to    Retroflexion showed the hiatal hernia        Gastric or Duodenal ulcer present: No      The patient tolerated the procedure well and was taken to the post anesthesia care unit in good condition.       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 Gabriele Garza    Current hemoglobin today was 11    Thank you for this kind referral    Shalini Meraz MD, MD  [facility]  [phone]

## 2022-10-21 NOTE — H&P
Gastroenterology Note             Pre-operative History and Physical    Patient: Serena Hines  : 1940  CSN:     History Obtained From:  patient and/or guardian. HISTORY OF PRESENT ILLNESS:    The patient is a 80 y.o. male  here for EGD and enteroscopy the patient had a capsule endoscopy recently and was found to have some atrial venous malformations that were going to evaluate current hemoglobin today 11.7    Past Medical History:    Past Medical History:   Diagnosis Date    Anemia 2022    CAD (coronary artery disease) 1998    Stent in LAD    CAD (coronary artery disease) 2009    Stent in RCA    COPD (chronic obstructive pulmonary disease) (HCC)     COPD (chronic obstructive pulmonary disease) ([facility] [address] 2016    GERD (gastroesophageal reflux disease)     on Nexium    HTN (hypertension) 2016    Hyperlipidemia     IBS (irritable bowel syndrome)     Stented coronary artery      Past Surgical History:    Past Surgical History:   Procedure Laterality Date    CAPSULE ENDOSCOPY  2022    BOWEL SMALL CAPSULE ENDOSCOPY performed by Nik Mijares DO at [address] [place] [address]      CAROTID ENDARTERECTOMY  2014    CHOLECYSTECTOMY      COLONOSCOPY  2004    COLONOSCOPY N/A 2022    COLONOSCOPY POLYPECTOMY SNARE/HOT BIOPSY performed by Nik Mijares DO at [facility]      CORONARY ARTERY BYPASS GRAFT  2000    at [facility] to LAD, SVG-D1 and OM    DIAGNOSTIC CARDIAC CATH LAB PROCEDURE      UPPER GASTROINTESTINAL ENDOSCOPY  2004    UPPER GASTROINTESTINAL ENDOSCOPY  7/15/14    gastritis    UPPER GASTROINTESTINAL ENDOSCOPY N/A 2022    EGD DIAGNOSTIC ONLY performed by Nik Mijares DO at [address] [place] [address]     Medications Prior to Admission:   No current facility-administered medications on file prior to encounter.      Current Outpatient Medications on File Prior to Encounter Medication Sig Dispense Refill    ferrous sulfate (IRON 325) 325 (65 Fe) MG tablet Take 325 mg by mouth daily (with breakfast)      Evolocumab (REPATHA SURECLICK) 413 MG/ML SOAJ INJECT ONE MILLILITER UNDER THE SKIN ONCE EVERY 2 WEEKS 2 mL 3    [DISCONTINUED] rivaroxaban (XARELTO) 20 MG TABS tablet Take 1 tablet by mouth daily (with breakfast) 90 tablet 3    zolpidem (AMBIEN) 10 MG tablet Take 10 mg by mouth as needed. Fluticasone-Umeclidin-Vilant (TRELEGY ELLIPTA IN) Inhale into the lungs       [DISCONTINUED] apixaban (ELIQUIS) 5 MG TABS tablet Take 1 tablet by mouth 2 times daily (Patient taking differently: Take 5 mg by mouth 2 times daily 1/2 tablet twice daily) 180 tablet 3    nitroGLYCERIN (NITROSTAT) 0.4 MG SL tablet Place 1 tablet under the tongue every 5 minutes as needed for Chest pain 25 tablet 3    esomeprazole (NEXIUM) 20 MG delayed release capsule Take 20 mg by mouth daily as needed      albuterol (PROVENTIL HFA) 108 (90 BASE) MCG/ACT inhaler Inhale 2 puffs into the lungs every 4 hours as needed for Wheezing or Shortness of Breath. 1 Inhaler 0    promethazine (PHENERGAN) 25 MG tablet Take 25 mg by mouth every 6 hours as needed. Allergies:  Crestor [rosuvastatin calcium], Statins, Amlodipine, Budesonide-formoterol fumarate, Codeine, Dilaudid [hydromorphone hcl], Nitrofurantoin macrocrystal, Other, Propoxyphene, Xarelto [rivaroxaban], Zetia [ezetimibe], Apixaban, Benadryl [diphenhydramine], Digoxin, Hydromorphone hcl, and Iodoform      Social History:   Social History     Tobacco Use    Smoking status: Former     Packs/day: 0.50     Years: 30.00     Pack years: 15.00     Types: Cigarettes     Quit date: 3/16/1996     Years since quittin.6    Smokeless tobacco: Never   Substance Use Topics    Alcohol use:  Yes     Alcohol/week: 0.0 standard drinks     Comment: occ     Family History:   Family History   Problem Relation Age of Onset    Diabetes Mother     Heart Failure Mother     Heart Failure Father     Heart Failure Sister     Pacemaker Brother     Asthma Neg Hx     Cancer Neg Hx     Emphysema Neg Hx     Hypertension Neg Hx        PHYSICAL EXAM:      BP (!) 168/83   Pulse 83   Temp 97.7 °F (36.5 °C) (Temporal)   Resp 20   Ht 6' (1.829 m)   Wt 194 lb (88 kg)   SpO2 100%   BMI 26.31 kg/m²  I        Heart:   RRR, normal s1s2    Lungs:  CTA bilat,  Normal effort    Abdomen:   NT, ND      ASA Grade:  ASA 3 - Patient with moderate systemic disease with functional limitations    Mallampati Class: 2          ASSESSMENT AND PLAN:    1. Patient is a 80 y.o. male here for EGD with MAC.   2.  Procedure options, risks and benefits reviewed with patient. Patient expresses understanding.     Ananya Rea MD,   [facility]  10/21/2022

## 2022-10-21 NOTE — ANESTHESIA PRE PROCEDURE
Department of Anesthesiology  Preprocedure Note       Name:  Brianne Zelaya   Age:  80 y.o.  :  1940                                          MRN:  [id number]         Date:  10/21/2022      Surgeon: Armen Curtis):  Shalini Meraz MD    Procedure: Procedure(s):  ESOPHAGOGASTRODUODENOSCOPY WITH PUSH ENTEROSCOPY  . Medications prior to admission:   Prior to Admission medications    Medication Sig Start Date End Date Taking? Authorizing Provider   ferrous sulfate (IRON 325) 325 (65 Fe) MG tablet Take 325 mg by mouth daily (with breakfast)   Yes Historical Provider, MD   Evolocumab (REPATHA SURECLICK) 444 MG/ML SOAJ INJECT ONE MILLILITER UNDER THE SKIN ONCE EVERY 2 WEEKS 8/3/22   Tigre Howell DO   rivaroxaban (XARELTO) 20 MG TABS tablet Take 1 tablet by mouth daily (with breakfast) 22  Anat Cheng MD   zolpidem (AMBIEN) 10 MG tablet Take 10 mg by mouth as needed. 22   Historical Provider, MD   Fluticasone-Umeclidin-Vilant (Yamil Bullion IN) Inhale into the lungs     Historical Provider, MD   apixaban (ELIQUIS) 5 MG TABS tablet Take 1 tablet by mouth 2 times daily  Patient taking differently: Take 5 mg by mouth 2 times daily 1/2 tablet twice daily 22  LASHONDA Velazquez - CNP   nitroGLYCERIN (NITROSTAT) 0.4 MG SL tablet Place 1 tablet under the tongue every 5 minutes as needed for Chest pain 18   Anat Cheng MD   esomeprazole (NEXIUM) 20 MG delayed release capsule Take 20 mg by mouth daily as needed    Historical Provider, MD   albuterol (PROVENTIL HFA) 108 (90 BASE) MCG/ACT inhaler Inhale 2 puffs into the lungs every 4 hours as needed for Wheezing or Shortness of Breath. 10/9/13   Kevin Red MD   promethazine (PHENERGAN) 25 MG tablet Take 25 mg by mouth every 6 hours as needed.       Historical Provider, MD       Current medications:    Current Facility-Administered Medications   Medication Dose Route Frequency Provider Last Rate Last Admin    lactated ringers infusion   IntraVENous Continuous Joanne Pinky,  mL/hr at 10/21/22 1119 New Bag at 10/21/22 1119    sodium chloride flush 0.9 % injection 5-40 mL  5-40 mL IntraVENous 2 times per day Joanne Pinky, DO        sodium chloride flush 0.9 % injection 5-40 mL  5-40 mL IntraVENous PRN Joanne Pinky, DO        0.9 % sodium chloride infusion   IntraVENous PRN Joanne Pinky, DO           Allergies: Allergies   Allergen Reactions    Crestor [Rosuvastatin Calcium] Anaphylaxis     Hives, throat closure    Statins Anaphylaxis     Crestor    Amlodipine      High BP    Budesonide-Formoterol Fumarate Swelling    Codeine Itching     Other reaction(s): Other (See Comments)  nightmare    Dilaudid [Hydromorphone Hcl]      Confusion/ anxiety    Nitrofurantoin Macrocrystal      Patient unknown    Other      betablockers- unspecified. Feels unwell     Propoxyphene Other (See Comments)     palpitations    Xarelto [Rivaroxaban]     Zetia [Ezetimibe]      Not sure of the reaction    Apixaban Nausea And Vomiting    Benadryl [Diphenhydramine] Anxiety     Hyperactivity \"not an allergy\" per pt    Digoxin Nausea And Vomiting    Hydromorphone Hcl Anxiety     agitation    Iodoform Rash     Developed localized rash on neck when Iodoform packing was used in neck wound 2/2014.        Problem List:    Patient Active Problem List   Diagnosis Code    Coronary artery disease involving native coronary artery of native heart without angina pectoris I25.10    Hyperlipidemia E78.5    Nausea & vomiting R11.2    Carotid disease, bilateral (HCC) I77.9    Chest pain R07.9    COPD (chronic obstructive pulmonary disease) ([facility] [address] J44.9    HTN (hypertension) I10    Stented coronary artery Z95.5    IBS (irritable bowel syndrome) K58.9    GERD (gastroesophageal reflux disease) K21.9    CRP elevated R79.82    Weakness R53.1    SOB (shortness of breath) R06.02    Centrilobular emphysema (HCC) J43.2    [facility] I48.91    Atrial flutter by electrocardiogram ([facility] [address] I48.92    Anemia D64.9       Past Medical History:        Diagnosis Date    Anemia 2022    CAD (coronary artery disease) 1998    Stent in LAD    CAD (coronary artery disease) 2009    Stent in RCA    COPD (chronic obstructive pulmonary disease) ([facility] [address]     COPD (chronic obstructive pulmonary disease) ([facility] [address] 2016    GERD (gastroesophageal reflux disease)     on Nexium    HTN (hypertension) 2016    Hyperlipidemia     IBS (irritable bowel syndrome)     Stented coronary artery        Past Surgical History:        Procedure Laterality Date    CAPSULE ENDOSCOPY  2022    BOWEL SMALL CAPSULE ENDOSCOPY performed by Lyndle Lesch, DO at [address]      CAROTID ENDARTERECTOMY  2014    CHOLECYSTECTOMY      COLONOSCOPY  2004    COLONOSCOPY N/A 2022    COLONOSCOPY POLYPECTOMY SNARE/HOT BIOPSY performed by Lyndle Lesch, DO at [facility]      CORONARY ARTERY BYPASS GRAFT  2000    at [facility] to LAD, SVG-D1 and OM    DIAGNOSTIC CARDIAC CATH LAB PROCEDURE      UPPER GASTROINTESTINAL ENDOSCOPY  2004    UPPER GASTROINTESTINAL ENDOSCOPY  7/15/14    gastritis    UPPER GASTROINTESTINAL ENDOSCOPY N/A 2022    EGD DIAGNOSTIC ONLY performed by Lyndle Lesch, DO at [address] History:    Social History     Tobacco Use    Smoking status: Former     Packs/day: 0.50     Years: 30.00     Pack years: 15.00     Types: Cigarettes     Quit date: 3/16/1996     Years since quittin.6    Smokeless tobacco: Never   Substance Use Topics    Alcohol use:  Yes     Alcohol/week: 0.0 standard drinks     Comment: occ                                Counseling given: Not Answered      Vital Signs (Current):   Vitals:    10/21/22 1032   BP: (!) 168/83   Pulse: 83   Resp: 20   Temp: 97.7 °F (36.5 °C)   TempSrc: Temporal   SpO2: 100%   Weight: 194 lb (88 kg)   Height: 6' (1.829 m)                                              BP Readings from Last 3 Encounters:   10/21/22 (!) 168/83   09/22/22 (!) 142/73   07/21/22 (!) 124/50       NPO Status: Time of last liquid consumption: 2100                        Time of last solid consumption: 2100                        Date of last liquid consumption: 10/20/22                        Date of last solid food consumption: 10/20/22    BMI:   Wt Readings from Last 3 Encounters:   10/21/22 194 lb (88 kg)   09/20/22 195 lb (88.5 kg)   07/21/22 200 lb (90.7 kg)     Body mass index is 26.31 kg/m².     CBC:   Lab Results   Component Value Date/Time    WBC 6.4 09/21/2022 06:59 AM    RBC 3.34 09/21/2022 06:59 AM    HGB 8.7 09/22/2022 07:37 AM    HCT 28.1 09/22/2022 07:37 AM    MCV 83.1 09/21/2022 06:59 AM    RDW 18.6 09/21/2022 06:59 AM     09/21/2022 06:59 AM       CMP:   Lab Results   Component Value Date/Time     09/21/2022 06:59 AM    K 3.8 09/21/2022 06:59 AM    K 4.5 06/14/2022 08:26 AM     09/21/2022 06:59 AM    CO2 22 09/21/2022 06:59 AM    BUN 15 09/21/2022 06:59 AM    CREATININE 1.3 09/21/2022 06:59 AM    GFRAA >60 09/21/2022 06:59 AM    GFRAA >60 07/17/2012 05:52 AM    AGRATIO 1.6 06/14/2022 08:26 AM    LABGLOM 53 09/21/2022 06:59 AM    GLUCOSE 114 09/21/2022 06:59 AM    PROT 7.2 06/14/2022 08:26 AM    PROT 7.0 07/17/2012 05:52 AM    CALCIUM 8.8 09/21/2022 06:59 AM    BILITOT 0.8 06/14/2022 08:26 AM    ALKPHOS 120 06/14/2022 08:26 AM    AST 25 06/14/2022 08:26 AM    ALT 18 06/14/2022 08:26 AM       POC Tests:   Recent Labs     10/21/22  1116   POCGLU 98       Coags:   Lab Results   Component Value Date/Time    PROTIME 16.2 09/19/2022 07:06 AM    INR 1.32 09/19/2022 07:06 AM    APTT 31.7 02/28/2017 05:25 PM       HCG (If Applicable): No results found for: PREGTESTUR, PREGSERUM, HCG, HCGQUANT     ABGs: No results found for: PHART, PO2ART, XHM6LZO, EAA4MOR, BEART, K5KEANMB     Type & Screen (If Applicable):  No results found for: LABABO, LABRH    Drug/Infectious Status (If Applicable):  No results found for: HIV, HEPCAB    COVID-19 Screening (If Applicable):   Lab Results   Component Value Date/Time    COVID19 Not Detected 09/14/2022 11:44 AM           Anesthesia Evaluation  Patient summary reviewed and Nursing notes reviewed no history of anesthetic complications:   Airway: Mallampati: II  TM distance: >3 FB   Neck ROM: full  Mouth opening: > = 3 FB   Dental: normal exam         Pulmonary:normal exam    (+) COPD:  shortness of breath:                             Cardiovascular:    (+) hypertension:, CAD:, CABG/stent:,                   Neuro/Psych:   Negative Neuro/Psych ROS              GI/Hepatic/Renal:   (+) GERD:,           Endo/Other: Negative Endo/Other ROS                    Abdominal:             Vascular: negative vascular ROS. Other Findings:           Anesthesia Plan      MAC     ASA 3       Induction: intravenous. Anesthetic plan and risks discussed with patient. Plan discussed with CRNA.     Attending anesthesiologist reviewed and agrees with Preprocedure content                Pili Hall MD   10/21/2022

## 2022-10-21 NOTE — PROGRESS NOTES
Ambulatory Surgery/Procedure Discharge Note    Vitals:    10/21/22 1351   BP: 129/60   Pulse: 61   Resp: 16   Temp:    SpO2: 98%       In: 1000 [I.V.:1000]  Out: -  pt drank an apple juice; declined offer of bathroom    Restroom use offered before discharge. Yes -- declined    Pain assessment:  location, inner throat, \"sore\"  Pain Level: 2    Pt returned to Endo recovery s/p enteroscopy with intervention. Pt at first very drowsy, but by discharge fully alert; speech clear; breathing easily on RA; drank apple juice and tolerated well. Dr. Brigette Delvalle came to bedside to talk with pt and pt's wife Carlitos Jang. Discharge instructions discussed with pt and wife, and both verbalized understanding. IV removed, and dressing applied. Patient discharged to home/self care.  Patient discharged via wheel chair by RN to waiting family/S.O.       10/21/2022 2:13 PM

## 2022-10-25 NOTE — TELEPHONE ENCOUNTER
Spoke with patient's wife and patient they are not interested in the Adriane feels that he doesn't need Anticoagulation let alone Watchman since he is not in A-fib. I tried to educate them about A-fib and the stroke risk and she repeat they are not interested. I also mentioned why he would be put on anticoagulation because of his shanelle score of 5. She states \"her  has no CHF or HTN and has been asking for them to remove this from his chart. \" At this point I instructed patient and family if anything changes to notify Dr. Monica Patel since he was the referring MD.  I sent note informing Dr. Monica Patel of their decision.

## 2022-11-28 DIAGNOSIS — E78.49 OTHER HYPERLIPIDEMIA: ICD-10-CM

## 2022-11-28 RX ORDER — EVOLOCUMAB 140 MG/ML
INJECTION, SOLUTION SUBCUTANEOUS
Qty: 2 ML | Refills: 3 | Status: SHIPPED | OUTPATIENT
Start: 2022-11-28

## 2022-11-28 NOTE — TELEPHONE ENCOUNTER
Pts wife stated that they need a 3m prescription of Repatha sent to [facility]. Pts next injection is tomorrow and he is out of the medication. Last ov 07/21/2022 vsp.

## 2023-03-01 ENCOUNTER — TELEPHONE (OUTPATIENT)
Dept: CARDIOLOGY CLINIC | Age: 83
End: 2023-03-01

## 2023-03-01 DIAGNOSIS — Z79.899 MEDICATION MANAGEMENT: Primary | ICD-10-CM

## 2023-03-01 NOTE — TELEPHONE ENCOUNTER
Called and spoke with Sayra Serna patient wife notified to have fasting lipids completed. She reports lipid panel from [facility] recently. Obtained records in care everywhere. Sayra Serna requested [employer], betty BOWMAN faxed.

## 2023-03-01 NOTE — TELEPHONE ENCOUNTER
Pts wife stated that they were advised from the pharmacy that they will need a refill of Repatha for April and were advised that he might need lab work for it? Please advise.

## 2023-03-21 DIAGNOSIS — I48.0 PAROXYSMAL ATRIAL FIBRILLATION (HCC): Primary | ICD-10-CM

## 2023-03-21 DIAGNOSIS — E78.49 OTHER HYPERLIPIDEMIA: ICD-10-CM

## 2023-03-21 DIAGNOSIS — I65.23 BILATERAL CAROTID ARTERY STENOSIS: ICD-10-CM

## 2023-03-21 RX ORDER — EVOLOCUMAB 140 MG/ML
INJECTION, SOLUTION SUBCUTANEOUS
Qty: 2 ML | Refills: 3 | Status: SHIPPED | OUTPATIENT
Start: 2023-03-21

## 2023-03-21 NOTE — TELEPHONE ENCOUNTER
Spoke with pt wife Paulino aPge she would like the Repatha medication sent to [facility] for home delivery. Lab order placed to be done at the beginning of June for a new PA.

## 2023-03-21 NOTE — TELEPHONE ENCOUNTER
Lakia Bhaskaryoko, pt's wife, called asking if ABEL Martino has anymore informaiton regarding pt's PA and the repatha. Informed Lakia Murrieta that EMRE is out of the office today and will send this message to her. EMRE please contact Lakia Murrieta at [phone].

## 2023-03-22 RX ORDER — EVOLOCUMAB 140 MG/ML
INJECTION, SOLUTION SUBCUTANEOUS
Qty: 2 ML | Refills: 3 | OUTPATIENT
Start: 2023-03-22

## 2023-04-26 DIAGNOSIS — E78.49 OTHER HYPERLIPIDEMIA: ICD-10-CM

## 2023-04-26 RX ORDER — EVOLOCUMAB 140 MG/ML
INJECTION, SOLUTION SUBCUTANEOUS
Qty: 6 ADJUSTABLE DOSE PRE-FILLED PEN SYRINGE | Refills: 3 | Status: SHIPPED | OUTPATIENT
Start: 2023-04-26

## 2023-04-26 NOTE — TELEPHONE ENCOUNTER
Pts wife stated that they are having trouble getting pts refill for Repatha. Harpreet Melgoza was advised that it was called into [facility] on 03/21. Harpreet Melgoza asked for phone number for there and it was provided. Harpreet Melgoza asked why pt can not get a 3m supply? She stated that there are some months that he runs out of the medication. Please advise.

## 2023-06-05 ENCOUNTER — HOSPITAL ENCOUNTER (OUTPATIENT)
Age: 83
Discharge: HOME | End: 2023-06-05
Payer: COMMERCIAL

## 2023-06-05 DIAGNOSIS — E78.49 OTHER HYPERLIPIDEMIA: ICD-10-CM

## 2023-06-05 DIAGNOSIS — I65.23 BILATERAL CAROTID ARTERY STENOSIS: ICD-10-CM

## 2023-06-05 DIAGNOSIS — I48.0 PAROXYSMAL ATRIAL FIBRILLATION (HCC): ICD-10-CM

## 2023-06-05 LAB
CHOLEST SERPL-MCNC: 131 MG/DL (ref 0–199)
HDLC SERPL-MCNC: 72 MG/DL (ref 40–60)
LDL CHOLESTEROL CALCULATED: 44 MG/DL
TRIGL SERPL-MCNC: 77 MG/DL (ref 0–150)
VLDLC SERPL CALC-MCNC: 15 MG/DL

## 2023-06-05 PROCEDURE — 36415 COLL VENOUS BLD VENIPUNCTURE: CPT

## 2023-06-05 PROCEDURE — 80061 LIPID PANEL: CPT

## 2023-06-06 ENCOUNTER — TELEPHONE (OUTPATIENT)
Dept: CARDIOLOGY CLINIC | Age: 83
End: 2023-06-06

## 2023-06-06 NOTE — TELEPHONE ENCOUNTER
Called and left VM with normal results of cholesterol, okay per HIPAA. office number provided for any additional questions.

## 2023-06-06 NOTE — TELEPHONE ENCOUNTER
----- Message from Laura Reynolds MD sent at 6/6/2023  8:57 AM EDT -----  Please let the patient know that their recently checked lipids are at goal. The current medication regimen is working. No changes recommended at this time.

## 2023-06-07 ENCOUNTER — TELEPHONE (OUTPATIENT)
Dept: CARDIOLOGY CLINIC | Age: 83
End: 2023-06-07

## 2023-06-07 NOTE — TELEPHONE ENCOUNTER
Pt had lipids done on 6/5/23. Kalen Gill wants this reviewed and the PA processed. Wife wants call back letting her know office received message.

## 2023-06-08 NOTE — TELEPHONE ENCOUNTER
Attempted to reach patient. No answer. Left VM to call office back. See Media tab Repatha was approved. Per Medication list was sent to [facility], please verify pharmacy patient would like to use.

## 2023-06-09 NOTE — TELEPHONE ENCOUNTER
Wife calling to check on PA through [address] was approved 05/01/23-05/30/2024 see media tab, relayed to her she JEWEL. No further questions.

## 2023-06-09 NOTE — TELEPHONE ENCOUNTER
Attempted to reach patient. No answer. LVM to call office back. See Media tab Repatha was approved. Per Medication list was sent to [employer], please verify pharmacy patient would like to use.

## 2023-06-15 ENCOUNTER — TELEPHONE (OUTPATIENT)
Dept: CARDIOLOGY CLINIC | Age: 83
End: 2023-06-15

## 2023-06-15 NOTE — TELEPHONE ENCOUNTER
Submitted PA for REPATHA  [facility]: [id number]  STATUS: PENDING. Follow up done daily; if no response in three days we will refax for status check. If another three days goes by with no response we will call the insurance for status.

## 2023-08-24 ENCOUNTER — OFFICE VISIT (OUTPATIENT)
Dept: CARDIOLOGY CLINIC | Age: 83
End: 2023-08-24
Payer: COMMERCIAL

## 2023-08-24 VITALS
HEIGHT: 72 IN | HEART RATE: 62 BPM | OXYGEN SATURATION: 98 % | BODY MASS INDEX: 29.12 KG/M2 | DIASTOLIC BLOOD PRESSURE: 70 MMHG | WEIGHT: 215 LBS | SYSTOLIC BLOOD PRESSURE: 140 MMHG

## 2023-08-24 DIAGNOSIS — I48.0 PAROXYSMAL ATRIAL FIBRILLATION (HCC): ICD-10-CM

## 2023-08-24 DIAGNOSIS — E78.49 OTHER HYPERLIPIDEMIA: ICD-10-CM

## 2023-08-24 DIAGNOSIS — I10 PRIMARY HYPERTENSION: Chronic | ICD-10-CM

## 2023-08-24 DIAGNOSIS — D64.9 ANEMIA, UNSPECIFIED TYPE: ICD-10-CM

## 2023-08-24 DIAGNOSIS — I25.10 CORONARY ARTERY DISEASE INVOLVING NATIVE CORONARY ARTERY OF NATIVE HEART WITHOUT ANGINA PECTORIS: Primary | Chronic | ICD-10-CM

## 2023-08-24 PROCEDURE — 99214 OFFICE O/P EST MOD 30 MIN: CPT | Performed by: INTERNAL MEDICINE

## 2023-08-24 PROCEDURE — 3077F SYST BP >= 140 MM HG: CPT | Performed by: INTERNAL MEDICINE

## 2023-08-24 PROCEDURE — 1123F ACP DISCUSS/DSCN MKR DOCD: CPT | Performed by: INTERNAL MEDICINE

## 2023-08-24 PROCEDURE — 3078F DIAST BP <80 MM HG: CPT | Performed by: INTERNAL MEDICINE

## 2023-08-24 RX ORDER — EVOLOCUMAB 140 MG/ML
INJECTION, SOLUTION SUBCUTANEOUS
Qty: 6 ADJUSTABLE DOSE PRE-FILLED PEN SYRINGE | Refills: 3 | Status: SHIPPED | OUTPATIENT
Start: 2023-08-24

## 2023-08-24 RX ORDER — CLOPIDOGREL BISULFATE 75 MG/1
75 TABLET ORAL DAILY
Qty: 90 TABLET | Refills: 3 | Status: SHIPPED | OUTPATIENT
Start: 2023-08-24

## 2023-08-24 RX ORDER — GABAPENTIN 300 MG/1
CAPSULE ORAL
COMMUNITY
Start: 2023-08-07

## 2023-08-24 NOTE — PATIENT INSTRUCTIONS
Plan:    Re-start clopidogrel (Plavix) 75 mg once daily ~ history of coronary artery bypass grafting  Labs reviewed in care-everywhere  Instructed to call office for any bleeding issues or seek emergency room   Follow up in 6 months

## 2023-08-24 NOTE — PROGRESS NOTES
questions and concerns were addressed to the patient/family. Alternatives to my treatment were discussed. The note was completed using EMR. Every effort was made to ensure accuracy; however, inadvertent computerized transcription errors may be present.     Nedra Paul MD, [address] [place] [address]  [phone] [facility] office  [phone] [facility][1]  8/24/2023  3:56 PM

## 2023-11-16 ENCOUNTER — TELEPHONE (OUTPATIENT)
Dept: CARDIOLOGY CLINIC | Age: 83
End: 2023-11-16

## 2023-11-16 DIAGNOSIS — R06.02 SOB (SHORTNESS OF BREATH): Primary | ICD-10-CM

## 2023-11-16 NOTE — TELEPHONE ENCOUNTER
Pts wife called and stated pt is at [facility] ED. Pts wife wanted vsp to know and to check pts chart from [facility].

## 2023-11-17 ENCOUNTER — TELEPHONE (OUTPATIENT)
Dept: CARDIOLOGY | Age: 83
End: 2023-11-17

## 2023-11-17 NOTE — TELEPHONE ENCOUNTER
Pt called on-call service this AM to be directly admitted to [facility]. I spoke directly with his wife Matthew Villeda. It seems the patient was advised to go to the emergency department yesterday following possible reaction to IV iron therapy. He had complained of ankle rash with swelling. Sat for many hours, left as was uncomfortable. Reports this morning that symptoms are resolved. However they were told that he had a cardiac problem and should be admitted. On further history it sounds like high-sensitivity troponin was elevated in the 70sx2. He has no ischemic symptoms. I cannot see work-up at [facility] in care everywhere as yet. Patient is wife were looking for direction and I notified them I would have P contact him with further direction-possible OV, possible further testing. Given lack of ischemic symptoms however, I did not advise admission.     Sandeep Tavera MD, [address] [facility]  [phone] [facility]  [phone] [facility]

## 2023-11-17 NOTE — TELEPHONE ENCOUNTER
Called and spoke with patient and wife Shawn Hess. Echo ordered per VSP. Patient scheduled for ECHO at [facility], VU to time,date, and location of ECHO.

## 2023-11-25 ENCOUNTER — TELEPHONE (OUTPATIENT)
Dept: CARDIOLOGY CLINIC | Age: 83
End: 2023-11-25

## 2023-11-25 NOTE — TELEPHONE ENCOUNTER
Attempted to call patient regarding page regarding chest pain. Left voicemail detailing and advising patient to get to the ED. I was notified that patient was wondering about direct admission but there are other causes of chest pain and to expedite workup it would be more feasible for this to happen in the ED. Will attempt to call again here shortly to see if I can reach them.      Mechelle iPres DO

## 2023-11-30 ENCOUNTER — HOSPITAL ENCOUNTER (OUTPATIENT)
Dept: CARDIOLOGY | Age: 83
Discharge: HOME | End: 2023-11-30
Payer: COMMERCIAL

## 2023-11-30 DIAGNOSIS — R06.02 SOB (SHORTNESS OF BREATH): ICD-10-CM

## 2023-11-30 PROCEDURE — 93306 TTE W/DOPPLER COMPLETE: CPT

## 2023-12-01 ENCOUNTER — TELEPHONE (OUTPATIENT)
Dept: CARDIOLOGY CLINIC | Age: 83
End: 2023-12-01

## 2023-12-01 ENCOUNTER — PATIENT MESSAGE (OUTPATIENT)
Dept: CARDIOLOGY CLINIC | Age: 83
End: 2023-12-01

## 2023-12-01 NOTE — TELEPHONE ENCOUNTER
Re: echo results. Copied from separate encounter:      Signed        ----- Message from Germania Pfeiffer MD sent at 12/1/2023 12:17 PM EST -----  The testing findings are consistent with the patient's previous test results and heart disease. Patient presented to [facility] 11/16/2023. See below: \"The patient is a(n) 80year old male who was admitted for weakness and a possible allergic reaction to venofer. Please see same day H&P. Patient decided to leave the hospital AMA from the ED. Discharge Diagnoses: Principal Problem:    Elevated troponin (POA: Yes)  Active Problems:    Mixed hyperlipidemia (POA: Yes)    CKD (chronic kidney disease) stage 3, GFR 30-59 ml/min (HCC) (POA: Yes)    Atypical atrial flutter (HCC) (POA: Yes)    COPD (chronic obstructive pulmonary disease) (HCC) (POA: Yes)  Resolved Problems:    * No resolved hospital problems. * \"    [place] note 11/17:   \"Pt called on-call service this AM to be directly admitted to [facility]. I spoke directly with his wife Marky Rooney. It seems the patient was advised to go to the emergency department yesterday following possible reaction to IV iron therapy. He had complained of ankle rash with swelling. Sat for many hours, left as was uncomfortable. Reports this morning that symptoms are resolved. However they were told that he had a cardiac problem and should be admitted. On further history it sounds like high-sensitivity troponin was elevated in the 70sx2. He has no ischemic symptoms. I cannot see work-up at [facility] in care everywhere as yet. Patient is wife were looking for direction and I notified them [facility] with [facility] direction-possible OV, possible further testing. Given lack of ischemic symptoms however, I did not advise admission. \"

## 2023-12-01 NOTE — TELEPHONE ENCOUNTER
Informed patient via [employer] that we will forward to [facility] to address on his return as per Dr. Héctor Nickerson telephone call below.

## 2023-12-01 NOTE — TELEPHONE ENCOUNTER
----- Message from Danny Hong MD sent at 12/1/2023 12:17 PM EST -----  The testing findings are consistent with the patient's previous test results and heart disease.

## 2023-12-01 NOTE — TELEPHONE ENCOUNTER
From: Lilli Benton  To: Dr. Gabbie Fernandez: 12/1/2023 7:30 AM EST  Subject: Echo results     What does this mean and what do we do now ? Ricky Ruggiero is now complaining of pain in his right thigh - lateral and medial... I have kept Corlis Cocking since bringing him home from the ER 2 weeks ago. He compains of being weak and unable to walk any amount of distance. Manages to the bathroom but worn out returning. Also, his feet are swelling even though using a recliner. DID he have a heart attack?   Thanks Versa Modesto  [phone]

## 2023-12-01 NOTE — TELEPHONE ENCOUNTER
Called and spoke with patient and wife. Relayed VSP results. Patient is concerned. He reports over last 3 weeks he has developed SOB. He states this is concerning because this is similar to symptom complex prior to stent. He states when he goes to mailbox and back significantly SOB. Please advise.

## 2023-12-04 NOTE — TELEPHONE ENCOUNTER
Spoke with patient; Appt made for 12/6/2022 with Willadean Babinski 230pm @ [facility]. Informed patient to go to ED if SOB worsens.  V/U.

## 2023-12-04 NOTE — TELEPHONE ENCOUNTER
Let's please get patient scheduled for a follow-up appointment so that he can be re-evaluated. If he develops worsening shortness of breath in the interim, I recommend that he go to the ED for more urgent evaluation.

## 2023-12-06 ENCOUNTER — OFFICE VISIT (OUTPATIENT)
Dept: CARDIOLOGY CLINIC | Age: 83
End: 2023-12-06
Payer: COMMERCIAL

## 2023-12-06 VITALS
SYSTOLIC BLOOD PRESSURE: 140 MMHG | HEIGHT: 72 IN | HEART RATE: 89 BPM | WEIGHT: 211 LBS | OXYGEN SATURATION: 97 % | DIASTOLIC BLOOD PRESSURE: 72 MMHG | BODY MASS INDEX: 28.58 KG/M2

## 2023-12-06 DIAGNOSIS — I10 PRIMARY HYPERTENSION: ICD-10-CM

## 2023-12-06 DIAGNOSIS — I65.23 BILATERAL CAROTID ARTERY STENOSIS: ICD-10-CM

## 2023-12-06 DIAGNOSIS — I25.10 CORONARY ARTERY DISEASE INVOLVING NATIVE CORONARY ARTERY OF NATIVE HEART WITHOUT ANGINA PECTORIS: Primary | ICD-10-CM

## 2023-12-06 DIAGNOSIS — E78.49 OTHER HYPERLIPIDEMIA: ICD-10-CM

## 2023-12-06 DIAGNOSIS — I48.0 PAROXYSMAL ATRIAL FIBRILLATION (HCC): ICD-10-CM

## 2023-12-06 DIAGNOSIS — J44.9 CHRONIC OBSTRUCTIVE PULMONARY DISEASE, UNSPECIFIED COPD TYPE (HCC): ICD-10-CM

## 2023-12-06 PROCEDURE — 3078F DIAST BP <80 MM HG: CPT | Performed by: NURSE PRACTITIONER

## 2023-12-06 PROCEDURE — 1123F ACP DISCUSS/DSCN MKR DOCD: CPT | Performed by: NURSE PRACTITIONER

## 2023-12-06 PROCEDURE — 99214 OFFICE O/P EST MOD 30 MIN: CPT | Performed by: NURSE PRACTITIONER

## 2023-12-06 PROCEDURE — 3074F SYST BP LT 130 MM HG: CPT | Performed by: NURSE PRACTITIONER

## 2023-12-06 RX ORDER — NITROGLYCERIN 0.4 MG/1
0.4 TABLET SUBLINGUAL EVERY 5 MIN PRN
Qty: 25 TABLET | Refills: 3 | Status: SHIPPED | OUTPATIENT
Start: 2023-12-06

## 2023-12-06 NOTE — PROGRESS NOTES
[facility]   Cardiac Evaluation    Primary Care Doctor:  Nancy Sanchez MD    Chief Complaint   Patient presents with    Follow-up        Assessment:    1. Coronary artery disease involving native coronary artery of native heart without angina pectoris    2. Paroxysmal atrial fibrillation (HCC)    3. Primary hypertension    4. Other hyperlipidemia    5. Bilateral carotid artery stenosis    6. Chronic obstructive pulmonary disease, unspecified COPD type ([address])        Plan:   When you have the chest tightness, sit and rest, if does not go away then try a sublingual nitroglycerin   Stress test - call [phone]) to schedule  Will review with Dr. Carmelita Schumacher and let you know if he has any different recommendations      Vitals:    12/06/23 1418   BP: (!) 140/72   Site: Right Upper Arm   Position: Sitting   Pulse: 89   SpO2: 97%   Weight: 95.7 kg (211 lb)   Height: 1.829 m (6' 0.01\")       Primary Cardiologist: Dr. Caroline Strauss. Carmelita Schumacher     History of Present Illness:   I had the pleasure of seeing Julio Villanueva (12 y.o.) in follow up for CAD with prior PCI to LAD in 1998, followed by CABG x3 in 2000, PCI to RCA in 2009, PAF, hypertension, hyperlipidemia, cerebrovascular disease with L CEA in 2014, COPD   He is off Xarelto and Eliquis due to reaction - felt due to anemia and bleeding in GI tract. He was seen in the ED at [facility] for potential side effect of IV Venofer - weakness, shortness of breath. In the ED he was told he had heart attack due to elevated troponin's. They left AMA due to long wait and felt could receive better care at home. He did not have chest pain / tightness when went to the ED. He still feels poorly. Now he has a tightness around chest.  This only started 1 week ago. States is similar to prior angina when needed stents to RCA. Just started with a cough, X 1 day, non-produtive. BP at home stays 100-120/50-60's, can go up to 150/80's when feels \"sick\".      He had an

## 2023-12-06 NOTE — PATIENT INSTRUCTIONS
When you have the chest tightness, sit and rest, if does not go away then try a sublingual nitroglycerin   Stress test - call [facility] ([phone]) to schedule  Will review with Dr. Maia Ocampo and let you know if he has any different recommendations

## 2023-12-07 NOTE — TELEPHONE ENCOUNTER
VSP patient has been evaluated with DD NP 12/06/23 would you like acute appt for patient at this time?

## 2023-12-07 NOTE — TELEPHONE ENCOUNTER
Called and spoke with patient. He reports he is going to proceed with stress test as ordered and does not want appt at this time.

## 2023-12-15 ENCOUNTER — HOSPITAL ENCOUNTER (OUTPATIENT)
Dept: CARDIOLOGY | Age: 83
Discharge: HOME | End: 2023-12-15
Payer: COMMERCIAL

## 2023-12-15 ENCOUNTER — TELEPHONE (OUTPATIENT)
Dept: CARDIOLOGY CLINIC | Age: 83
End: 2023-12-15

## 2023-12-15 DIAGNOSIS — I10 PRIMARY HYPERTENSION: ICD-10-CM

## 2023-12-15 DIAGNOSIS — I48.0 PAROXYSMAL ATRIAL FIBRILLATION (HCC): ICD-10-CM

## 2023-12-15 DIAGNOSIS — I25.10 CORONARY ARTERY DISEASE INVOLVING NATIVE CORONARY ARTERY OF NATIVE HEART WITHOUT ANGINA PECTORIS: ICD-10-CM

## 2023-12-15 PROCEDURE — 93017 CV STRESS TEST TRACING ONLY: CPT

## 2023-12-15 PROCEDURE — 78452 HT MUSCLE IMAGE SPECT MULT: CPT

## 2023-12-15 PROCEDURE — 6360000002 HC RX W HCPCS: Performed by: NURSE PRACTITIONER

## 2023-12-15 PROCEDURE — 3430000000 HC RX DIAGNOSTIC RADIOPHARMACEUTICAL: Performed by: NURSE PRACTITIONER

## 2023-12-15 PROCEDURE — A9502 TC99M TETROFOSMIN: HCPCS | Performed by: NURSE PRACTITIONER

## 2023-12-15 RX ORDER — REGADENOSON 0.08 MG/ML
0.4 INJECTION, SOLUTION INTRAVENOUS
Status: COMPLETED | OUTPATIENT
Start: 2023-12-15 | End: 2023-12-15

## 2023-12-15 RX ADMIN — TETROFOSMIN 11.5 MILLICURIE: 1.38 INJECTION, POWDER, LYOPHILIZED, FOR SOLUTION INTRAVENOUS at 08:02

## 2023-12-15 RX ADMIN — TETROFOSMIN 32 MILLICURIE: 1.38 INJECTION, POWDER, LYOPHILIZED, FOR SOLUTION INTRAVENOUS at 09:23

## 2023-12-15 RX ADMIN — REGADENOSON 0.4 MG: 0.08 INJECTION, SOLUTION INTRAVENOUS at 09:23

## 2023-12-15 NOTE — TELEPHONE ENCOUNTER
Pts wife called and stated she spoke npdd about an angiogram and wants to know if pt can get the angiogram completed before the end of the year? Please advise.  the patient ph# [phone]

## 2023-12-15 NOTE — TELEPHONE ENCOUNTER
Contacted the PT advised message was forwarded and he should he from office about scheduling Angiogram. Pt had understanding

## 2023-12-21 ENCOUNTER — HOSPITAL ENCOUNTER (INPATIENT)
Dept: CARDIAC CATH/INVASIVE PROCEDURES | Age: 83
LOS: 1 days | Discharge: HOME | DRG: 322 | End: 2023-12-22
Attending: INTERNAL MEDICINE | Admitting: INTERNAL MEDICINE
Payer: MEDICARE

## 2023-12-21 DIAGNOSIS — I48.91 ATRIAL FIBRILLATION, UNSPECIFIED TYPE (HCC): Primary | ICD-10-CM

## 2023-12-21 PROBLEM — I25.10 CAD IN NATIVE ARTERY: Status: ACTIVE | Noted: 2023-12-21

## 2023-12-21 LAB
ANION GAP SERPL CALCULATED.3IONS-SCNC: 10 MMOL/L (ref 3–16)
BUN SERPL-MCNC: 20 MG/DL (ref 7–20)
CALCIUM SERPL-MCNC: 9.4 MG/DL (ref 8.3–10.6)
CHLORIDE SERPL-SCNC: 104 MMOL/L (ref 99–110)
CHOLEST SERPL-MCNC: 90 MG/DL (ref 0–199)
CO2 SERPL-SCNC: 25 MMOL/L (ref 21–32)
CREAT SERPL-MCNC: 1.3 MG/DL (ref 0.8–1.3)
DEPRECATED RDW RBC AUTO: 16.2 % (ref 12.4–15.4)
EKG ATRIAL RATE: 108 BPM
EKG ATRIAL RATE: 73 BPM
EKG DIAGNOSIS: NORMAL
EKG DIAGNOSIS: NORMAL
EKG P-R INTERVAL: 156 MS
EKG Q-T INTERVAL: 420 MS
EKG Q-T INTERVAL: 430 MS
EKG QRS DURATION: 108 MS
EKG QRS DURATION: 110 MS
EKG QTC CALCULATION (BAZETT): 466 MS
EKG QTC CALCULATION (BAZETT): 473 MS
EKG R AXIS: -79 DEGREES
EKG R AXIS: -79 DEGREES
EKG T AXIS: 14 DEGREES
EKG T AXIS: 58 DEGREES
EKG VENTRICULAR RATE: 73 BPM
EKG VENTRICULAR RATE: 74 BPM
GFR SERPLBLD CREATININE-BSD FMLA CKD-EPI: 54 ML/MIN/{1.73_M2}
GLUCOSE SERPL-MCNC: 104 MG/DL (ref 70–99)
HCT VFR BLD AUTO: 38.7 % (ref 40.5–52.5)
HDLC SERPL-MCNC: 46 MG/DL (ref 40–60)
HGB BLD-MCNC: 12.7 G/DL (ref 13.5–17.5)
INR PPP: 1.1 (ref 0.84–1.16)
LDLC SERPL CALC-MCNC: 32 MG/DL
MCH RBC QN AUTO: 32.6 PG (ref 26–34)
MCHC RBC AUTO-ENTMCNC: 32.9 G/DL (ref 31–36)
MCV RBC AUTO: 99.3 FL (ref 80–100)
PLATELET # BLD AUTO: 205 K/UL (ref 135–450)
PMV BLD AUTO: 8.1 FL (ref 5–10.5)
POTASSIUM SERPL-SCNC: 4.1 MMOL/L (ref 3.5–5.1)
PROTHROMBIN TIME: 14.2 SEC (ref 11.5–14.8)
RBC # BLD AUTO: 3.9 M/UL (ref 4.2–5.9)
SODIUM SERPL-SCNC: 139 MMOL/L (ref 136–145)
TRIGL SERPL-MCNC: 58 MG/DL (ref 0–150)
VLDLC SERPL CALC-MCNC: 12 MG/DL
WBC # BLD AUTO: 6.6 K/UL (ref 4–11)

## 2023-12-21 PROCEDURE — 93010 ELECTROCARDIOGRAM REPORT: CPT | Performed by: INTERNAL MEDICINE

## 2023-12-21 PROCEDURE — 2580000003 HC RX 258

## 2023-12-21 PROCEDURE — 93459 L HRT ART/GRFT ANGIO: CPT | Performed by: INTERNAL MEDICINE

## 2023-12-21 PROCEDURE — 85610 PROTHROMBIN TIME: CPT

## 2023-12-21 PROCEDURE — 2500000003 HC RX 250 WO HCPCS

## 2023-12-21 PROCEDURE — 93005 ELECTROCARDIOGRAM TRACING: CPT | Performed by: INTERNAL MEDICINE

## 2023-12-21 PROCEDURE — 2580000003 HC RX 258: Performed by: INTERNAL MEDICINE

## 2023-12-21 PROCEDURE — 6370000000 HC RX 637 (ALT 250 FOR IP)

## 2023-12-21 PROCEDURE — 2060000000 HC ICU INTERMEDIATE R&B

## 2023-12-21 PROCEDURE — 92937 PRQ TRLUML REVSC CAB GRF 1: CPT | Performed by: INTERNAL MEDICINE

## 2023-12-21 PROCEDURE — 92978 ENDOLUMINL IVUS OCT C 1ST: CPT | Performed by: INTERNAL MEDICINE

## 2023-12-21 PROCEDURE — 027036Z DILATION OF CORONARY ARTERY, ONE ARTERY WITH THREE DRUG-ELUTING INTRALUMINAL DEVICES, PERCUTANEOUS APPROACH: ICD-10-PCS | Performed by: INTERNAL MEDICINE

## 2023-12-21 PROCEDURE — C9604 PERC D-E COR REVASC T CABG S: HCPCS

## 2023-12-21 PROCEDURE — C1769 GUIDE WIRE: HCPCS | Performed by: INTERNAL MEDICINE

## 2023-12-21 PROCEDURE — 85027 COMPLETE CBC AUTOMATED: CPT

## 2023-12-21 PROCEDURE — C1874 STENT, COATED/COV W/DEL SYS: HCPCS | Performed by: INTERNAL MEDICINE

## 2023-12-21 PROCEDURE — C1760 CLOSURE DEV, VASC: HCPCS | Performed by: INTERNAL MEDICINE

## 2023-12-21 PROCEDURE — 80061 LIPID PANEL: CPT

## 2023-12-21 PROCEDURE — 80048 BASIC METABOLIC PNL TOTAL CA: CPT

## 2023-12-21 PROCEDURE — 2709999900 HC NON-CHARGEABLE SUPPLY: Performed by: INTERNAL MEDICINE

## 2023-12-21 PROCEDURE — 6370000000 HC RX 637 (ALT 250 FOR IP): Performed by: INTERNAL MEDICINE

## 2023-12-21 PROCEDURE — C1725 CATH, TRANSLUMIN NON-LASER: HCPCS | Performed by: INTERNAL MEDICINE

## 2023-12-21 PROCEDURE — C1884 EMBOLIZATION PROTECT SYST: HCPCS | Performed by: INTERNAL MEDICINE

## 2023-12-21 PROCEDURE — 92978 ENDOLUMINL IVUS OCT C 1ST: CPT

## 2023-12-21 PROCEDURE — C1753 CATH, INTRAVAS ULTRASOUND: HCPCS | Performed by: INTERNAL MEDICINE

## 2023-12-21 PROCEDURE — C1887 CATHETER, GUIDING: HCPCS | Performed by: INTERNAL MEDICINE

## 2023-12-21 PROCEDURE — 6360000002 HC RX W HCPCS: Performed by: INTERNAL MEDICINE

## 2023-12-21 PROCEDURE — 6360000002 HC RX W HCPCS

## 2023-12-21 PROCEDURE — C1894 INTRO/SHEATH, NON-LASER: HCPCS | Performed by: INTERNAL MEDICINE

## 2023-12-21 PROCEDURE — B54CZZ3 ULTRASONOGRAPHY OF LEFT LOWER EXTREMITY VEINS, INTRAVASCULAR: ICD-10-PCS | Performed by: INTERNAL MEDICINE

## 2023-12-21 PROCEDURE — 4A023N7 MEASUREMENT OF CARDIAC SAMPLING AND PRESSURE, LEFT HEART, PERCUTANEOUS APPROACH: ICD-10-PCS | Performed by: INTERNAL MEDICINE

## 2023-12-21 PROCEDURE — 93459 L HRT ART/GRFT ANGIO: CPT

## 2023-12-21 PROCEDURE — 2700000000 HC OXYGEN THERAPY PER DAY

## 2023-12-21 PROCEDURE — 99152 MOD SED SAME PHYS/QHP 5/>YRS: CPT | Performed by: INTERNAL MEDICINE

## 2023-12-21 RX ORDER — SODIUM CHLORIDE 0.9 % (FLUSH) 0.9 %
5-40 SYRINGE (ML) INJECTION EVERY 12 HOURS SCHEDULED
Status: DISCONTINUED | OUTPATIENT
Start: 2023-12-21 | End: 2023-12-22 | Stop reason: HOSPADM

## 2023-12-21 RX ORDER — SODIUM CHLORIDE 9 MG/ML
INJECTION, SOLUTION INTRAVENOUS CONTINUOUS
Status: ACTIVE | OUTPATIENT
Start: 2023-12-21 | End: 2023-12-21

## 2023-12-21 RX ORDER — ACETAMINOPHEN 325 MG/1
650 TABLET ORAL EVERY 4 HOURS PRN
Status: DISCONTINUED | OUTPATIENT
Start: 2023-12-21 | End: 2023-12-22 | Stop reason: HOSPADM

## 2023-12-21 RX ORDER — FENTANYL CITRATE 50 UG/ML
INJECTION, SOLUTION INTRAMUSCULAR; INTRAVENOUS
Status: COMPLETED | OUTPATIENT
Start: 2023-12-21 | End: 2023-12-21

## 2023-12-21 RX ORDER — HYDRALAZINE HYDROCHLORIDE 20 MG/ML
10 INJECTION INTRAMUSCULAR; INTRAVENOUS ONCE
Status: COMPLETED | OUTPATIENT
Start: 2023-12-21 | End: 2023-12-21

## 2023-12-21 RX ORDER — ALBUTEROL SULFATE 90 UG/1
2 AEROSOL, METERED RESPIRATORY (INHALATION) EVERY 4 HOURS PRN
Status: DISCONTINUED | OUTPATIENT
Start: 2023-12-21 | End: 2023-12-22 | Stop reason: HOSPADM

## 2023-12-21 RX ORDER — SODIUM CHLORIDE 0.9 % (FLUSH) 0.9 %
5-40 SYRINGE (ML) INJECTION PRN
Status: DISCONTINUED | OUTPATIENT
Start: 2023-12-21 | End: 2023-12-22 | Stop reason: HOSPADM

## 2023-12-21 RX ORDER — PANTOPRAZOLE SODIUM 40 MG/1
40 TABLET, DELAYED RELEASE ORAL
Status: DISCONTINUED | OUTPATIENT
Start: 2023-12-22 | End: 2023-12-21

## 2023-12-21 RX ORDER — LORAZEPAM 0.5 MG/1
0.5 TABLET ORAL
Status: ACTIVE | OUTPATIENT
Start: 2023-12-21 | End: 2023-12-22

## 2023-12-21 RX ORDER — SODIUM CHLORIDE 9 MG/ML
INJECTION, SOLUTION INTRAVENOUS PRN
Status: DISCONTINUED | OUTPATIENT
Start: 2023-12-21 | End: 2023-12-22 | Stop reason: HOSPADM

## 2023-12-21 RX ORDER — CLOPIDOGREL 300 MG/1
TABLET, FILM COATED ORAL
Status: COMPLETED | OUTPATIENT
Start: 2023-12-21 | End: 2023-12-21

## 2023-12-21 RX ORDER — ASPIRIN 325 MG
325 TABLET ORAL ONCE
Status: COMPLETED | OUTPATIENT
Start: 2023-12-21 | End: 2023-12-21

## 2023-12-21 RX ORDER — MIDAZOLAM HYDROCHLORIDE 1 MG/ML
INJECTION INTRAMUSCULAR; INTRAVENOUS
Status: COMPLETED | OUTPATIENT
Start: 2023-12-21 | End: 2023-12-21

## 2023-12-21 RX ORDER — ONDANSETRON 2 MG/ML
4 INJECTION INTRAMUSCULAR; INTRAVENOUS EVERY 6 HOURS PRN
Status: DISCONTINUED | OUTPATIENT
Start: 2023-12-21 | End: 2023-12-22 | Stop reason: SDUPTHER

## 2023-12-21 RX ORDER — PANTOPRAZOLE SODIUM 40 MG/1
40 TABLET, DELAYED RELEASE ORAL
Status: DISCONTINUED | OUTPATIENT
Start: 2023-12-21 | End: 2023-12-22 | Stop reason: HOSPADM

## 2023-12-21 RX ORDER — ONDANSETRON 2 MG/ML
4 INJECTION INTRAMUSCULAR; INTRAVENOUS EVERY 6 HOURS PRN
Status: DISCONTINUED | OUTPATIENT
Start: 2023-12-21 | End: 2023-12-22 | Stop reason: HOSPADM

## 2023-12-21 RX ORDER — CLOPIDOGREL BISULFATE 75 MG/1
75 TABLET ORAL DAILY
Status: DISCONTINUED | OUTPATIENT
Start: 2023-12-22 | End: 2023-12-22 | Stop reason: HOSPADM

## 2023-12-21 RX ADMIN — FENTANYL CITRATE 25 MCG: 50 INJECTION, SOLUTION INTRAMUSCULAR; INTRAVENOUS at 13:29

## 2023-12-21 RX ADMIN — Medication 325 MG: at 08:59

## 2023-12-21 RX ADMIN — FENTANYL CITRATE 25 MCG: 50 INJECTION, SOLUTION INTRAMUSCULAR; INTRAVENOUS at 14:15

## 2023-12-21 RX ADMIN — Medication 10 ML: at 20:05

## 2023-12-21 RX ADMIN — SODIUM CHLORIDE: 9 INJECTION, SOLUTION INTRAVENOUS at 10:10

## 2023-12-21 RX ADMIN — PANTOPRAZOLE SODIUM 40 MG: 40 TABLET, DELAYED RELEASE ORAL at 16:25

## 2023-12-21 RX ADMIN — SODIUM CHLORIDE: 9 INJECTION, SOLUTION INTRAVENOUS at 15:49

## 2023-12-21 RX ADMIN — CLOPIDOGREL 600 MG: 300 TABLET, FILM COATED ORAL at 14:30

## 2023-12-21 RX ADMIN — HYDRALAZINE HYDROCHLORIDE 10 MG: 20 INJECTION INTRAMUSCULAR; INTRAVENOUS at 16:36

## 2023-12-21 RX ADMIN — ONDANSETRON 4 MG: 2 INJECTION INTRAMUSCULAR; INTRAVENOUS at 18:42

## 2023-12-21 RX ADMIN — MIDAZOLAM HYDROCHLORIDE 1 MG: 1 INJECTION INTRAMUSCULAR; INTRAVENOUS at 13:20

## 2023-12-21 RX ADMIN — Medication 10 ML: at 08:59

## 2023-12-21 NOTE — DISCHARGE INSTRUCTIONS
Cath Labs at  [facility]   Discharge Instructions        12/21/2023  [address] [place]   Date of Birth 1940       Activity:  No driving for 24 hours. In 24 hours you may remove dressing and shower, wash site gently with soap and water and leave open to air  Avoid submerging your arm in sitting water for 5 days. Do not use your right hand for 24 hours, then  No lifting more than 5 pounds for 5 days. No lotions, powders, or ointments near site for 5 days. No work/school for 5 days unless instructed otherwise by your cardiologist.    Diet:   Resume previous diet, if a cardiac diet is specified you will receive a handout with  general guidelines. Drink extra non-alcoholic/decaffienated fluids for first 24 hours after your procedure. Arm Management:  If bleeding occurs from the site or a hematoma (lump) begins to increase in size, apply pressure directly over the site, call 911 to return to the hospital.    Special Instructions:  Report any coolness or numbness in the arm  Report any chills, fever, itching, red bumps or rash   Report any of the following to the MD: drainage from the site, redness and/or swelling at the site, increased tenderness at the site   If you are currently taking Metformin or Metformin combination medications for Diabetes, hold your dose for 48 hours after your procedure. Consult your Cardiologist before taking any NSAIDS, vitamin supplements, estrogen, or estrogen plus progestin. Do not stop taking Plavix, Brilinta or Effient, without first consulting your cardiologist.    Sedation Discharge Instructions: For the next 24 hours do not drive a car, operate machinery, power tools or kitchen appliances. Do not drink alcohol; including beer or wine. Do not make any important decisions or sign any important papers. For the next 24 hours you can expect drowsiness, light-headed or dizziness, nausea/ vomiting, inability to concentrate, fatigue and desire to sleep.   We a strange feeling in your back, neck or jaw, or upper belly or in one or both shoulders or arms. Lightheadedness or sudden weakness. A fast or irregular heartbeat. After you call 911, the  may tell you to chew 1 adult-strength or 2 to 4 low-dose aspirin. Wait for an ambulance. Do not try to drive yourself. Call your doctor today if :  You have any trouble breathing. Your chest pain gets worse. You are dizzy or lightheaded, or you feel like you may faint. You are not getting better as expected. You are having new or different chest pain. Cath Lab at  [facility]  Discharge Instructions        12/21/2023  [address] [facility]   Date of Birth 1940     Activity:  No driving for 24 hours. No Tub baths, swimming or hot tubs for 5 days. In 24 hours you may remove dressing and shower. Wash site with soap and water and leave open to air  No lotions, powders or ointments near site for 5 days. No lifting over 5 pounds for 5 days. Return to work/school in 5 days unless instructed otherwise by your doctor. Groin Management:  If you have stairs to walk up, pretend you have a cast on the affected leg walk up them without bending affected leg. When you go home go to the bed or sofa, do not get up except to go to the bathroom. Avoid strenuous activity for 5 days. For example, lifting heavy objects greater than 10 lbs, bicycling, jogging, climbing stairs, or walking long distances. If bleeding occurs from the site or a hematoma (lump), begins to increase in size, apply pressure directly over the site and call 911 to return to the hospital.     Special Instructions:  Report any coolness or numbness in the leg where the cath was done to the MD or call 911  Report any chills, fever, itching, red bumps or rash. Report any of the following to the MD: drainage from the cath site, redness and/or swelling at the site, increased tenderness at the site.   If you are currently instructions or the video instructions - “Menu - Help - VitalPatch Application”    · End of Wear - Returning the Phone    o You are responsible for returning the phone. You will be financially responsible for an unreturned phone.    o Discard the used patch in the trash    o Return the phone,  and any unopened additional patches or adhesives in the pre-paid mailing pouch or drop off at the office.    o Drop the  at [facility] location or box or schedule a home  by calling    [facility] - [phone]. Keep the tracking number for your records.    CONTACT [employer] CUST[employer]ER SUPPORT:  [phone]

## 2023-12-21 NOTE — PROGRESS NOTES
Report given to [facility] on C4. CMU updated regarding upcoming transfer to [facility].

## 2023-12-21 NOTE — PROCEDURES
[facility]       Cardiac Catheterization Lab Report    PATIENT: Matheus Loza  DATE: 2023  MRN: [id number]  CSN: [id number]  : 1940      Performing Physician: Ambar Alvarez MD, GAY, [place], [place]  Primary Care Physician: Cata Salguero MD  Admitting/Referring provider: Pastor Mook MD     Procedures Performed:   1. CPT Code: 05843 US guidance for vascular access  NOTE: Ultrasound access was used to access the femoral artery using the modified seldinger technique after local infiltration of 1-2% lidocaine. Ultrasound documented/visualized patency, pulsatility, and proper location for puncture. It determined safety to proceed with access. Image (s) was printed off [address] [place] [address] and/or sent to medical records for documentation. 2. Left heart catheterization  3. Selective left and right coronary angiogram  4. Left ventriculography   5. Arterial conduit to the diagonal branch  6. Saphenous vein graft to the circumflex complex  7. Left internal mammary artery angiography  8. Intravascular ultrasound of the saphenous vein graft to the circumflex  9. Drug-eluting stent insertion x 3 to the saphenous vein graft conduit to the circumflex  10. Angio-Seal deployment    Procedure Findings:  1. Severe multi vessel coronary artery diease  2. Reduced left ventricular function with ejection fraction of 45% and global hypokinesis  3. Normal left ventricular hemodynamics  4. Patent arterial conduit to the diagonal branch  5. Critically diseased saphenous vein graft conduit to the circumflex with tandem lesions of 70%, 70%, and 95%  6.   Atretic left internal mammary artery to the LAD    Indications:   Patient Active Problem List   Diagnosis    Coronary artery disease involving native coronary artery of native heart without angina pectoris    Hyperlipidemia    Nausea & vomiting    Carotid disease, bilateral (HCC)    Chest pain    COPD (chronic obstructive pulmonary disease) ([address])    HTN (hypertension)    Stented coronary artery    IBS (irritable bowel syndrome)    GERD (gastroesophageal reflux disease)    CRP elevated    Weakness    SOB (shortness of breath)    Centrilobular emphysema (HCC)    Atrial fibrillation (HCC)    Atrial flutter by electrocardiogram ([address])    Anemia    CAD in native artery       Details:   Javy Stein was brought to the cardiac catheterization lab in a fasting state after informed consent was obtained. If the patient was able to provide written consent, it was obtained. The patient's vitals were monitored through out the procedure. The patient was sedated using the appropriate levels of sedation and ASA guidelines. The appropriate access site area was prepped and drapped in a sterile fashion. The area was anesthetized with 2% lidocaine. Initial attempts were made at accessing the right radial artery. We were unable to cannulate the artery despite ultrasound. Eventually we changed the procedure access site to the right common femoral artery. Ultrasound and fluoroscopy was utilized and single antral puncture was utilized to place a 6 [place] sheath into the right common femoral artery. Using the modified Seldinger technique, an arterial sheath was introduced into the arterial access site using a single anterior wall puncture. The sheath was flushed and prepped in usual fashion. Catheters used during the procedure included 5 [place] JL 4, JR4, and pigtail. The catheters were advanced and removed over a .035\" wire, into the appropriate positions. Multiple angiographic views were obtained. An LV gram was obtained. The interventional portion of the procedure was completed utilizing a 6 [place] system. Initially we attempted to place a filter wire EZ 2.25 to 2.5 mm system into the vein graft. Unfortunately, the lesion at the anastomosis site was too severe and the filter wire would not advance through the lesion.   At this point we elected to abort

## 2023-12-21 NOTE — PROGRESS NOTES
Dr. Ki Carlos at bedside speaking with patient and his wife. Angiomax stopped @ 1624. Given a dose of Hydralazine for elevated BP. Also given Protonix for c/o heart burn. Will cont to monitor. Declines food at this time. States his wife will pick something up for him.

## 2023-12-21 NOTE — POST SEDATION
Patient:  Adiel Wills   :   1940    A pre-sedation re-evaluation was performed immediately at the end of the procedure. Procedure:  Cardiac cath  Medications: Procedural sedation with minimal conscious sedation  Complications: None  Estimated Blood Loss: none  Specimens: Were not obtained        [place] Medication and Procedural Reconciliation:  I agree that the documented medications and procedures performed are true. The medications were given under my order. The procedures were performed under my direct supervision.

## 2023-12-21 NOTE — PROGRESS NOTES
Pt en route to 427. All belongings gathered. Wife went home. Tolerated part of his burger and cookies for dinner. TR band off since 1755 and site unremarkable (gauze/tegaderm and armboard in place). R groin site soft/unremarkable (small dot of blood- unchanged). Patient to remain on bedrest 4 hours per Dr. Dieudonne Leija (Until [phone]). ABEL Sharma Clarity updated.

## 2023-12-21 NOTE — PROGRESS NOTES
Back to [facility] post cath lab @ this time.  -R Radial site with TR band on, site unremarkable  -R Groin site unremarkable, gauze/tegaderm in place  -Bilateral DP/PT pulses +2 in feet remain  -Angiomax infusing @ 33.5ml/hr upon arrival. Fluids @ 100ml/hr.  -Patient awake and alert. States his pain is 6/10- \"heartburn\". Once 1.5L 02 applied, patient's pain \"down to a 3.\" Vitals stable, but elevated BP. Cath lab RN to let Dr. Rivas Catalan know.

## 2023-12-22 VITALS
DIASTOLIC BLOOD PRESSURE: 72 MMHG | BODY MASS INDEX: 27.89 KG/M2 | HEART RATE: 75 BPM | RESPIRATION RATE: 16 BRPM | WEIGHT: 205.9 LBS | TEMPERATURE: 98 F | HEIGHT: 72 IN | SYSTOLIC BLOOD PRESSURE: 138 MMHG | OXYGEN SATURATION: 98 %

## 2023-12-22 LAB
ANION GAP SERPL CALCULATED.3IONS-SCNC: 8 MMOL/L (ref 3–16)
BUN SERPL-MCNC: 22 MG/DL (ref 7–20)
CALCIUM SERPL-MCNC: 8.9 MG/DL (ref 8.3–10.6)
CHLORIDE SERPL-SCNC: 106 MMOL/L (ref 99–110)
CO2 SERPL-SCNC: 24 MMOL/L (ref 21–32)
CREAT SERPL-MCNC: 1.1 MG/DL (ref 0.8–1.3)
DEPRECATED RDW RBC AUTO: 16.1 % (ref 12.4–15.4)
GFR SERPLBLD CREATININE-BSD FMLA CKD-EPI: >60 ML/MIN/{1.73_M2}
GLUCOSE SERPL-MCNC: 80 MG/DL (ref 70–99)
HCT VFR BLD AUTO: 35.1 % (ref 40.5–52.5)
HGB BLD-MCNC: 11.8 G/DL (ref 13.5–17.5)
MCH RBC QN AUTO: 33.3 PG (ref 26–34)
MCHC RBC AUTO-ENTMCNC: 33.7 G/DL (ref 31–36)
MCV RBC AUTO: 98.7 FL (ref 80–100)
PLATELET # BLD AUTO: 195 K/UL (ref 135–450)
PMV BLD AUTO: 8.8 FL (ref 5–10.5)
POTASSIUM SERPL-SCNC: 4.4 MMOL/L (ref 3.5–5.1)
RBC # BLD AUTO: 3.56 M/UL (ref 4.2–5.9)
SODIUM SERPL-SCNC: 138 MMOL/L (ref 136–145)
WBC # BLD AUTO: 9.2 K/UL (ref 4–11)

## 2023-12-22 PROCEDURE — 36415 COLL VENOUS BLD VENIPUNCTURE: CPT

## 2023-12-22 PROCEDURE — 6360000002 HC RX W HCPCS: Performed by: INTERNAL MEDICINE

## 2023-12-22 PROCEDURE — 99239 HOSP IP/OBS DSCHRG MGMT >30: CPT | Performed by: INTERNAL MEDICINE

## 2023-12-22 PROCEDURE — 80048 BASIC METABOLIC PNL TOTAL CA: CPT

## 2023-12-22 PROCEDURE — 2700000000 HC OXYGEN THERAPY PER DAY

## 2023-12-22 PROCEDURE — 6370000000 HC RX 637 (ALT 250 FOR IP): Performed by: INTERNAL MEDICINE

## 2023-12-22 PROCEDURE — 94761 N-INVAS EAR/PLS OXIMETRY MLT: CPT

## 2023-12-22 PROCEDURE — 85027 COMPLETE CBC AUTOMATED: CPT

## 2023-12-22 RX ORDER — ASPIRIN 81 MG/1
81 TABLET ORAL DAILY
Qty: 90 TABLET | Refills: 1 | Status: SHIPPED | OUTPATIENT
Start: 2023-12-22

## 2023-12-22 RX ADMIN — CLOPIDOGREL BISULFATE 75 MG: 75 TABLET ORAL at 09:00

## 2023-12-22 RX ADMIN — ONDANSETRON 4 MG: 2 INJECTION INTRAMUSCULAR; INTRAVENOUS at 06:49

## 2023-12-22 NOTE — CARE COORDINATION
Case Management Assessment  Initial Evaluation    Date/Time of Evaluation: 12/22/2023 9:18 AM  Assessment Completed by: Jayde Shields RN    If patient is discharged prior to next notation, then this note serves as note for discharge by case management. Patient Name: Jessica Hampton                   YOB: 1940  Diagnosis: CAD in native artery [I25.10]                   Date / Time: 12/21/2023  8:29 AM    Patient Admission Status: Inpatient   Readmission Risk (Low < 19, Mod (19-27), High > 27): Readmission Risk Score: 7.1    Current PCP: Margoth Simmons MD  PCP verified by CM? Yes Verona Hebert MD)    Chart Reviewed: Yes      History Provided by: Patient, Spouse  Patient Orientation: Alert and Oriented    Patient Cognition: Alert    Hospitalization in the last 30 days (Readmission):  No    If yes, Readmission Assessment in  Navigator will be completed. Advance Directives:      Code Status: Full Code   Patient's Primary Decision Maker is: Legal Next of Kin    Primary Decision MakerMemo Oliver - [phone]    Secondary Decision Maker: Bo Rico - Child  [phone]    Supplemental (Other) Decision Maker: Cezar Parents - Other Relative - [phone]    Discharge Planning:    Patient lives with: Spouse/Significant Other Type of Home: House  Primary Care Giver: Self  Patient Support Systems include: Spouse/Significant Other   Current Financial resources: Other (Comment) ([facility])  Current community resources: None  Current services prior to admission: Oxygen Therapy (HS through [address])            Current DME:              Type of Home Care services:  None    ADLS  Prior functional level: Independent in ADLs/IADLs  Current functional level: Independent in ADLs/IADLs    PT AM-PAC:   /24  OT AM-PAC:   /24    Family can provide assistance at DC:  Yes  Would you like Case Management to discuss the discharge plan with any other family members/significant others,

## 2023-12-22 NOTE — PLAN OF CARE
Problem: Discharge Planning  Goal: Discharge to home or other facility with appropriate resources  12/22/2023 0945 by Neida Conde RN  Outcome: Completed  12/21/2023 2143 by Otto Hanks RN  Outcome: Progressing     Problem: Safety - Adult  Goal: Free from fall injury  12/22/2023 0945 by Neida Conde RN  Outcome: Completed  12/21/2023 2143 by Otto Hanks RN  Outcome: Progressing     Problem: ABCDS Injury Assessment  Goal: Absence of physical injury  12/22/2023 0945 by Neida Conde RN  Outcome: Completed  12/21/2023 2143 by Otto Hanks RN  Outcome: Progressing

## 2023-12-22 NOTE — DISCHARGE SUMMARY
Physician Discharge Summary       Patient ID:  Sindy Abraham  [id number]  36 y.o.  1940    Admit date: 2023    Discharge date:  2023    Admitting Physician: Consuelo Martini MD     Discharge Physician: Tunde Torres MD, GAY, [facility]    Admission Diagnoses: CAD in native artery [I25.10]    Discharge Diagnoses: SAME    Admission Condition: fair    Discharged Condition: good    Hospital Course:                                                                                                           [facility]                                                   Cardiac Catheterization Lab Report     PATIENT: Augustine Arch: 2023  MRN: [id number]  CSN: [id number]  : 1940        Performing Physician: Tunde Torres MD, [address] [place] [address]  Primary Care Physician: Leeanne Sommers MD  Admitting/Referring provider: Consuelo Martini MD      Procedures Performed:   1. CPT Code: 81043 US guidance for vascular access  NOTE: Ultrasound access was used to access the femoral artery using the modified seldinger technique after local infiltration of 1-2% lidocaine. Ultrasound documented/visualized patency, pulsatility, and proper location for puncture. It determined safety to proceed with access. Image (s) was printed off [address] [place] [address] and/or sent to medical records for documentation. 2. Left heart catheterization  3. Selective left and right coronary angiogram  4. Left ventriculography   5. Arterial conduit to the diagonal branch  6. Saphenous vein graft to the circumflex complex  7. Left internal mammary artery angiography  8. Intravascular ultrasound of the saphenous vein graft to the circumflex  9. Drug-eluting stent insertion x 3 to the saphenous vein graft conduit to the circumflex  10. Angio-Seal deployment     Procedure Findings:  1. Severe multi vessel coronary artery diease  2.   Reduced left ventricular function with ejection fraction of 45% and global daily            CONTINUE taking these medications      albuterol sulfate  (90 Base) MCG/ACT inhaler  Commonly known as: Proventil HFA  Inhale 2 puffs into the lungs every 4 hours as needed for Wheezing or Shortness of Breath. clopidogrel 75 MG tablet  Commonly known as: PLAVIX  Take 1 tablet by mouth daily     esomeprazole 20 MG delayed release capsule  Commonly known as: NexIUM     gabapentin 300 MG capsule  Commonly known as: NEURONTIN     nitroGLYCERIN 0.4 MG SL tablet  Commonly known as: NITROSTAT  Place 1 tablet under the tongue every 5 minutes as needed for Chest pain     promethazine 25 MG tablet  Commonly known as: PHENERGAN     Repatha SureClick 922 MG/ML Soaj  Generic drug: Evolocumab  INJECT ONE MILLILITER UNDER THE SKIN ONCE EVERY 2 WEEKS     TRELEGY ELLIPTA IN            STOP taking these medications      zolpidem 10 MG tablet  Commonly known as: AMBIEN            ASK your doctor about these medications      apixaban 5 MG Tabs tablet  Commonly known as: Eliquis  Take 1 tablet by mouth 2 times daily     rivaroxaban 20 MG Tabs tablet  Commonly known as: Xarelto  Take 1 tablet by mouth daily (with breakfast)               Where to Get Your Medications        These medications were sent to [facility] [id number] [place], [place] - [address] [phone] Natacha Bates [phone]  [facility] [address]      Phone: [phone]   aspirin 81 MG EC tablet         Activity: no lifting, Driving, or Strenuous exercise for 1 weeks  Diet: cardiac diet  Wound Care: keep wound clean and dry. Please call the office of physician on call if problems with access site. Follow-up with 6 weeks with [address] [place] [address] Drive and PCP. Signed:  Kirk Cardona MD  12/22/2023  9:00 AM  [phone] Inessa Izaguirre  [phone] Main Office  [phone] [place] office    Total time spent in coordinating discharge for the patient was > 30 minutes.  This included but not limited to speaking with patient and family, completing discharge medication reconciliation, providing instructions and discharge planning.   s, or procedures  (X) Documentation within the EHR

## 2024-01-05 ENCOUNTER — TELEPHONE (OUTPATIENT)
Dept: CARDIOLOGY CLINIC | Age: 84
End: 2024-01-05

## 2024-01-09 DIAGNOSIS — I25.10 CORONARY ARTERY DISEASE INVOLVING NATIVE CORONARY ARTERY OF NATIVE HEART WITHOUT ANGINA PECTORIS: Chronic | ICD-10-CM

## 2024-01-09 RX ORDER — CLOPIDOGREL BISULFATE 75 MG/1
75 TABLET ORAL DAILY
Qty: 90 TABLET | Refills: 1 | Status: SHIPPED | OUTPATIENT
Start: 2024-01-09

## 2024-01-09 NOTE — TELEPHONE ENCOUNTER
Pts wife is requesting refill of Plavix 75mg for a 90 day prescription. She said they need the Aptoex brand. Preferred pharmacy is [email or web address].    Last ov 12/06/2023 npdd  Next ov 02/06/2024 yury

## 2024-02-06 ENCOUNTER — OFFICE VISIT (OUTPATIENT)
Dept: CARDIOLOGY CLINIC | Age: 84
End: 2024-02-06
Payer: COMMERCIAL

## 2024-02-06 VITALS
SYSTOLIC BLOOD PRESSURE: 130 MMHG | OXYGEN SATURATION: 98 % | HEART RATE: 99 BPM | HEIGHT: 72 IN | DIASTOLIC BLOOD PRESSURE: 66 MMHG | BODY MASS INDEX: 28.17 KG/M2 | WEIGHT: 208 LBS

## 2024-02-06 DIAGNOSIS — I25.10 CORONARY ARTERY DISEASE INVOLVING NATIVE CORONARY ARTERY OF NATIVE HEART WITHOUT ANGINA PECTORIS: Primary | Chronic | ICD-10-CM

## 2024-02-06 DIAGNOSIS — R06.02 SOB (SHORTNESS OF BREATH): ICD-10-CM

## 2024-02-06 DIAGNOSIS — I48.91 ATRIAL FIBRILLATION, UNSPECIFIED TYPE (HCC): ICD-10-CM

## 2024-02-06 PROCEDURE — 1123F ACP DISCUSS/DSCN MKR DOCD: CPT | Performed by: INTERNAL MEDICINE

## 2024-02-06 PROCEDURE — 3075F SYST BP GE 130 - 139MM HG: CPT | Performed by: INTERNAL MEDICINE

## 2024-02-06 PROCEDURE — 3078F DIAST BP <80 MM HG: CPT | Performed by: INTERNAL MEDICINE

## 2024-02-06 PROCEDURE — 99214 OFFICE O/P EST MOD 30 MIN: CPT | Performed by: INTERNAL MEDICINE

## 2024-02-06 RX ORDER — FERROUS SULFATE 325(65) MG
325 TABLET ORAL
COMMUNITY

## 2024-02-06 NOTE — PATIENT INSTRUCTIONS
Plan:    Cardiac event monitor results reviewed ~ indicating paroxysmal atrial fibrillation   Discussion importance to continue aspirin 81 mg daily and clopidogrel (Plavix) 75 mg daily ~ status post stent placement  ~   discussion at future encounter reducing to single agent antiplatelet therapy ~ anemia   3.   Referral to Electrophysiology  ~ paroxysmal atrial fibrillation, Watchman   4.   Follow up with me in 6 months

## 2024-02-06 NOTE — PROGRESS NOTES
[facility]   Cardiovascular Evaluation    PATIENT: Khalif Pinedo Jr  DATE: 2024  MRN: [id number]  CSN: [id number]  : 1940    Primary Care Doctor: Scott Denise MD    Reason for evaluation:   Follow-up, Atrial Fibrillation, Coronary Artery Disease, Hypertension, Hyperlipidemia, and Shortness of Breath      Subjective;    History of present illness on initial date of evaluation:   Khalif Pinedo Jr is a 83 y.o. patient who presents for cardiology follow up. He has a past medical history including coronary artery disease s/p stent placement and coronary artery bypass grafting, hyperlipidemia, hypertension, carotid artery disease, COPD, and former tobacco abuse.    Echocardiogram completed on 21 showing ejection fraction 50-55%, mild left ventricular hypertrophy, mild aortic regurgitation, and mild mitral regurgitation. His carotid doppler on 2021 shows right internal carotid artery <50% stenosis. Left internal carotid artery appears normal. Both with normal antegrade flow.    Since last office visit he wore a cardiac event monitor from -22 showing persistent atrial fibrillation with adequate HR control and NSVT. Echocardiogram 22 ejection fraction 50-55% and mild aortic regurgitation. Lexiscan Stress Myoview 22 no ischemia or scarring. He presented to ED on 22 with nausea and vomiting. Eliquis was discontinued as presumed as side effect from medication. He followed up with  outpatient for watchman evaluation in relation to anticoagulation intolerance. He is currently prescribed Xarelto for AC for atrial fibrillation.    Patient was hospitalized -22 with anemia. EGD normal. Colonoscopy with 2 small polyps in ascending colon and AVM in ascending colon. AC was placed on hold. He was discharged and followed up outpatient to undergo gastric cauterization.    Since last office visit he had followed with Crissy Sprague

## 2024-02-09 ENCOUNTER — TELEPHONE (OUTPATIENT)
Dept: CARDIOLOGY CLINIC | Age: 84
End: 2024-02-09

## 2024-02-09 NOTE — TELEPHONE ENCOUNTER
----- Message from JAMISON Pollard Jr., MD sent at 2/9/2024  4:39 PM EST -----  Can we set up with Dr. Mclain please and thank you.  ----- Message -----  From: Tanja Correia MD  Sent: 2/6/2024   1:21 PM EST  To: JAMISON Pollard Jr., MD    Can you get out mutual patient in for finalizing Watchman.    He was worked up last year but cancelled his appointment with you. He is now ready to move forward.    Had monitor and persistent AF/Aflutter and not on AC    Vansh

## 2024-02-12 NOTE — TELEPHONE ENCOUNTER
Pt was already scheduled with [facility] for 2/13/24 at 6206s and will schedule pt with  for another watchman consult at that time.

## 2024-02-12 NOTE — TELEPHONE ENCOUNTER
ABEL Phillips it looks the pt had a consultation with  in 2022. Does he need to have another office visit with ?

## 2024-02-12 NOTE — PROGRESS NOTES
[facility]   Electrophysiology Consult Note    Date: 2/13/24  Patient Name: Khalif Pinedo Jr  YOB: 1940    Primary Care Physician: Scott Denise MD    CHIEF COMPLAINT:   Chief Complaint   Patient presents with    Follow-up    Atrial Fibrillation     HISTORY OF PRESENT ILLNESS: Khalif Pinedo Jr is a 83 y.o. male who presents for evaluation for  Evaluation of AF. Patient has a past medical history including coronary artery disease s/p stent placement and coronary artery bypass grafting, hyperlipidemia, hypertension, carotid artery disease, COPD, and former tobacco abuse. Patient was noted to have an abnormal EKG by his PCP. Patient was placed on a cardiac event monitor by LASHONDA Winter. Patient wore a cardiac event monitor from 5/5/2022 to 5/19/2022 which demonstrated predominately AF with an average HR of 75 (). PAC burden 0%, PVC burden 1.30%, AF burden 100%. Monitor noted events of NSVT. Patient was placed on Diltiazem and Eliquis on 5/6/2022. Patient presented to the ER on 6/14/2022 with complaints of nausea and vomiting. He correlated this with starting the new medications. Eliquis was discontinued at this time. Patient was referred to  EP by Dr. Correia.   Interval History since last office visit: Patient underwent LHC on 12/21/2023 that resulted in CHANTELL. Patient has had issues with anemia and is being referred for Watchman procedure. Patient wore a cardiac event monitor from 12/23/2023 to 1/22/2024 which demonstrated predominately AF with an average HR of 68 ().    Today, 2/13/2024, EKG demonstrates AF 87 bpm. He reports that he still has SOB. He is taking his medication as prescribed. He is still off of blood thinner due to anemia. Patient denies current edema, chest pain, sob, palpitations, dizziness or syncope.     Past Medical History:   has a past medical history of Anemia, CAD (coronary artery disease), CAD (coronary artery disease), COPD (chronic

## 2024-02-12 NOTE — TELEPHONE ENCOUNTER
Caller: Unspecified (3 days ago,  4:45 PM)  Yes he does been 2 years since Rayne spoke with him.  Need to evaluate him to decided if he is a candidate or not since 2 years have passed and if any changes in his history

## 2024-02-13 ENCOUNTER — TELEPHONE (OUTPATIENT)
Dept: CARDIOLOGY CLINIC | Age: 84
End: 2024-02-13

## 2024-02-13 ENCOUNTER — OFFICE VISIT (OUTPATIENT)
Dept: CARDIOLOGY CLINIC | Age: 84
End: 2024-02-13
Payer: COMMERCIAL

## 2024-02-13 VITALS
HEART RATE: 87 BPM | DIASTOLIC BLOOD PRESSURE: 60 MMHG | SYSTOLIC BLOOD PRESSURE: 128 MMHG | BODY MASS INDEX: 28.71 KG/M2 | WEIGHT: 212 LBS | OXYGEN SATURATION: 99 % | HEIGHT: 72 IN

## 2024-02-13 DIAGNOSIS — I48.0 PAROXYSMAL A-FIB (HCC): Primary | ICD-10-CM

## 2024-02-13 DIAGNOSIS — Z95.818 PRESENCE OF WATCHMAN LEFT ATRIAL APPENDAGE CLOSURE DEVICE: ICD-10-CM

## 2024-02-13 DIAGNOSIS — Z01.818 PRE-OP TESTING: ICD-10-CM

## 2024-02-13 DIAGNOSIS — I48.0 PAROXYSMAL ATRIAL FIBRILLATION (HCC): ICD-10-CM

## 2024-02-13 DIAGNOSIS — R60.9 SWELLING: Primary | ICD-10-CM

## 2024-02-13 PROCEDURE — 1123F ACP DISCUSS/DSCN MKR DOCD: CPT | Performed by: INTERNAL MEDICINE

## 2024-02-13 PROCEDURE — 99214 OFFICE O/P EST MOD 30 MIN: CPT | Performed by: INTERNAL MEDICINE

## 2024-02-13 PROCEDURE — 93000 ELECTROCARDIOGRAM COMPLETE: CPT | Performed by: INTERNAL MEDICINE

## 2024-02-13 PROCEDURE — 3074F SYST BP LT 130 MM HG: CPT | Performed by: INTERNAL MEDICINE

## 2024-02-13 PROCEDURE — 3078F DIAST BP <80 MM HG: CPT | Performed by: INTERNAL MEDICINE

## 2024-02-13 RX ORDER — TRAZODONE HYDROCHLORIDE 50 MG/1
TABLET ORAL
COMMUNITY
Start: 2024-02-02

## 2024-02-13 NOTE — PATIENT INSTRUCTIONS
RECOMMENDATIONS:  Discussed Watchman procedure. Will get you in with Dr Rayne ORDONEZ.  After Watchman and 45 day post procedure phase, then can discuss treatment options for atrial fibrillation.   Start 40 mg of lasix daily for 3 days for cough and swelling.   Follow up in 3 months with me.

## 2024-02-13 NOTE — TELEPHONE ENCOUNTER
Pt was present today to see AGK, and wife is upset that the pt wasn't seeing  and wanted explanation why the pt was seeing AGK. I tried explaining the reasoning. she stated per   told them they can go ahead with watchman procedure scheduling. I tried explaining to Anne, pt's wife, that per the watchman group the pt hasn't been seen since 2022 with  and they are requiring another office visit first.  Anne would like to speak directly to the watchman team regarding why pt needs to see  and a shared decision again in order to proceed with the procedure. She can be reached at [phone].

## 2024-02-14 ENCOUNTER — TELEPHONE (OUTPATIENT)
Dept: CARDIOLOGY CLINIC | Age: 84
End: 2024-02-14

## 2024-02-14 RX ORDER — FUROSEMIDE 40 MG/1
40 TABLET ORAL DAILY
Qty: 3 TABLET | Refills: 1 | Status: SHIPPED | OUTPATIENT
Start: 2024-02-14 | End: 2024-02-20

## 2024-02-14 NOTE — TELEPHONE ENCOUNTER
Pt's wife has questions about pre certification of having Watchman procedure. Will send note to Kym Anand to see if she can help. TY

## 2024-02-23 DIAGNOSIS — I48.91 ATRIAL FIBRILLATION, UNSPECIFIED TYPE (HCC): ICD-10-CM

## 2024-02-26 NOTE — PROGRESS NOTES
WATCHMAN CONSULTATION         Patient Name: Khalif Pinedo Jr  Primary Care physician: Scott Denise MD    Reason for Referral/Chief Complaint: Khalif Pinedo Jr is a 83 y.o. patient who is referred to cardiology clinic today for evaluation for candidacy for Watchman Procedure.     History of Present Illness:   Khalif Pinedo Jr 83 y.o. male with a medial history notable for atrial fibrillation, atrial flutter, CAD with prior PCI to LAD in 1998, followed by CABG x3 in 2000, PCI to RCA in 2009, and most recently CHANTELL x 3 to SVG-Lcx, Left CEA 2014, emphysema, COPD, GERD, hypertension, hyperlipidemia, shortness of breath, anemia and IBS.     Patient follows with our cardiology team for Atrial fibrillation and coronary artery disease. Patient underwent LHC on 12/21/2023 that resulted in CHANTELL. Patient has had issues with anemia and is being referred for Watchman procedure.      Today he presents with his wife.  He has met with Dr. Mclain regarding the Watchman.  He does not tolerate blood thinners. States black stools previously with OAC with resultant drop in Hgb to 8 range on chart review. C scope disclosed 4 mm AVM in the ascending colon that was cauterized, internal hemorrhoids and diverticulosis. Upper endoscopy was normal. He has had repletion with IV iron. Blood counts recovered. Now on DAPT following most recent PCI as above. The patient denies chest pain, shortness of breath at rest and dyspnea with exertion. Denies dizziness, near-syncope or veda syncope.  He states he is fatigued from the Afib. Wonders when he'll be able to have ablation.     Past Medical History:   has a past medical history of Anemia, CAD (coronary artery disease), CAD (coronary artery disease), COPD (chronic obstructive pulmonary disease) (HCC), COPD (chronic obstructive pulmonary disease) (HCC), GERD (gastroesophageal reflux disease), HTN (hypertension), Hyperlipidemia, IBS (irritable bowel syndrome), and Stented coronary

## 2024-02-27 ENCOUNTER — OFFICE VISIT (OUTPATIENT)
Dept: CARDIOLOGY CLINIC | Age: 84
End: 2024-02-27
Payer: COMMERCIAL

## 2024-02-27 VITALS
HEIGHT: 72 IN | OXYGEN SATURATION: 97 % | BODY MASS INDEX: 27.09 KG/M2 | SYSTOLIC BLOOD PRESSURE: 110 MMHG | HEART RATE: 66 BPM | DIASTOLIC BLOOD PRESSURE: 62 MMHG | WEIGHT: 200 LBS

## 2024-02-27 DIAGNOSIS — I48.91 ATRIAL FIBRILLATION, UNSPECIFIED TYPE (HCC): Primary | ICD-10-CM

## 2024-02-27 DIAGNOSIS — I25.10 CORONARY ARTERY DISEASE INVOLVING NATIVE CORONARY ARTERY OF NATIVE HEART WITHOUT ANGINA PECTORIS: ICD-10-CM

## 2024-02-27 PROCEDURE — 3074F SYST BP LT 130 MM HG: CPT | Performed by: INTERNAL MEDICINE

## 2024-02-27 PROCEDURE — 99214 OFFICE O/P EST MOD 30 MIN: CPT | Performed by: INTERNAL MEDICINE

## 2024-02-27 PROCEDURE — 1123F ACP DISCUSS/DSCN MKR DOCD: CPT | Performed by: INTERNAL MEDICINE

## 2024-02-27 PROCEDURE — 3078F DIAST BP <80 MM HG: CPT | Performed by: INTERNAL MEDICINE

## 2024-02-27 NOTE — PATIENT INSTRUCTIONS
PLAN:   Agree patient is a good candidate for Watchman procedure    We will inform Dr. Mclain and RN Alan Goldberg of this and they will reach out to you to schedule the procedure.

## 2024-02-28 ENCOUNTER — TELEPHONE (OUTPATIENT)
Dept: CARDIOLOGY CLINIC | Age: 84
End: 2024-02-28

## 2024-02-28 NOTE — TELEPHONE ENCOUNTER
Pt wife called to set up a watchman procedure. Pt wife was told to call back tomorrow after 8. Pt wife V/u

## 2024-02-29 RX ORDER — CHLORHEXIDINE GLUCONATE 213 G/1000ML
SOLUTION TOPICAL
Qty: 1 EACH | Refills: 0 | Status: ON HOLD | OUTPATIENT
Start: 2024-02-29 | End: 2024-03-25 | Stop reason: HOSPADM

## 2024-02-29 NOTE — TELEPHONE ENCOUNTER
Spoke with patients wife and informed him of the procedure that is being scheduled for 4/15/2024 6:45 AM arrival.  Lab work has been ordered and instructed to have done at [facility] 4/8/2024.  Also informed to get Hibiclens bath at their Pharmacy.  All procedure instructions were mailed to patient.  Instructed to contact me with any questions prior to procedure.

## 2024-02-29 NOTE — TELEPHONE ENCOUNTER
ABEL Phillips, this is the patient that you informed that you would handle getting scheduled. Let me know if I need to do anything else.

## 2024-02-29 NOTE — TELEPHONE ENCOUNTER
Edison Pollard 6/28/2022 referred for Watchman procedure.  Dr. Mclain consulted on 10/27/2022.  Dr. Jha shared decision on 2/27/2024.  PALLAVI to be done the day of the procedure.  Insurance to be approved per Katlyn Anand

## 2024-03-11 NOTE — TELEPHONE ENCOUNTER
Dr. Redman reached out and asked if I could move patients Watchman implant date up to a different date.  After speaking with Dr. Cunha, he gave a permission to put a 4 patient on for 3/25/2024.  I called patient and informed him we are moving to this date and to arrive at 9:30 AM at [facility].  I have sent a new letter of information to patients email stating all the changes for the Watchman procedure.

## 2024-03-18 ENCOUNTER — HOSPITAL ENCOUNTER (OUTPATIENT)
Age: 84
Discharge: HOME | End: 2024-03-18
Payer: COMMERCIAL

## 2024-03-18 DIAGNOSIS — I48.0 PAROXYSMAL A-FIB (HCC): ICD-10-CM

## 2024-03-18 DIAGNOSIS — Z01.818 PRE-OP TESTING: ICD-10-CM

## 2024-03-18 LAB
ABO + RH BLD: NORMAL
ANION GAP SERPL CALCULATED.3IONS-SCNC: 9 MMOL/L (ref 3–16)
BILIRUB UR QL STRIP.AUTO: NEGATIVE
BLD GP AB SCN SERPL QL: NORMAL
BUN SERPL-MCNC: 24 MG/DL (ref 7–20)
CALCIUM SERPL-MCNC: 9.6 MG/DL (ref 8.3–10.6)
CHLORIDE SERPL-SCNC: 106 MMOL/L (ref 99–110)
CLARITY UR: CLEAR
CO2 SERPL-SCNC: 28 MMOL/L (ref 21–32)
COLOR UR: YELLOW
CREAT SERPL-MCNC: 1.6 MG/DL (ref 0.8–1.3)
DEPRECATED RDW RBC AUTO: 15.7 % (ref 12.4–15.4)
GFR SERPLBLD CREATININE-BSD FMLA CKD-EPI: 42 ML/MIN/{1.73_M2}
GLUCOSE SERPL-MCNC: 98 MG/DL (ref 70–99)
GLUCOSE UR STRIP.AUTO-MCNC: NEGATIVE MG/DL
HCT VFR BLD AUTO: 33.1 % (ref 40.5–52.5)
HGB BLD-MCNC: 10.9 G/DL (ref 13.5–17.5)
HGB UR QL STRIP.AUTO: ABNORMAL
INR PPP: 1.09 (ref 0.84–1.16)
KETONES UR STRIP.AUTO-MCNC: NEGATIVE MG/DL
LEUKOCYTE ESTERASE UR QL STRIP.AUTO: NEGATIVE
MCH RBC QN AUTO: 30.4 PG (ref 26–34)
MCHC RBC AUTO-ENTMCNC: 32.8 G/DL (ref 31–36)
MCV RBC AUTO: 92.4 FL (ref 80–100)
NITRITE UR QL STRIP.AUTO: NEGATIVE
PH UR STRIP.AUTO: 6 [PH] (ref 5–8)
PLATELET # BLD AUTO: 281 K/UL (ref 135–450)
PMV BLD AUTO: 8.3 FL (ref 5–10.5)
POTASSIUM SERPL-SCNC: 4.2 MMOL/L (ref 3.5–5.1)
PROT UR STRIP.AUTO-MCNC: NEGATIVE MG/DL
PROTHROMBIN TIME: 14.1 SEC (ref 11.5–14.8)
RBC # BLD AUTO: 3.58 M/UL (ref 4.2–5.9)
RBC #/AREA URNS HPF: NORMAL /HPF (ref 0–4)
SODIUM SERPL-SCNC: 143 MMOL/L (ref 136–145)
SP GR UR STRIP.AUTO: 1.01 (ref 1–1.03)
UA DIPSTICK W REFLEX MICRO PNL UR: YES
URN SPEC COLLECT METH UR: ABNORMAL
UROBILINOGEN UR STRIP-ACNC: 0.2 E.U./DL
WBC # BLD AUTO: 7.3 K/UL (ref 4–11)
WBC #/AREA URNS HPF: NORMAL /HPF (ref 0–5)

## 2024-03-18 PROCEDURE — 86850 RBC ANTIBODY SCREEN: CPT

## 2024-03-18 PROCEDURE — 81001 URINALYSIS AUTO W/SCOPE: CPT

## 2024-03-18 PROCEDURE — 85610 PROTHROMBIN TIME: CPT

## 2024-03-18 PROCEDURE — 86901 BLOOD TYPING SEROLOGIC RH(D): CPT

## 2024-03-18 PROCEDURE — 80048 BASIC METABOLIC PNL TOTAL CA: CPT

## 2024-03-18 PROCEDURE — 85027 COMPLETE CBC AUTOMATED: CPT

## 2024-03-18 PROCEDURE — 36415 COLL VENOUS BLD VENIPUNCTURE: CPT

## 2024-03-18 PROCEDURE — 86900 BLOOD TYPING SEROLOGIC ABO: CPT

## 2024-03-19 NOTE — TELEPHONE ENCOUNTER
Watchman 3/25/2024     7:30 AM Khalif Pinedo 1940 Rayne LIZARRAGA    Spoke with patient and went over all instructions.     Creatinine 1.6  Glucose 98  Hgb 10.9  Plt 281  U/A Negative  PT/INR 14.1 / 1.09    CHADSvasc is at least 5 (Age, CAD, CHF, HTN)  HASbled is at least 2.     Patient currently only on BASA/Plavix, he is a poor candidate for chronic anticoagulation with his history of Intolerance to Eliquis and Xarelto as well as GIB/Anemia.    Edisno Atul 6/28/2022 referred for Watchman procedure.  Dr. Mclain consulted on 10/27/2022.  Dr. Jha shared decision on 2/27/2024.  PALLAVI to be done the day of the procedure.  Insurance to be approved per Katlyn Anand    Call patient on Friday to review medications/problems. YES.  UTI (Antibiotic taken)? NO UTI  Any groin issues? look for any type of rash, open skin, etc NO  On any blood thinners & when to stop taking? Do not stop taking Plavix. Take BASA the day of the procedure.  Lab work addressed prior to admission. YES  Allergies addressed? YES  Pre-medication necessary? NO  Pt. staying overnight? YES  PT/INR, T&C & Glucose ordered for day of procedure on every patient. YES  \"Electrophysiology Testing\" order placed on every patient? YES

## 2024-03-21 DIAGNOSIS — E78.49 OTHER HYPERLIPIDEMIA: ICD-10-CM

## 2024-03-21 RX ORDER — EVOLOCUMAB 140 MG/ML
INJECTION, SOLUTION SUBCUTANEOUS
Qty: 6 ML | Refills: 3 | Status: SHIPPED | OUTPATIENT
Start: 2024-03-21

## 2024-03-25 ENCOUNTER — HOSPITAL ENCOUNTER (INPATIENT)
Dept: CARDIAC CATH/INVASIVE PROCEDURES | Age: 84
LOS: 1 days | Discharge: HOME | DRG: 274 | End: 2024-03-25
Attending: INTERNAL MEDICINE | Admitting: INTERNAL MEDICINE
Payer: MEDICARE

## 2024-03-25 ENCOUNTER — ANESTHESIA EVENT (OUTPATIENT)
Dept: CARDIAC CATH/INVASIVE PROCEDURES | Age: 84
DRG: 274 | End: 2024-03-25
Payer: MEDICARE

## 2024-03-25 ENCOUNTER — ANESTHESIA (OUTPATIENT)
Dept: CARDIAC CATH/INVASIVE PROCEDURES | Age: 84
DRG: 274 | End: 2024-03-25
Payer: MEDICARE

## 2024-03-25 VITALS
DIASTOLIC BLOOD PRESSURE: 78 MMHG | HEIGHT: 72 IN | BODY MASS INDEX: 27.09 KG/M2 | RESPIRATION RATE: 16 BRPM | WEIGHT: 200 LBS | HEART RATE: 66 BPM | OXYGEN SATURATION: 95 % | SYSTOLIC BLOOD PRESSURE: 129 MMHG | TEMPERATURE: 97.1 F

## 2024-03-25 PROBLEM — I48.19 PERSISTENT ATRIAL FIBRILLATION (HCC): Status: ACTIVE | Noted: 2024-03-25

## 2024-03-25 LAB
ABO + RH BLD: NORMAL
ANION GAP SERPL CALCULATED.3IONS-SCNC: 11 MMOL/L (ref 3–16)
BLD GP AB SCN SERPL QL: NORMAL
BUN SERPL-MCNC: 26 MG/DL (ref 7–20)
CALCIUM SERPL-MCNC: 9.3 MG/DL (ref 8.3–10.6)
CHLORIDE SERPL-SCNC: 105 MMOL/L (ref 99–110)
CO2 SERPL-SCNC: 22 MMOL/L (ref 21–32)
CREAT SERPL-MCNC: 1.4 MG/DL (ref 0.8–1.3)
EKG ATRIAL RATE: 208 BPM
EKG DIAGNOSIS: NORMAL
EKG Q-T INTERVAL: 412 MS
EKG QRS DURATION: 108 MS
EKG QTC CALCULATION (BAZETT): 484 MS
EKG R AXIS: -74 DEGREES
EKG T AXIS: 51 DEGREES
EKG VENTRICULAR RATE: 83 BPM
GFR SERPLBLD CREATININE-BSD FMLA CKD-EPI: 50 ML/MIN/{1.73_M2}
GLUCOSE SERPL-MCNC: 118 MG/DL (ref 70–99)
POC ACT LR: 231 SEC
POTASSIUM SERPL-SCNC: 4.5 MMOL/L (ref 3.5–5.1)
SODIUM SERPL-SCNC: 138 MMOL/L (ref 136–145)

## 2024-03-25 PROCEDURE — 6370000000 HC RX 637 (ALT 250 FOR IP): Performed by: INTERNAL MEDICINE

## 2024-03-25 PROCEDURE — 85347 COAGULATION TIME ACTIVATED: CPT

## 2024-03-25 PROCEDURE — 6360000002 HC RX W HCPCS: Performed by: NURSE ANESTHETIST, CERTIFIED REGISTERED

## 2024-03-25 PROCEDURE — 33340 PERQ CLSR TCAT L ATR APNDGE: CPT | Performed by: INTERNAL MEDICINE

## 2024-03-25 PROCEDURE — 86900 BLOOD TYPING SEROLOGIC ABO: CPT

## 2024-03-25 PROCEDURE — 7100000001 HC PACU RECOVERY - ADDTL 15 MIN: Performed by: STUDENT IN AN ORGANIZED HEALTH CARE EDUCATION/TRAINING PROGRAM

## 2024-03-25 PROCEDURE — 7100000000 HC PACU RECOVERY - FIRST 15 MIN: Performed by: STUDENT IN AN ORGANIZED HEALTH CARE EDUCATION/TRAINING PROGRAM

## 2024-03-25 PROCEDURE — 86850 RBC ANTIBODY SCREEN: CPT

## 2024-03-25 PROCEDURE — 3700000000 HC ANESTHESIA ATTENDED CARE: Performed by: STUDENT IN AN ORGANIZED HEALTH CARE EDUCATION/TRAINING PROGRAM

## 2024-03-25 PROCEDURE — 6360000004 HC RX CONTRAST MEDICATION: Performed by: INTERNAL MEDICINE

## 2024-03-25 PROCEDURE — 93010 ELECTROCARDIOGRAM REPORT: CPT | Performed by: INTERNAL MEDICINE

## 2024-03-25 PROCEDURE — 2580000003 HC RX 258: Performed by: NURSE ANESTHETIST, CERTIFIED REGISTERED

## 2024-03-25 PROCEDURE — 93355 ECHO TRANSESOPHAGEAL (TEE): CPT

## 2024-03-25 PROCEDURE — 7100000011 HC PHASE II RECOVERY - ADDTL 15 MIN: Performed by: STUDENT IN AN ORGANIZED HEALTH CARE EDUCATION/TRAINING PROGRAM

## 2024-03-25 PROCEDURE — 33340 PERQ CLSR TCAT L ATR APNDGE: CPT

## 2024-03-25 PROCEDURE — 6370000000 HC RX 637 (ALT 250 FOR IP): Performed by: NURSE ANESTHETIST, CERTIFIED REGISTERED

## 2024-03-25 PROCEDURE — 36415 COLL VENOUS BLD VENIPUNCTURE: CPT

## 2024-03-25 PROCEDURE — 6360000002 HC RX W HCPCS: Performed by: INTERNAL MEDICINE

## 2024-03-25 PROCEDURE — 3700000001 HC ADD 15 MINUTES (ANESTHESIA): Performed by: STUDENT IN AN ORGANIZED HEALTH CARE EDUCATION/TRAINING PROGRAM

## 2024-03-25 PROCEDURE — 7100000010 HC PHASE II RECOVERY - FIRST 15 MIN: Performed by: STUDENT IN AN ORGANIZED HEALTH CARE EDUCATION/TRAINING PROGRAM

## 2024-03-25 PROCEDURE — 93005 ELECTROCARDIOGRAM TRACING: CPT | Performed by: INTERNAL MEDICINE

## 2024-03-25 PROCEDURE — 86901 BLOOD TYPING SEROLOGIC RH(D): CPT

## 2024-03-25 PROCEDURE — 2709999900 HC NON-CHARGEABLE SUPPLY

## 2024-03-25 PROCEDURE — 80048 BASIC METABOLIC PNL TOTAL CA: CPT

## 2024-03-25 PROCEDURE — 02L73DK OCCLUSION OF LEFT ATRIAL APPENDAGE WITH INTRALUMINAL DEVICE, PERCUTANEOUS APPROACH: ICD-10-PCS | Performed by: INTERNAL MEDICINE

## 2024-03-25 PROCEDURE — 2500000003 HC RX 250 WO HCPCS: Performed by: NURSE ANESTHETIST, CERTIFIED REGISTERED

## 2024-03-25 PROCEDURE — C1894 INTRO/SHEATH, NON-LASER: HCPCS

## 2024-03-25 PROCEDURE — C1760 CLOSURE DEV, VASC: HCPCS

## 2024-03-25 PROCEDURE — APPNB45 APP NON BILLABLE 31-45 MINUTES: Performed by: NURSE PRACTITIONER

## 2024-03-25 PROCEDURE — B24BZZ4 ULTRASONOGRAPHY OF HEART WITH AORTA, TRANSESOPHAGEAL: ICD-10-PCS | Performed by: INTERNAL MEDICINE

## 2024-03-25 PROCEDURE — G0269 OCCLUSIVE DEVICE IN VEIN ART: HCPCS

## 2024-03-25 PROCEDURE — C1889 IMPLANT/INSERT DEVICE, NOC: HCPCS

## 2024-03-25 PROCEDURE — 1200000000 HC SEMI PRIVATE

## 2024-03-25 PROCEDURE — C1769 GUIDE WIRE: HCPCS

## 2024-03-25 RX ORDER — FAMOTIDINE 10 MG/ML
INJECTION, SOLUTION INTRAVENOUS PRN
Status: DISCONTINUED | OUTPATIENT
Start: 2024-03-25 | End: 2024-03-25 | Stop reason: SDUPTHER

## 2024-03-25 RX ORDER — SODIUM CHLORIDE 9 MG/ML
INJECTION, SOLUTION INTRAVENOUS PRN
Status: DISCONTINUED | OUTPATIENT
Start: 2024-03-25 | End: 2024-03-26 | Stop reason: HOSPADM

## 2024-03-25 RX ORDER — FENTANYL CITRATE 50 UG/ML
INJECTION, SOLUTION INTRAMUSCULAR; INTRAVENOUS PRN
Status: DISCONTINUED | OUTPATIENT
Start: 2024-03-25 | End: 2024-03-25 | Stop reason: SDUPTHER

## 2024-03-25 RX ORDER — SODIUM CHLORIDE 9 MG/ML
INJECTION, SOLUTION INTRAVENOUS CONTINUOUS PRN
Status: DISCONTINUED | OUTPATIENT
Start: 2024-03-25 | End: 2024-03-25 | Stop reason: SDUPTHER

## 2024-03-25 RX ORDER — PROPOFOL 10 MG/ML
INJECTION, EMULSION INTRAVENOUS PRN
Status: DISCONTINUED | OUTPATIENT
Start: 2024-03-25 | End: 2024-03-25 | Stop reason: SDUPTHER

## 2024-03-25 RX ORDER — ACETAMINOPHEN 325 MG/1
650 TABLET ORAL EVERY 4 HOURS PRN
Status: DISCONTINUED | OUTPATIENT
Start: 2024-03-25 | End: 2024-03-26 | Stop reason: HOSPADM

## 2024-03-25 RX ORDER — SODIUM CHLORIDE 0.9 % (FLUSH) 0.9 %
5-40 SYRINGE (ML) INJECTION PRN
Status: DISCONTINUED | OUTPATIENT
Start: 2024-03-25 | End: 2024-03-26 | Stop reason: HOSPADM

## 2024-03-25 RX ORDER — SODIUM CHLORIDE 0.9 % (FLUSH) 0.9 %
5-40 SYRINGE (ML) INJECTION EVERY 12 HOURS SCHEDULED
Status: DISCONTINUED | OUTPATIENT
Start: 2024-03-25 | End: 2024-03-26 | Stop reason: HOSPADM

## 2024-03-25 RX ORDER — LIDOCAINE HYDROCHLORIDE 20 MG/ML
INJECTION, SOLUTION INFILTRATION; PERINEURAL PRN
Status: DISCONTINUED | OUTPATIENT
Start: 2024-03-25 | End: 2024-03-25 | Stop reason: SDUPTHER

## 2024-03-25 RX ORDER — HEPARIN SODIUM 1000 [USP'U]/ML
INJECTION, SOLUTION INTRAVENOUS; SUBCUTANEOUS PRN
Status: DISCONTINUED | OUTPATIENT
Start: 2024-03-25 | End: 2024-03-25 | Stop reason: SDUPTHER

## 2024-03-25 RX ORDER — SUCCINYLCHOLINE/SOD CL,ISO/PF 100 MG/5ML
SYRINGE (ML) INTRAVENOUS PRN
Status: DISCONTINUED | OUTPATIENT
Start: 2024-03-25 | End: 2024-03-25 | Stop reason: SDUPTHER

## 2024-03-25 RX ORDER — SODIUM CHLORIDE 9 MG/ML
INJECTION, SOLUTION INTRAVENOUS CONTINUOUS
Status: DISCONTINUED | OUTPATIENT
Start: 2024-03-25 | End: 2024-03-26 | Stop reason: HOSPADM

## 2024-03-25 RX ORDER — ROCURONIUM BROMIDE 10 MG/ML
INJECTION, SOLUTION INTRAVENOUS PRN
Status: DISCONTINUED | OUTPATIENT
Start: 2024-03-25 | End: 2024-03-25 | Stop reason: SDUPTHER

## 2024-03-25 RX ORDER — LIDOCAINE HYDROCHLORIDE 40 MG/ML
SOLUTION TOPICAL PRN
Status: DISCONTINUED | OUTPATIENT
Start: 2024-03-25 | End: 2024-03-25 | Stop reason: SDUPTHER

## 2024-03-25 RX ORDER — ASPIRIN 81 MG/1
81 TABLET, CHEWABLE ORAL DAILY
Status: DISCONTINUED | OUTPATIENT
Start: 2024-03-25 | End: 2024-03-26 | Stop reason: HOSPADM

## 2024-03-25 RX ADMIN — HEPARIN SODIUM 7000 UNITS: 1000 INJECTION INTRAVENOUS; SUBCUTANEOUS at 10:27

## 2024-03-25 RX ADMIN — LIDOCAINE HYDROCHLORIDE 80 MG: 20 INJECTION, SOLUTION INFILTRATION; PERINEURAL at 10:05

## 2024-03-25 RX ADMIN — ROCURONIUM BROMIDE 30 MG: 10 INJECTION, SOLUTION INTRAVENOUS at 10:10

## 2024-03-25 RX ADMIN — ROCURONIUM BROMIDE 10 MG: 10 INJECTION, SOLUTION INTRAVENOUS at 10:05

## 2024-03-25 RX ADMIN — PHENYLEPHRINE HYDROCHLORIDE 100 MCG: 10 INJECTION INTRAVENOUS at 10:05

## 2024-03-25 RX ADMIN — FENTANYL CITRATE 25 MCG: 50 INJECTION, SOLUTION INTRAMUSCULAR; INTRAVENOUS at 10:05

## 2024-03-25 RX ADMIN — PHENYLEPHRINE HYDROCHLORIDE 50 MCG/MIN: 10 INJECTION INTRAVENOUS at 10:12

## 2024-03-25 RX ADMIN — IOPAMIDOL 20 ML: 755 INJECTION, SOLUTION INTRAVENOUS at 10:50

## 2024-03-25 RX ADMIN — Medication 120 MG: at 10:06

## 2024-03-25 RX ADMIN — PROPOFOL 130 MG: 10 INJECTION, EMULSION INTRAVENOUS at 10:05

## 2024-03-25 RX ADMIN — Medication 2000 MG: at 10:09

## 2024-03-25 RX ADMIN — PHENYLEPHRINE HYDROCHLORIDE 100 MCG: 10 INJECTION INTRAVENOUS at 10:27

## 2024-03-25 RX ADMIN — SUGAMMADEX 200 MG: 100 INJECTION, SOLUTION INTRAVENOUS at 10:46

## 2024-03-25 RX ADMIN — FAMOTIDINE 20 MG: 10 INJECTION INTRAVENOUS at 09:45

## 2024-03-25 RX ADMIN — SODIUM CHLORIDE: 9 INJECTION, SOLUTION INTRAVENOUS at 09:38

## 2024-03-25 RX ADMIN — HEPARIN SODIUM 4000 UNITS: 1000 INJECTION INTRAVENOUS; SUBCUTANEOUS at 10:35

## 2024-03-25 RX ADMIN — LIDOCAINE HYDROCHLORIDE 3 ML: 40 SOLUTION TOPICAL at 10:07

## 2024-03-25 RX ADMIN — ASPIRIN 81 MG 81 MG: 81 TABLET ORAL at 09:56

## 2024-03-25 ASSESSMENT — ENCOUNTER SYMPTOMS: SHORTNESS OF BREATH: 1

## 2024-03-25 NOTE — PROCEDURES
through intact septum were done using PALLAVI guidance as well as pressure monitoring , and fluoroscopy in KIM and LEE projections. We placed one Watchman Sheaths inside the left atrium. A long wire was placed into the left superior pulmonary vein. Then the SL sheath was exchanged over the wire with double curve watchman access sheath. Then a pigtail catheter was inserted into the access sheath and was placed inside the left atrial appendage. Angiography of ANAIS was performed. Pigtail catheter was removed. Then Watchman delivery sheath was inserted into the access sheath. Using fluoroscopy and live PALLAVI the anterior lobe of the appendage was located and delivery sheath was advanced into this lobe. Then device was implanted into the appendage under fluoroscopy and live PALLAVI imaging.     A Tug test was done multiple times to insure device stability   PALLAVI guidance and 3D guidance revealed device to be well seated did not correct the position.     10 cc Contrast injection was done to confirm device position.       After deployment a tug test was done and stability of device was checked. Position of device was checked. Measurement of device showed appropriate compression of the device. Using color Doppler, no leak around the was noted. PASS criteria for releasing of device were met and device was released. A Perclose was used to achieve hemostasis.       Patient was extubated and transferred to the floor. No immediate complications noted.       Assessment and Summary:    Successful deployment of left atrial appendage occlusion device with no complication    WATCHMAN device used 24 mm size     Angiogram revealed good seal and no thrombus     PALLAVI confirmed placement and seal    EBL Less than 40 mL  No complications        Plan:     The patient will be admitted and have usual post care  Continue ASA, plavix     Patient is participating in the left atrial appendage occlusion/closure registry. LAAO Registry is approved by the

## 2024-03-25 NOTE — TELEPHONE ENCOUNTER
Fatuma, can you assist in finding a date/time for this Post Watchman PALLAVI with tammi Jha? Thank you.

## 2024-03-25 NOTE — H&P
referral.  Based on both stroke and bleeding risk, a shared decision has been made to pursue closure of the left atrial appendage as an alternative to oral anticoagulant therapy for stroke prophylaxis and to reduce their long term risk of incidence of bleeding.    Signed:    Huan Poole MD, 3/25/2024, 9:21 AM   Patient Active Problem List   Diagnosis    Coronary artery disease involving native coronary artery of native heart without angina pectoris    Hyperlipidemia    Nausea & vomiting    Carotid disease, bilateral (HCC)    Chest pain    COPD (chronic obstructive pulmonary disease) (HCC)    HTN (hypertension)    Stented coronary artery    IBS (irritable bowel syndrome)    GERD (gastroesophageal reflux disease)    CRP elevated    Weakness    SOB (shortness of breath)    Centrilobular emphysema (HCC)    Atrial fibrillation (HCC)    Atrial flutter by electrocardiogram (HCC)    Anemia    CAD in native artery       PLAN:   Agree that this patient is a good candidate for Watchman procedure as he has failed to tolerate oral anticoagulation previously due to GI bleeding. High risk for re-bleeding as requires anti-plt therapy for complex coronary artery disease.    Schedule for WATCHMAN once approval gained.  Continue DAPT status post recent PCI -duration per Dr. Correia  Continue PCSK-9 therapy for coronary artery disease/lipids      Plan:  He is here for scheduled ANAIS occlusion with a Watchman device under general anesthesia.    Huan Poole MD,   [place] [address] [phone]

## 2024-03-25 NOTE — TELEPHONE ENCOUNTER
3/25/2024 s/p SUCCESSFUL WATCHMAN LAAC PROCEDURE PER DR. Huan Poole of left atrial appendage occlusion device with no complication, WATCHMAN device used 24 mm size.     --6-month f/u Post Watchman.  --1-year f/u Post Watchman.  --2-year f/u Post Watchman.    Princess, Schedule at [facility]  Post procedure PALLAVI to be completed 45-59 days post procedure (5/9/2024-5/23/2024) Order placed.     Thanks.   Alan Goldberg RN, BSN   Watchman Coordinator

## 2024-03-25 NOTE — PROGRESS NOTES
Pressure drsg removed. Patient out of bed to ambulate to the bathroom and around the unit, R groin site drsg with no change post ambulation, + pedal pulses. States he is ready for discharge. NP contacted and okay with discharge.

## 2024-03-25 NOTE — PROGRESS NOTES
R groin site oozing with saturated dressing after palpation. Pressure to groin held for 30 minutes. Destat and new dressing applied, lying flat in cart. Leelee LEON contacted and ordered additional 2 hour bedrest. Bedrest up at 1515.

## 2024-03-25 NOTE — FLOWSHEET NOTE
03/25/24 1545   AVS Reviewed   AVS & discharge instructions reviewed with patient and/or representative? Yes   Reviewed instructions with Patient   Level of Understanding Questions answered;Teach back completed;Verbalized understanding;Return demonstration     Patient discharged home with all belongings via w/c to car with wife. PIV removed. R groin site drsg remains with no change after getting dressed.

## 2024-03-25 NOTE — PROGRESS NOTES
Patient to PACU from cath lab. Awakens to voice. VSS. Afib on tele. Right groin site intact. Distal pulses present.

## 2024-03-25 NOTE — PROGRESS NOTES
Pt on unit from pacu report received at bedside. Pt denies any pain VSS R groin soft dressing CDI pedal pulse strong. Call light in reach wife at bedside.pt aware of bedrest order

## 2024-03-25 NOTE — DISCHARGE SUMMARY
[facility]    DISCHARGE SUMMARY      Patient ID:  Khalif Pinedo Jr  [id number] 83 y.o. 1940    Discharge date:  03/25/24    Admitting Physician: Huan Poole MD     Discharge NP: Leelee Henderson, APRN - CNP    Admission Diagnoses: Paroxysmal atrial fibrillation [I48.0]  Persistent atrial fibrillation (HCC) [I48.19]    Discharge Diagnoses:   Patient Active Problem List   Diagnosis    Coronary artery disease involving native coronary artery of native heart without angina pectoris    Hyperlipidemia    Nausea & vomiting    Carotid disease, bilateral (HCC)    Chest pain    COPD (chronic obstructive pulmonary disease) (HCC)    HTN (hypertension)    Stented coronary artery    IBS (irritable bowel syndrome)    GERD (gastroesophageal reflux disease)    CRP elevated    Weakness    SOB (shortness of breath)    Centrilobular emphysema (HCC)    Atrial fibrillation (HCC)    Atrial flutter by electrocardiogram (HCC)    Anemia    CAD in native artery    Persistent atrial fibrillation (HCC)         Discharged Condition: good    Hospital Course: Khalif Pinedo Jr is a 83 y.o. male patient  with a medial history notable for atrial fibrillation, atrial flutter, CAD with prior PCI to LAD in 1998, followed by CABG x3 in 2000, PCI to RCA in 2009, and most recently CHANTELL x 3 to SVG-Lcx, Left CEA 2014, emphysema, COPD, GERD, hypertension, hyperlipidemia, shortness of breath, anemia and IBS.    Patient follows with our cardiology team for Atrial fibrillation and coronary artery disease. Patient underwent LHC on 12/21/2023 that resulted in CHANTELL. Patient has had issues with anemia and was referred for Watchman procedure.    (per H&P)    Patient presented today for Watchman Device implantation.     Impression     1. Atrial fibrillation (non-valvular) s/p successful percutaneous left atrial appendage occlusion    Recommendations     Treatment with dual antiplatelet therapy   2.   PALLAVI at 45 days         -  Anticoagulation

## 2024-03-25 NOTE — DISCHARGE INSTRUCTIONS
Watchman Discharge Instruction    Care of your puncture site:  Remove bandage 24 hours after the procedure.  May shower in 24 hours but do not sit in a bathtub/pool of water for 5 days or until the wound is healed.  Gently clean groin using soap and water.  Dry thoroughly and apply a Band-Aid that covers the entire site. Use Band-Aid until skin heals over in about 3-5 days.   Do not apply powder or lotion.    Limit walking and stair climbing today.    Normal Observations:  Soreness or tenderness which may last one week.  Mild oozing from the incision site.  Possible bruising that could last 2 weeks.    Activity:  You may resume normal activity in 5 days or after the wound heals.  Avoid lifting more than 10 pounds for 5 days or until the wound heals.  Avoid strenuous exercise or activity for 1 week.  You may return to work in 1 week  without restrictions       Call your doctor immediately if your condition worsens, for any other concerns, for a follow-up appointment or if you experience any of the following:  Increased swelling on the groin or leg.  Unusual pain, numbness, or tingling of the groin or down the leg.  Any signs of infection such as: redness, yellow drainage at the site, swelling or pain.    IF GROIN STARTS BLEEDING SIGNIFICANTLY:   LAY FLAT, HOLD FIRM DIRECT PRESSURE, AND CALL 911     antibiotic prophylaxis (amoxicillin 2 g once 60 minutes prior to procedure) for 6 months for:  a.     Dental procedures including cleanings  b.     Gastrointestinal procedures  c.     Genitourinary procedures  d.     Respiratory procedures involving incision or biopsy  e.     Procedures on infected skin or muscle.        SEDATION DISCHARGE INSTRUCTIONS    3/25/2024     Wear your seatbelt home.  You are under the influence of drugs. Do not drink alcohol, drive, operate machinery, or make any important decisions or sign any legal documents for 24 hours  A responsible adult needs to be with you for 24 hours.  You may

## 2024-03-25 NOTE — ANESTHESIA POSTPROCEDURE EVALUATION
Department of Anesthesiology  Postprocedure Note    Patient: Khalif Pinedo Jr  MRN: [id number]  YOB: 1940  Date of evaluation: 3/25/2024    Procedure Summary       Date: 03/25/24 Room / Location: [facility] Cath Lab; [facility] Echocardiography    Anesthesia Start: 1001 Anesthesia Stop: 1103    Procedure: ECHO PALLAVI IN CARDIAC CATH Diagnosis:       Paroxysmal atrial fibrillation      Persistent atrial fibrillation (HCC)    Scheduled Providers: Jose Smith MD Responsible Provider: Mally Rizo MD    Anesthesia Type: general ASA Status: 3            Anesthesia Type: No value filed.    India Phase I: India Score: 10    India Phase II: India Score: 10    Anesthesia Post Evaluation    Patient location during evaluation: bedside  Patient participation: complete - patient participated  Level of consciousness: awake and alert  Pain score: 2  Airway patency: patent  Nausea & Vomiting: no vomiting  Cardiovascular status: hemodynamically stable  Respiratory status: nonlabored ventilation  Hydration status: stable  Multimodal analgesia pain management approach  Pain management: adequate    No notable events documented.

## 2024-03-25 NOTE — ANESTHESIA PRE PROCEDURE
Department of Anesthesiology  Preprocedure Note       Name:  Khalif Pinedo Jr   Age:  83 y.o.  :  1940                                          MRN:  [id number]         Date:  3/25/2024      Surgeon: * No surgeons listed *    Procedure: * No procedures listed *    Medications prior to admission:   Prior to Admission medications    Medication Sig Start Date End Date Taking? Authorizing Provider   Evolocumab (REPATHA SURECLICK) 140 MG/ML SOAJ ADMINISTER 1 ML UNDER THE SKIN 1 TIME EVERY 2 WEEKS 3/21/24   Tigre Howell DO   chlorhexidine (HIBICLENS) 4 % external liquid Wash from neck down the night before or morning of the procedure. 24  Indra Mclain MD   furosemide (LASIX) 40 MG tablet Take 1 tablet by mouth daily for 6 days  Patient not taking: Reported on 2024  JAMISON Pollard Jr., MD   traZODone (DESYREL) 50 MG tablet  24   ProviderMilo MD   ferrous sulfate (IRON 325) 325 (65 Fe) MG tablet Take 1 tablet by mouth daily (with breakfast)  Patient not taking: Reported on 2024    Milo Plascencia MD   clopidogrel (PLAVIX) 75 MG tablet Take 1 tablet by mouth daily 24   Tanja Correia MD   aspirin 81 MG EC tablet Take 1 tablet by mouth daily 23   Tanja Correia MD   nitroGLYCERIN (NITROSTAT) 0.4 MG SL tablet Place 1 tablet under the tongue every 5 minutes as needed for Chest pain 23   Crissy Sprague, APRN - CNP   gabapentin (NEURONTIN) 300 MG capsule  23   Milo Plascencia MD   rivaroxaban (XARELTO) 20 MG TABS tablet Take 1 tablet by mouth daily (with breakfast) 22  Tanja Correia MD   Fluticasone-Umeclidin-Vilant (TRELEGY ELLIPTA IN) Inhale into the lungs     Milo Plascencia MD   apixaban (ELIQUIS) 5 MG TABS tablet Take 1 tablet by mouth 2 times daily  Patient taking differently: Take 5 mg by mouth 2 times daily 1/2 tablet twice daily 5/5/22 6/14/22  Crissy Sprague, APRN - CNP   esomeprazole

## 2024-03-25 NOTE — PROGRESS NOTES
Patient is alert, VSS. Right groin site intact. Phase 1 recovery completed, will transition to phase 2.

## 2024-03-25 NOTE — PROGRESS NOTES
PRE-PROCEDURE    DATE: 3/25/2024 ARRIVAL TO CATH LAB: 7:44 AM    ADMIT SOURCE: Outpatient    ID & ALLERGY BAND: On    CONSENT: Yes    NPO SINCE: Midnight    PULSES: Right DP 2+  Right PT 2+  Left DP 2+  Left PT 2+    IV SITE : Started in L arm.  with fluids infusing at kvo 7:44 AM     EKG RHYTHM: Atrial Fibrillation

## 2024-04-01 ENCOUNTER — APPOINTMENT (OUTPATIENT)
Dept: GENERAL RADIOLOGY | Age: 84
DRG: 192 | End: 2024-04-01
Payer: MEDICARE

## 2024-04-01 ENCOUNTER — APPOINTMENT (OUTPATIENT)
Dept: CT IMAGING | Age: 84
DRG: 192 | End: 2024-04-01
Payer: MEDICARE

## 2024-04-01 ENCOUNTER — HOSPITAL ENCOUNTER (INPATIENT)
Age: 84
LOS: 1 days | Discharge: HOME | DRG: 192 | End: 2024-04-02
Attending: STUDENT IN AN ORGANIZED HEALTH CARE EDUCATION/TRAINING PROGRAM | Admitting: STUDENT IN AN ORGANIZED HEALTH CARE EDUCATION/TRAINING PROGRAM
Payer: MEDICARE

## 2024-04-01 DIAGNOSIS — R06.00 DYSPNEA, UNSPECIFIED TYPE: Primary | ICD-10-CM

## 2024-04-01 DIAGNOSIS — J44.1 COPD EXACERBATION (HCC): ICD-10-CM

## 2024-04-01 DIAGNOSIS — R79.89 ELEVATED TROPONIN: ICD-10-CM

## 2024-04-01 LAB
ALBUMIN SERPL-MCNC: 4.4 G/DL (ref 3.4–5)
ALBUMIN/GLOB SERPL: 1.4 {RATIO} (ref 1.1–2.2)
ALP SERPL-CCNC: 163 U/L (ref 40–129)
ALT SERPL-CCNC: 12 U/L (ref 10–40)
ANION GAP SERPL CALCULATED.3IONS-SCNC: 13 MMOL/L (ref 3–16)
AST SERPL-CCNC: 21 U/L (ref 15–37)
BASOPHILS # BLD: 0 K/UL (ref 0–0.2)
BASOPHILS NFR BLD: 0.4 %
BILIRUB SERPL-MCNC: 0.9 MG/DL (ref 0–1)
BUN SERPL-MCNC: 25 MG/DL (ref 7–20)
CALCIUM SERPL-MCNC: 9.5 MG/DL (ref 8.3–10.6)
CHLORIDE SERPL-SCNC: 101 MMOL/L (ref 99–110)
CO2 SERPL-SCNC: 20 MMOL/L (ref 21–32)
CREAT SERPL-MCNC: 1.4 MG/DL (ref 0.8–1.3)
D DIMER: 1.24 UG/ML FEU (ref 0–0.6)
DEPRECATED RDW RBC AUTO: 16 % (ref 12.4–15.4)
EKG ATRIAL RATE: 105 BPM
EKG DIAGNOSIS: NORMAL
EKG Q-T INTERVAL: 346 MS
EKG QRS DURATION: 106 MS
EKG QTC CALCULATION (BAZETT): 434 MS
EKG R AXIS: -73 DEGREES
EKG T AXIS: 32 DEGREES
EKG VENTRICULAR RATE: 95 BPM
EOSINOPHIL # BLD: 0 K/UL (ref 0–0.6)
EOSINOPHIL NFR BLD: 0.1 %
FLUAV RNA RESP QL NAA+PROBE: NOT DETECTED
FLUBV RNA RESP QL NAA+PROBE: NOT DETECTED
GFR SERPLBLD CREATININE-BSD FMLA CKD-EPI: 50 ML/MIN/{1.73_M2}
GLUCOSE SERPL-MCNC: 146 MG/DL (ref 70–99)
HCT VFR BLD AUTO: 31.9 % (ref 40.5–52.5)
HGB BLD-MCNC: 10.2 G/DL (ref 13.5–17.5)
LACTATE BLDV-SCNC: 1.4 MMOL/L (ref 0.4–1.9)
LACTATE BLDV-SCNC: 1.6 MMOL/L (ref 0.4–2)
LACTATE BLDV-SCNC: 1.7 MMOL/L (ref 0.4–1.9)
LYMPHOCYTES # BLD: 0.7 K/UL (ref 1–5.1)
LYMPHOCYTES NFR BLD: 8.9 %
MCH RBC QN AUTO: 29 PG (ref 26–34)
MCHC RBC AUTO-ENTMCNC: 31.9 G/DL (ref 31–36)
MCV RBC AUTO: 90.9 FL (ref 80–100)
MONOCYTES # BLD: 0.2 K/UL (ref 0–1.3)
MONOCYTES NFR BLD: 3.1 %
NEUTROPHILS # BLD: 6.7 K/UL (ref 1.7–7.7)
NEUTROPHILS NFR BLD: 87.5 %
NT-PROBNP SERPL-MCNC: 2226 PG/ML (ref 0–449)
PLATELET # BLD AUTO: 279 K/UL (ref 135–450)
PMV BLD AUTO: 7.9 FL (ref 5–10.5)
POTASSIUM SERPL-SCNC: 5 MMOL/L (ref 3.5–5.1)
PROT SERPL-MCNC: 7.6 G/DL (ref 6.4–8.2)
RBC # BLD AUTO: 3.51 M/UL (ref 4.2–5.9)
SARS-COV-2 RNA RESP QL NAA+PROBE: NOT DETECTED
SODIUM SERPL-SCNC: 134 MMOL/L (ref 136–145)
TROPONIN, HIGH SENSITIVITY: 69 NG/L (ref 0–22)
WBC # BLD AUTO: 7.6 K/UL (ref 4–11)

## 2024-04-01 PROCEDURE — 1200000000 HC SEMI PRIVATE

## 2024-04-01 PROCEDURE — 83605 ASSAY OF LACTIC ACID: CPT

## 2024-04-01 PROCEDURE — 80053 COMPREHEN METABOLIC PANEL: CPT

## 2024-04-01 PROCEDURE — 2580000003 HC RX 258: Performed by: STUDENT IN AN ORGANIZED HEALTH CARE EDUCATION/TRAINING PROGRAM

## 2024-04-01 PROCEDURE — 87040 BLOOD CULTURE FOR BACTERIA: CPT

## 2024-04-01 PROCEDURE — 36415 COLL VENOUS BLD VENIPUNCTURE: CPT

## 2024-04-01 PROCEDURE — 6370000000 HC RX 637 (ALT 250 FOR IP): Performed by: STUDENT IN AN ORGANIZED HEALTH CARE EDUCATION/TRAINING PROGRAM

## 2024-04-01 PROCEDURE — 87636 SARSCOV2 & INF A&B AMP PRB: CPT

## 2024-04-01 PROCEDURE — 6360000002 HC RX W HCPCS: Performed by: STUDENT IN AN ORGANIZED HEALTH CARE EDUCATION/TRAINING PROGRAM

## 2024-04-01 PROCEDURE — 85379 FIBRIN DEGRADATION QUANT: CPT

## 2024-04-01 PROCEDURE — 6370000000 HC RX 637 (ALT 250 FOR IP): Performed by: NURSE PRACTITIONER

## 2024-04-01 PROCEDURE — 93005 ELECTROCARDIOGRAM TRACING: CPT | Performed by: STUDENT IN AN ORGANIZED HEALTH CARE EDUCATION/TRAINING PROGRAM

## 2024-04-01 PROCEDURE — 83880 ASSAY OF NATRIURETIC PEPTIDE: CPT

## 2024-04-01 PROCEDURE — 71046 X-RAY EXAM CHEST 2 VIEWS: CPT

## 2024-04-01 PROCEDURE — 71260 CT THORAX DX C+: CPT

## 2024-04-01 PROCEDURE — 84484 ASSAY OF TROPONIN QUANT: CPT

## 2024-04-01 PROCEDURE — 85025 COMPLETE CBC W/AUTO DIFF WBC: CPT

## 2024-04-01 PROCEDURE — 99285 EMERGENCY DEPT VISIT HI MDM: CPT

## 2024-04-01 PROCEDURE — 6360000004 HC RX CONTRAST MEDICATION: Performed by: STUDENT IN AN ORGANIZED HEALTH CARE EDUCATION/TRAINING PROGRAM

## 2024-04-01 PROCEDURE — 93010 ELECTROCARDIOGRAM REPORT: CPT | Performed by: INTERNAL MEDICINE

## 2024-04-01 RX ORDER — IPRATROPIUM BROMIDE AND ALBUTEROL SULFATE 2.5; .5 MG/3ML; MG/3ML
2 SOLUTION RESPIRATORY (INHALATION) CONTINUOUS
Status: DISCONTINUED | OUTPATIENT
Start: 2024-04-01 | End: 2024-04-01

## 2024-04-01 RX ORDER — SODIUM CHLORIDE 9 MG/ML
INJECTION, SOLUTION INTRAVENOUS PRN
Status: DISCONTINUED | OUTPATIENT
Start: 2024-04-01 | End: 2024-04-02 | Stop reason: HOSPADM

## 2024-04-01 RX ORDER — SODIUM CHLORIDE 0.9 % (FLUSH) 0.9 %
5-40 SYRINGE (ML) INJECTION EVERY 12 HOURS SCHEDULED
Status: DISCONTINUED | OUTPATIENT
Start: 2024-04-01 | End: 2024-04-02 | Stop reason: HOSPADM

## 2024-04-01 RX ORDER — POTASSIUM CHLORIDE 7.45 MG/ML
10 INJECTION INTRAVENOUS PRN
Status: DISCONTINUED | OUTPATIENT
Start: 2024-04-01 | End: 2024-04-02 | Stop reason: HOSPADM

## 2024-04-01 RX ORDER — ONDANSETRON 4 MG/1
4 TABLET, ORALLY DISINTEGRATING ORAL EVERY 8 HOURS PRN
Status: DISCONTINUED | OUTPATIENT
Start: 2024-04-01 | End: 2024-04-02 | Stop reason: HOSPADM

## 2024-04-01 RX ORDER — LABETALOL HYDROCHLORIDE 5 MG/ML
10 INJECTION, SOLUTION INTRAVENOUS EVERY 4 HOURS PRN
Status: DISCONTINUED | OUTPATIENT
Start: 2024-04-01 | End: 2024-04-01

## 2024-04-01 RX ORDER — MAGNESIUM SULFATE IN WATER 40 MG/ML
2000 INJECTION, SOLUTION INTRAVENOUS PRN
Status: DISCONTINUED | OUTPATIENT
Start: 2024-04-01 | End: 2024-04-02 | Stop reason: HOSPADM

## 2024-04-01 RX ORDER — ACETAMINOPHEN 650 MG/1
650 SUPPOSITORY RECTAL EVERY 6 HOURS PRN
Status: DISCONTINUED | OUTPATIENT
Start: 2024-04-01 | End: 2024-04-02 | Stop reason: HOSPADM

## 2024-04-01 RX ORDER — POLYETHYLENE GLYCOL 3350 17 G/17G
17 POWDER, FOR SOLUTION ORAL DAILY PRN
Status: DISCONTINUED | OUTPATIENT
Start: 2024-04-01 | End: 2024-04-02 | Stop reason: HOSPADM

## 2024-04-01 RX ORDER — IPRATROPIUM BROMIDE AND ALBUTEROL SULFATE 2.5; .5 MG/3ML; MG/3ML
1 SOLUTION RESPIRATORY (INHALATION)
Status: DISCONTINUED | OUTPATIENT
Start: 2024-04-01 | End: 2024-04-02

## 2024-04-01 RX ORDER — LABETALOL HYDROCHLORIDE 5 MG/ML
10 INJECTION, SOLUTION INTRAVENOUS EVERY 4 HOURS PRN
Status: DISCONTINUED | OUTPATIENT
Start: 2024-04-01 | End: 2024-04-02

## 2024-04-01 RX ORDER — GABAPENTIN 300 MG/1
300 CAPSULE ORAL NIGHTLY
Status: DISCONTINUED | OUTPATIENT
Start: 2024-04-01 | End: 2024-04-02

## 2024-04-01 RX ORDER — SODIUM CHLORIDE 0.9 % (FLUSH) 0.9 %
5-40 SYRINGE (ML) INJECTION PRN
Status: DISCONTINUED | OUTPATIENT
Start: 2024-04-01 | End: 2024-04-02 | Stop reason: HOSPADM

## 2024-04-01 RX ORDER — ACETAMINOPHEN 325 MG/1
650 TABLET ORAL EVERY 6 HOURS PRN
Status: DISCONTINUED | OUTPATIENT
Start: 2024-04-01 | End: 2024-04-02 | Stop reason: HOSPADM

## 2024-04-01 RX ORDER — POTASSIUM CHLORIDE 20 MEQ/1
40 TABLET, EXTENDED RELEASE ORAL PRN
Status: DISCONTINUED | OUTPATIENT
Start: 2024-04-01 | End: 2024-04-02 | Stop reason: HOSPADM

## 2024-04-01 RX ORDER — ONDANSETRON 2 MG/ML
4 INJECTION INTRAMUSCULAR; INTRAVENOUS EVERY 6 HOURS PRN
Status: DISCONTINUED | OUTPATIENT
Start: 2024-04-01 | End: 2024-04-02 | Stop reason: HOSPADM

## 2024-04-01 RX ADMIN — ONDANSETRON 4 MG: 4 TABLET, ORALLY DISINTEGRATING ORAL at 22:34

## 2024-04-01 RX ADMIN — WATER 40 MG: 1 INJECTION INTRAMUSCULAR; INTRAVENOUS; SUBCUTANEOUS at 21:35

## 2024-04-01 RX ADMIN — IPRATROPIUM BROMIDE AND ALBUTEROL SULFATE 2 DOSE: 2.5; .5 SOLUTION RESPIRATORY (INHALATION) at 13:24

## 2024-04-01 RX ADMIN — IOPAMIDOL 75 ML: 755 INJECTION, SOLUTION INTRAVENOUS at 14:43

## 2024-04-01 RX ADMIN — Medication 10 ML: at 21:47

## 2024-04-01 RX ADMIN — SODIUM CHLORIDE 25 ML: 9 INJECTION, SOLUTION INTRAVENOUS at 21:41

## 2024-04-01 RX ADMIN — GABAPENTIN 300 MG: 300 CAPSULE ORAL at 22:56

## 2024-04-01 RX ADMIN — AZITHROMYCIN MONOHYDRATE 500 MG: 500 INJECTION, POWDER, LYOPHILIZED, FOR SOLUTION INTRAVENOUS at 21:42

## 2024-04-01 ASSESSMENT — PAIN SCALES - GENERAL
PAINLEVEL_OUTOF10: 0

## 2024-04-01 ASSESSMENT — PAIN - FUNCTIONAL ASSESSMENT: PAIN_FUNCTIONAL_ASSESSMENT: 0-10

## 2024-04-01 NOTE — ED NOTES
Patient sitting up in the chair at the bedside. He denies any complaints or needs at this time. Wife remains with the patient. Patient connected to monitors. Call light within reach

## 2024-04-01 NOTE — ED PROVIDER NOTES
is 36 minutes, which excludes separately billable procedures and updating family. Time spent is specifically for management of the presenting complaint and symptoms initially, direct bedside care, reevaluation, review of records, and consultation.  There was a high probability of clinically significant life-threatening deterioration in the patient's condition, which required my urgent intervention.     This chart was generated in part by using Dragon Dictation system and may contain errors related to that system including errors in grammar, punctuation, and spelling, as well as words and phrases that may be inappropriate. If there are any questions or concerns please feel free to contact the dictating provider for clarification.     Katja Ríos MD   Acute Care [facility]        Katja Ríos MD  04/01/24 3211

## 2024-04-01 NOTE — CARE COORDINATION
Case Management Assessment  Initial Evaluation    Date/Time of Evaluation: 4/1/2024 3:57 PM  Assessment Completed by: ALEXA Murray    If patient is discharged prior to next notation, then this note serves as note for discharge by case management.    Patient Name: Khalif Pinedo Jr                   YOB: 1940  Diagnosis: COPD exacerbation (HCC) [J44.1]                   Date / Time: 4/1/2024 12:57 PM    Patient Admission Status: Inpatient   Readmission Risk (Low < 19, Mod (19-27), High > 27): Readmission Risk Score: 9    Current PCP: Scott Denise MD  PCP verified by CM? (P) Yes    Chart Reviewed: Yes      History Provided by: (P) Patient  Patient Orientation: (P) Alert and Oriented    Patient Cognition: (P) Alert    Hospitalization in the last 30 days (Readmission):  No    If yes, Readmission Assessment in CM Navigator will be completed.    Advance Directives:      Code Status: Prior   Patient's Primary Decision Maker is: (P) Legal Next of Kin    Primary Decision Maker: Anne Pinedo - Spouse - [phone]    Secondary Decision Maker: KhrisKhalif - Child - [phone]    Supplemental (Other) Decision Maker: Luis Manuel Mosqueda - Other Relative - [phone]    Discharge Planning:    Patient lives with: (P) Spouse/Significant Other Type of Home: (P) House  Primary Care Giver: (P) Self  Patient Support Systems include: (P) Spouse/Significant Other   Current Financial resources: (P) None  Current community resources: (P) ECF/Home Care  Current services prior to admission: (P) None            Current DME:              Type of Home Care services:  (P) None    ADLS  Prior functional level: (P) Independent in ADLs/IADLs  Current functional level: (P) Independent in ADLs/IADLs    PT AM-PAC:   /24  OT AM-PAC:   /24    Family can provide assistance at DC: (P) Yes  Would you like Case Management to discuss the discharge plan with any other family members/significant others, and if so, who? (P)

## 2024-04-01 NOTE — ED NOTES
Assisted patient with urinal at the bedside. Unable to urinate at this time. Patient to CT with transport. ED MD at the bedside to discuss plan for admission with patient and his wife.

## 2024-04-01 NOTE — H&P
V2.0  History and Physical      Name:  Khalif Pinedo Jr /Age/Sex: 1940  (83 y.o. male)   MRN & CSN:  [id number] & [id number] Encounter Date/Time: 2024 3:28 PM EDT   Location:   PCP: Scott Denise MD       Hospital Day: 1    Assessment and Plan:   Khalif Pinedo Jr is a 83 y.o. male with a pmh of CAD COPD hypertension atrial fibrillation IBS peripheral neuropathy carotid artery stenosis hyperlipidemia who presents with COPD exacerbation (HCC)    Hospital Problems             Last Modified POA    * (Principal) COPD exacerbation (HCC) 2024 Yes       Acute COPD exacerbation started on DuoNebs chest physio therapy incentive spirometry PT OT Solu-Medrol.  Patient does have allergy as swelling to Solu-Medrol?  Discussed with the patient about the steroids part we will try to use steroids at this can for treatment for allergic reaction as well.  Could be fluid retention issues?  If any reaction develops consider pulmonology consultation.  Patient is aware of this plan.  CT PE is negative  Chronic ulcerative pulmonary disease on inhalers at home not on oxygen  Carotid artery stenosis  Peripheral neuropathy on gabapentin  Irritable bowel syndrome  Possible atrial fibrillation with Watchman device in place due to underlying history of frequent bleeding issues      Goals status was discussed in detail with patient.  Verbalized understanding of different options of CODE STATUS.  Patient opted for full code.    Comment: Please note this report has been produced using speech recognition software and may contain errors related to that system including errors in grammar, punctuation, and spelling, as well as words and phrases that may be inappropriate. If there are any questions or concerns please feel free to contact the dictating provider for clarification.     Disposition:   Current Living situation: Home  Expected Disposition: Home  Estimated D/C: 2 to 3 days    Diet No diet orders on file   DVT

## 2024-04-01 NOTE — ED NOTES
Mr. Khris Rothman is a 83 y.o. male who had concerns including Shortness of Breath (Patient reporting SOB that started over the weekend. It becomes worse with exertion. Patient reports  he wears home oxygen at night and says his sats have running around 98%. Says he did run a low grate temperature on Saturday evening of 100.2/Patient had a watchman placed last Monday ).    Chief Complaint   Patient presents with    Shortness of Breath     Patient reporting SOB that started over the weekend. It becomes worse with exertion. Patient reports  he wears home oxygen at night and says his sats have running around 98%. Says he did run a low grate temperature on Saturday evening of 100.2  Patient had a watchman placed last Monday        He is being admitted for:    COPD exacerbation ([facility])    His ED problem list included:    1. Dyspnea, unspecified type    2. COPD exacerbation ([facility])    3. Elevated troponin        Past Medical History:   Diagnosis Date    Anemia 9/19/2022    CAD (coronary artery disease) 5/17/1998    Stent in LAD    CAD (coronary artery disease) 9/17/2009    Stent in RCA    COPD (chronic obstructive pulmonary disease) ([facility])     COPD (chronic obstructive pulmonary disease) ([facility]) 2/17/2016    GERD (gastroesophageal reflux disease)     on Nexium    HTN (hypertension) 6/14/2016    Hyperlipidemia     IBS (irritable bowel syndrome)     Stented coronary artery        Past Surgical History:   Procedure Laterality Date    CAPSULE ENDOSCOPY  09/21/2022    BOWEL SMALL CAPSULE ENDOSCOPY performed by Santosh Jerome DO at [facility]    CARDIAC CATHETERIZATION  09/13/2012    diagnostic - Dr. Black    CARDIAC CATHETERIZATION  09/28/2010    diagnostic - dr. black    CARDIAC CATHETERIZATION  04/21/2000    diagnostic    CARDIAC CATHETERIZATION  05/17/1998    no in-stent restenosis    CARDIAC CATHETERIZATION  09/17/2009    atretic LIMA - @[facility]    CAROTID ENDARTERECTOMY  01/01/2014    CHOLECYSTECTOMY      COLONOSCOPY

## 2024-04-02 VITALS
BODY MASS INDEX: 27.67 KG/M2 | DIASTOLIC BLOOD PRESSURE: 71 MMHG | RESPIRATION RATE: 16 BRPM | SYSTOLIC BLOOD PRESSURE: 137 MMHG | WEIGHT: 204.3 LBS | HEART RATE: 77 BPM | TEMPERATURE: 98 F | OXYGEN SATURATION: 95 % | HEIGHT: 72 IN

## 2024-04-02 LAB
BACTERIA UR CULT: NORMAL
BILIRUB UR QL STRIP.AUTO: NEGATIVE
CLARITY UR: CLEAR
COLOR UR: YELLOW
GLUCOSE UR STRIP.AUTO-MCNC: NEGATIVE MG/DL
HGB UR QL STRIP.AUTO: ABNORMAL
KETONES UR STRIP.AUTO-MCNC: NEGATIVE MG/DL
LEUKOCYTE ESTERASE UR QL STRIP.AUTO: NEGATIVE
NITRITE UR QL STRIP.AUTO: NEGATIVE
PH UR STRIP.AUTO: 6 [PH] (ref 5–8)
PROT UR STRIP.AUTO-MCNC: NEGATIVE MG/DL
RBC #/AREA URNS HPF: NORMAL /HPF (ref 0–4)
SP GR UR STRIP.AUTO: 1.02 (ref 1–1.03)
UA DIPSTICK W REFLEX MICRO PNL UR: YES
URN SPEC COLLECT METH UR: ABNORMAL
UROBILINOGEN UR STRIP-ACNC: 1 E.U./DL
WBC #/AREA URNS HPF: NORMAL /HPF (ref 0–5)

## 2024-04-02 PROCEDURE — 97165 OT EVAL LOW COMPLEX 30 MIN: CPT

## 2024-04-02 PROCEDURE — 2580000003 HC RX 258: Performed by: STUDENT IN AN ORGANIZED HEALTH CARE EDUCATION/TRAINING PROGRAM

## 2024-04-02 PROCEDURE — 6370000000 HC RX 637 (ALT 250 FOR IP): Performed by: STUDENT IN AN ORGANIZED HEALTH CARE EDUCATION/TRAINING PROGRAM

## 2024-04-02 PROCEDURE — 6360000002 HC RX W HCPCS: Performed by: NURSE PRACTITIONER

## 2024-04-02 PROCEDURE — 94761 N-INVAS EAR/PLS OXIMETRY MLT: CPT

## 2024-04-02 PROCEDURE — 81001 URINALYSIS AUTO W/SCOPE: CPT

## 2024-04-02 PROCEDURE — 94640 AIRWAY INHALATION TREATMENT: CPT

## 2024-04-02 PROCEDURE — 97116 GAIT TRAINING THERAPY: CPT

## 2024-04-02 PROCEDURE — 2700000000 HC OXYGEN THERAPY PER DAY

## 2024-04-02 PROCEDURE — 97530 THERAPEUTIC ACTIVITIES: CPT

## 2024-04-02 PROCEDURE — 87086 URINE CULTURE/COLONY COUNT: CPT

## 2024-04-02 PROCEDURE — 97161 PT EVAL LOW COMPLEX 20 MIN: CPT

## 2024-04-02 RX ORDER — IPRATROPIUM BROMIDE AND ALBUTEROL SULFATE 2.5; .5 MG/3ML; MG/3ML
3 SOLUTION RESPIRATORY (INHALATION) EVERY 4 HOURS PRN
Qty: 360 ML | Refills: 0 | Status: SHIPPED | OUTPATIENT
Start: 2024-04-02 | End: 2024-05-02

## 2024-04-02 RX ORDER — AZITHROMYCIN 250 MG/1
250 TABLET, FILM COATED ORAL DAILY
Qty: 4 TABLET | Refills: 0 | Status: SHIPPED | OUTPATIENT
Start: 2024-04-02 | End: 2024-04-06

## 2024-04-02 RX ORDER — PANTOPRAZOLE SODIUM 40 MG/1
40 TABLET, DELAYED RELEASE ORAL
Status: DISCONTINUED | OUTPATIENT
Start: 2024-04-03 | End: 2024-04-02 | Stop reason: HOSPADM

## 2024-04-02 RX ORDER — IPRATROPIUM BROMIDE AND ALBUTEROL SULFATE 2.5; .5 MG/3ML; MG/3ML
1 SOLUTION RESPIRATORY (INHALATION)
Status: DISCONTINUED | OUTPATIENT
Start: 2024-04-02 | End: 2024-04-02 | Stop reason: HOSPADM

## 2024-04-02 RX ORDER — PROMETHAZINE HYDROCHLORIDE 25 MG/1
25 TABLET ORAL EVERY 6 HOURS PRN
Status: DISCONTINUED | OUTPATIENT
Start: 2024-04-02 | End: 2024-04-02 | Stop reason: HOSPADM

## 2024-04-02 RX ORDER — TRAZODONE HYDROCHLORIDE 50 MG/1
50 TABLET ORAL NIGHTLY PRN
Status: DISCONTINUED | OUTPATIENT
Start: 2024-04-02 | End: 2024-04-02 | Stop reason: HOSPADM

## 2024-04-02 RX ORDER — CLOPIDOGREL BISULFATE 75 MG/1
75 TABLET ORAL DAILY
Status: DISCONTINUED | OUTPATIENT
Start: 2024-04-02 | End: 2024-04-02 | Stop reason: HOSPADM

## 2024-04-02 RX ORDER — ASPIRIN 81 MG/1
81 TABLET ORAL DAILY
Status: DISCONTINUED | OUTPATIENT
Start: 2024-04-02 | End: 2024-04-02 | Stop reason: HOSPADM

## 2024-04-02 RX ORDER — IPRATROPIUM BROMIDE AND ALBUTEROL SULFATE 2.5; .5 MG/3ML; MG/3ML
1 SOLUTION RESPIRATORY (INHALATION) EVERY 4 HOURS PRN
Status: DISCONTINUED | OUTPATIENT
Start: 2024-04-02 | End: 2024-04-02 | Stop reason: HOSPADM

## 2024-04-02 RX ORDER — PREDNISONE 10 MG/1
TABLET ORAL
Qty: 20 TABLET | Refills: 0 | Status: SHIPPED | OUTPATIENT
Start: 2024-04-02

## 2024-04-02 RX ORDER — FERROUS SULFATE 325(65) MG
325 TABLET ORAL
Status: DISCONTINUED | OUTPATIENT
Start: 2024-04-03 | End: 2024-04-02 | Stop reason: HOSPADM

## 2024-04-02 RX ORDER — GABAPENTIN 300 MG/1
300 CAPSULE ORAL 2 TIMES DAILY
Status: DISCONTINUED | OUTPATIENT
Start: 2024-04-02 | End: 2024-04-02 | Stop reason: HOSPADM

## 2024-04-02 RX ADMIN — Medication 10 ML: at 08:53

## 2024-04-02 RX ADMIN — IPRATROPIUM BROMIDE AND ALBUTEROL SULFATE 1 DOSE: 2.5; .5 SOLUTION RESPIRATORY (INHALATION) at 07:41

## 2024-04-02 RX ADMIN — LABETALOL HYDROCHLORIDE 10 MG: 5 INJECTION INTRAVENOUS at 05:11

## 2024-04-02 ASSESSMENT — PAIN SCALES - GENERAL: PAINLEVEL_OUTOF10: 0

## 2024-04-02 NOTE — TELEPHONE ENCOUNTER
Alan, here are the Post Watchman appts:     PALLAVI - 5-9-24 AND w/MILES @ 8:00 am     6 mo - 9-30-24 AND w/NPAM at 9:15 am    Postpone set to schedule 1 year follow-up - Pend Recall for 2 year follow-up

## 2024-04-02 NOTE — DISCHARGE INSTRUCTIONS
- please schedule an appointment to see your PCP  - please schedule an appointment to see a pulmonologist   - please take medications as prescribed

## 2024-04-02 NOTE — TELEPHONE ENCOUNTER
Procedure:  PALLAVI/Watchman  Doctor:  Dr. Jha  Date:  5/9/24  Time:  8am  Arrival:  6:30am  Reps:  n/a  Anesthesia:  Yes      Spoke with patient's wife. Please have patient arrive to the main entrance of [facility] ([address] [place] [id number]) and check in with the registration desk.  They will be directed to the Cath Lab.  Please call patient regarding medication instructions. Remind patient to be NPO after midnight (8 hours prior). Do not apply lotions/creams on skin the day of procedure.

## 2024-04-02 NOTE — PROGRESS NOTES
4 Eyes Skin Assessment     NAME:  Khalif Pinedo Jr  YOB: 1940  MEDICAL RECORD NUMBER:  [id number]    The patient is being assessed for  Admission    I agree that at least one RN has performed a thorough Head to Toe Skin Assessment on the patient. ALL assessment sites listed below have been assessed.      Areas assessed by both nurses:    Head, Face, Ears, Shoulders, Back, Chest, Arms, Elbows, Hands, Sacrum. Buttock, Coccyx, Ischium, Legs. Feet and Heels, and Under Medical Devices   Scattered bruising         Does the Patient have a Wound? No noted wound(s)       Daniel Prevention initiated by RN: No  Wound Care Orders initiated by RN: No    Pressure Injury (Stage 3,4, Unstageable, DTI, NWPT, and Complex wounds) if present, place Wound referral order by RN under : No    New Ostomies, if present place, Ostomy referral order under : No     Nurse 1 eSignature: Electronically signed by Sol Mederos RN on 4/2/24 at 12:46 AM EDT    **SHARE this note so that the co-signing nurse can place an eSignature**    Nurse 2 eSignature: Electronically signed by Nick Grace RN on 4/2/24 at 1:51 AM EDT   
Gave patient prn labetalol around 0500 for high BP. Patient started to complain of itching and did have scattered redness on right hand. Patient stated he \"felt like he was having an allergic reaction\". VSS. Notified overnight attending.   
Occupational Therapy  Facility/Department: [place] [address] - [facility] SURG  Occupational Therapy Initial Assessment, Treatment, and Discharge    Name: Khalif Pinedo Jr  : 1940  MRN: [id number]  Date of Service: 2024    Discharge Recommendations:  Home independently  OT Equipment Recommendations  Equipment Needed: No     If pt is unable to be seen after this session, please let this note serve as discharge summary.  Please see case management note for discharge disposition.  Thank you.    Patient Diagnosis(es): The primary encounter diagnosis was Dyspnea, unspecified type. Diagnoses of COPD exacerbation (HCC) and Elevated troponin were also pertinent to this visit.  Past Medical History:  has a past medical history of Anemia, CAD (coronary artery disease), CAD (coronary artery disease), COPD (chronic obstructive pulmonary disease) (HCC), COPD (chronic obstructive pulmonary disease) (HCC), GERD (gastroesophageal reflux disease), HTN (hypertension), Hyperlipidemia, IBS (irritable bowel syndrome), and Stented coronary artery.  Past Surgical History:  has a past surgical history that includes Coronary artery bypass graft (2000); Coronary angioplasty with stent (2005); Carotid endarterectomy (2014); Colonoscopy (2004); Upper gastrointestinal endoscopy (2004); Cholecystectomy; Upper gastrointestinal endoscopy (07/15/2014); Diagnostic Cardiac Cath Lab Procedure; Upper gastrointestinal endoscopy (N/A, 2022); Colonoscopy (N/A, 2022); Capsule endoscopy (2022); Upper gastrointestinal endoscopy (N/A, 10/21/2022); Cardiac catheterization (2012); Cardiac catheterization (2010); Cardiac catheterization (2000); Cardiac catheterization (1998); and Cardiac catheterization (2009).           Assessment   Assessment: Pt is 82 yo male presenting from home w/ wife, c/o SOB, found to have COPD exacerbation. PLOF IND with all ADLs and mobility w/o AD. Pt is 
Patient admitted to room 362 from ER.  Patient oriented to room, call light, bed rails, phone, lights and bathroom.  Patient instructed about the schedule of the day including: vital sign frequency, lab draws, possible tests, frequency of MD and staff rounds, including RN/MD rounding together at bedside, daily weights, and I &O's.  Patient instructed about prescribed diet, how to use 8MENU, and television.    Telemetry box 53 in place, patient aware of placement and reason.  Bed locked, in lowest position, side rails up 2/4, call light within reach.    Belongings at bedside  
Patient discharged home. avs and scripts given. Patient educated using teach back method. Patient taken to main entrance via w/c and picked up by family.     
Co-treatment   Time In 1037     1041   Time Out 1041     1057   Minutes 4     16   Timed Code Treatment Minutes: 10 Minutes (10 min eval)       Vivi Melissa, PT

## 2024-04-02 NOTE — CARE COORDINATION
CASE MANAGEMENT DISCHARGE SUMMARY      Discharge to: Home    IMM given: 4/1/2024    Transportation:    Family/car: private    Confirmed discharge plan with:     Patient: yes     Family:  yes     RN, name: Jenae Omer RN

## 2024-04-02 NOTE — CONSULTS
Pharmacy Medication History Note      List of current medications patient is taking is complete.   Admission med rec has been completed by RN; however, pharmacy reviewed as requested per consult order.   Prior to Admission medications    Medication Sig Start Date End Date Taking? Authorizing Provider   Evolocumab (REPATHA SURECLICK) 140 MG/ML SOAJ ADMINISTER 1 ML UNDER THE SKIN 1 TIME EVERY 2 WEEKS 3/21/24   Tigre Howell DO   furosemide (LASIX) 40 MG tablet Take 1 tablet by mouth daily for 6 days  Patient not taking: Reported on 2/27/2024 2/14/24 2/20/24  JAMISON Pollard Jr., MD   traZODone (DESYREL) 50 MG tablet  2/2/24   ProviderMilo MD   ferrous sulfate (IRON 325) 325 (65 Fe) MG tablet Take 1 tablet by mouth daily (with breakfast)    Milo Plascencia MD   clopidogrel (PLAVIX) 75 MG tablet Take 1 tablet by mouth daily 1/9/24   Tanja Correia MD   aspirin 81 MG EC tablet Take 1 tablet by mouth daily 12/22/23   Tanja Correia MD   nitroGLYCERIN (NITROSTAT) 0.4 MG SL tablet Place 1 tablet under the tongue every 5 minutes as needed for Chest pain 12/6/23   Crissy Sprague APRN - CNP   gabapentin (NEURONTIN) 300 MG capsule  8/7/23   Milo Plascencia MD   rivaroxaban (XARELTO) 20 MG TABS tablet Take 1 tablet by mouth daily (with breakfast) 7/21/22 9/22/22  Tanja Correia MD   Fluticasone-Umeclidin-Vilant (TRELEGY ELLIPTA IN) Inhale into the lungs     Milo Plascencia MD   apixaban (ELIQUIS) 5 MG TABS tablet Take 1 tablet by mouth 2 times daily  Patient taking differently: Take 5 mg by mouth 2 times daily 1/2 tablet twice daily 5/5/22 6/14/22  Crissy Sprague APRN - CNP   esomeprazole (NEXIUM) 20 MG delayed release capsule Take 1 capsule by mouth daily as needed    Milo Plascencia MD   albuterol (PROVENTIL HFA) 108 (90 BASE) MCG/ACT inhaler Inhale 2 puffs into the lungs every 4 hours as needed for Wheezing or Shortness of Breath. 10/9/13   Jose Redding MD

## 2024-04-02 NOTE — DISCHARGE SUMMARY
can be used as the progress note for today's visit.  Doing well. Has no complaints or concerns. Was hoping to be discharged home. On room air.       Physical Exam  Vitals:   Vitals:    04/02/24 1037   BP: 137/71   Pulse: 77   Resp:    Temp:    SpO2: 95%       General: NAD  Eyes: EOMI  ENT: neck supple  Cardiovascular: Regular rate.  Respiratory: Clear to auscultation  Gastrointestinal: Soft, non tender  Genitourinary: no suprapubic tenderness  Musculoskeletal: trace edema  Skin: warm, dry  Neuro: Alert.  Psych: Mood appropriate.     The patient expressed appropriate understanding of and agreement with the discharge recommendations, medications, and plan.     Consults this admission:  IP CONSULT TO HOSPITALIST  IP CONSULT TO PHARMACY      Discharge Instruction:   Handoff to PCP:     Follow up appointments: pulm  Primary care physician: Scott Denise MD      Diet:  regular diet   Activity: activity as tolerated  Disposition: Discharged to:    [x]Home, [][facility], []SNF, []Acute Rehab, []Hospice   Condition on discharge: Stable    Discharge Medications:        Medication List        START taking these medications      azithromycin 250 MG tablet  Commonly known as: ZITHROMAX  Take 1 tablet by mouth daily for 4 days     ipratropium 0.5 mg-albuterol 2.5 mg 0.5-2.5 (3) MG/3ML Soln nebulizer solution  Commonly known as: DUONEB  Inhale 3 mLs into the lungs every 4 hours as needed for Shortness of Breath     predniSONE 10 MG tablet  Commonly known as: DELTASONE  4 tablets once daily for 2 days then 3 tablets once daily for 2 days then 2 tablets once daily for 2 days then 1 tablet once daily for 2 days.            CONTINUE taking these medications      albuterol sulfate  (90 Base) MCG/ACT inhaler  Commonly known as: Proventil HFA  Inhale 2 puffs into the lungs every 4 hours as needed for Wheezing or Shortness of Breath.     aspirin 81 MG EC tablet  Take 1 tablet by mouth daily     clopidogrel 75 MG tablet  Commonly known

## 2024-04-02 NOTE — RT PROTOCOL NOTE
RT Nebulizer Bronchodilator Protocol Note    There is a bronchodilator order in the chart from a provider indicating to follow the RT Bronchodilator Protocol and there is an “Initiate RT Bronchodilator Protocol” order as well (see protocol at bottom of note).    CXR Findings:  No results found.    The findings from the last RT Protocol Assessment were as follows:  Smoking: Chronic pulmonary disease  Respiratory Pattern: Dyspnea on exertion or RR 21-25 bpm  Breath Sounds: Slightly diminished and/or crackles  Cough: Strong, spontaneous, non-productive  Indication for Bronchodilator Therapy: Decreased or absent breath sounds  Bronchodilator Assessment Score: 6    Aerosolized bronchodilator medication orders have been revised according to the RT Nebulizer Bronchodilator Protocol below.    Respiratory Therapist to perform RT Therapy Protocol Assessment initially then follow the protocol.  Repeat RT Therapy Protocol Assessment PRN for score 0-3 or on second treatment, BID, and PRN for scores above 3.    No Indications - adjust the frequency to every 6 hours PRN wheezing or bronchospasm, if no treatments needed after 48 hours then discontinue using Per Protocol order mode.     If indication present, adjust the RT bronchodilator orders based on the Bronchodilator Assessment Score as indicated below.  If a patient is on this medication at home then do not decrease Frequency below that used at home.    0-3 - enter or revise RT bronchodilator order(s) to equivalent RT Bronchodilator order with Frequency of every 4 hours PRN for wheezing or increased work of breathing using Per Protocol order mode.       4-6 - enter or revise RT Bronchodilator order(s) to two equivalent RT bronchodilator orders with one order with BID Frequency and one order with Frequency of every 4 hours PRN wheezing or increased work of breathing using Per Protocol order mode.         7-10 - enter or revise RT Bronchodilator order(s) to two equivalent RT

## 2024-04-02 NOTE — PLAN OF CARE
Problem: Discharge Planning  Goal: Discharge to home or other facility with appropriate resources  Outcome: Progressing  Flowsheets (Taken 4/1/2024 2134)  Discharge to home or other facility with appropriate resources: Identify barriers to discharge with patient and caregiver     Problem: Pain  Goal: Verbalizes/displays adequate comfort level or baseline comfort level  Outcome: Progressing     Problem: ABCDS Injury Assessment  Goal: Absence of physical injury  Outcome: Progressing     Problem: Respiratory - Adult  Goal: Achieves optimal ventilation and oxygenation  Reactivated     Problem: Cardiovascular - Adult  Goal: Maintains optimal cardiac output and hemodynamic stability  Reactivated     Problem: Metabolic/Fluid and Electrolytes - Adult  Goal: Electrolytes maintained within normal limits  Reactivated

## 2024-04-05 LAB
BACTERIA BLD CULT ORG #2: NORMAL
BACTERIA BLD CULT: NORMAL

## 2024-04-24 RX ORDER — SODIUM CHLORIDE 0.9 % (FLUSH) 0.9 %
5-40 SYRINGE (ML) INJECTION PRN
OUTPATIENT
Start: 2024-04-24

## 2024-04-24 RX ORDER — SODIUM CHLORIDE 9 MG/ML
INJECTION, SOLUTION INTRAVENOUS PRN
OUTPATIENT
Start: 2024-04-24

## 2024-04-24 RX ORDER — SODIUM CHLORIDE 0.9 % (FLUSH) 0.9 %
5-40 SYRINGE (ML) INJECTION EVERY 12 HOURS SCHEDULED
OUTPATIENT
Start: 2024-04-24

## 2024-04-30 ENCOUNTER — TELEPHONE (OUTPATIENT)
Dept: CARDIOLOGY CLINIC | Age: 84
End: 2024-04-30

## 2024-04-30 DIAGNOSIS — R60.9 SWELLING: Primary | ICD-10-CM

## 2024-04-30 NOTE — TELEPHONE ENCOUNTER
Pts wife Slime stated that pt previously received Lasix through [facility] but prescription has been deactivated. Slime would like to know if they are able to get refill os Lasix 40mg. If so refills need to be sent to     [facility] [id number] - [address] [phone]     k previously prescribed Lasix aat 2/13/24 ov for:    Start 40 mg of lasix daily for 3 days for cough and swelling.     Rjm last ov 02/27/2024  Rjm next ov 09/30/2024

## 2024-04-30 NOTE — TELEPHONE ENCOUNTER
Saw this patient for watchman appointment only.  Follows with [facility] for coronary disease care, EP LISSET K as stated.  Seems like a short course of Lasix was written for.  Would be up to these 2 providers whether or not to reimplement based on history and exam. I will forward.

## 2024-05-01 NOTE — TELEPHONE ENCOUNTER
Cat,    Can you refill lasix for patient.    Not sure how he is taking but whatever he is on.    [facility]

## 2024-05-02 RX ORDER — FUROSEMIDE 20 MG/1
TABLET ORAL
Qty: 30 TABLET | Refills: 1 | Status: SHIPPED | OUTPATIENT
Start: 2024-05-02

## 2024-05-02 NOTE — TELEPHONE ENCOUNTER
Called and spoke with patient. Patient had request furosemide for swelling as needed. He reports initially had been experiencing swelling to his ankles. Denies any recent medication changes, swelling, weight gain, or shortness of breath. He reports BP 96/45. Denies any signs of bleeding. I instructed to hold lasix if systolic bp 100 or less. He states swelling to ankles has improved today and does not feel he needs prescription now. I instructed patient to call office if he was symptomatic prior to taking PRN lasix he VU.

## 2024-05-09 ENCOUNTER — HOSPITAL ENCOUNTER (OUTPATIENT)
Dept: CARDIAC CATH/INVASIVE PROCEDURES | Age: 84
Discharge: HOME | End: 2024-05-09
Attending: INTERNAL MEDICINE

## 2024-05-13 ENCOUNTER — ANESTHESIA (OUTPATIENT)
Dept: CARDIAC CATH/INVASIVE PROCEDURES | Age: 84
End: 2024-05-13
Payer: MEDICARE

## 2024-05-13 ENCOUNTER — ANESTHESIA EVENT (OUTPATIENT)
Dept: CARDIAC CATH/INVASIVE PROCEDURES | Age: 84
End: 2024-05-13
Payer: MEDICARE

## 2024-05-13 ENCOUNTER — HOSPITAL ENCOUNTER (INPATIENT)
Age: 84
LOS: 4 days | Discharge: HOME | End: 2024-05-17
Attending: INTERNAL MEDICINE | Admitting: INTERNAL MEDICINE
Payer: MEDICARE

## 2024-05-13 ENCOUNTER — HOSPITAL ENCOUNTER (OUTPATIENT)
Dept: CARDIAC CATH/INVASIVE PROCEDURES | Age: 84
Discharge: HOME | End: 2024-05-15
Attending: INTERNAL MEDICINE
Payer: COMMERCIAL

## 2024-05-13 DIAGNOSIS — I48.0 PAROXYSMAL A-FIB (HCC): ICD-10-CM

## 2024-05-13 DIAGNOSIS — D50.0 IRON DEFICIENCY ANEMIA DUE TO CHRONIC BLOOD LOSS: Primary | ICD-10-CM

## 2024-05-13 DIAGNOSIS — Z95.818 PRESENCE OF WATCHMAN LEFT ATRIAL APPENDAGE CLOSURE DEVICE: ICD-10-CM

## 2024-05-13 DIAGNOSIS — I48.19 PERSISTENT ATRIAL FIBRILLATION (HCC): ICD-10-CM

## 2024-05-13 DIAGNOSIS — N17.9 AKI (ACUTE KIDNEY INJURY) (HCC): ICD-10-CM

## 2024-05-13 DIAGNOSIS — E78.00 PURE HYPERCHOLESTEROLEMIA: Chronic | ICD-10-CM

## 2024-05-13 PROBLEM — I48.91 A-FIB (HCC): Status: ACTIVE | Noted: 2024-05-13

## 2024-05-13 PROBLEM — Z86.79 STATUS POST ABLATION OF ATRIAL FIBRILLATION: Status: ACTIVE | Noted: 2024-05-13

## 2024-05-13 PROBLEM — Z98.890 STATUS POST ABLATION OF ATRIAL FIBRILLATION: Status: ACTIVE | Noted: 2024-05-13

## 2024-05-13 LAB
ANION GAP SERPL CALCULATED.3IONS-SCNC: 8 MMOL/L (ref 3–16)
BUN SERPL-MCNC: 21 MG/DL (ref 7–20)
CALCIUM SERPL-MCNC: 9.3 MG/DL (ref 8.3–10.6)
CHLORIDE SERPL-SCNC: 103 MMOL/L (ref 99–110)
CO2 SERPL-SCNC: 26 MMOL/L (ref 21–32)
CREAT SERPL-MCNC: 1.3 MG/DL (ref 0.8–1.3)
DEPRECATED RDW RBC AUTO: 19.8 % (ref 12.4–15.4)
DEPRECATED RDW RBC AUTO: 20.7 % (ref 12.4–15.4)
ECHO BSA: 2.19 M2
EKG ATRIAL RATE: 85 BPM
EKG DIAGNOSIS: NORMAL
EKG Q-T INTERVAL: 400 MS
EKG QRS DURATION: 108 MS
EKG QTC CALCULATION (BAZETT): 461 MS
EKG R AXIS: 227 DEGREES
EKG T AXIS: 7 DEGREES
EKG VENTRICULAR RATE: 80 BPM
GFR SERPLBLD CREATININE-BSD FMLA CKD-EPI: 54 ML/MIN/{1.73_M2}
GLUCOSE SERPL-MCNC: 91 MG/DL (ref 70–99)
HCT VFR BLD AUTO: 30.4 % (ref 40.5–52.5)
HCT VFR BLD AUTO: 30.6 % (ref 40.5–52.5)
HGB BLD-MCNC: 9.2 G/DL (ref 13.5–17.5)
HGB BLD-MCNC: 9.4 G/DL (ref 13.5–17.5)
MCH RBC QN AUTO: 26.6 PG (ref 26–34)
MCH RBC QN AUTO: 26.8 PG (ref 26–34)
MCHC RBC AUTO-ENTMCNC: 30.1 G/DL (ref 31–36)
MCHC RBC AUTO-ENTMCNC: 30.7 G/DL (ref 31–36)
MCV RBC AUTO: 86.7 FL (ref 80–100)
MCV RBC AUTO: 88.9 FL (ref 80–100)
PLATELET # BLD AUTO: 302 K/UL (ref 135–450)
PLATELET # BLD AUTO: 317 K/UL (ref 135–450)
PMV BLD AUTO: 7.4 FL (ref 5–10.5)
PMV BLD AUTO: 7.5 FL (ref 5–10.5)
POC ACT LR: 328 SEC
POC ACT LR: 347 SEC
POC ACT LR: 365 SEC
POTASSIUM SERPL-SCNC: 4.7 MMOL/L (ref 3.5–5.1)
RBC # BLD AUTO: 3.43 M/UL (ref 4.2–5.9)
RBC # BLD AUTO: 3.53 M/UL (ref 4.2–5.9)
SODIUM SERPL-SCNC: 137 MMOL/L (ref 136–145)
SPECIMEN STATUS: NORMAL
WBC # BLD AUTO: 5.5 K/UL (ref 4–11)
WBC # BLD AUTO: 6.2 K/UL (ref 4–11)

## 2024-05-13 PROCEDURE — 02583ZZ DESTRUCTION OF CONDUCTION MECHANISM, PERCUTANEOUS APPROACH: ICD-10-PCS | Performed by: INTERNAL MEDICINE

## 2024-05-13 PROCEDURE — 7100000001 HC PACU RECOVERY - ADDTL 15 MIN: Performed by: INTERNAL MEDICINE

## 2024-05-13 PROCEDURE — 93657 TX L/R ATRIAL FIB ADDL: CPT | Performed by: INTERNAL MEDICINE

## 2024-05-13 PROCEDURE — 2500000003 HC RX 250 WO HCPCS

## 2024-05-13 PROCEDURE — 2720000010 HC SURG SUPPLY STERILE: Performed by: INTERNAL MEDICINE

## 2024-05-13 PROCEDURE — 51798 US URINE CAPACITY MEASURE: CPT

## 2024-05-13 PROCEDURE — 2580000003 HC RX 258: Performed by: NURSE ANESTHETIST, CERTIFIED REGISTERED

## 2024-05-13 PROCEDURE — 02K83ZZ MAP CONDUCTION MECHANISM, PERCUTANEOUS APPROACH: ICD-10-PCS | Performed by: INTERNAL MEDICINE

## 2024-05-13 PROCEDURE — 6370000000 HC RX 637 (ALT 250 FOR IP): Performed by: INTERNAL MEDICINE

## 2024-05-13 PROCEDURE — 0Y373ZZ CONTROL BLEEDING IN RIGHT FEMORAL REGION, PERCUTANEOUS APPROACH: ICD-10-PCS | Performed by: INTERNAL MEDICINE

## 2024-05-13 PROCEDURE — 6360000002 HC RX W HCPCS: Performed by: INTERNAL MEDICINE

## 2024-05-13 PROCEDURE — 4A023FZ MEASUREMENT OF CARDIAC RHYTHM, PERCUTANEOUS APPROACH: ICD-10-PCS | Performed by: INTERNAL MEDICINE

## 2024-05-13 PROCEDURE — 80048 BASIC METABOLIC PNL TOTAL CA: CPT

## 2024-05-13 PROCEDURE — C1759 CATH, INTRA ECHOCARDIOGRAPHY: HCPCS | Performed by: INTERNAL MEDICINE

## 2024-05-13 PROCEDURE — 93655 ICAR CATH ABLTJ DSCRT ARRHYT: CPT | Performed by: INTERNAL MEDICINE

## 2024-05-13 PROCEDURE — 7100000010 HC PHASE II RECOVERY - FIRST 15 MIN

## 2024-05-13 PROCEDURE — 93656 COMPRE EP EVAL ABLTJ ATR FIB: CPT | Performed by: INTERNAL MEDICINE

## 2024-05-13 PROCEDURE — C1894 INTRO/SHEATH, NON-LASER: HCPCS | Performed by: INTERNAL MEDICINE

## 2024-05-13 PROCEDURE — 2580000003 HC RX 258: Performed by: INTERNAL MEDICINE

## 2024-05-13 PROCEDURE — 36415 COLL VENOUS BLD VENIPUNCTURE: CPT

## 2024-05-13 PROCEDURE — 2700000000 HC OXYGEN THERAPY PER DAY

## 2024-05-13 PROCEDURE — 85347 COAGULATION TIME ACTIVATED: CPT

## 2024-05-13 PROCEDURE — 93609 INTRA-VNTR MAPG TCHYCAR SITE: CPT | Performed by: INTERNAL MEDICINE

## 2024-05-13 PROCEDURE — 93005 ELECTROCARDIOGRAM TRACING: CPT | Performed by: INTERNAL MEDICINE

## 2024-05-13 PROCEDURE — 93010 ELECTROCARDIOGRAM REPORT: CPT | Performed by: INTERNAL MEDICINE

## 2024-05-13 PROCEDURE — 7100000000 HC PACU RECOVERY - FIRST 15 MIN: Performed by: INTERNAL MEDICINE

## 2024-05-13 PROCEDURE — 85027 COMPLETE CBC AUTOMATED: CPT

## 2024-05-13 PROCEDURE — 6360000002 HC RX W HCPCS

## 2024-05-13 PROCEDURE — 93622 COMP EP EVAL L VENTR PAC&REC: CPT | Performed by: INTERNAL MEDICINE

## 2024-05-13 PROCEDURE — 94761 N-INVAS EAR/PLS OXIMETRY MLT: CPT

## 2024-05-13 PROCEDURE — 2709999900 HC NON-CHARGEABLE SUPPLY: Performed by: INTERNAL MEDICINE

## 2024-05-13 PROCEDURE — 93325 DOPPLER ECHO COLOR FLOW MAPG: CPT

## 2024-05-13 PROCEDURE — B24BZZ4 ULTRASONOGRAPHY OF HEART WITH AORTA, TRANSESOPHAGEAL: ICD-10-PCS | Performed by: INTERNAL MEDICINE

## 2024-05-13 PROCEDURE — 2500000003 HC RX 250 WO HCPCS: Performed by: NURSE ANESTHETIST, CERTIFIED REGISTERED

## 2024-05-13 PROCEDURE — 7100000011 HC PHASE II RECOVERY - ADDTL 15 MIN

## 2024-05-13 PROCEDURE — 51701 INSERT BLADDER CATHETER: CPT

## 2024-05-13 PROCEDURE — C1766 INTRO/SHEATH,STRBLE,NON-PEEL: HCPCS | Performed by: INTERNAL MEDICINE

## 2024-05-13 PROCEDURE — 2060000000 HC ICU INTERMEDIATE R&B

## 2024-05-13 PROCEDURE — C1760 CLOSURE DEV, VASC: HCPCS | Performed by: INTERNAL MEDICINE

## 2024-05-13 PROCEDURE — C1730 CATH, EP, 19 OR FEW ELECT: HCPCS | Performed by: INTERNAL MEDICINE

## 2024-05-13 PROCEDURE — 3700000000 HC ANESTHESIA ATTENDED CARE: Performed by: INTERNAL MEDICINE

## 2024-05-13 PROCEDURE — 93623 PRGRMD STIMJ&PACG IV RX NFS: CPT | Performed by: INTERNAL MEDICINE

## 2024-05-13 PROCEDURE — 2500000003 HC RX 250 WO HCPCS: Performed by: INTERNAL MEDICINE

## 2024-05-13 PROCEDURE — 6360000002 HC RX W HCPCS: Performed by: NURSE ANESTHETIST, CERTIFIED REGISTERED

## 2024-05-13 PROCEDURE — C1732 CATH, EP, DIAG/ABL, 3D/VECT: HCPCS | Performed by: INTERNAL MEDICINE

## 2024-05-13 PROCEDURE — C1893 INTRO/SHEATH, FIXED,NON-PEEL: HCPCS | Performed by: INTERNAL MEDICINE

## 2024-05-13 PROCEDURE — 3700000001 HC ADD 15 MINUTES (ANESTHESIA): Performed by: INTERNAL MEDICINE

## 2024-05-13 RX ORDER — PHENYLEPHRINE HCL IN 0.9% NACL 1 MG/10 ML
SYRINGE (ML) INTRAVENOUS PRN
Status: DISCONTINUED | OUTPATIENT
Start: 2024-05-13 | End: 2024-05-13 | Stop reason: SDUPTHER

## 2024-05-13 RX ORDER — SODIUM CHLORIDE 0.9 % (FLUSH) 0.9 %
5-40 SYRINGE (ML) INJECTION EVERY 12 HOURS SCHEDULED
Status: DISCONTINUED | OUTPATIENT
Start: 2024-05-13 | End: 2024-05-13 | Stop reason: HOSPADM

## 2024-05-13 RX ORDER — PANTOPRAZOLE SODIUM 40 MG/1
40 TABLET, DELAYED RELEASE ORAL
Status: DISCONTINUED | OUTPATIENT
Start: 2024-05-14 | End: 2024-05-17 | Stop reason: HOSPADM

## 2024-05-13 RX ORDER — SODIUM CHLORIDE 0.9 % (FLUSH) 0.9 %
5-40 SYRINGE (ML) INJECTION EVERY 12 HOURS SCHEDULED
Status: DISCONTINUED | OUTPATIENT
Start: 2024-05-13 | End: 2024-05-17 | Stop reason: HOSPADM

## 2024-05-13 RX ORDER — SUCRALFATE 1 G/1
1 TABLET ORAL EVERY 8 HOURS SCHEDULED
Status: DISCONTINUED | OUTPATIENT
Start: 2024-05-13 | End: 2024-05-17 | Stop reason: HOSPADM

## 2024-05-13 RX ORDER — MEPERIDINE HYDROCHLORIDE 50 MG/ML
12.5 INJECTION INTRAMUSCULAR; INTRAVENOUS; SUBCUTANEOUS EVERY 5 MIN PRN
Status: DISCONTINUED | OUTPATIENT
Start: 2024-05-13 | End: 2024-05-13 | Stop reason: HOSPADM

## 2024-05-13 RX ORDER — MAGNESIUM SULFATE IN WATER 40 MG/ML
2000 INJECTION, SOLUTION INTRAVENOUS ONCE
Status: COMPLETED | OUTPATIENT
Start: 2024-05-13 | End: 2024-05-13

## 2024-05-13 RX ORDER — IPRATROPIUM BROMIDE AND ALBUTEROL SULFATE 2.5; .5 MG/3ML; MG/3ML
1 SOLUTION RESPIRATORY (INHALATION) EVERY 4 HOURS PRN
Status: DISCONTINUED | OUTPATIENT
Start: 2024-05-13 | End: 2024-05-17 | Stop reason: HOSPADM

## 2024-05-13 RX ORDER — NALOXONE HYDROCHLORIDE 0.4 MG/ML
INJECTION, SOLUTION INTRAMUSCULAR; INTRAVENOUS; SUBCUTANEOUS PRN
Status: DISCONTINUED | OUTPATIENT
Start: 2024-05-13 | End: 2024-05-13 | Stop reason: HOSPADM

## 2024-05-13 RX ORDER — SODIUM CHLORIDE 9 MG/ML
INJECTION, SOLUTION INTRAVENOUS PRN
Status: DISCONTINUED | OUTPATIENT
Start: 2024-05-13 | End: 2024-05-15

## 2024-05-13 RX ORDER — FERROUS SULFATE 325(65) MG
325 TABLET ORAL
Status: DISCONTINUED | OUTPATIENT
Start: 2024-05-14 | End: 2024-05-17 | Stop reason: HOSPADM

## 2024-05-13 RX ORDER — BUPIVACAINE HYDROCHLORIDE 5 MG/ML
INJECTION, SOLUTION EPIDURAL; INTRACAUDAL PRN
Status: DISCONTINUED | OUTPATIENT
Start: 2024-05-13 | End: 2024-05-13 | Stop reason: HOSPADM

## 2024-05-13 RX ORDER — SODIUM CHLORIDE 0.9 % (FLUSH) 0.9 %
5-40 SYRINGE (ML) INJECTION PRN
Status: DISCONTINUED | OUTPATIENT
Start: 2024-05-13 | End: 2024-05-13 | Stop reason: HOSPADM

## 2024-05-13 RX ORDER — ONDANSETRON 2 MG/ML
INJECTION INTRAMUSCULAR; INTRAVENOUS PRN
Status: DISCONTINUED | OUTPATIENT
Start: 2024-05-13 | End: 2024-05-13 | Stop reason: SDUPTHER

## 2024-05-13 RX ORDER — HEPARIN SODIUM 10000 [USP'U]/100ML
INJECTION, SOLUTION INTRAVENOUS CONTINUOUS PRN
Status: DISCONTINUED | OUTPATIENT
Start: 2024-05-13 | End: 2024-05-13 | Stop reason: HOSPADM

## 2024-05-13 RX ORDER — PROMETHAZINE HYDROCHLORIDE 25 MG/1
25 TABLET ORAL EVERY 8 HOURS PRN
Status: DISCONTINUED | OUTPATIENT
Start: 2024-05-13 | End: 2024-05-17 | Stop reason: HOSPADM

## 2024-05-13 RX ORDER — HYDROXYZINE HYDROCHLORIDE 10 MG/1
10 TABLET, FILM COATED ORAL 3 TIMES DAILY PRN
Status: DISCONTINUED | OUTPATIENT
Start: 2024-05-13 | End: 2024-05-17 | Stop reason: HOSPADM

## 2024-05-13 RX ORDER — GABAPENTIN 300 MG/1
300 CAPSULE ORAL 2 TIMES DAILY
Status: DISCONTINUED | OUTPATIENT
Start: 2024-05-13 | End: 2024-05-14

## 2024-05-13 RX ORDER — LIDOCAINE HYDROCHLORIDE 20 MG/ML
INJECTION, SOLUTION EPIDURAL; INFILTRATION; INTRACAUDAL; PERINEURAL PRN
Status: DISCONTINUED | OUTPATIENT
Start: 2024-05-13 | End: 2024-05-13 | Stop reason: HOSPADM

## 2024-05-13 RX ORDER — SODIUM CHLORIDE 9 MG/ML
INJECTION, SOLUTION INTRAVENOUS PRN
Status: DISCONTINUED | OUTPATIENT
Start: 2024-05-13 | End: 2024-05-13 | Stop reason: HOSPADM

## 2024-05-13 RX ORDER — PROTAMINE SULFATE 10 MG/ML
INJECTION, SOLUTION INTRAVENOUS PRN
Status: DISCONTINUED | OUTPATIENT
Start: 2024-05-13 | End: 2024-05-13 | Stop reason: SDUPTHER

## 2024-05-13 RX ORDER — LIDOCAINE HYDROCHLORIDE 20 MG/ML
INJECTION, SOLUTION EPIDURAL; INFILTRATION; INTRACAUDAL; PERINEURAL PRN
Status: DISCONTINUED | OUTPATIENT
Start: 2024-05-13 | End: 2024-05-13 | Stop reason: SDUPTHER

## 2024-05-13 RX ORDER — COLCHICINE 0.6 MG/1
0.6 TABLET ORAL DAILY
Status: DISCONTINUED | OUTPATIENT
Start: 2024-05-13 | End: 2024-05-15

## 2024-05-13 RX ORDER — ACETAMINOPHEN 325 MG/1
650 TABLET ORAL EVERY 4 HOURS PRN
Status: DISCONTINUED | OUTPATIENT
Start: 2024-05-13 | End: 2024-05-17 | Stop reason: HOSPADM

## 2024-05-13 RX ORDER — SODIUM CHLORIDE 9 MG/ML
INJECTION, SOLUTION INTRAVENOUS CONTINUOUS PRN
Status: COMPLETED | OUTPATIENT
Start: 2024-05-13 | End: 2024-05-13

## 2024-05-13 RX ORDER — ALBUTEROL SULFATE 90 UG/1
2 AEROSOL, METERED RESPIRATORY (INHALATION) EVERY 4 HOURS PRN
Status: DISCONTINUED | OUTPATIENT
Start: 2024-05-13 | End: 2024-05-17 | Stop reason: HOSPADM

## 2024-05-13 RX ORDER — PROPOFOL 10 MG/ML
INJECTION, EMULSION INTRAVENOUS PRN
Status: DISCONTINUED | OUTPATIENT
Start: 2024-05-13 | End: 2024-05-13 | Stop reason: SDUPTHER

## 2024-05-13 RX ORDER — OXYCODONE HYDROCHLORIDE 5 MG/1
10 TABLET ORAL PRN
Status: DISCONTINUED | OUTPATIENT
Start: 2024-05-13 | End: 2024-05-13 | Stop reason: HOSPADM

## 2024-05-13 RX ORDER — OXYCODONE HYDROCHLORIDE 5 MG/1
5 TABLET ORAL PRN
Status: DISCONTINUED | OUTPATIENT
Start: 2024-05-13 | End: 2024-05-13 | Stop reason: HOSPADM

## 2024-05-13 RX ORDER — TRAMADOL HYDROCHLORIDE 50 MG/1
25 TABLET ORAL EVERY 6 HOURS PRN
Status: DISCONTINUED | OUTPATIENT
Start: 2024-05-13 | End: 2024-05-17 | Stop reason: HOSPADM

## 2024-05-13 RX ORDER — TRAZODONE HYDROCHLORIDE 50 MG/1
50 TABLET ORAL NIGHTLY
Status: DISCONTINUED | OUTPATIENT
Start: 2024-05-13 | End: 2024-05-17 | Stop reason: HOSPADM

## 2024-05-13 RX ORDER — AMIODARONE HYDROCHLORIDE 200 MG/1
400 TABLET ORAL 2 TIMES DAILY
Status: DISCONTINUED | OUTPATIENT
Start: 2024-05-13 | End: 2024-05-17

## 2024-05-13 RX ORDER — FUROSEMIDE 10 MG/ML
40 INJECTION INTRAMUSCULAR; INTRAVENOUS ONCE
Status: COMPLETED | OUTPATIENT
Start: 2024-05-13 | End: 2024-05-13

## 2024-05-13 RX ORDER — HEPARIN SODIUM 200 [USP'U]/100ML
INJECTION, SOLUTION INTRAVENOUS CONTINUOUS PRN
Status: COMPLETED | OUTPATIENT
Start: 2024-05-13 | End: 2024-05-13

## 2024-05-13 RX ORDER — HEPARIN SODIUM 1000 [USP'U]/ML
INJECTION, SOLUTION INTRAVENOUS; SUBCUTANEOUS PRN
Status: DISCONTINUED | OUTPATIENT
Start: 2024-05-13 | End: 2024-05-13 | Stop reason: HOSPADM

## 2024-05-13 RX ORDER — ROCURONIUM BROMIDE 10 MG/ML
INJECTION, SOLUTION INTRAVENOUS PRN
Status: DISCONTINUED | OUTPATIENT
Start: 2024-05-13 | End: 2024-05-13 | Stop reason: SDUPTHER

## 2024-05-13 RX ORDER — ASPIRIN 81 MG/1
81 TABLET ORAL DAILY
Status: DISCONTINUED | OUTPATIENT
Start: 2024-05-13 | End: 2024-05-14

## 2024-05-13 RX ORDER — NITROGLYCERIN 0.4 MG/1
0.4 TABLET SUBLINGUAL EVERY 5 MIN PRN
Status: DISCONTINUED | OUTPATIENT
Start: 2024-05-13 | End: 2024-05-17 | Stop reason: HOSPADM

## 2024-05-13 RX ORDER — ONDANSETRON 2 MG/ML
4 INJECTION INTRAMUSCULAR; INTRAVENOUS
Status: DISCONTINUED | OUTPATIENT
Start: 2024-05-13 | End: 2024-05-13 | Stop reason: HOSPADM

## 2024-05-13 RX ORDER — TRAMADOL HYDROCHLORIDE 50 MG/1
100 TABLET ORAL EVERY 6 HOURS PRN
Status: DISCONTINUED | OUTPATIENT
Start: 2024-05-13 | End: 2024-05-17 | Stop reason: HOSPADM

## 2024-05-13 RX ORDER — DEXAMETHASONE SODIUM PHOSPHATE 4 MG/ML
INJECTION, SOLUTION INTRA-ARTICULAR; INTRALESIONAL; INTRAMUSCULAR; INTRAVENOUS; SOFT TISSUE PRN
Status: DISCONTINUED | OUTPATIENT
Start: 2024-05-13 | End: 2024-05-13 | Stop reason: SDUPTHER

## 2024-05-13 RX ORDER — SODIUM CHLORIDE 0.9 % (FLUSH) 0.9 %
5-40 SYRINGE (ML) INJECTION PRN
Status: DISCONTINUED | OUTPATIENT
Start: 2024-05-13 | End: 2024-05-17 | Stop reason: HOSPADM

## 2024-05-13 RX ORDER — FUROSEMIDE 10 MG/ML
INJECTION INTRAMUSCULAR; INTRAVENOUS PRN
Status: DISCONTINUED | OUTPATIENT
Start: 2024-05-13 | End: 2024-05-13 | Stop reason: SDUPTHER

## 2024-05-13 RX ADMIN — FUROSEMIDE 40 MG: 10 INJECTION, SOLUTION INTRAMUSCULAR; INTRAVENOUS at 21:10

## 2024-05-13 RX ADMIN — SUCRALFATE 1 G: 1 TABLET ORAL at 21:11

## 2024-05-13 RX ADMIN — LIDOCAINE HYDROCHLORIDE 100 MG: 20 INJECTION, SOLUTION EPIDURAL; INFILTRATION; INTRACAUDAL; PERINEURAL at 13:20

## 2024-05-13 RX ADMIN — Medication 100 MCG: at 13:32

## 2024-05-13 RX ADMIN — SODIUM CHLORIDE: 9 INJECTION, SOLUTION INTRAVENOUS at 13:11

## 2024-05-13 RX ADMIN — TRAMADOL HYDROCHLORIDE 100 MG: 50 TABLET ORAL at 21:21

## 2024-05-13 RX ADMIN — DEXAMETHASONE SODIUM PHOSPHATE 4 MG: 4 INJECTION, SOLUTION INTRAMUSCULAR; INTRAVENOUS at 13:39

## 2024-05-13 RX ADMIN — PHENYLEPHRINE HYDROCHLORIDE 50 MCG/MIN: 10 INJECTION INTRAVENOUS at 13:28

## 2024-05-13 RX ADMIN — Medication 100 MCG: at 14:13

## 2024-05-13 RX ADMIN — SODIUM CHLORIDE: 9 INJECTION, SOLUTION INTRAVENOUS at 16:27

## 2024-05-13 RX ADMIN — Medication 100 MCG: at 13:55

## 2024-05-13 RX ADMIN — COLCHICINE 0.6 MG: 0.6 TABLET ORAL at 21:11

## 2024-05-13 RX ADMIN — Medication 100 MCG: at 13:57

## 2024-05-13 RX ADMIN — ONDANSETRON 4 MG: 2 INJECTION INTRAMUSCULAR; INTRAVENOUS at 13:39

## 2024-05-13 RX ADMIN — Medication 100 MCG: at 13:39

## 2024-05-13 RX ADMIN — MAGNESIUM SULFATE HEPTAHYDRATE 2000 MG: 40 INJECTION, SOLUTION INTRAVENOUS at 21:39

## 2024-05-13 RX ADMIN — TRAZODONE HYDROCHLORIDE 50 MG: 50 TABLET ORAL at 21:11

## 2024-05-13 RX ADMIN — Medication 200 MCG: at 14:31

## 2024-05-13 RX ADMIN — SUGAMMADEX 200 MG: 100 INJECTION, SOLUTION INTRAVENOUS at 17:16

## 2024-05-13 RX ADMIN — ROCURONIUM BROMIDE 50 MG: 50 INJECTION, SOLUTION INTRAVENOUS at 13:20

## 2024-05-13 RX ADMIN — GABAPENTIN 300 MG: 300 CAPSULE ORAL at 21:11

## 2024-05-13 RX ADMIN — PROTAMINE SULFATE 40 MG: 10 INJECTION, SOLUTION INTRAVENOUS at 17:09

## 2024-05-13 RX ADMIN — PROMETHAZINE HYDROCHLORIDE 25 MG: 25 TABLET ORAL at 21:21

## 2024-05-13 RX ADMIN — Medication 100 MCG: at 13:49

## 2024-05-13 RX ADMIN — AMIODARONE HYDROCHLORIDE 400 MG: 200 TABLET ORAL at 21:11

## 2024-05-13 RX ADMIN — FUROSEMIDE 60 MG: 10 INJECTION, SOLUTION INTRAMUSCULAR; INTRAVENOUS at 17:07

## 2024-05-13 RX ADMIN — Medication 100 MCG: at 14:23

## 2024-05-13 RX ADMIN — PROPOFOL 90 MG: 10 INJECTION, EMULSION INTRAVENOUS at 13:20

## 2024-05-13 ASSESSMENT — PAIN - FUNCTIONAL ASSESSMENT
PAIN_FUNCTIONAL_ASSESSMENT: 0-10
PAIN_FUNCTIONAL_ASSESSMENT: ACTIVITIES ARE NOT PREVENTED

## 2024-05-13 ASSESSMENT — LIFESTYLE VARIABLES: SMOKING_STATUS: 0

## 2024-05-13 ASSESSMENT — PAIN SCALES - GENERAL: PAINLEVEL_OUTOF10: 9

## 2024-05-13 ASSESSMENT — ENCOUNTER SYMPTOMS: SHORTNESS OF BREATH: 1

## 2024-05-13 ASSESSMENT — PAIN DESCRIPTION - ORIENTATION: ORIENTATION: MID;LOWER

## 2024-05-13 ASSESSMENT — PAIN DESCRIPTION - DESCRIPTORS: DESCRIPTORS: ACHING

## 2024-05-13 ASSESSMENT — PAIN DESCRIPTION - LOCATION: LOCATION: BACK

## 2024-05-13 NOTE — H&P
[facility]   Cardiology Admission Note              Date:   5/13/2024  Patient name: Khalif Pinedo Jr  Date of admission:  5/13/2024 11:01 AM  MRN:   [id number]  YOB: 1940    Primary Care physician: Scott Denise MD    Reason for Admission:  atrial fibrillation    CHIEF COMPLAINT:  Symptomatic atrial fibrillation     History Obtained From:  patient    HISTORY OF PRESENT ILLNESS:    Patient is a pleasant 83 year old male with a medical history significant for atrial fibrillation status post Watchman, ischemic cardiomyopathy status post PCI, COPD, peripheral vascular disease, and GERD who presents from home PALLAVI and atrial fibrillation ablation.  Patient reports significant fatigue and weakness.  His hemoglobin continues to trend lower.  We discussed PALLAVI to evaluate watchman and get him off DAPT vs PALLAVI with ablation.  His preference would be to move forward with ablation given how he has been feeling.  Low threshold for blood post ablation if hemoglobin worsens.      Past Medical History:   has a past medical history of Anemia, CAD (coronary artery disease), CAD (coronary artery disease), COPD (chronic obstructive pulmonary disease) (HCC), COPD (chronic obstructive pulmonary disease) (HCC), GERD (gastroesophageal reflux disease), HTN (hypertension), Hyperlipidemia, IBS (irritable bowel syndrome), and Stented coronary artery.    Past Surgical History:   has a past surgical history that includes Coronary artery bypass graft (12/05/2000); Coronary angioplasty with stent (01/01/2005); Carotid endarterectomy (01/01/2014); Colonoscopy (01/01/2004); Upper gastrointestinal endoscopy (01/01/2004); Cholecystectomy; Upper gastrointestinal endoscopy (07/15/2014); Diagnostic Cardiac Cath Lab Procedure; Upper gastrointestinal endoscopy (N/A, 09/20/2022); Colonoscopy (N/A, 09/21/2022); Capsule endoscopy (09/21/2022); Upper gastrointestinal endoscopy (N/A, 10/21/2022); Cardiac catheterization  cough or wheezing, no sputum production. No hematemesis.    Gastrointestinal: No abdominal pain, appetite loss, blood in stools. No change in bowel or bladder habits.  Genitourinary: No dysuria, trouble voiding, or hematuria.  Musculoskeletal:  No gait disturbance, No weakness or joint complaints.  Integumentary: No rash or pruritis.  Neurological: No headache, diplopia, change in muscle strength, numbness or tingling. No change in gait, balance, coordination, mood, affect, memory, mentation, behavior.  Psychiatric: No anxiety, or depression.  Endocrine: No temperature intolerance. No excessive thirst, fluid intake, or urination. No tremor.  Hematologic/Lymphatic: No abnormal bruising or bleeding, blood clots or swollen lymph nodes.  Allergic/Immunologic: No nasal congestion or hives.    PHYSICAL EXAM:    Physical Examination:    Vital Signs:           Blood pressure 134/77, height 1.829 m (6'), weight 94.8 kg (209 lb), SpO2 96 %.  BP  Min: 134/77  Max: 134/77  SpO2  Av %  Min: 96 %  Max: 96 %    Admission Weight:  Weight - Scale: 94.8 kg (209 lb)      I/O:   No intake or output data in the 24 hours ending 24 1259         Vent Settings:          Constitutional and General Appearance: alert, cooperative, no distress, and appears stated age  HEENT: PERRL, no cervical lymphadenopathy. No masses palpable. Normal oral mucosa  Respiratory:  Normal excursion and expansion without use of accessory muscles  Resp Auscultation: Normal breath sounds without dullness or wheezing  Cardiovascular:  The apical impulse is not displaced  Irregular rate and rhythm w/o M/G/R  Peripheral pulses are symmetrical and full   Abdomen:  No masses or tenderness  Bowel sounds present  Extremities:   No Cyanosis or Clubbing   Lower extremity edema: No   Skin: Warm and dry  Neurological:  Alert and oriented.  Moves all extremities well  No abnormalities of mood, affect, memory, mentation, or behavior are noted    DATA:    CARDIOLOGY

## 2024-05-13 NOTE — PROGRESS NOTES
D: Patient's right groin site is now bleeding more. A: Applied direct pressure to site with sterile 4 x 4's and called cath lab .

## 2024-05-13 NOTE — PROGRESS NOTES
D: Patient here from OR via stretcher, taken to bay 7, all current drips, treatments, skin issues, and plan of care were reviewed by both RN, patient transferred in stable condition, patient is bleeding from fem site on the right, patient is awake, alert, and oriented x 4. C/O of throat tightness, states he can't breath. Patient has 0 2 via NC with 0 2 flowing at 2 liters, call light is in reach. A: Assessment completed and documented, discussed plan of care with patient who agreed.

## 2024-05-13 NOTE — ANESTHESIA PRE PROCEDURE
Department of Anesthesiology  Preprocedure Note       Name:  Khalif Pinedo Jr   Age:  83 y.o.  :  1940                                          MRN:  [id number]         Date:  2024      Surgeon: Surgeon(s):  JAMISON Pollard Jr., MD    Procedure: Procedure(s):  Ablation A-fib w complete ep study    Medications prior to admission:   Prior to Admission medications    Medication Sig Start Date End Date Taking? Authorizing Provider   furosemide (LASIX) 20 MG tablet Take 1 tablet by mouth as needed for weight gain, swelling, or shortness of breath.  Patient not taking: Reported on 2024   Vasquez Jha MD   predniSONE (DELTASONE) 10 MG tablet 4 tablets once daily for 2 days then 3 tablets once daily for 2 days then 2 tablets once daily for 2 days then 1 tablet once daily for 2 days. 24   Howie Joyce MD   ipratropium 0.5 mg-albuterol 2.5 mg (DUONEB) 0.5-2.5 (3) MG/3ML SOLN nebulizer solution Inhale 3 mLs into the lungs every 4 hours as needed for Shortness of Breath 24  Howie Joyce MD   Evolocumab (REPATHA SURECLICK) 140 MG/ML SOAJ ADMINISTER 1 ML UNDER THE SKIN 1 TIME EVERY 2 WEEKS 3/21/24   Tigre Howell DO   traZODone (DESYREL) 50 MG tablet Take 1 tablet by mouth nightly 24   Milo Plascencia MD   ferrous sulfate (IRON 325) 325 (65 Fe) MG tablet Take 1 tablet by mouth daily (with breakfast)    Milo Plascencia MD   clopidogrel (PLAVIX) 75 MG tablet Take 1 tablet by mouth daily 24   Tanja Correia MD   aspirin 81 MG EC tablet Take 1 tablet by mouth daily 23   Tanja Correia MD   nitroGLYCERIN (NITROSTAT) 0.4 MG SL tablet Place 1 tablet under the tongue every 5 minutes as needed for Chest pain 23   Crissy Sprague, APRN - CNP   gabapentin (NEURONTIN) 300 MG capsule Take 1 capsule by mouth 2 times daily. 8/7/23   Milo Plascencia MD   rivaroxaban (XARELTO) 20 MG TABS tablet Take 1 tablet by mouth daily (with breakfast) 22

## 2024-05-13 NOTE — PROGRESS NOTES
Wife Anne updated regarding bed status (patient got bed 211). Belongings brought from cath lab over to 211.

## 2024-05-13 NOTE — ANESTHESIA POSTPROCEDURE EVALUATION
Department of Anesthesiology  Postprocedure Note    Patient: Khalif Pinedo Jr  MRN: [id number]  YOB: 1940  Date of evaluation: 5/13/2024    Procedure Summary       Date: 05/13/24 Room / Location: [facility] CATH/EP LAB 4 / [facility] CARDIAC CATH LAB    Anesthesia Start: 1255 Anesthesia Stop: 1746    Procedure: Ablation A-fib w complete ep study Diagnosis:       Persistent atrial fibrillation (HCC)      (Persistent atrial fibrillation (HCC) [I48.19])    Providers: JAMISON Pollard Jr., MD Responsible Provider: Arnaldo Torres MD    Anesthesia Type: General ASA Status: 3            Anesthesia Type: General    India Phase I: India Score: 9    India Phase II:      Anesthesia Post Evaluation    Patient location during evaluation: PACU  Patient participation: complete - patient participated  Level of consciousness: awake and alert  Airway patency: patent  Nausea & Vomiting: no nausea and no vomiting  Cardiovascular status: hemodynamically stable  Respiratory status: acceptable  Hydration status: euvolemic  Pain management: adequate    No notable events documented.

## 2024-05-13 NOTE — PLAN OF CARE
Post afib ablation.  Vascade used to close bilateral groins.  Beginning duiresis. Am hopeful wife can stay with him while admitted.  CBC in am to consider transfusion given anemia.    Edison Pollard MD  Cardiac Electrophysiology  [facility]  [phone] [place] Office

## 2024-05-14 LAB
ANION GAP SERPL CALCULATED.3IONS-SCNC: 12 MMOL/L (ref 3–16)
BUN SERPL-MCNC: 26 MG/DL (ref 7–20)
CALCIUM SERPL-MCNC: 8.2 MG/DL (ref 8.3–10.6)
CHLORIDE SERPL-SCNC: 100 MMOL/L (ref 99–110)
CO2 SERPL-SCNC: 25 MMOL/L (ref 21–32)
CREAT SERPL-MCNC: 1.2 MG/DL (ref 0.8–1.3)
DEPRECATED RDW RBC AUTO: 19.9 % (ref 12.4–15.4)
EKG ATRIAL RATE: 41 BPM
EKG DIAGNOSIS: NORMAL
EKG Q-T INTERVAL: 412 MS
EKG QRS DURATION: 106 MS
EKG QTC CALCULATION (BAZETT): 493 MS
EKG R AXIS: -81 DEGREES
EKG T AXIS: 65 DEGREES
EKG VENTRICULAR RATE: 86 BPM
GFR SERPLBLD CREATININE-BSD FMLA CKD-EPI: 60 ML/MIN/{1.73_M2}
GLUCOSE SERPL-MCNC: 108 MG/DL (ref 70–99)
HCT VFR BLD AUTO: 23.2 % (ref 40.5–52.5)
HCT VFR BLD AUTO: 24.5 % (ref 40.5–52.5)
HCT VFR BLD AUTO: 24.5 % (ref 40.5–52.5)
HCT VFR BLD AUTO: 25.2 % (ref 40.5–52.5)
HGB BLD-MCNC: 7.1 G/DL (ref 13.5–17.5)
HGB BLD-MCNC: 7.5 G/DL (ref 13.5–17.5)
HGB BLD-MCNC: 7.6 G/DL (ref 13.5–17.5)
HGB BLD-MCNC: 7.9 G/DL (ref 13.5–17.5)
INR PPP: 1.25 (ref 0.85–1.15)
MAGNESIUM SERPL-MCNC: 2.2 MG/DL (ref 1.8–2.4)
MCH RBC QN AUTO: 26.9 PG (ref 26–34)
MCHC RBC AUTO-ENTMCNC: 31.4 G/DL (ref 31–36)
MCV RBC AUTO: 85.9 FL (ref 80–100)
PLATELET # BLD AUTO: 256 K/UL (ref 135–450)
PMV BLD AUTO: 7.6 FL (ref 5–10.5)
POC ACT LR: >400 SEC
POTASSIUM SERPL-SCNC: 4.1 MMOL/L (ref 3.5–5.1)
PROTHROMBIN TIME: 15.9 SEC (ref 11.9–14.9)
RBC # BLD AUTO: 2.94 M/UL (ref 4.2–5.9)
SODIUM SERPL-SCNC: 137 MMOL/L (ref 136–145)
WBC # BLD AUTO: 7.1 K/UL (ref 4–11)

## 2024-05-14 PROCEDURE — 2580000003 HC RX 258: Performed by: INTERNAL MEDICINE

## 2024-05-14 PROCEDURE — 6370000000 HC RX 637 (ALT 250 FOR IP): Performed by: INTERNAL MEDICINE

## 2024-05-14 PROCEDURE — 85018 HEMOGLOBIN: CPT

## 2024-05-14 PROCEDURE — 2700000000 HC OXYGEN THERAPY PER DAY

## 2024-05-14 PROCEDURE — 36569 INSJ PICC 5 YR+ W/O IMAGING: CPT

## 2024-05-14 PROCEDURE — 85014 HEMATOCRIT: CPT

## 2024-05-14 PROCEDURE — 85610 PROTHROMBIN TIME: CPT

## 2024-05-14 PROCEDURE — 93005 ELECTROCARDIOGRAM TRACING: CPT | Performed by: INTERNAL MEDICINE

## 2024-05-14 PROCEDURE — 83735 ASSAY OF MAGNESIUM: CPT

## 2024-05-14 PROCEDURE — 02HV33Z INSERTION OF INFUSION DEVICE INTO SUPERIOR VENA CAVA, PERCUTANEOUS APPROACH: ICD-10-PCS | Performed by: INTERNAL MEDICINE

## 2024-05-14 PROCEDURE — C1751 CATH, INF, PER/CENT/MIDLINE: HCPCS

## 2024-05-14 PROCEDURE — 99232 SBSQ HOSP IP/OBS MODERATE 35: CPT

## 2024-05-14 PROCEDURE — 80048 BASIC METABOLIC PNL TOTAL CA: CPT

## 2024-05-14 PROCEDURE — 2060000000 HC ICU INTERMEDIATE R&B

## 2024-05-14 PROCEDURE — 85027 COMPLETE CBC AUTOMATED: CPT

## 2024-05-14 PROCEDURE — 94761 N-INVAS EAR/PLS OXIMETRY MLT: CPT

## 2024-05-14 PROCEDURE — 93010 ELECTROCARDIOGRAM REPORT: CPT | Performed by: INTERNAL MEDICINE

## 2024-05-14 RX ORDER — SODIUM CHLORIDE 0.9 % (FLUSH) 0.9 %
5-40 SYRINGE (ML) INJECTION EVERY 12 HOURS SCHEDULED
Status: DISCONTINUED | OUTPATIENT
Start: 2024-05-14 | End: 2024-05-17 | Stop reason: HOSPADM

## 2024-05-14 RX ORDER — SODIUM CHLORIDE 9 MG/ML
25 INJECTION, SOLUTION INTRAVENOUS PRN
Status: DISCONTINUED | OUTPATIENT
Start: 2024-05-14 | End: 2024-05-15

## 2024-05-14 RX ORDER — LIDOCAINE HYDROCHLORIDE 10 MG/ML
5 INJECTION, SOLUTION INFILTRATION; PERINEURAL ONCE
Status: DISCONTINUED | OUTPATIENT
Start: 2024-05-14 | End: 2024-05-17 | Stop reason: HOSPADM

## 2024-05-14 RX ORDER — GABAPENTIN 300 MG/1
300 CAPSULE ORAL NIGHTLY
Status: DISCONTINUED | OUTPATIENT
Start: 2024-05-15 | End: 2024-05-17 | Stop reason: HOSPADM

## 2024-05-14 RX ORDER — SODIUM CHLORIDE 0.9 % (FLUSH) 0.9 %
5-40 SYRINGE (ML) INJECTION PRN
Status: DISCONTINUED | OUTPATIENT
Start: 2024-05-14 | End: 2024-05-17 | Stop reason: HOSPADM

## 2024-05-14 RX ADMIN — FERROUS SULFATE TAB 325 MG (65 MG ELEMENTAL FE) 325 MG: 325 (65 FE) TAB at 09:29

## 2024-05-14 RX ADMIN — COLCHICINE 0.6 MG: 0.6 TABLET ORAL at 09:29

## 2024-05-14 RX ADMIN — AMIODARONE HYDROCHLORIDE 400 MG: 200 TABLET ORAL at 09:30

## 2024-05-14 RX ADMIN — TRAMADOL HYDROCHLORIDE 100 MG: 50 TABLET ORAL at 04:45

## 2024-05-14 RX ADMIN — SUCRALFATE 1 G: 1 TABLET ORAL at 06:12

## 2024-05-14 RX ADMIN — SODIUM CHLORIDE, PRESERVATIVE FREE 10 ML: 5 INJECTION INTRAVENOUS at 09:29

## 2024-05-14 RX ADMIN — MUPIROCIN: 20 OINTMENT TOPICAL at 09:30

## 2024-05-14 RX ADMIN — SODIUM CHLORIDE, PRESERVATIVE FREE 10 ML: 5 INJECTION INTRAVENOUS at 21:36

## 2024-05-14 RX ADMIN — AMIODARONE HYDROCHLORIDE 400 MG: 200 TABLET ORAL at 21:35

## 2024-05-14 RX ADMIN — SUCRALFATE 1 G: 1 TABLET ORAL at 21:35

## 2024-05-14 RX ADMIN — SODIUM CHLORIDE, PRESERVATIVE FREE 10 ML: 5 INJECTION INTRAVENOUS at 09:38

## 2024-05-14 RX ADMIN — BENZOCAINE: 200 SPRAY DENTAL; ORAL; PERIODONTAL at 17:05

## 2024-05-14 RX ADMIN — SODIUM CHLORIDE, PRESERVATIVE FREE 10 ML: 5 INJECTION INTRAVENOUS at 21:39

## 2024-05-14 RX ADMIN — SUCRALFATE 1 G: 1 TABLET ORAL at 14:00

## 2024-05-14 RX ADMIN — TRAZODONE HYDROCHLORIDE 50 MG: 50 TABLET ORAL at 21:35

## 2024-05-14 RX ADMIN — GABAPENTIN 300 MG: 300 CAPSULE ORAL at 09:28

## 2024-05-14 RX ADMIN — MUPIROCIN: 20 OINTMENT TOPICAL at 21:39

## 2024-05-14 RX ADMIN — PANTOPRAZOLE SODIUM 40 MG: 40 TABLET, DELAYED RELEASE ORAL at 16:47

## 2024-05-14 RX ADMIN — PANTOPRAZOLE SODIUM 40 MG: 40 TABLET, DELAYED RELEASE ORAL at 06:12

## 2024-05-14 ASSESSMENT — PAIN DESCRIPTION - DESCRIPTORS: DESCRIPTORS: SHOOTING

## 2024-05-14 ASSESSMENT — PAIN SCALES - GENERAL: PAINLEVEL_OUTOF10: 8

## 2024-05-14 ASSESSMENT — PAIN DESCRIPTION - LOCATION: LOCATION: LEG

## 2024-05-14 ASSESSMENT — PAIN SCALES - WONG BAKER: WONGBAKER_NUMERICALRESPONSE: NO HURT

## 2024-05-14 ASSESSMENT — PAIN DESCRIPTION - ORIENTATION: ORIENTATION: RIGHT

## 2024-05-14 NOTE — PROGRESS NOTES
Admit Pt from A2. S/P Ablation Afib at around 1730h today. Came to ICU for bleeding on the venous site (bilateral groin with fem stopper on Rt fem). Picked up and transported by ICU bed. Alert and oriented with Spouse around. Hooked on cardiac monitor and initial vital signs taken. Noted with bleeding on the puncture site in both forearms. Scattered large bruises in both forearms and Lt groin. Bathed with chlorhexidine wipes. Call light provided and instructed with      4 Eyes Skin Assessment     NAME:  Khalif Pinedo Jr  YOB: 1940  MEDICAL RECORD NUMBER:  [id number]    The patient is being assessed for  Admission    I agree that at least one RN has performed a thorough Head to Toe Skin Assessment on the patient. ALL assessment sites listed below have been assessed.      Areas assessed by both nurses:    Head, Face, Ears, Shoulders, Back, Chest, Arms, Elbows, Hands, Sacrum. Buttock, Coccyx, Ischium, Legs. Feet and Heels, Under Medical Devices , and Other          Does the Patient have a Wound? No noted wound(s)       Daniel Prevention initiated by RN: Yes  Wound Care Orders initiated by RN: No wound noted    Pressure Injury (Stage 3,4, Unstageable, DTI, NWPT, and Complex wounds) if present, place Wound referral order by RN under : No wound noted    New Ostomies, if present place, Ostomy referral order under : No Ostomy noted    Nurse 1 eSignature: Electronically signed by Oz Williamson RN on 5/14/24 at 12:49 AM EDT    **SHARE this note so that the co-signing nurse can place an eSignature**    Nurse 2 eSignature: Electronically signed by Bentley Koch RN on 5/14/24 at 12:55 AM EDT

## 2024-05-14 NOTE — DISCHARGE SUMMARY
Patient ID:  Khalif Pinedo Jr  [id number]  83 y.o.  1940    Admit date: 5/13/2024    Discharge date and time: 5/17/2024    Admitting Physician: JAMISON Pollard Jr., MD     Discharge NP: Vandana GALLEGO-CNP    Admission Diagnoses: Persistent atrial fibrillation (HCC) [I48.19]  Status post ablation of atrial fibrillation [Z98.890, Z86.79]  A-fib (HCC) [I48.91]    Discharge Diagnoses: SAME    Admission Condition: fair    Discharged Condition: good    Hospital Course: Khalif Pinedo Jr was admitted on 5/13/2024 and had EPS, typical atrial flutter ablation and atrial fibrillation ablation.  Postprocedure was complicated by right groin bleed. Hgb dropped to 7.0 and patient ended up with JOSE DE JESUS.  Patient received 1 unit of PRBC. Hgb jaja to 8.5 and JOSE DE JESUS resolved prior to discharge.  Rhythm has been SR with occ PVCs. Denies chest pain, shortness of breath and palpitations. Has eaten, ambulated, and voided.     Consults: Hospitalist    Assessment:   Persistent atrial fibrillation              -Watchman device-evaluated PALLAVI 5/13/24--no thrombus on ANAIS or lizeth- leak  CAD/PCI/CABG  Ischemic cardiomyopathy  Hyperlipidemia  Hypertension  Carotid artery disease  COPD  Former smoker  Anemia-Hgb 8.5  JOSE DE JESUS-resolved    Plan:   1. Continue amiodarone 400 mg daily x 1 month; then reduce to 200 mg daily  2.  Monitor for drug toxicity  3.  Continue iron tablet 325 daily  4.  Continue Protonix 40 mg twice daily with meals x 30 days  5.  Continue Carafate 1 tablet every 8 hours x 14 days  6.  Continue Plavix 75 mg daily  7.  No aspirin--history of GI bleed on aspirin  Needs CBC, BMP and lipids in 10 days    Post procedure instructions reviewed  Follow up in EP office on 8/22/2024 with Dr Pollard   Follow-up in EP office with GIDEON 12/5/2024      Discharge Exam:  /69   Pulse 75   Temp 98.1 °F (36.7 °C) (Oral)   Resp 18   Ht 1.829 m (6')   Wt 92.5 kg (203 lb 14.4 oz)   SpO2 95%   BMI 27.65 kg/m²     General Appearance:

## 2024-05-14 NOTE — PROGRESS NOTES
1ml air removed from TR band every 10-15 minutes. Pt tolerated well, no furhter bleeding or hematoma noted. TR d/c at 2100. No further bleeding.

## 2024-05-14 NOTE — PROGRESS NOTES
Manual pressure applied to R groin site for bleeding, dr then stitched site. Hemaostasis obtained. Pressure also applied to L groin hematoma, site now soft, no further bleeding noted to BL ramiro sites. Distal pulses palpable.

## 2024-05-14 NOTE — PROGRESS NOTES
Arrived to place PICC line with bedside RN Stacey Pre-procedure and timeout done with RN, discussed limitations of placement and allergies. Consent confirmed. Vital signs stable. Labs, allergies, medications, and code status reviewed. No contraindications noted.     Procedure explained to pt, including the risk and benefits of the procedure. All questions answered. Pt verbalizes understanding of the procedure and states no more questions.       Pt's basilic, brachial, and cephalic are all easily collapsible with no indication for a clot. Vein selected is large enough for catheter. Pt tolerated sterile procedure well, with no difficulty accessing right basilic vein, when accessed - blood was free flowing and non-pulsatile. Guidewire, introducer, and catheter went in smoothly. PICC line verified with 3CG technology with peaked P-waves (please see image below).      Nurses:  OK to use PICC.    Please replace all existing IV tubing with new IV tubing prior to using the PICC for current IV infusions.  Please remove any PIVs from PICC arm.  All of the above may be sources of infection or an increase chance of a clot.      Post procedure - reorganized pt table, placed pt in lowest position, with call light and educated on line care. Instructed pt/RN not to use arm for at least 30min to avoid bleeding. Reported off to bedside RN.      If you have any questions please call number below and dispatch will direct you to the PICC RN that is on call.    [phone]

## 2024-05-14 NOTE — PROGRESS NOTES
Shift: [id number]    Admitting diagnosis: Afib    Presentation to hospital: Symptomatic Afib with fatigue    Surgery: yes - S/P Afib Ablation     Nursing assessment at handoff  stable    Emergency Contact/POA: Spouse  Family updated: yes -     Most recent vitals: /62   Pulse 85   Temp 97.8 °F (36.6 °C)   Resp 17   Ht 1.829 m (6')   Wt 94.6 kg (208 lb 8.9 oz)   SpO2 97%   BMI 28.29 kg/m²      Rhythm: Junctional Rhythm    NC/HFNC- 1 lpm  Respiratory support: - No ventilator support    Vent days: Day NA    Increased O2 requirements: no    Admission weight Weight - Scale: 94.8 kg (209 lb)  Today's weight   Wt Readings from Last 1 Encounters:   05/14/24 94.6 kg (208 lb 8.9 oz)         UOP >30ml/hr: yes -      Lee need assessed each shift: no    Restraints: no  Order current and documentation up to date?    Lines/Drains  LDA Insertion Date Discontinued Date Dressing Changes   PIV  5/13     TLC/PICC 5/14     Arterial       Lee       Vas Cath      ETT       Surgical drains        Night Shift Hospitalist Interventions    Problem(Brief) Date Time Intervention Physician contacted                                               Drip rates at handoff:    sodium chloride      sodium chloride         Hospital Course Daily Updates:  Admit Day# 0- Admission Night (5/13/24)  - Trend H&H; latest one 7.9- Dr. Almanza made aware.  - Hard stick; PICC line place on Rt basilic  - with fem stopper on Rt side  - mild hematoma and bleeding on Lt groin  - UOP for x12 hours was 4.5L        Lab Data:   CBC:   Recent Labs     05/13/24  1930 05/14/24  0430   WBC 6.2 7.1   HGB 9.2* 7.9*   HCT 30.4* 25.2*   MCV 88.9 85.9    256     BMP:    Recent Labs     05/13/24  1143 05/14/24  0430    137   K 4.7 4.1   CO2 26 25   BUN 21* 26*   CREATININE 1.3 1.2     LIVR: No results for input(s): \"AST\", \"ALT\" in the last 72 hours.  PT/INR:   Recent Labs     05/14/24  0430   INR 1.25*     APTT: No results for input(s): \"APTT\" in the last  Hatchet Flap Text: The defect edges were debeveled with a #15 scalpel blade.  Given the location of the defect, shape of the defect and the proximity to free margins a hatchet flap was deemed most appropriate.  Using a sterile surgical marker, an appropriate hatchet flap was drawn incorporating the defect and placing the expected incisions within the relaxed skin tension lines where possible.    The area thus outlined was incised deep to adipose tissue with a #15 scalpel blade.  The skin margins were undermined to an appropriate distance in all directions utilizing iris scissors.

## 2024-05-14 NOTE — PROGRESS NOTES
Pt coughing up bright red blood with clots, bleeding noted at various sites, all old IV and peripheral lab sticks are oozing blood. Urine pink. Dr Hinton called and order placed for pt to be transferred to ICU. ICU nurses came to 211 to transfer pt. Bedside report given. All questions answered. Belongings collected. Pt wife at BS and updated with pt condition and transfer status.

## 2024-05-14 NOTE — PROGRESS NOTES
Pt arrived to unit. Pt A/Ox4, VSS on arrival. oozing noted to R radial site, bleeding noted to R groin site and hematoma noted to L groin site. Distal pulses palpable to BLE. R hand pale, bluish color, sluggish capillary refill. R hand has swelling/hematoma below TR band. Dr at BS. Pressure applied to L groin site. Will cont to closely monitor and hold pressure as needed to sites.

## 2024-05-14 NOTE — PROGRESS NOTES
came in and tried to draw a blood sample  for coagulation and H&H but the Pt is a hard stick with bruises all over his forearms. Dr. Almanza- [facility] Hospitalist made aware and ordered PICC Placement at 0030h. Consent to secure.         Signed Electronically by: Oz Williamson, DMITRYN, RN

## 2024-05-14 NOTE — PLAN OF CARE
Problem: Discharge Planning  Goal: Discharge to home or other facility with appropriate resources  5/14/2024 0009 by Oz Varela RN  Outcome: Progressing  5/14/2024 0008 by Oz Varela RN  Outcome: Progressing  Flowsheets (Taken 5/13/2024 2300)  Discharge to home or other facility with appropriate resources:   Identify barriers to discharge with patient and caregiver   Arrange for needed discharge resources and transportation as appropriate   Identify discharge learning needs (meds, wound care, etc)   Arrange for interpreters to assist at discharge as needed   Refer to discharge planning if patient needs post-hospital services based on physician order or complex needs related to functional status, cognitive ability or social support system     Problem: Pain  Goal: Verbalizes/displays adequate comfort level or baseline comfort level  5/14/2024 0009 by Oz Varela RN  Outcome: Progressing  5/14/2024 0008 by Oz Varela RN  Outcome: Progressing     Problem: Safety - Adult  Goal: Free from fall injury  5/14/2024 0009 by Oz Varela RN  Outcome: Progressing  5/14/2024 0008 by Oz Varela RN  Outcome: Progressing     Problem: Skin/Tissue Integrity  Goal: Absence of new skin breakdown  Description: 1.  Monitor for areas of redness and/or skin breakdown  2.  Assess vascular access sites hourly  3.  Every 4-6 hours minimum:  Change oxygen saturation probe site  4.  Every 4-6 hours:  If on nasal continuous positive airway pressure, respiratory therapy assess nares and determine need for appliance change or resting period.  5/14/2024 0009 by Oz Varela RN  Outcome: Progressing  5/14/2024 0008 by Oz Varela RN  Outcome: Progressing     Problem: ABCDS Injury Assessment  Goal: Absence of physical injury  5/14/2024 0009 by Oz Varela RN  Outcome:

## 2024-05-14 NOTE — PROGRESS NOTES
[facility]     Electrophysiology                                     Progress Note    Admission date:  2024    Reason for follow up visit: Atrial fibrillation    HPI/CC: Khalif Pinedo Jr was admitted on 24 for EPS, PALLAVI, atrial fibrillation ablation and typical atrial flutter ablation.  Postprocedure patient had right groin bleeding.  Suture applied and patient admitted to ICU for monitoring.       Rhythm has been sinus rhythm.     Subjective: Patient denies chest pain, shortness of breath and palpitations.  Patient left groin dressing removed.  Site without bleeding.  Positive left femoral pulse.  Right groin figure-of-eight suture removed without issue.  No bleeding or hematoma noted.  Will continue to monitor hemoglobin.  Okay to transfer out of ICU    Vitals:  Blood pressure (!) 118/56, pulse 83, temperature 97.6 °F (36.4 °C), temperature source Oral, resp. rate 14, height 1.829 m (6'), weight 94.6 kg (208 lb 8.9 oz), SpO2 99 %.  Temp  Av.6 °F (36.4 °C)  Min: 97 °F (36.1 °C)  Max: 97.9 °F (36.6 °C)  Pulse  Av.9  Min: 72  Max: 92  BP  Min: 108/61  Max: 165/85  SpO2  Av.3 %  Min: 96 %  Max: 100 %    24 hour I/O    Intake/Output Summary (Last 24 hours) at 2024 1329  Last data filed at 2024 0954  Gross per 24 hour   Intake 2051.56 ml   Output 4750 ml   Net -2698.44 ml     Current Facility-Administered Medications   Medication Dose Route Frequency Provider Last Rate Last Admin    lidocaine 1 % injection 5 mL  5 mL IntraDERmal Once Archie, Ahmad A, DO        sodium chloride flush 0.9 % injection 5-40 mL  5-40 mL IntraVENous 2 times per day Archie, Ahmad A, DO   10 mL at 24 0929    sodium chloride flush 0.9 % injection 5-40 mL  5-40 mL IntraVENous PRN Archie, Ahmad A, DO        0.9 % sodium chloride infusion  25 mL IntraVENous PRN Archie, Ahmad A, DO        mupirocin (BACTROBAN) 2 % ointment   Each Nostril BID JAMISON Pollard Jr., MD   Given at 24 0950

## 2024-05-14 NOTE — CONSULTS
COLONOSCOPY POLYPECTOMY SNARE/HOT BIOPSY performed by Santosh Jerome DO at [facility] ENDOSCOPY    CORONARY ANGIOPLASTY WITH STENT PLACEMENT  01/01/2005    CORONARY ARTERY BYPASS GRAFT  12/05/2000    at [facility] to LAD, SVG-D1 and and svg - OM (not 2005)    DIAGNOSTIC CARDIAC CATH LAB PROCEDURE      EP DEVICE PROCEDURE N/A 5/13/2024    Ablation A-fib w complete ep study performed by JAMISON Pollard Jr., MD at [facility] CARDIAC CATH LAB    UPPER GASTROINTESTINAL ENDOSCOPY  01/01/2004    UPPER GASTROINTESTINAL ENDOSCOPY  07/15/2014    gastritis    UPPER GASTROINTESTINAL ENDOSCOPY N/A 09/20/2022    EGD DIAGNOSTIC ONLY performed by aSntosh Jerome DO at [facility] ENDOSCOPY    UPPER GASTROINTESTINAL ENDOSCOPY N/A 10/21/2022    ENTEROSCOPY WITH INTERVENTION performed by Aakash Man MD at [facility] ENDOSCOPY       Medications Prior to Admission:   Prior to Admission medications    Medication Sig Start Date End Date Taking? Authorizing Provider   furosemide (LASIX) 20 MG tablet Take 1 tablet by mouth as needed for weight gain, swelling, or shortness of breath.  Patient not taking: Reported on 5/13/2024 5/2/24   Vasquez Jha MD   predniSONE (DELTASONE) 10 MG tablet 4 tablets once daily for 2 days then 3 tablets once daily for 2 days then 2 tablets once daily for 2 days then 1 tablet once daily for 2 days. 4/2/24   Howie Joyce MD   ipratropium 0.5 mg-albuterol 2.5 mg (DUONEB) 0.5-2.5 (3) MG/3ML SOLN nebulizer solution Inhale 3 mLs into the lungs every 4 hours as needed for Shortness of Breath 4/2/24 5/2/24  Howie Joyce MD   Evolocumab (REPATHA SURECLICK) 140 MG/ML SOAJ ADMINISTER 1 ML UNDER THE SKIN 1 TIME EVERY 2 WEEKS 3/21/24   Tigre Howell DO   traZODone (DESYREL) 50 MG tablet Take 1 tablet by mouth nightly 2/2/24   Milo Plascencia MD   ferrous sulfate (IRON 325) 325 (65 Fe) MG tablet Take 1 tablet by mouth daily (with breakfast)    Milo Plascencia MD   clopidogrel (PLAVIX) 75 MG tablet Take 1 tablet by  mouth daily 1/9/24   Tanja Correia MD   aspirin 81 MG EC tablet Take 1 tablet by mouth daily 12/22/23   Tanja Correia MD   nitroGLYCERIN (NITROSTAT) 0.4 MG SL tablet Place 1 tablet under the tongue every 5 minutes as needed for Chest pain 12/6/23   Crissy Sprague APRN - CNP   gabapentin (NEURONTIN) 300 MG capsule Take 1 capsule by mouth 2 times daily. 8/7/23   Milo Plascencia MD   rivaroxaban (XARELTO) 20 MG TABS tablet Take 1 tablet by mouth daily (with breakfast) 7/21/22 9/22/22  Tanja Correia MD   Fluticasone-Umeclidin-Vilant (TRELEGY ELLIPTA IN) Inhale into the lungs     ProviderMilo MD   apixaban (ELIQUIS) 5 MG TABS tablet Take 1 tablet by mouth 2 times daily  Patient taking differently: Take 5 mg by mouth 2 times daily 1/2 tablet twice daily 5/5/22 6/14/22  Crissy Sprague APRN - CNP   esomeprazole (NEXIUM) 20 MG delayed release capsule Take 1 capsule by mouth daily as needed    ProviderMilo MD   albuterol (PROVENTIL HFA) 108 (90 BASE) MCG/ACT inhaler Inhale 2 puffs into the lungs every 4 hours as needed for Wheezing or Shortness of Breath.  Patient taking differently: Inhale 2 puffs into the lungs every 6 hours as needed for Wheezing or Shortness of Breath 10/9/13   Jose Redding MD   promethazine (PHENERGAN) 25 MG tablet Take 1 tablet by mouth every 8 hours as needed    ProviderMilo MD       Labs: Personally reviewed and interpreted for clinical significance.   Recent Labs     05/13/24  1143 05/13/24  1930 05/14/24  0430 05/14/24  1240   WBC 5.5 6.2 7.1  --    HGB 9.4* 9.2* 7.9* 7.6*   HCT 30.6* 30.4* 25.2* 24.5*    302 256  --      Recent Labs     05/13/24  1143 05/14/24  0430    137   K 4.7 4.1    100   CO2 26 25   BUN 21* 26*   CREATININE 1.3 1.2   CALCIUM 9.3 8.2*   MG  --  2.20     No results for input(s): \"PROBNP\", \"TROPHS\" in the last 72 hours.  No results for input(s): \"LABA1C\" in the last 72 hours.  No results for

## 2024-05-15 LAB
ABO + RH BLD: NORMAL
ANION GAP SERPL CALCULATED.3IONS-SCNC: 9 MMOL/L (ref 3–16)
BLD GP AB SCN SERPL QL: NORMAL
BLOOD BANK DISPENSE STATUS: NORMAL
BLOOD BANK DISPENSE STATUS: NORMAL
BLOOD BANK PRODUCT CODE: NORMAL
BLOOD BANK PRODUCT CODE: NORMAL
BPU ID: NORMAL
BPU ID: NORMAL
BUN SERPL-MCNC: 35 MG/DL (ref 7–20)
CALCIUM SERPL-MCNC: 8 MG/DL (ref 8.3–10.6)
CHLORIDE SERPL-SCNC: 100 MMOL/L (ref 99–110)
CO2 SERPL-SCNC: 28 MMOL/L (ref 21–32)
CREAT SERPL-MCNC: 1.5 MG/DL (ref 0.8–1.3)
DEPRECATED RDW RBC AUTO: 19.8 % (ref 12.4–15.4)
DESCRIPTION BLOOD BANK: NORMAL
DESCRIPTION BLOOD BANK: NORMAL
ECHO BSA: 2.19 M2
GFR SERPLBLD CREATININE-BSD FMLA CKD-EPI: 46 ML/MIN/{1.73_M2}
GLUCOSE SERPL-MCNC: 108 MG/DL (ref 70–99)
HCT VFR BLD AUTO: 22.5 % (ref 40.5–52.5)
HCT VFR BLD AUTO: 28.6 % (ref 40.5–52.5)
HCT VFR BLD AUTO: 30.3 % (ref 40.5–52.5)
HGB BLD-MCNC: 7 G/DL (ref 13.5–17.5)
HGB BLD-MCNC: 8.9 G/DL (ref 13.5–17.5)
HGB BLD-MCNC: 9.4 G/DL (ref 13.5–17.5)
MAGNESIUM SERPL-MCNC: 2.2 MG/DL (ref 1.8–2.4)
MCH RBC QN AUTO: 26.4 PG (ref 26–34)
MCHC RBC AUTO-ENTMCNC: 30.9 G/DL (ref 31–36)
MCV RBC AUTO: 85.5 FL (ref 80–100)
PLATELET # BLD AUTO: 246 K/UL (ref 135–450)
PMV BLD AUTO: 7.5 FL (ref 5–10.5)
POC ACT LR: >400 SEC
POTASSIUM SERPL-SCNC: 4 MMOL/L (ref 3.5–5.1)
RBC # BLD AUTO: 2.63 M/UL (ref 4.2–5.9)
SODIUM SERPL-SCNC: 137 MMOL/L (ref 136–145)
WBC # BLD AUTO: 7.3 K/UL (ref 4–11)

## 2024-05-15 PROCEDURE — 2700000000 HC OXYGEN THERAPY PER DAY

## 2024-05-15 PROCEDURE — 86900 BLOOD TYPING SEROLOGIC ABO: CPT

## 2024-05-15 PROCEDURE — 97530 THERAPEUTIC ACTIVITIES: CPT

## 2024-05-15 PROCEDURE — 36430 TRANSFUSION BLD/BLD COMPNT: CPT

## 2024-05-15 PROCEDURE — 86923 COMPATIBILITY TEST ELECTRIC: CPT

## 2024-05-15 PROCEDURE — 97166 OT EVAL MOD COMPLEX 45 MIN: CPT

## 2024-05-15 PROCEDURE — 86901 BLOOD TYPING SEROLOGIC RH(D): CPT

## 2024-05-15 PROCEDURE — 99232 SBSQ HOSP IP/OBS MODERATE 35: CPT

## 2024-05-15 PROCEDURE — 85027 COMPLETE CBC AUTOMATED: CPT

## 2024-05-15 PROCEDURE — 30233N1 TRANSFUSION OF NONAUTOLOGOUS RED BLOOD CELLS INTO PERIPHERAL VEIN, PERCUTANEOUS APPROACH: ICD-10-PCS | Performed by: INTERNAL MEDICINE

## 2024-05-15 PROCEDURE — 83735 ASSAY OF MAGNESIUM: CPT

## 2024-05-15 PROCEDURE — 80048 BASIC METABOLIC PNL TOTAL CA: CPT

## 2024-05-15 PROCEDURE — 6370000000 HC RX 637 (ALT 250 FOR IP): Performed by: INTERNAL MEDICINE

## 2024-05-15 PROCEDURE — 97116 GAIT TRAINING THERAPY: CPT

## 2024-05-15 PROCEDURE — 2580000003 HC RX 258: Performed by: INTERNAL MEDICINE

## 2024-05-15 PROCEDURE — 85014 HEMATOCRIT: CPT

## 2024-05-15 PROCEDURE — 85018 HEMOGLOBIN: CPT

## 2024-05-15 PROCEDURE — 94761 N-INVAS EAR/PLS OXIMETRY MLT: CPT

## 2024-05-15 PROCEDURE — P9016 RBC LEUKOCYTES REDUCED: HCPCS

## 2024-05-15 PROCEDURE — 2060000000 HC ICU INTERMEDIATE R&B

## 2024-05-15 PROCEDURE — 86850 RBC ANTIBODY SCREEN: CPT

## 2024-05-15 PROCEDURE — 6360000002 HC RX W HCPCS

## 2024-05-15 PROCEDURE — 97162 PT EVAL MOD COMPLEX 30 MIN: CPT

## 2024-05-15 RX ORDER — CLOPIDOGREL BISULFATE 75 MG/1
75 TABLET ORAL DAILY
Status: DISCONTINUED | OUTPATIENT
Start: 2024-05-15 | End: 2024-05-15

## 2024-05-15 RX ORDER — CLOPIDOGREL BISULFATE 75 MG/1
75 TABLET ORAL DAILY
Status: DISCONTINUED | OUTPATIENT
Start: 2024-05-16 | End: 2024-05-17 | Stop reason: HOSPADM

## 2024-05-15 RX ORDER — SODIUM CHLORIDE 9 MG/ML
INJECTION, SOLUTION INTRAVENOUS PRN
Status: DISCONTINUED | OUTPATIENT
Start: 2024-05-15 | End: 2024-05-17 | Stop reason: HOSPADM

## 2024-05-15 RX ORDER — FUROSEMIDE 10 MG/ML
20 INJECTION INTRAMUSCULAR; INTRAVENOUS ONCE
Status: COMPLETED | OUTPATIENT
Start: 2024-05-15 | End: 2024-05-15

## 2024-05-15 RX ADMIN — AMIODARONE HYDROCHLORIDE 400 MG: 200 TABLET ORAL at 20:49

## 2024-05-15 RX ADMIN — PANTOPRAZOLE SODIUM 40 MG: 40 TABLET, DELAYED RELEASE ORAL at 17:47

## 2024-05-15 RX ADMIN — GABAPENTIN 300 MG: 300 CAPSULE ORAL at 20:49

## 2024-05-15 RX ADMIN — SUCRALFATE 1 G: 1 TABLET ORAL at 20:49

## 2024-05-15 RX ADMIN — SUCRALFATE 1 G: 1 TABLET ORAL at 14:36

## 2024-05-15 RX ADMIN — AMIODARONE HYDROCHLORIDE 400 MG: 200 TABLET ORAL at 09:40

## 2024-05-15 RX ADMIN — SODIUM CHLORIDE, PRESERVATIVE FREE 10 ML: 5 INJECTION INTRAVENOUS at 09:46

## 2024-05-15 RX ADMIN — FUROSEMIDE 20 MG: 20 INJECTION, SOLUTION INTRAMUSCULAR; INTRAVENOUS at 14:35

## 2024-05-15 RX ADMIN — PANTOPRAZOLE SODIUM 40 MG: 40 TABLET, DELAYED RELEASE ORAL at 06:36

## 2024-05-15 RX ADMIN — FERROUS SULFATE TAB 325 MG (65 MG ELEMENTAL FE) 325 MG: 325 (65 FE) TAB at 09:41

## 2024-05-15 RX ADMIN — MUPIROCIN: 20 OINTMENT TOPICAL at 09:44

## 2024-05-15 RX ADMIN — TRAZODONE HYDROCHLORIDE 50 MG: 50 TABLET ORAL at 20:49

## 2024-05-15 RX ADMIN — SUCRALFATE 1 G: 1 TABLET ORAL at 06:36

## 2024-05-15 NOTE — PROGRESS NOTES
Hospital Medicine Progress Note      Date of Admission: 5/13/2024  Hospital Day: 3    Chief Admission Complaint: He was admitted for atrial fibrillation ablation     Subjective: He is sitting up in bed, in no acute distress.  Has no complaints at this time.    Presenting Admission History:       83 y.o. male with PMHx significant for atrial fibrillation, status post watchman, ischemic cardiomyopathy status post PCI, COPD, GERD, peripheral neuropathy.  He presented to [facility] for PALLAVI , EPS and atrial fibrillation ablation.  He did go for this on 5/13/2024.  Hemoglobin was noted to be 7 today and is to be transfused with 2 units of PRBCs today.    Assessment/Plan:      Current Principal Problem:  Status post ablation of atrial fibrillation    Persistent atrial fibrillation : On 5/13/2024 he did go for PALLAVI, Watchman device was evaluated.  No thrombus on the ANAIS.  He is status post ablation.  Continue on amiodarone 400 mg twice daily.  This will decrease to 400 mg daily at discharge for 1 month and then reduce to 200 mg daily.  Continue colchicine 0.6 mg daily while admitted.  Continue Plavix daily, no aspirin he has  a history of GI bleed with aspirin.     GERD: Currently stable continue on Protonix twice daily.     COPD: He is currently stable, continue on bronchodilators and will follow.      Peripheral neuropathy: He does report having peripheral neuropathy in both lower extremities.  He takes gabapentin 300 mg every evening and will continue.    Anemia : Noted decreased H/H today.  He is being transfused 2 units of PRBCs today.  Will follow posttransfusion H/H.    Acute renal failure: Noted creatinine increased to 1.5 today.  Will avoid nephrotoxic agents, follow urine output.  Will check repeat renal functions in the morning.    Physical Exam Performed:      General appearance: He is lying in bed, in no apparent distress, he is alert and cooperative.  HEENT:  Pupils equal, round, and reactive to light.

## 2024-05-15 NOTE — PROGRESS NOTES
Occupational Therapy  Facility/Department: [facility] C2 CARD TELEMETRY  Occupational Therapy Initial Assessment and Treatment Note    Name: Khalif Pinedo Jr  : 1940  MRN: [id number]  Date of Service: 5/15/2024    Discharge Recommendations:  24 hour supervision or assist  OT Equipment Recommendations  Equipment Needed: No     If pt is unable to be seen after this session, please let this note serve as discharge summary.  Please see case management note for discharge disposition.  Thank you.    Patient Diagnosis(es): The encounter diagnosis was Persistent atrial fibrillation (HCC).  Past Medical History:  has a past medical history of Anemia, CAD (coronary artery disease), CAD (coronary artery disease), COPD (chronic obstructive pulmonary disease) (HCC), COPD (chronic obstructive pulmonary disease) (HCC), GERD (gastroesophageal reflux disease), HTN (hypertension), Hyperlipidemia, IBS (irritable bowel syndrome), and Stented coronary artery.  Past Surgical History:  has a past surgical history that includes Coronary artery bypass graft (2000); Coronary angioplasty with stent (2005); Carotid endarterectomy (2014); Colonoscopy (2004); Upper gastrointestinal endoscopy (2004); Cholecystectomy; Upper gastrointestinal endoscopy (07/15/2014); Diagnostic Cardiac Cath Lab Procedure; Upper gastrointestinal endoscopy (N/A, 2022); Colonoscopy (N/A, 2022); Capsule endoscopy (2022); Upper gastrointestinal endoscopy (N/A, 10/21/2022); Cardiac catheterization (2012); Cardiac catheterization (2010); Cardiac catheterization (2000); Cardiac catheterization (1998); Cardiac catheterization (2009); and ep device procedure (N/A, 2024).       Assessment   Performance deficits / Impairments: Decreased functional mobility ;Decreased ADL status;Decreased strength;Decreased balance;Decreased endurance  Assessment: Pt was admitted to [facility] s/p ablation of AFIB. He  awareness training;Verbal cues  Sit to Stand: Contact-guard assistance (from EOB)  Stand to Sit: Contact-guard assistance (to EOB)       AROM: Within functional limits  Strength: Generally decreased, functional  Coordination: Within functional limits  Tone: Normal  Sensation: Intact    ADL  Functional Mobility: Contact guard assistance  Functional Mobility Skilled Clinical Factors: CGA for ambulation within room/in garcia. Pt requires standing rest every ~10-15 ft d/t SOB  Additional Comments: Pt declined ADLs d/t performing them prior to OT arrival                Vision  Vision: Impaired  Vision Exceptions: Wears glasses at all times  Hearing  Hearing: Within functional limits    Cognition  Overall Cognitive Status: WFL  Orientation  Overall Orientation Status: Within Functional Limits  Orientation Level: Oriented X4                    Education Given To: Patient;Family  Education Provided: Role of Therapy;Plan of Care;Transfer Training;Energy Conservation  Education Provided Comments: PLB, importance of OOB activity, role of OT  Education Method: Verbal  Barriers to Learning: None  Education Outcome: Verbalized understanding;Continued education needed  Disease Specific Education: Pt educated on importance of OOB mobility, prevention of complications of bedrest, and general safety during hospitalization. Pt verbalized understanding       AM-PAC - ADL  AM-PAC Daily Activity - Inpatient   How much help is needed for putting on and taking off regular lower body clothing?: A Little  How much help is needed for bathing (which includes washing, rinsing, drying)?: A Little  How much help is needed for toileting (which includes using toilet, bedpan, or urinal)?: A Little  How much help is needed for putting on and taking off regular upper body clothing?: A Little  How much help is needed for taking care of personal grooming?: A Little  How much help for eating meals?: None  AM-PAC Inpatient Daily Activity Raw Score:

## 2024-05-15 NOTE — CONSENT
Informed Consent for Blood Component Transfusion Note    I have discussed with the patient the rationale for blood component transfusion; its benefits in treating or preventing fatigue, organ damage, or death; and its risk which includes mild transfusion reactions, rare risk of blood borne infection, or more serious but rare reactions. I have discussed the alternatives to transfusion, including the risk and consequences of not receiving transfusion. The patient had an opportunity to ask questions and had agreed to proceed with transfusion of blood components.    Electronically signed by LASHONDA Malhotra CNP on 5/15/24 at 8:05 AM EDT

## 2024-05-15 NOTE — PROGRESS NOTES
[facility]     Electrophysiology                                     Progress Note    Admission date:  2024    Reason for follow up visit: Atrial fibrillation    HPI/CC: Khalif Pinedo Jr was admitted on 24 for EPS, PALLAVI, atrial fibrillation ablation and typical atrial flutter ablation.  Postprocedure patient had right groin bleeding.  Suture applied and patient admitted to ICU for monitoring.       Rhythm has been sinus rhythm.     Subjective: Patient denies chest pain, shortness of breath and palpitations.  Patient right groin site without bleeding.  No bleeding or hematoma noted. Hgb 7.0 and his creatinine bumped to 1.5. Discussed blood transfusion with the patient.  Okay to transfer out of ICU if bed needed    Vitals:  Blood pressure (!) 115/58, pulse 73, temperature 97.5 °F (36.4 °C), temperature source Axillary, resp. rate 16, height 1.829 m (6'), weight 92.5 kg (203 lb 14.4 oz), SpO2 98 %.  Temp  Av.6 °F (36.4 °C)  Min: 97.3 °F (36.3 °C)  Max: 97.9 °F (36.6 °C)  Pulse  Av.1  Min: 60  Max: 88  BP  Min: 75/49  Max: 132/55  SpO2  Av.5 %  Min: 90 %  Max: 98 %    24 hour I/O    Intake/Output Summary (Last 24 hours) at 5/15/2024 1029  Last data filed at 5/15/2024 0600  Gross per 24 hour   Intake 480 ml   Output 800 ml   Net -320 ml       Current Facility-Administered Medications   Medication Dose Route Frequency Provider Last Rate Last Admin    0.9 % sodium chloride infusion   IntraVENous PRN Vandana Temple, LASHONDA - CNP        lidocaine 1 % injection 5 mL  5 mL IntraDERmal Once Kelly Almanzamad A, DO        sodium chloride flush 0.9 % injection 5-40 mL  5-40 mL IntraVENous 2 times per day Aakash Almanzad A, DO   10 mL at 05/15/24 0946    sodium chloride flush 0.9 % injection 5-40 mL  5-40 mL IntraVENous PRN Aakash Almanzad A, DO        mupirocin (BACTROBAN) 2 % ointment   Each Nostril BID JAMISON Pollard Jr., MD   Given at 05/15/24 0944    benzocaine (HURRICAINE) 20 % oral spray    systolic function, size, and wall motion.  Calculated LVEF of >65%. Overall findings represent a low risk scan.      Echo 6/16/2022  Summary   Technically difficult examination.   Low normal LVEF 50-55%   Left ventricular function/wall motion is difficult to estimate due to poor   endocardial visualization.   Diastolic dysfunction grade and filling pressure are indeterminate.   The right atrium is mildly dilated.   Mild aortic valve regurgitation.   Inadequate tricuspid regurgitation to estimate systolic pulmonary artery   pressure.   Subcostal view not well visualized.   Pre-mature beats throughout the study.    All labs and testing reviewed.  Lab Review     Renal Profile:   Lab Results   Component Value Date/Time    CREATININE 1.5 05/15/2024 04:37 AM    BUN 35 05/15/2024 04:37 AM     05/15/2024 04:37 AM    K 4.0 05/15/2024 04:37 AM    K 5.0 04/01/2024 01:07 PM     05/15/2024 04:37 AM    CO2 28 05/15/2024 04:37 AM     CBC:    Lab Results   Component Value Date/Time    WBC 7.3 05/15/2024 04:37 AM    RBC 2.63 05/15/2024 04:37 AM    HGB 7.0 05/15/2024 04:37 AM    HCT 22.5 05/15/2024 04:37 AM    MCV 85.5 05/15/2024 04:37 AM    RDW 19.8 05/15/2024 04:37 AM     05/15/2024 04:37 AM     BNP:    Lab Results   Component Value Date/Time    BNP 30 10/09/2013 04:40 AM     Fasting Lipid Panel:    Lab Results   Component Value Date/Time    CHOL 90 12/21/2023 09:00 AM    HDL 46 12/21/2023 09:00 AM    TRIG 58 12/21/2023 09:00 AM     Cardiac Enzymes:  CK/MbTroponin  Lab Results   Component Value Date/Time    TROPONINI <0.01 03/01/2017 05:56 AM     PT/ INR   Lab Results   Component Value Date/Time    INR 1.25 05/14/2024 04:30 AM    INR 1.09 03/18/2024 10:47 AM    INR 1.10 12/21/2023 09:00 AM    PROTIME 15.9 05/14/2024 04:30 AM    PROTIME 14.1 03/18/2024 10:47 AM    PROTIME 14.2 12/21/2023 09:00 AM     PTT No components found for: \"PTT\"   Lab Results   Component Value Date/Time    MG 2.20 05/15/2024 04:37 AM

## 2024-05-15 NOTE — PROGRESS NOTES
Physical Therapy  Facility/Department: [facility] C2 CARD TELEMETRY  Physical Therapy Initial Assessment    Name: Khalif Pinedo Jr  : 1940  MRN: [id number]  Date of Service: 5/15/2024    Discharge Recommendations:  24 hour supervision or assist (initially for safety; pt would likely benefit from eventual OP cardiac rehab per cardiology recs)   PT Equipment Recommendations  Equipment Needed: No      Patient Diagnosis(es): The encounter diagnosis was Persistent atrial fibrillation (HCC).  Past Medical History:  has a past medical history of Anemia, CAD (coronary artery disease), CAD (coronary artery disease), COPD (chronic obstructive pulmonary disease) (HCC), COPD (chronic obstructive pulmonary disease) (HCC), GERD (gastroesophageal reflux disease), HTN (hypertension), Hyperlipidemia, IBS (irritable bowel syndrome), and Stented coronary artery.  Past Surgical History:  has a past surgical history that includes Coronary artery bypass graft (2000); Coronary angioplasty with stent (2005); Carotid endarterectomy (2014); Colonoscopy (2004); Upper gastrointestinal endoscopy (2004); Cholecystectomy; Upper gastrointestinal endoscopy (07/15/2014); Diagnostic Cardiac Cath Lab Procedure; Upper gastrointestinal endoscopy (N/A, 2022); Colonoscopy (N/A, 2022); Capsule endoscopy (2022); Upper gastrointestinal endoscopy (N/A, 10/21/2022); Cardiac catheterization (2012); Cardiac catheterization (2010); Cardiac catheterization (2000); Cardiac catheterization (1998); Cardiac catheterization (2009); and ep device procedure (N/A, 2024).    Assessment   Body Structures, Functions, Activity Limitations Requiring Skilled Therapeutic Intervention: Decreased functional mobility ;Decreased strength;Decreased endurance  Assessment: Pt referred for PT evaluation during current hospital stay with dx of persistent A fib, s/p ablation procedure on 24.  Pt

## 2024-05-15 NOTE — PROGRESS NOTES
Shift: [id number]    Admitting diagnosis: Afib    Presentation to hospital: Symptomatic Afib with fatigue    Surgery: yes - S/P Afib Ablation     Nursing assessment at handoff  stable    Emergency Contact/POA: Spouse  Family updated: yes -     Most recent vitals: BP (!) 98/51   Pulse 61   Temp 97.6 °F (36.4 °C) (Oral)   Resp 18   Ht 1.829 m (6')   Wt 92.5 kg (203 lb 14.4 oz)   SpO2 98%   BMI 27.65 kg/m²      Rhythm: Junctional Rhythm    NC/HFNC- 1 lpm  Respiratory support: - No ventilator support    Vent days: Day NA    Increased O2 requirements: no    Admission weight Weight - Scale: 94.8 kg (209 lb)  Today's weight   Wt Readings from Last 1 Encounters:   05/15/24 92.5 kg (203 lb 14.4 oz)         UOP >30ml/hr: yes -      Lee need assessed each shift: no    Restraints: no  Order current and documentation up to date?    Lines/Drains  LDA Insertion Date Discontinued Date Dressing Changes   PIV  5/13     TLC/PICC 5/14     Arterial       Lee       Vas Cath      ETT       Surgical drains        Night Shift Hospitalist Interventions    Problem(Brief) Date Time Intervention Physician contacted                                               Drip rates at handoff:    sodium chloride      sodium chloride         Hospital Course Daily Updates:  Admit Day# 0- Admission Night (5/13/24)  - Trend H&H; latest one 7.9- Dr. Almanza made aware.  - Hard stick; PICC line place on Rt basilic  - with fem stopper on Rt side  - mild hematoma and bleeding on Lt groin  - UOP for x12 hours was 4.5L    Admit D1 (5/14/24) PM  - Latest Hgb is 7.0; to transfuse when below 7  - urine color is tea color with mild blood clot noted; total UOP for 12 hours was 700mls  - VSS; with move out orders, awaiting for the available room          Lab Data:   CBC:   Recent Labs     05/14/24  0430 05/14/24  1240 05/14/24  2225 05/15/24  0437   WBC 7.1  --   --  7.3   HGB 7.9*   < > 7.1* 7.0*   HCT 25.2*   < > 23.2* 22.5*   MCV 85.9  --   --  85.5      --   --  246    < > = values in this interval not displayed.       BMP:    Recent Labs     05/14/24  0430 05/15/24  0437    137   K 4.1 4.0   CO2 25 28   BUN 26* 35*   CREATININE 1.2 1.5*       LIVR: No results for input(s): \"AST\", \"ALT\" in the last 72 hours.  PT/INR:   Recent Labs     05/14/24  0430   INR 1.25*       APTT: No results for input(s): \"APTT\" in the last 72 hours.  ABG: No results for input(s): \"PHART\", \"LEX6YAT\", \"PO2ART\" in the last 72 hours.  Consults (if GI or Nephrology- which group?)-  Cardio

## 2024-05-16 ENCOUNTER — TELEPHONE (OUTPATIENT)
Dept: CARDIOLOGY CLINIC | Age: 84
End: 2024-05-16

## 2024-05-16 LAB
ANION GAP SERPL CALCULATED.3IONS-SCNC: 10 MMOL/L (ref 3–16)
ANION GAP SERPL CALCULATED.3IONS-SCNC: 11 MMOL/L (ref 3–16)
BILIRUB UR QL STRIP.AUTO: NEGATIVE
BUN SERPL-MCNC: 28 MG/DL (ref 7–20)
BUN SERPL-MCNC: 30 MG/DL (ref 7–20)
CALCIUM SERPL-MCNC: 8.2 MG/DL (ref 8.3–10.6)
CALCIUM SERPL-MCNC: 8.5 MG/DL (ref 8.3–10.6)
CHLORIDE SERPL-SCNC: 100 MMOL/L (ref 99–110)
CHLORIDE SERPL-SCNC: 101 MMOL/L (ref 99–110)
CLARITY UR: CLEAR
CO2 SERPL-SCNC: 26 MMOL/L (ref 21–32)
CO2 SERPL-SCNC: 26 MMOL/L (ref 21–32)
COLOR UR: YELLOW
CREAT SERPL-MCNC: 1.4 MG/DL (ref 0.8–1.3)
CREAT SERPL-MCNC: 1.4 MG/DL (ref 0.8–1.3)
DEPRECATED RDW RBC AUTO: 18.9 % (ref 12.4–15.4)
EPI CELLS #/AREA URNS HPF: ABNORMAL /HPF (ref 0–5)
GFR SERPLBLD CREATININE-BSD FMLA CKD-EPI: 50 ML/MIN/{1.73_M2}
GFR SERPLBLD CREATININE-BSD FMLA CKD-EPI: 50 ML/MIN/{1.73_M2}
GLUCOSE SERPL-MCNC: 105 MG/DL (ref 70–99)
GLUCOSE SERPL-MCNC: 98 MG/DL (ref 70–99)
GLUCOSE UR STRIP.AUTO-MCNC: NEGATIVE MG/DL
HCT VFR BLD AUTO: 27.1 % (ref 40.5–52.5)
HCT VFR BLD AUTO: 28.9 % (ref 40.5–52.5)
HGB BLD-MCNC: 8.4 G/DL (ref 13.5–17.5)
HGB BLD-MCNC: 9.3 G/DL (ref 13.5–17.5)
HGB UR QL STRIP.AUTO: ABNORMAL
KETONES UR STRIP.AUTO-MCNC: NEGATIVE MG/DL
LEUKOCYTE ESTERASE UR QL STRIP.AUTO: NEGATIVE
MAGNESIUM SERPL-MCNC: 1.9 MG/DL (ref 1.8–2.4)
MCH RBC QN AUTO: 26.6 PG (ref 26–34)
MCHC RBC AUTO-ENTMCNC: 31.2 G/DL (ref 31–36)
MCV RBC AUTO: 85.4 FL (ref 80–100)
NITRITE UR QL STRIP.AUTO: NEGATIVE
PH UR STRIP.AUTO: 6 [PH] (ref 5–8)
PLATELET # BLD AUTO: 224 K/UL (ref 135–450)
PMV BLD AUTO: 7.6 FL (ref 5–10.5)
POTASSIUM SERPL-SCNC: 3.6 MMOL/L (ref 3.5–5.1)
POTASSIUM SERPL-SCNC: 3.6 MMOL/L (ref 3.5–5.1)
POTASSIUM SERPL-SCNC: 3.9 MMOL/L (ref 3.5–5.1)
PROT UR STRIP.AUTO-MCNC: NEGATIVE MG/DL
RBC # BLD AUTO: 3.17 M/UL (ref 4.2–5.9)
RBC #/AREA URNS HPF: ABNORMAL /HPF (ref 0–4)
SODIUM SERPL-SCNC: 136 MMOL/L (ref 136–145)
SODIUM SERPL-SCNC: 138 MMOL/L (ref 136–145)
SP GR UR STRIP.AUTO: 1.01 (ref 1–1.03)
UA COMPLETE W REFLEX CULTURE PNL UR: ABNORMAL
UA DIPSTICK W REFLEX MICRO PNL UR: YES
URN SPEC COLLECT METH UR: ABNORMAL
UROBILINOGEN UR STRIP-ACNC: 1 E.U./DL
WBC # BLD AUTO: 7.7 K/UL (ref 4–11)
WBC #/AREA URNS HPF: ABNORMAL /HPF (ref 0–5)

## 2024-05-16 PROCEDURE — 99233 SBSQ HOSP IP/OBS HIGH 50: CPT

## 2024-05-16 PROCEDURE — 97530 THERAPEUTIC ACTIVITIES: CPT

## 2024-05-16 PROCEDURE — 2580000003 HC RX 258: Performed by: INTERNAL MEDICINE

## 2024-05-16 PROCEDURE — 85027 COMPLETE CBC AUTOMATED: CPT

## 2024-05-16 PROCEDURE — 97110 THERAPEUTIC EXERCISES: CPT

## 2024-05-16 PROCEDURE — 97116 GAIT TRAINING THERAPY: CPT

## 2024-05-16 PROCEDURE — 85014 HEMATOCRIT: CPT

## 2024-05-16 PROCEDURE — 6370000000 HC RX 637 (ALT 250 FOR IP): Performed by: INTERNAL MEDICINE

## 2024-05-16 PROCEDURE — 2060000000 HC ICU INTERMEDIATE R&B

## 2024-05-16 PROCEDURE — 81001 URINALYSIS AUTO W/SCOPE: CPT

## 2024-05-16 PROCEDURE — 94761 N-INVAS EAR/PLS OXIMETRY MLT: CPT

## 2024-05-16 PROCEDURE — 85018 HEMOGLOBIN: CPT

## 2024-05-16 PROCEDURE — 2700000000 HC OXYGEN THERAPY PER DAY

## 2024-05-16 PROCEDURE — 80048 BASIC METABOLIC PNL TOTAL CA: CPT

## 2024-05-16 PROCEDURE — 83735 ASSAY OF MAGNESIUM: CPT

## 2024-05-16 RX ADMIN — SUCRALFATE 1 G: 1 TABLET ORAL at 06:40

## 2024-05-16 RX ADMIN — PANTOPRAZOLE SODIUM 40 MG: 40 TABLET, DELAYED RELEASE ORAL at 06:40

## 2024-05-16 RX ADMIN — AMIODARONE HYDROCHLORIDE 400 MG: 200 TABLET ORAL at 08:40

## 2024-05-16 RX ADMIN — SODIUM CHLORIDE, PRESERVATIVE FREE 10 ML: 5 INJECTION INTRAVENOUS at 08:41

## 2024-05-16 RX ADMIN — TRAZODONE HYDROCHLORIDE 50 MG: 50 TABLET ORAL at 20:12

## 2024-05-16 RX ADMIN — GABAPENTIN 300 MG: 300 CAPSULE ORAL at 20:12

## 2024-05-16 RX ADMIN — CLOPIDOGREL BISULFATE 75 MG: 75 TABLET ORAL at 08:40

## 2024-05-16 RX ADMIN — SUCRALFATE 1 G: 1 TABLET ORAL at 14:18

## 2024-05-16 RX ADMIN — AMIODARONE HYDROCHLORIDE 400 MG: 200 TABLET ORAL at 20:12

## 2024-05-16 RX ADMIN — PANTOPRAZOLE SODIUM 40 MG: 40 TABLET, DELAYED RELEASE ORAL at 16:55

## 2024-05-16 RX ADMIN — SUCRALFATE 1 G: 1 TABLET ORAL at 20:12

## 2024-05-16 RX ADMIN — SODIUM CHLORIDE, PRESERVATIVE FREE 10 ML: 5 INJECTION INTRAVENOUS at 20:12

## 2024-05-16 RX ADMIN — FERROUS SULFATE TAB 325 MG (65 MG ELEMENTAL FE) 325 MG: 325 (65 FE) TAB at 08:39

## 2024-05-16 NOTE — CARE COORDINATION
CM update; LOS # 3: Followed by Electrophysiology, Cardiology. IM, PT/OT; Patient has been Ok'ed for move to step down floor. Patietn IPTA; Lives with spouse no needs at this time will follow.Kierra Mukherjee RN    
[id number]  Phone: [phone] Fax: [phone]      Notes:    Factors facilitating achievement of predicted outcomes: Family support, Cooperative, Pleasant, and Good insight into deficits    Barriers to discharge: Decreased endurance    Additional Case Management Notes: IPTA; Lives with spouse in Condo. No needs at this time Will follow    The Plan for Transition of Care is related to the following treatment goals of Persistent atrial fibrillation (HCC) [I48.19]  Status post ablation of atrial fibrillation [Z98.890, Z86.79]  A-fib (HCC) [I48.91]    IF APPLICABLE: The Patient and/or patient representative Khalif and his family were provided with a choice of provider and agrees with the discharge plan. Freedom of choice list with basic dialogue that supports the patient's individualized plan of care/goals and shares the quality data associated with the providers was provided to:     Patient Representative Name:       The Patient and/or Patient Representative Agree with the Discharge Plan?      Kierra Mukherjee RN  Case Management Department  Ph: [phone]

## 2024-05-16 NOTE — PROGRESS NOTES
Report given to Kirt ROMAN. No further questions or concerns. Patient sent to A2 with all belongings.

## 2024-05-16 NOTE — PROGRESS NOTES
Occupational Therapy  Facility/Department: [facility] A2 CARD TELEMETRY  Daily Treatment Note  NAME: Khalif Pinedo Jr  : 1940  MRN: [id number]    Date of Service: 2024    Discharge Recommendations:  24 hour supervision or assist  OT Equipment Recommendations  Equipment Needed: No    Patient Diagnosis(es): The encounter diagnosis was Persistent atrial fibrillation (HCC).     Assessment    Assessment: Pt w/ fair tolerance to session remaining limited by endurance/ activity tolerance and fatigue. Pt completing LB dress and functional transfers SUPV and SBA for mobility in hallway. Pt declining additional ADLs this date d/t completing prior to OT arrival. OT educated pt on BUE therex and cervical stretches to address self-reported neck pain (see exercise section for details). OT rec d/ home initial , cont acute OT.   Activity Tolerance: Patient tolerated treatment well;Patient limited by endurance;Patient limited by fatigue  Discharge Recommendations: 24 hour supervision or assist  Equipment Needed: No      Plan   Occupational Therapy Plan  Times Per Week: 3-5x/wk  Current Treatment Recommendations: Strengthening;Balance training;Functional mobility training;Endurance training;Self-Care / ADL     Restrictions  Restrictions/Precautions  Restrictions/Precautions: General Precautions  Position Activity Restriction  Other position/activity restrictions: Low fall risk per nursing assessment, up with assistance    Subjective   Subjective  Subjective: pt seated in chair on approach, pleasant and agreeable to session. RN approved.  Pain: denies  Orientation  Overall Orientation Status: Within Normal Limits  Orientation Level: Oriented X4  Pain: Pt denies pain.  Cognition  Overall Cognitive Status: WFL      Objective    Vitals  Vitals:    24 1540   BP: (!) 128/48   Pulse: 74   Resp: 20   Temp: 97.3 °F (36.3 °C)   SpO2: 98%   Comments: denies pain      Bed Mobility Training: No (pt seated up in chair on  hospitalization. Pt verbalized understanding    Goals  Short Term Goals  Time Frame for Short Term Goals: 1 week (5/22/24) unless otherwise noted; ongoing 5/16  Short Term Goal 1: Pt will perform toilet transfer IND by 5/21  Short Term Goal 2: Pt will perform LB dressing IND; 5/16 LB dress SUPV  Short Term Goal 3: Pt will perform 1-2 grooming tasks in stance at sink IND  Short Term Goal 4: Pt will tolerate x20 reps of BUE therex; 5/16 MET; ONGOING    AM-PAC - ADL  AM-PAC Daily Activity - Inpatient   How much help is needed for putting on and taking off regular lower body clothing?: A Little  How much help is needed for bathing (which includes washing, rinsing, drying)?: A Little  How much help is needed for toileting (which includes using toilet, bedpan, or urinal)?: A Little  How much help is needed for putting on and taking off regular upper body clothing?: A Little  How much help is needed for taking care of personal grooming?: A Little  How much help for eating meals?: None  AM-PAC Inpatient Daily Activity Raw Score: 19  AM-PAC Inpatient ADL T-Scale Score : 40.22  ADL Inpatient CMS 0-100% Score: 42.8  ADL Inpatient CMS G-Code Modifier : CK    Therapy Time   Individual Concurrent Group Co-treatment   Time In 1503         Time Out 1526         Minutes 23         Timed Code Treatment Minutes: 23 Minutes       Reggie Pillai OT

## 2024-05-16 NOTE — PLAN OF CARE
Problem: Discharge Planning  Goal: Discharge to home or other facility with appropriate resources  5/16/2024 1345 by Kirt Martin, RN  Outcome: Progressing     Problem: Pain  Goal: Verbalizes/displays adequate comfort level or baseline comfort level  5/16/2024 1345 by Kirt Martin, RN  Outcome: Progressing     Problem: Safety - Adult  Goal: Free from fall injury  5/16/2024 1345 by Kirt Martin, RN  Outcome: Progressing     Problem: Skin/Tissue Integrity  Goal: Absence of new skin breakdown  Description: 1.  Monitor for areas of redness and/or skin breakdown  2.  Assess vascular access sites hourly  3.  Every 4-6 hours minimum:  Change oxygen saturation probe site  4.  Every 4-6 hours:  If on nasal continuous positive airway pressure, respiratory therapy assess nares and determine need for appliance change or resting period.  5/16/2024 1345 by Kirt Martin, RN  Outcome: Progressing     Problem: ABCDS Injury Assessment  Goal: Absence of physical injury  5/16/2024 1345 by Kirt Martin, RN  Outcome: Progressing

## 2024-05-16 NOTE — PROGRESS NOTES
Hospital Medicine Progress Note      Date of Admission: 5/13/2024  Hospital Day: 4    Chief Admission Complaint:  admitted for atrial fibrillation ablation      Subjective: Up in bed in no distress.  Feeling better.    Presenting Admission History:       83 y.o. male with PMHx significant for atrial fibrillation, status post watchman, ischemic cardiomyopathy status post PCI, COPD, GERD, peripheral neuropathy.  [facility] , EPS and atrial fibrillation ablation.  He did go for this on 5/13/2024.         Assessment/Plan:      Current Principal Problem:  Status post ablation of atrial fibrillation    Persistent atrial fibrillation : On 5/13/2024 he did go for PALLAVI, Watchman device was evaluated.  No thrombus on the ANAIS.  He is status post ablation.  Continue on amiodarone 400 mg twice daily.  This will decrease to 400 mg daily at discharge for 1 month and then reduce to 200 mg daily.  Continue colchicine 0.6 mg daily while admitted.  Continue Plavix daily, no aspirin he has  a history of GI bleed with aspirin.     GERD: Currently stable continue on Protonix twice daily.     COPD: He is currently stable, continue on bronchodilators and will follow.      Peripheral neuropathy: He does report having peripheral neuropathy in both lower extremities.  He takes gabapentin 300 mg every evening and will continue.     Anemia : Noted decreased H/H on 5/15/24.  He was transfused 2 units of PRBCs.   H/H.  Stable today     Acute renal failure: Noted creatinine i seems to be stabilizing and is  1.4 today.  Will avoid nephrotoxic agents, follow urine output.  Will check repeat renal functions in the morning.       Physical Exam Performed:      General appearance: He is lying in bed, in no apparent distress, he is alert and cooperative.  HEENT:  Pupils equal, round, and reactive to light. Conjunctivae/corneas clear.  Respiratory:  Normal respiratory effort. Clear to auscultation, bilaterally without

## 2024-05-16 NOTE — TELEPHONE ENCOUNTER
Spoke with Dr. Pollard regarding patients recent ablation and needing anticoagulation.  Dr. Pollard was ok with being on Plavix only because he had a GIB and since his Post Watchman PALLAVI shows  Device closure in the appendage with a 24 mm Watchman. No thrombus present on the ANAIS device. No lizeth-device leak of ANAIS device.

## 2024-05-16 NOTE — PROGRESS NOTES
Physical Therapy  Facility/Department: [facility] A2 CARD TELEMETRY  Daily Treatment Note  NAME: Khalif Pinedo Jr  : 1940  MRN: [id number]    Date of Service: 2024    Discharge Recommendations:  Home with assist PRN, Therapy recommended at discharge (eventual OP cardiac rehab for endurance per cardio recs)   PT Equipment Recommendations  Equipment Needed: No    Patient Diagnosis(es): The encounter diagnosis was Persistent atrial fibrillation (HCC).    Assessment   Assessment: Pt participated well with PT today, demonstrating improved endurance and level of (I) with mobility.  Pt performing bed mobility with (I) and transfers/gait with supervision without AD.  Pt ambulating increased distances and requiring fewer rest breaks today vs. yesterday, although still demonstrates decreased endurance compared to baseline.  Recommend pt return home with assist PRN from family; pt would also benefit from OP cardiac rehab to build endurance per cardiology recs.  Equipment Needed: No     Plan    Physical Therapy Plan  General Plan: 3-5 times per week  Specific Instructions for Next Treatment: Progress ther ex and mobility as tolerated, activities to build endurance  Current Treatment Recommendations: Strengthening;Balance training;Functional mobility training;Transfer training;Endurance training;Gait training;Home exercise program;Safety education & training;Patient/Caregiver education & training;Therapeutic activities     Restrictions  Restrictions/Precautions  Restrictions/Precautions: General Precautions  Position Activity Restriction  Other position/activity restrictions: Low fall risk per nursing assessment, up with assistance     Subjective    Subjective: Pt agreeable to work with PT this morning.  States he was just about to fall asleep but agreeable to try and walk first.  Pt states he's feeling less SOB today vs. yesterday.  Pain: Pt denies pain.  Overall Orientation Status: Within Normal Limits     Objective  Given To: Patient  Education Provided: Role of Therapy;Plan of Care;Precautions;Transfer Training;Energy Conservation;Home Exercise Program  Education Method: Demonstration;Verbal  Barriers to Learning: None  Education Outcome: Verbalized understanding;Demonstrated understanding    AM-PAC - Mobility    AM-PAC Basic Mobility - Inpatient   How much help is needed turning from your back to your side while in a flat bed without using bedrails?: None  How much help is needed moving from lying on your back to sitting on the side of a flat bed without using bedrails?: None  How much help is needed moving to and from a bed to a chair?: None  How much help is needed standing up from a chair using your arms?: None  How much help is needed walking in hospital room?: A Little  How much help is needed climbing 3-5 steps with a railing?: A Little  AM-PAC Inpatient Mobility Raw Score : 22  AM-PAC Inpatient T-Scale Score : 53.28  Mobility Inpatient CMS 0-100% Score: 20.91  Mobility Inpatient CMS G-Code Modifier : CJ         Therapy Time   Individual Concurrent Group Co-treatment   Time In 1051         Time Out 1130         Minutes 39         Timed Code Treatment Minutes: 39 Minutes       Alayna Castañeda, PT, DPT #[id number]    If pt is unable to be seen after this session, please let this note serve as discharge summary.  Please see case management note for discharge disposition.  Thank you.

## 2024-05-16 NOTE — PROGRESS NOTES
[facility]     Electrophysiology                                     Progress Note    Admission date:  2024    Reason for follow up visit: Atrial fibrillation    HPI/CC: Khalif Pinedo Jr was admitted on 24 for EPS, PALLAVI, atrial fibrillation ablation and typical atrial flutter ablation.  Postprocedure patient had right groin bleeding.  Suture applied and patient admitted to ICU for monitoring. 5/15 Hgb 7.0, patient transfused with 2 units of packed red blood cells      Rhythm has been sinus rhythm occ PVC    Subjective: Patient is up in chair.  Wife is at bedside.  Patient denies chest pain, shortness of breath and palpitations.  Patient right groin site reexamined and is without bleeding.  No bleeding or hematoma noted. Hgb 8.4 and his creatinine today 1.4.  Patient also states he had some blood-tinged urine this morning.  We will send for UA.  We will also redraw BMP and H&H at 11:00 this morning    Vitals:  Blood pressure (!) 141/68, pulse 85, temperature 98 °F (36.7 °C), temperature source Oral, resp. rate 15, height 1.829 m (6'), weight 92.5 kg (203 lb 14.4 oz), SpO2 95 %.  Temp  Av.7 °F (36.5 °C)  Min: 97.4 °F (36.3 °C)  Max: 98 °F (36.7 °C)  Pulse  Av  Min: 62  Max: 85  BP  Min: 97/46  Max: 141/68  SpO2  Av.5 %  Min: 90 %  Max: 100 %    24 hour I/O    Intake/Output Summary (Last 24 hours) at 2024 0925  Last data filed at 2024 0840  Gross per 24 hour   Intake 1935 ml   Output 2075 ml   Net -140 ml       Current Facility-Administered Medications   Medication Dose Route Frequency Provider Last Rate Last Admin    0.9 % sodium chloride infusion   IntraVENous PRN Vandana Temple, LASHONDA - CNP        clopidogrel (PLAVIX) tablet 75 mg  75 mg Oral Daily Emre Castillo MD   75 mg at 24 0840    lidocaine 1 % injection 5 mL  5 mL IntraDERmal Once Ciara Almanza DO        sodium chloride flush 0.9 % injection 5-40 mL  5-40 mL IntraVENous 2 times per day Ciara Almanza

## 2024-05-16 NOTE — PROGRESS NOTES
Pt admit to 205, VSS, no needs per pt at this time.    Vitals:    05/16/24 1148   BP: 129/84   Pulse: 77   Resp: 18   Temp: 97.7 °F (36.5 °C)   SpO2: 97%

## 2024-05-17 ENCOUNTER — TELEPHONE (OUTPATIENT)
Dept: CARDIOLOGY CLINIC | Age: 84
End: 2024-05-17

## 2024-05-17 VITALS
BODY MASS INDEX: 27.62 KG/M2 | WEIGHT: 203.9 LBS | HEART RATE: 75 BPM | DIASTOLIC BLOOD PRESSURE: 69 MMHG | HEIGHT: 72 IN | TEMPERATURE: 98.1 F | SYSTOLIC BLOOD PRESSURE: 137 MMHG | RESPIRATION RATE: 18 BRPM | OXYGEN SATURATION: 95 %

## 2024-05-17 DIAGNOSIS — I48.0 PAROXYSMAL ATRIAL FIBRILLATION (HCC): Primary | ICD-10-CM

## 2024-05-17 LAB
ANION GAP SERPL CALCULATED.3IONS-SCNC: 10 MMOL/L (ref 3–16)
BUN SERPL-MCNC: 25 MG/DL (ref 7–20)
CALCIUM SERPL-MCNC: 8.3 MG/DL (ref 8.3–10.6)
CHLORIDE SERPL-SCNC: 102 MMOL/L (ref 99–110)
CO2 SERPL-SCNC: 26 MMOL/L (ref 21–32)
CREAT SERPL-MCNC: 1.2 MG/DL (ref 0.8–1.3)
DEPRECATED RDW RBC AUTO: 19.2 % (ref 12.4–15.4)
GFR SERPLBLD CREATININE-BSD FMLA CKD-EPI: 60 ML/MIN/{1.73_M2}
GLUCOSE SERPL-MCNC: 98 MG/DL (ref 70–99)
HCT VFR BLD AUTO: 26.9 % (ref 40.5–52.5)
HGB BLD-MCNC: 8.5 G/DL (ref 13.5–17.5)
MCH RBC QN AUTO: 27.3 PG (ref 26–34)
MCHC RBC AUTO-ENTMCNC: 31.6 G/DL (ref 31–36)
MCV RBC AUTO: 86.5 FL (ref 80–100)
PLATELET # BLD AUTO: 216 K/UL (ref 135–450)
PMV BLD AUTO: 7.7 FL (ref 5–10.5)
POTASSIUM SERPL-SCNC: 3.7 MMOL/L (ref 3.5–5.1)
RBC # BLD AUTO: 3.11 M/UL (ref 4.2–5.9)
SODIUM SERPL-SCNC: 138 MMOL/L (ref 136–145)
WBC # BLD AUTO: 6.3 K/UL (ref 4–11)

## 2024-05-17 PROCEDURE — 6370000000 HC RX 637 (ALT 250 FOR IP): Performed by: INTERNAL MEDICINE

## 2024-05-17 PROCEDURE — 99239 HOSP IP/OBS DSCHRG MGMT >30: CPT

## 2024-05-17 PROCEDURE — 2580000003 HC RX 258: Performed by: INTERNAL MEDICINE

## 2024-05-17 PROCEDURE — 80048 BASIC METABOLIC PNL TOTAL CA: CPT

## 2024-05-17 PROCEDURE — 85027 COMPLETE CBC AUTOMATED: CPT

## 2024-05-17 RX ORDER — ALBUTEROL SULFATE 90 UG/1
2 AEROSOL, METERED RESPIRATORY (INHALATION) EVERY 6 HOURS PRN
Qty: 1 EACH | Refills: 1 | Status: SHIPPED
Start: 2024-05-17

## 2024-05-17 RX ORDER — SUCRALFATE 1 G/1
1 TABLET ORAL EVERY 8 HOURS SCHEDULED
Qty: 120 TABLET | Refills: 0 | Status: SHIPPED | OUTPATIENT
Start: 2024-05-17 | End: 2024-06-28

## 2024-05-17 RX ORDER — AMIODARONE HYDROCHLORIDE 400 MG/1
400 TABLET ORAL DAILY
Qty: 30 TABLET | Refills: 0 | Status: SHIPPED | OUTPATIENT
Start: 2024-05-18 | End: 2024-05-17 | Stop reason: SDUPTHER

## 2024-05-17 RX ORDER — AMIODARONE HYDROCHLORIDE 200 MG/1
200 TABLET ORAL DAILY
Qty: 30 TABLET | Refills: 3 | Status: SHIPPED | OUTPATIENT
Start: 2024-06-17

## 2024-05-17 RX ORDER — PANTOPRAZOLE SODIUM 40 MG/1
40 TABLET, DELAYED RELEASE ORAL
Qty: 60 TABLET | Refills: 0 | Status: SHIPPED | OUTPATIENT
Start: 2024-05-17

## 2024-05-17 RX ORDER — AMIODARONE HYDROCHLORIDE 200 MG/1
200 TABLET ORAL DAILY
Status: DISCONTINUED | OUTPATIENT
Start: 2024-06-17 | End: 2024-05-17 | Stop reason: HOSPADM

## 2024-05-17 RX ORDER — AMIODARONE HYDROCHLORIDE 200 MG/1
400 TABLET ORAL DAILY
Qty: 60 TABLET | Refills: 0 | Status: SHIPPED | OUTPATIENT
Start: 2024-05-17 | End: 2024-06-16

## 2024-05-17 RX ORDER — AMIODARONE HYDROCHLORIDE 200 MG/1
400 TABLET ORAL DAILY
Status: DISCONTINUED | OUTPATIENT
Start: 2024-05-18 | End: 2024-05-17 | Stop reason: HOSPADM

## 2024-05-17 RX ORDER — AMIODARONE HYDROCHLORIDE 200 MG/1
400 TABLET ORAL DAILY
Qty: 60 TABLET | Refills: 0 | Status: SHIPPED | OUTPATIENT
Start: 2024-05-18 | End: 2024-05-17 | Stop reason: SDUPTHER

## 2024-05-17 RX ADMIN — CLOPIDOGREL BISULFATE 75 MG: 75 TABLET ORAL at 08:31

## 2024-05-17 RX ADMIN — AMIODARONE HYDROCHLORIDE 400 MG: 200 TABLET ORAL at 08:31

## 2024-05-17 RX ADMIN — SODIUM CHLORIDE, PRESERVATIVE FREE 10 ML: 5 INJECTION INTRAVENOUS at 08:32

## 2024-05-17 RX ADMIN — SUCRALFATE 1 G: 1 TABLET ORAL at 06:05

## 2024-05-17 RX ADMIN — PANTOPRAZOLE SODIUM 40 MG: 40 TABLET, DELAYED RELEASE ORAL at 06:05

## 2024-05-17 RX ADMIN — FERROUS SULFATE TAB 325 MG (65 MG ELEMENTAL FE) 325 MG: 325 (65 FE) TAB at 08:32

## 2024-05-17 NOTE — PLAN OF CARE
Problem: Discharge Planning  Goal: Discharge to home or other facility with appropriate resources  5/17/2024 0856 by Mare Tiwari RN  Flowsheets (Taken 5/17/2024 0856)  Discharge to home or other facility with appropriate resources: Identify barriers to discharge with patient and caregiver  5/17/2024 0057 by Erica Camilo RN  Outcome: Progressing     Problem: Pain  Goal: Verbalizes/displays adequate comfort level or baseline comfort level  5/17/2024 0057 by Erica Camilo RN  Outcome: Progressing  Note: Pt will be satisfied with pain control. Pt uses numeric pain rating scale with reassessments after pain med administration. Will continue to monitor progression throughout shift.      Problem: Safety - Adult  Goal: Free from fall injury  5/17/2024 0856 by Mare Tiwari RN  Flowsheets (Taken 5/17/2024 0856)  Free From Fall Injury: Instruct family/caregiver on patient safety  5/17/2024 0057 by Erica Camilo RN  Outcome: Progressing  Note: Pt will remain free from falls throughout hospital stay. Fall precautions in place, bed alarm on, bed in lowest position with wheels locked and side rails 2/4 up. Room door open and hourly rounding completed. Will continue to monitor throughout shift.

## 2024-05-17 NOTE — TELEPHONE ENCOUNTER
Pts wife Anne stated that [facility] does not have the Amiodarone 200mg until 5/18 in the PM and pt was advised by npkk to take 200mg Amiodarone tonight since he had 400mg this morning while in the hospital. Anne said that she is not able to pick the prescription for Amiodarone 400mg until 6/17. Anne is at [facility] and going to speak to the pharmacist to transfer to outpatient pharmacy.

## 2024-05-17 NOTE — TELEPHONE ENCOUNTER
PT spouse contacted office, pt cannot find 400MG of amiodarone. PT spouse wanting to know if it can be 200MG tablets. Please advise

## 2024-05-17 NOTE — TELEPHONE ENCOUNTER
Spoke with patient's wife. Changed 400 mg amiodarone tablets to 200 mg tablets (dose still 400 mg daily) per GIDEON discharge note. Sent to [facility] pharmacy.       GIDEON PORTILLO

## 2024-05-17 NOTE — PROGRESS NOTES
monitoring for toxicity include:  Transfused with PRBCs, is receiving amiodarone   [] Change in code status:    [] Decision to escalate care:    [] Major surgery/procedure with associated risk factors:    ----------------------------------------------------------------------  C. Data (any 2)  [x] Discussed current management and discharge planning options with Case Management.  [x] Discussed management of the case with: Cardiology  [x] Telemetry personally reviewed and interpreted as documented above    [] Imaging personally reviewed and interpreted, includes:    [] Data Review (any 3)  [] Collateral history obtained from:    [x] All available Consultant notes from yesterday/today were reviewed  [x] All current labs were reviewed and interpreted for clinical significance   [x] Appropriate follow-up labs were ordered    Medications:  Personally reviewed in detail in conjunction w/ labs as documented for evidence of drug toxicity.     Infusion Medications   Scheduled Medications   PRN Meds:      Labs:  Personally reviewed and interpreted for clinical significance.     Recent Labs     05/15/24  0437 05/15/24  1745 05/16/24  0300 05/16/24  1100 05/17/24  0607   WBC 7.3  --  7.7  --  6.3   HGB 7.0*   < > 8.4* 9.3* 8.5*   HCT 22.5*   < > 27.1* 28.9* 26.9*     --  224  --  216    < > = values in this interval not displayed.     Recent Labs     05/15/24  0437 05/16/24  0300 05/16/24  1100 05/17/24  0607    136 138 138   K 4.0 3.6  3.6 3.9 3.7    100 101 102   CO2 28 26 26 26   BUN 35* 30* 28* 25*   CREATININE 1.5* 1.4* 1.4* 1.2   CALCIUM 8.0* 8.2* 8.5 8.3   MG 2.20 1.90  --   --      No results for input(s): \"PROBNP\", \"TROPHS\" in the last 72 hours.  No results for input(s): \"LABA1C\" in the last 72 hours.  No results for input(s): \"AST\", \"ALT\", \"BILIDIR\", \"BILITOT\", \"ALKPHOS\" in the last 72 hours.  No results for input(s): \"INR\", \"LACTA\", \"TSH\" in the last 72 hours.    Urine Cultures:   Lab Results    Component Value Date/Time    LABURIN No growth at 18 to 36 hours 04/02/2024 12:14 AM     Blood Cultures:   Lab Results   Component Value Date/Time    BC No Growth after 4 days of incubation. 04/01/2024 07:52 PM     Lab Results   Component Value Date/Time    BLOODCULT2 No Growth after 4 days of incubation. 04/01/2024 07:59 PM     Organism: No results found for: \"ORG\"      MARCY MAKI MD

## 2024-05-17 NOTE — TELEPHONE ENCOUNTER
Vandana Temple, APRN - CNP  Corie Chávez RN; [facility] Ep4 minutes ago (3:58 PM)       Thank you! I personally called and spoke with wife too. Dosing clarified.

## 2024-05-17 NOTE — PROGRESS NOTES
Pt and wife provided with discharge instructions. All questions answered. Pt is in stable condition. Wife to provide transportation.

## 2024-05-17 NOTE — PLAN OF CARE
Problem: Discharge Planning  Goal: Discharge to home or other facility with appropriate resources  5/17/2024 0057 by Erica Camilo RN  Outcome: Progressing     Problem: Pain  Goal: Verbalizes/displays adequate comfort level or baseline comfort level  5/17/2024 0057 by Erica Camilo RN  Outcome: Progressing  Note: Pt will be satisfied with pain control. Pt uses numeric pain rating scale with reassessments after pain med administration. Will continue to monitor progression throughout shift.      Problem: Safety - Adult  Goal: Free from fall injury  5/17/2024 0057 by Erica Camilo RN  Outcome: Progressing  Note: Pt will remain free from falls throughout hospital stay. Fall precautions in place, bed alarm on, bed in lowest position with wheels locked and side rails 2/4 up. Room door open and hourly rounding completed. Will continue to monitor throughout shift.      Problem: Skin/Tissue Integrity  Goal: Absence of new skin breakdown  Description: 1.  Monitor for areas of redness and/or skin breakdown  2.  Assess vascular access sites hourly  3.  Every 4-6 hours minimum:  Change oxygen saturation probe site  4.  Every 4-6 hours:  If on nasal continuous positive airway pressure, respiratory therapy assess nares and determine need for appliance change or resting period.  5/17/2024 0057 by Erica Camilo RN  Outcome: Progressing     Problem: ABCDS Injury Assessment  Goal: Absence of physical injury  5/17/2024 0057 by Erica Camilo RN  Outcome: Progressing

## 2024-05-21 ENCOUNTER — TELEPHONE (OUTPATIENT)
Dept: CARDIOLOGY CLINIC | Age: 84
End: 2024-05-21

## 2024-05-21 NOTE — TELEPHONE ENCOUNTER
Pt stated that he was started on 2 new medications while in the hospital and he would like to know why he needs to take them. The medications are Pantorazole 40mg and Sucralfate 1gm. Pt would like to speak to npkk directly regarding these medications. Please advise.

## 2024-05-21 NOTE — TELEPHONE ENCOUNTER
Per NPKK note 5/15/2024     Plan:   1. Continue amiodarone 400 mg twice daily; will decrease to 400 daily at discharge x 1 month; then reduce to 200 mg daily  2.  Monitor for drug toxicity  3.  Discontinue colchicine 0.6 mg daily   4.  Continue Protonix 40 mg twice daily with meals x 30 days  5.  Continue Carafate 1 tablet every 8 hours x 14 days  6.  Continue Plavix 75 mg daily      Called and spoke with patient and discussed the recommendations, he gave V/U.

## 2024-05-23 ENCOUNTER — TELEPHONE (OUTPATIENT)
Dept: ADMINISTRATIVE | Age: 84
End: 2024-05-23

## 2024-05-23 NOTE — TELEPHONE ENCOUNTER
Submitted PA for Repatha SureClick 140MG/ML auto-injectors  Via CMM  (Key: [id number]) STATUS:     To initiate an authorization request for this medication, please contact [employer] ([employer]) at [phone].     Submitted PA for Repatha SureClick 140MG/ML auto-injectors, via Phone Call to Lyudmila with [employer]. Reference # [id number]  Status:     Approved from 5/23/2024 - 5/23/2025    If this requires a response please respond to the pool ( P MHCX PSC MEDICATION PRE-AUTH).      Thank you please advise patient.

## 2024-05-28 ENCOUNTER — HOSPITAL ENCOUNTER (OUTPATIENT)
Age: 84
Discharge: HOME | End: 2024-05-28
Payer: MEDICARE

## 2024-05-28 DIAGNOSIS — N17.9 AKI (ACUTE KIDNEY INJURY) (HCC): ICD-10-CM

## 2024-05-28 DIAGNOSIS — E78.00 PURE HYPERCHOLESTEROLEMIA: Chronic | ICD-10-CM

## 2024-05-28 DIAGNOSIS — D50.0 IRON DEFICIENCY ANEMIA DUE TO CHRONIC BLOOD LOSS: ICD-10-CM

## 2024-05-28 LAB
ANION GAP SERPL CALCULATED.3IONS-SCNC: 10 MMOL/L (ref 3–16)
BASOPHILS # BLD: 0.1 K/UL (ref 0–0.2)
BASOPHILS NFR BLD: 0.9 %
BUN SERPL-MCNC: 27 MG/DL (ref 7–20)
CALCIUM SERPL-MCNC: 8.8 MG/DL (ref 8.3–10.6)
CHLORIDE SERPL-SCNC: 106 MMOL/L (ref 99–110)
CHOLEST SERPL-MCNC: 85 MG/DL (ref 0–199)
CO2 SERPL-SCNC: 25 MMOL/L (ref 21–32)
CREAT SERPL-MCNC: 1.6 MG/DL (ref 0.8–1.3)
DEPRECATED RDW RBC AUTO: 23.2 % (ref 12.4–15.4)
EOSINOPHIL # BLD: 0.2 K/UL (ref 0–0.6)
EOSINOPHIL NFR BLD: 2.9 %
GFR SERPLBLD CREATININE-BSD FMLA CKD-EPI: 42 ML/MIN/{1.73_M2}
GLUCOSE SERPL-MCNC: 102 MG/DL (ref 70–99)
HCT VFR BLD AUTO: 27.4 % (ref 40.5–52.5)
HDLC SERPL-MCNC: 49 MG/DL (ref 40–60)
HGB BLD-MCNC: 8.8 G/DL (ref 13.5–17.5)
LDLC SERPL CALC-MCNC: 28 MG/DL
LYMPHOCYTES # BLD: 1.4 K/UL (ref 1–5.1)
LYMPHOCYTES NFR BLD: 21.6 %
MCH RBC QN AUTO: 28.3 PG (ref 26–34)
MCHC RBC AUTO-ENTMCNC: 32.1 G/DL (ref 31–36)
MCV RBC AUTO: 87.9 FL (ref 80–100)
MONOCYTES # BLD: 0.9 K/UL (ref 0–1.3)
MONOCYTES NFR BLD: 14.7 %
NEUTROPHILS # BLD: 3.9 K/UL (ref 1.7–7.7)
NEUTROPHILS NFR BLD: 59.9 %
PLATELET # BLD AUTO: 295 K/UL (ref 135–450)
PMV BLD AUTO: 8 FL (ref 5–10.5)
POTASSIUM SERPL-SCNC: 4.3 MMOL/L (ref 3.5–5.1)
RBC # BLD AUTO: 3.11 M/UL (ref 4.2–5.9)
SODIUM SERPL-SCNC: 141 MMOL/L (ref 136–145)
TRIGL SERPL-MCNC: 39 MG/DL (ref 0–150)
VLDLC SERPL CALC-MCNC: 8 MG/DL
WBC # BLD AUTO: 6.4 K/UL (ref 4–11)

## 2024-05-28 PROCEDURE — 85025 COMPLETE CBC W/AUTO DIFF WBC: CPT

## 2024-05-28 PROCEDURE — 80048 BASIC METABOLIC PNL TOTAL CA: CPT

## 2024-05-28 PROCEDURE — 36415 COLL VENOUS BLD VENIPUNCTURE: CPT

## 2024-05-28 PROCEDURE — 80061 LIPID PANEL: CPT

## 2024-06-04 ENCOUNTER — TELEPHONE (OUTPATIENT)
Dept: CARDIOLOGY CLINIC | Age: 84
End: 2024-06-04

## 2024-06-04 NOTE — TELEPHONE ENCOUNTER
----- Message from LASHONDA Malhotra CNP sent at 6/4/2024  2:41 PM EDT -----  CBC noted.  Please let patient know I am okay with CBC results  ----- Message -----  From: Raulito Incoming Lab Results From Soft (Epic Adt)  Sent: 5/28/2024  12:26 PM EDT  To: LASHONDA Malhotra CNP

## 2024-06-11 ENCOUNTER — OFFICE VISIT (OUTPATIENT)
Dept: CARDIOLOGY CLINIC | Age: 84
End: 2024-06-11
Payer: COMMERCIAL

## 2024-06-11 VITALS
OXYGEN SATURATION: 98 % | HEIGHT: 72 IN | WEIGHT: 202 LBS | HEART RATE: 91 BPM | SYSTOLIC BLOOD PRESSURE: 156 MMHG | BODY MASS INDEX: 27.36 KG/M2 | DIASTOLIC BLOOD PRESSURE: 74 MMHG

## 2024-06-11 DIAGNOSIS — E78.2 MIXED HYPERLIPIDEMIA: Chronic | ICD-10-CM

## 2024-06-11 DIAGNOSIS — I10 PRIMARY HYPERTENSION: Chronic | ICD-10-CM

## 2024-06-11 DIAGNOSIS — I25.10 CORONARY ARTERY DISEASE INVOLVING NATIVE CORONARY ARTERY OF NATIVE HEART WITHOUT ANGINA PECTORIS: Primary | Chronic | ICD-10-CM

## 2024-06-11 DIAGNOSIS — D50.0 IRON DEFICIENCY ANEMIA DUE TO CHRONIC BLOOD LOSS: ICD-10-CM

## 2024-06-11 DIAGNOSIS — I25.810 CORONARY ATHEROSCLEROSIS OF AUTOLOGOUS VEIN BYPASS GRAFT WITHOUT ANGINA: ICD-10-CM

## 2024-06-11 PROCEDURE — 1123F ACP DISCUSS/DSCN MKR DOCD: CPT | Performed by: INTERNAL MEDICINE

## 2024-06-11 PROCEDURE — 99214 OFFICE O/P EST MOD 30 MIN: CPT | Performed by: INTERNAL MEDICINE

## 2024-06-11 PROCEDURE — 3078F DIAST BP <80 MM HG: CPT | Performed by: INTERNAL MEDICINE

## 2024-06-11 PROCEDURE — 3077F SYST BP >= 140 MM HG: CPT | Performed by: INTERNAL MEDICINE

## 2024-06-11 RX ORDER — CLOPIDOGREL BISULFATE 75 MG/1
75 TABLET ORAL DAILY
Qty: 90 TABLET | Refills: 3 | Status: SHIPPED | OUTPATIENT
Start: 2024-06-11 | End: 2024-06-11

## 2024-06-11 RX ORDER — CLOPIDOGREL BISULFATE 75 MG/1
75 TABLET ORAL DAILY
Qty: 90 TABLET | Refills: 3 | Status: SHIPPED | OUTPATIENT
Start: 2024-06-11

## 2024-06-11 NOTE — PROGRESS NOTES
[facility]   Cardiovascular Evaluation    PATIENT: Khalif Pinedo Jr  DATE: 2024  MRN: [id number]  CSN: [id number]  : 1940    Primary Care Doctor: Scott Denise MD    Reason for evaluation:   Coronary Artery Disease, Follow-up, Atrial Fibrillation, and Dizziness      Subjective;    History of present illness on initial date of evaluation:   Khalif Pinedo Jr is a 83 y.o. patient who presents for cardiology follow up. He has a past medical history including coronary artery disease s/p stent placement and coronary artery bypass grafting, hyperlipidemia, hypertension, carotid artery disease, COPD, and former tobacco abuse.    Echocardiogram completed on 21 showing ejection fraction 50-55%, mild left ventricular hypertrophy, mild aortic regurgitation, and mild mitral regurgitation. His carotid doppler on 2021 shows right internal carotid artery <50% stenosis. Left internal carotid artery appears normal. Both with normal antegrade flow.    Since last office visit he wore a cardiac event monitor from -22 showing persistent atrial fibrillation with adequate HR control and NSVT. Echocardiogram 22 ejection fraction 50-55% and mild aortic regurgitation. Lexiscan Stress Myoview 22 no ischemia or scarring. He presented to ED on 22 with nausea and vomiting. Eliquis was discontinued as presumed as side effect from medication. He followed up with  outpatient for watchman evaluation in relation to anticoagulation intolerance. He is currently prescribed Xarelto for AC for atrial fibrillation.    Patient was hospitalized -22 with anemia. EGD normal. Colonoscopy with 2 small polyps in ascending colon and AVM in ascending colon. AC was placed on hold. He was discharged and followed up outpatient to undergo gastric cauterization.    Since last office visit he had followed with Crissy Sprague CNP and reported chest pain. Echo 
swings or temperature intolerance  Breast ROS: deferred  Respiratory ROS: negative for - hemoptysis or stridor  Cardiovascular ROS: negative for - chest pain, dyspnea on exertion or palpitations  Gastrointestinal ROS: no abdominal pain, change in bowel habits, or black or bloody stools  Genito-Urinary ROS: no dysuria, trouble voiding, or hematuria  Musculoskeletal ROS: negative for - gait disturbance, joint pain or joint stiffness  Neurological ROS: negative for - seizures or speech problems  Dermatological ROS: negative for - rash or skin lesion changes     Physical Examination:    There were no vitals filed for this visit.                  Wt Readings from Last 3 Encounters:   05/15/24 92.5 kg (203 lb 14.4 oz)   04/02/24 92.7 kg (204 lb 4.8 oz)   03/25/24 90.7 kg (200 lb)         General Appearance:  Alert, cooperative, no distress, appears stated age   Head:  Normocephalic, without obvious abnormality, atraumatic   Eyes:  PERRL, conjunctiva/corneas clear       Nose: Nares normal, no drainage or sinus tenderness   Throat: Lips, mucosa, and tongue normal   Neck: Supple, symmetrical, trachea midline, no adenopathy, thyroid: not enlarged, symmetric, no tenderness/mass/nodules, no carotid bruit or JVD       Lungs:   Clear to auscultation bilaterally, respirations unlabored   Chest Wall:  No tenderness or deformity   Heart:  Regular rhythm and normal rate; S1, S2 are normal; no murmur noted; no rub or gallop   Abdomen:   Soft, non-tender, bowel sounds active all four quadrants,  no masses, no organomegaly           Extremities: Extremities normal, atraumatic, no cyanosis or edema   Pulses: 2+ and symmetric   Skin: Skin color, texture, turgor normal, no rashes or lesions   Pysch: Normal mood and affect   Neurologic: Normal gross motor and sensory exam.         Labs  CBC:   Lab Results   Component Value Date/Time    WBC 6.4 05/28/2024 07:57 AM    RBC 3.11 05/28/2024 07:57 AM    HGB 8.8 05/28/2024 07:57 AM    HCT 27.4

## 2024-06-14 ENCOUNTER — TELEPHONE (OUTPATIENT)
Dept: CARDIOLOGY CLINIC | Age: 84
End: 2024-06-14

## 2024-06-14 NOTE — TELEPHONE ENCOUNTER
CARDIAC CLEARANCE REQUEST    What type of procedure are you having: INJECTIONS FOR BACK PAIN     Are you taking any blood thinners: PLAVIX (STOP 7 DAYS PRIOR)    Type on anesthesia:     When is your procedure scheduled for: NOT SCHEDULED YET     What physician is performing your procedure: NEPTALI CHOU [employer]  Phone Number: [phone]    Fax number to send the letter: [phone]

## 2024-06-17 NOTE — TELEPHONE ENCOUNTER
Patient is now almost 6 months out from his last PCI.  Okay to hold Plavix for 5-7 days prior to procedure if desired by proceduralist.  He had been on Plavix monotherapy due to his anemia.  Would recommend that he take aspirin 81 mg daily while his Plavix is being held and then resume Plavix as soon as possible following his procedure.

## 2024-07-16 ENCOUNTER — TELEPHONE (OUTPATIENT)
Dept: CARDIOLOGY CLINIC | Age: 84
End: 2024-07-16

## 2024-07-16 NOTE — TELEPHONE ENCOUNTER
Called and spoke with patient. Instructed no contraindications to taking vitamin B12 supplement take OTC as directed on label, edson MUSA

## 2024-07-31 ENCOUNTER — HOSPITAL ENCOUNTER (INPATIENT)
Age: 84
LOS: 2 days | Discharge: HOME | DRG: 379 | End: 2024-08-02
Attending: STUDENT IN AN ORGANIZED HEALTH CARE EDUCATION/TRAINING PROGRAM | Admitting: INTERNAL MEDICINE
Payer: MEDICARE

## 2024-07-31 ENCOUNTER — APPOINTMENT (OUTPATIENT)
Dept: CT IMAGING | Age: 84
DRG: 379 | End: 2024-07-31
Payer: MEDICARE

## 2024-07-31 ENCOUNTER — APPOINTMENT (OUTPATIENT)
Dept: GENERAL RADIOLOGY | Age: 84
DRG: 379 | End: 2024-07-31
Payer: MEDICARE

## 2024-07-31 DIAGNOSIS — I25.10 CORONARY ARTERY DISEASE INVOLVING NATIVE CORONARY ARTERY OF NATIVE HEART WITHOUT ANGINA PECTORIS: Chronic | ICD-10-CM

## 2024-07-31 DIAGNOSIS — I71.40 ABDOMINAL AORTIC ANEURYSM (AAA) 3.0 CM TO 5.5 CM IN DIAMETER IN MALE (HCC): ICD-10-CM

## 2024-07-31 DIAGNOSIS — I48.0 PAROXYSMAL ATRIAL FIBRILLATION (HCC): ICD-10-CM

## 2024-07-31 DIAGNOSIS — R10.84 GENERALIZED ABDOMINAL PAIN: Primary | ICD-10-CM

## 2024-07-31 DIAGNOSIS — D64.9 CHRONIC ANEMIA: ICD-10-CM

## 2024-07-31 DIAGNOSIS — R10.30 LOWER ABDOMINAL PAIN: ICD-10-CM

## 2024-07-31 DIAGNOSIS — R19.5 OCCULT BLOOD POSITIVE STOOL: ICD-10-CM

## 2024-07-31 PROBLEM — K92.2 GI BLEEDING: Status: ACTIVE | Noted: 2024-07-31

## 2024-07-31 LAB
ABO + RH BLD: NORMAL
ALBUMIN SERPL-MCNC: 4 G/DL (ref 3.4–5)
ALBUMIN/GLOB SERPL: 1.4 {RATIO} (ref 1.1–2.2)
ALP SERPL-CCNC: 226 U/L (ref 40–129)
ALT SERPL-CCNC: 24 U/L (ref 10–40)
ANION GAP SERPL CALCULATED.3IONS-SCNC: 10 MMOL/L (ref 3–16)
AST SERPL-CCNC: 26 U/L (ref 15–37)
BASOPHILS # BLD: 0 K/UL (ref 0–0.2)
BASOPHILS NFR BLD: 0.8 %
BILIRUB SERPL-MCNC: 0.6 MG/DL (ref 0–1)
BILIRUB UR QL STRIP.AUTO: NEGATIVE
BLD GP AB SCN SERPL QL: NORMAL
BUN SERPL-MCNC: 34 MG/DL (ref 7–20)
CALCIUM SERPL-MCNC: 9 MG/DL (ref 8.3–10.6)
CHLORIDE SERPL-SCNC: 106 MMOL/L (ref 99–110)
CLARITY UR: CLEAR
CO2 SERPL-SCNC: 24 MMOL/L (ref 21–32)
COLOR UR: YELLOW
CREAT SERPL-MCNC: 1.3 MG/DL (ref 0.8–1.3)
DEPRECATED RDW RBC AUTO: 26.9 % (ref 12.4–15.4)
EOSINOPHIL # BLD: 0 K/UL (ref 0–0.6)
EOSINOPHIL NFR BLD: 1 %
GFR SERPLBLD CREATININE-BSD FMLA CKD-EPI: 54 ML/MIN/{1.73_M2}
GLUCOSE SERPL-MCNC: 97 MG/DL (ref 70–99)
GLUCOSE UR STRIP.AUTO-MCNC: NEGATIVE MG/DL
HCT VFR BLD AUTO: 27.1 % (ref 40.5–52.5)
HCT VFR BLD AUTO: 28.2 % (ref 40.5–52.5)
HCT VFR BLD AUTO: 29 % (ref 40.5–52.5)
HEMOCCULT SP1 STL QL: ABNORMAL
HGB BLD-MCNC: 8.7 G/DL (ref 13.5–17.5)
HGB BLD-MCNC: 9 G/DL (ref 13.5–17.5)
HGB BLD-MCNC: 9.4 G/DL (ref 13.5–17.5)
HGB UR QL STRIP.AUTO: NEGATIVE
KETONES UR STRIP.AUTO-MCNC: NEGATIVE MG/DL
LACTATE BLDV-SCNC: 0.6 MMOL/L (ref 0.4–1.9)
LACTATE BLDV-SCNC: 0.9 MMOL/L (ref 0.4–2)
LEUKOCYTE ESTERASE UR QL STRIP.AUTO: NEGATIVE
LYMPHOCYTES # BLD: 1 K/UL (ref 1–5.1)
LYMPHOCYTES NFR BLD: 22.8 %
MACROCYTES BLD QL SMEAR: ABNORMAL
MCH RBC QN AUTO: 31.6 PG (ref 26–34)
MCHC RBC AUTO-ENTMCNC: 32.3 G/DL (ref 31–36)
MCV RBC AUTO: 97.9 FL (ref 80–100)
MICROCYTES BLD QL SMEAR: ABNORMAL
MONOCYTES # BLD: 0.8 K/UL (ref 0–1.3)
MONOCYTES NFR BLD: 17.9 %
NEUTROPHILS # BLD: 2.6 K/UL (ref 1.7–7.7)
NEUTROPHILS NFR BLD: 57.5 %
NITRITE UR QL STRIP.AUTO: NEGATIVE
NT-PROBNP SERPL-MCNC: 1141 PG/ML (ref 0–449)
OVALOCYTES BLD QL SMEAR: ABNORMAL
PH UR STRIP.AUTO: 6 [PH] (ref 5–8)
PLATELET # BLD AUTO: 214 K/UL (ref 135–450)
PLATELET BLD QL SMEAR: ADEQUATE
PMV BLD AUTO: 6.9 FL (ref 5–10.5)
POLYCHROMASIA BLD QL SMEAR: ABNORMAL
POTASSIUM SERPL-SCNC: 4.4 MMOL/L (ref 3.5–5.1)
PROT SERPL-MCNC: 6.8 G/DL (ref 6.4–8.2)
PROT UR STRIP.AUTO-MCNC: NEGATIVE MG/DL
RBC # BLD AUTO: 2.96 M/UL (ref 4.2–5.9)
SLIDE REVIEW: ABNORMAL
SODIUM SERPL-SCNC: 140 MMOL/L (ref 136–145)
SP GR UR STRIP.AUTO: 1.02 (ref 1–1.03)
UA COMPLETE W REFLEX CULTURE PNL UR: NORMAL
UA DIPSTICK W REFLEX MICRO PNL UR: NORMAL
URN SPEC COLLECT METH UR: NORMAL
UROBILINOGEN UR STRIP-ACNC: 0.2 E.U./DL
WBC # BLD AUTO: 4.5 K/UL (ref 4–11)

## 2024-07-31 PROCEDURE — 99285 EMERGENCY DEPT VISIT HI MDM: CPT

## 2024-07-31 PROCEDURE — 71045 X-RAY EXAM CHEST 1 VIEW: CPT

## 2024-07-31 PROCEDURE — 85014 HEMATOCRIT: CPT

## 2024-07-31 PROCEDURE — 83605 ASSAY OF LACTIC ACID: CPT

## 2024-07-31 PROCEDURE — 81003 URINALYSIS AUTO W/O SCOPE: CPT

## 2024-07-31 PROCEDURE — 6370000000 HC RX 637 (ALT 250 FOR IP): Performed by: INTERNAL MEDICINE

## 2024-07-31 PROCEDURE — 80053 COMPREHEN METABOLIC PANEL: CPT

## 2024-07-31 PROCEDURE — 36415 COLL VENOUS BLD VENIPUNCTURE: CPT

## 2024-07-31 PROCEDURE — 82270 OCCULT BLOOD FECES: CPT

## 2024-07-31 PROCEDURE — 6370000000 HC RX 637 (ALT 250 FOR IP): Performed by: NURSE PRACTITIONER

## 2024-07-31 PROCEDURE — 6360000004 HC RX CONTRAST MEDICATION: Performed by: PHYSICIAN ASSISTANT

## 2024-07-31 PROCEDURE — 2580000003 HC RX 258: Performed by: INTERNAL MEDICINE

## 2024-07-31 PROCEDURE — 96374 THER/PROPH/DIAG INJ IV PUSH: CPT

## 2024-07-31 PROCEDURE — 6360000002 HC RX W HCPCS: Performed by: INTERNAL MEDICINE

## 2024-07-31 PROCEDURE — 2580000003 HC RX 258: Performed by: PHYSICIAN ASSISTANT

## 2024-07-31 PROCEDURE — 6360000002 HC RX W HCPCS: Performed by: PHYSICIAN ASSISTANT

## 2024-07-31 PROCEDURE — 96361 HYDRATE IV INFUSION ADD-ON: CPT

## 2024-07-31 PROCEDURE — 83880 ASSAY OF NATRIURETIC PEPTIDE: CPT

## 2024-07-31 PROCEDURE — 74177 CT ABD & PELVIS W/CONTRAST: CPT

## 2024-07-31 PROCEDURE — 86900 BLOOD TYPING SEROLOGIC ABO: CPT

## 2024-07-31 PROCEDURE — 86901 BLOOD TYPING SEROLOGIC RH(D): CPT

## 2024-07-31 PROCEDURE — 86850 RBC ANTIBODY SCREEN: CPT

## 2024-07-31 PROCEDURE — 85025 COMPLETE CBC W/AUTO DIFF WBC: CPT

## 2024-07-31 PROCEDURE — 1200000000 HC SEMI PRIVATE

## 2024-07-31 PROCEDURE — 85018 HEMOGLOBIN: CPT

## 2024-07-31 RX ORDER — ONDANSETRON 4 MG/1
4 TABLET, ORALLY DISINTEGRATING ORAL EVERY 8 HOURS PRN
Status: DISCONTINUED | OUTPATIENT
Start: 2024-07-31 | End: 2024-08-02 | Stop reason: HOSPADM

## 2024-07-31 RX ORDER — ACETAMINOPHEN 650 MG/1
650 SUPPOSITORY RECTAL EVERY 6 HOURS PRN
Status: DISCONTINUED | OUTPATIENT
Start: 2024-07-31 | End: 2024-08-02 | Stop reason: HOSPADM

## 2024-07-31 RX ORDER — SODIUM CHLORIDE 0.9 % (FLUSH) 0.9 %
5-40 SYRINGE (ML) INJECTION EVERY 12 HOURS SCHEDULED
Status: DISCONTINUED | OUTPATIENT
Start: 2024-07-31 | End: 2024-08-02 | Stop reason: HOSPADM

## 2024-07-31 RX ORDER — GABAPENTIN 300 MG/1
300 CAPSULE ORAL 2 TIMES DAILY
Status: DISCONTINUED | OUTPATIENT
Start: 2024-07-31 | End: 2024-08-02 | Stop reason: HOSPADM

## 2024-07-31 RX ORDER — ONDANSETRON 2 MG/ML
4 INJECTION INTRAMUSCULAR; INTRAVENOUS EVERY 6 HOURS PRN
Status: DISCONTINUED | OUTPATIENT
Start: 2024-07-31 | End: 2024-08-02 | Stop reason: HOSPADM

## 2024-07-31 RX ORDER — ALBUTEROL SULFATE 90 UG/1
2 AEROSOL, METERED RESPIRATORY (INHALATION) EVERY 6 HOURS PRN
Status: DISCONTINUED | OUTPATIENT
Start: 2024-07-31 | End: 2024-08-02 | Stop reason: HOSPADM

## 2024-07-31 RX ORDER — ONDANSETRON 2 MG/ML
4 INJECTION INTRAMUSCULAR; INTRAVENOUS ONCE
Status: COMPLETED | OUTPATIENT
Start: 2024-07-31 | End: 2024-07-31

## 2024-07-31 RX ORDER — AMIODARONE HYDROCHLORIDE 200 MG/1
400 TABLET ORAL DAILY
Status: DISCONTINUED | OUTPATIENT
Start: 2024-07-31 | End: 2024-07-31

## 2024-07-31 RX ORDER — 0.9 % SODIUM CHLORIDE 0.9 %
1000 INTRAVENOUS SOLUTION INTRAVENOUS ONCE
Status: COMPLETED | OUTPATIENT
Start: 2024-07-31 | End: 2024-07-31

## 2024-07-31 RX ORDER — ACETAMINOPHEN 325 MG/1
650 TABLET ORAL EVERY 6 HOURS PRN
Status: DISCONTINUED | OUTPATIENT
Start: 2024-07-31 | End: 2024-08-02 | Stop reason: HOSPADM

## 2024-07-31 RX ORDER — SODIUM CHLORIDE 9 MG/ML
INJECTION, SOLUTION INTRAVENOUS PRN
Status: DISCONTINUED | OUTPATIENT
Start: 2024-07-31 | End: 2024-08-02 | Stop reason: HOSPADM

## 2024-07-31 RX ORDER — SODIUM CHLORIDE 0.9 % (FLUSH) 0.9 %
5-40 SYRINGE (ML) INJECTION PRN
Status: DISCONTINUED | OUTPATIENT
Start: 2024-07-31 | End: 2024-08-02 | Stop reason: HOSPADM

## 2024-07-31 RX ORDER — PANTOPRAZOLE SODIUM 40 MG/10ML
40 INJECTION, POWDER, LYOPHILIZED, FOR SOLUTION INTRAVENOUS ONCE
Status: COMPLETED | OUTPATIENT
Start: 2024-07-31 | End: 2024-07-31

## 2024-07-31 RX ORDER — SODIUM CHLORIDE, SODIUM LACTATE, POTASSIUM CHLORIDE, CALCIUM CHLORIDE 600; 310; 30; 20 MG/100ML; MG/100ML; MG/100ML; MG/100ML
INJECTION, SOLUTION INTRAVENOUS CONTINUOUS
Status: DISCONTINUED | OUTPATIENT
Start: 2024-07-31 | End: 2024-08-02 | Stop reason: HOSPADM

## 2024-07-31 RX ADMIN — SODIUM CHLORIDE, POTASSIUM CHLORIDE, SODIUM LACTATE AND CALCIUM CHLORIDE: 600; 310; 30; 20 INJECTION, SOLUTION INTRAVENOUS at 14:41

## 2024-07-31 RX ADMIN — PANTOPRAZOLE SODIUM 8 MG/HR: 40 INJECTION, POWDER, FOR SOLUTION INTRAVENOUS at 15:39

## 2024-07-31 RX ADMIN — PANTOPRAZOLE SODIUM 40 MG: 40 INJECTION, POWDER, FOR SOLUTION INTRAVENOUS at 14:19

## 2024-07-31 RX ADMIN — IOPAMIDOL 75 ML: 755 INJECTION, SOLUTION INTRAVENOUS at 09:38

## 2024-07-31 RX ADMIN — ONDANSETRON 4 MG: 4 TABLET, ORALLY DISINTEGRATING ORAL at 14:19

## 2024-07-31 RX ADMIN — SODIUM CHLORIDE 1000 ML: 9 INJECTION, SOLUTION INTRAVENOUS at 10:08

## 2024-07-31 RX ADMIN — SODIUM CHLORIDE, POTASSIUM CHLORIDE, SODIUM LACTATE AND CALCIUM CHLORIDE: 600; 310; 30; 20 INJECTION, SOLUTION INTRAVENOUS at 22:27

## 2024-07-31 RX ADMIN — ONDANSETRON 4 MG: 2 INJECTION INTRAMUSCULAR; INTRAVENOUS at 11:49

## 2024-07-31 RX ADMIN — GABAPENTIN 300 MG: 300 CAPSULE ORAL at 21:02

## 2024-07-31 RX ADMIN — PANTOPRAZOLE SODIUM 8 MG/HR: 40 INJECTION, POWDER, FOR SOLUTION INTRAVENOUS at 23:38

## 2024-07-31 ASSESSMENT — PAIN SCALES - GENERAL: PAINLEVEL_OUTOF10: 0

## 2024-07-31 ASSESSMENT — PAIN - FUNCTIONAL ASSESSMENT
PAIN_FUNCTIONAL_ASSESSMENT: NONE - DENIES PAIN
PAIN_FUNCTIONAL_ASSESSMENT: 0-10

## 2024-07-31 NOTE — PROGRESS NOTES
Patient Name: Khalif Pinedo Jr  :  1940  84 y.o.  MRN:  [id number]  Preferred Name  Khalif  ED Room #:    Family/Caregiver Present yes  Restraints no  Sitter no  Sepsis Risk Score      Situation  Code Status: Full Code No additional code details.    Allergies: Crestor [rosuvastatin calcium], Statins, Amlodipine, Budesonide-formoterol fumarate, Codeine, Dilaudid [hydromorphone hcl], Labetalol, Nitrofurantoin macrocrystal, Other, Propoxyphene, Xarelto [rivaroxaban], Zetia [ezetimibe], Apixaban, Benadryl [diphenhydramine], Digoxin, Hydromorphone hcl, and Iodoform  Weight: Patient Vitals for the past 96 hrs (Last 3 readings):   Weight   24 0804 90.7 kg (200 lb)     Arrived from: home  Chief Complaint:   Chief Complaint   Patient presents with    Hematemesis     Pt reports he felt sick to his stomach this morning and spit up blood. States he's noticed some dark stools lately as well.     Abdominal Pain     Hospital Problem/Diagnosis:  Principal Problem:    GI bleeding  Resolved Problems:    * No resolved hospital problems. *    Imaging:   CT ABDOMEN PELVIS W IV CONTRAST Additional Contrast? None   Final Result   Diverticulosis.  No diverticulitis.  Appendix identified and normal in   caliber.      Subtle injection of the fat surrounds the bladder.  Recommend correlation   with urinalysis to exclude cystitis      Bilateral pleural effusions are seen with adjacent consolidation of the lung   bases, likely atelectasis in the absence of clinical signs of pneumonia.      Trace free fluid in pelvis, either reactive or due to mild fluid overload      Liver cysts and hepatic cysts      RECOMMENDATIONS:   Abdominal aortic aneurysm measuring 3.1 cm. Recommend follow-up every 3 years.      Reference: J Am Adali Radiol 2013;10:789-794.      Left Bosniak I benign renal cyst. No follow-up imaging is recommended.      JACR 2018 Feb; 264-273, Management of the Incidental Renal Mass on CT,   [employer] ;

## 2024-07-31 NOTE — H&P
Hospital Medicine History & Physical      PCP: Scott Denise MD    Date of Admission: 7/31/2024    Date of Service: Pt seen/examined and Admitted with expected LOS greater than two midnights due to medical therapy.     Chief Complaint:      Hematemesis this morning, tarry black dark schools for 2 days  History Of Present Illness:      This is a 84-year-old female who presented to the emergency room with the chief complaint of having tarry black stools ongoing for the last 2 days.  Hemoccult test was positive positive.  Patient's family called his cardiologist Dr. Correia mentioning that patient coughed up or spit up a huge chunk of blood but they were going to bring patient to the emergency room.    Patient denies any loss of consciousness no abdominal pain or cramps no coffee-ground vomiting.  She denies any chills fever nausea vomiting no diarrhea no constipation.  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  Currently on Plavix.    Patient's family initially requested transfer to [facility] because that is where patient had her procedures done due to AVM but [facility] is currently on diversion so they are agreeable to stay here for further care management    ED workup notable for:  Hemoglobin is 9.4 today        Past Medical History:          Diagnosis Date    Anemia 9/19/2022    CAD (coronary artery disease) 5/17/1998    Stent in LAD    CAD (coronary artery disease) 9/17/2009    Stent in RCA    COPD (chronic obstructive pulmonary disease) (HCC)     COPD (chronic obstructive pulmonary disease) (HCC) 2/17/2016    GERD (gastroesophageal reflux disease)     on Nexium    HTN (hypertension) 6/14/2016    Hyperlipidemia     IBS (irritable bowel syndrome)     Stented coronary artery        Past Surgical History:          Procedure Laterality Date    CAPSULE ENDOSCOPY  09/21/2022    BOWEL SMALL CAPSULE ENDOSCOPY performed by Santosh Jerome DO at [facility]  [K92.2] 07/31/2024     Hematemesis x 1 day  Tarry black stools for 2 days  History of AVM  Generalized weakness  History of chronic anemia iron deficiency anemia status post multiple iron transfusions  Coronary artery disease status post stenting LAD and RCA  COPD  GERD  Hypertension  Upper lipidemia  IBS    PLAN:    Admit to medical surgical floor with telemetry .    Gentle IV fluid hydration  PPI IV Protonix  Pain management as needed  Resume significant home meds  Zofran iv for nausea and vomiting as needed  Will hold Plavix for now  GI consult requested  H&H monitoring  CBC a.m. lab  Avoid NSAIDs    Patient will be kept n.p.o.  Discussed with patient and wife they were agreeable to stay at [facility] for further kidney care and management of GI bleeding.  Apparently at the [facility] they had a special endoscopy for his history of AVM and they would like to continue procedures over there.    O2 supplement therapy via nasal cannula to keep saturation above 92%  Respiratory protocol  Bronchodilator/inhaler therapy  DuoNebs    Continue close monitoring vital signs, temperature and pulse oximetry  Repeat CBC BMP a.m. labs  Electrolyte monitoring replacement as needed    Increase ambulation with assistance as tolerated  PT OT evaluation for improvement of functional level and discharge planning    DVT Prophylaxis: Bilateral SCDs  Diet: Diet NPO Exceptions are: Ice Chips  Code Status: Full Code      Dispo -Pending upon clinical improvement.    I reviewed and evaluated all imaging studies and test results discussed with patient and ED physician.  They agree with current management.  Further plan of care based on patient's overall condition and outcome of the ordered test results.   Time spent 80 minutes for evaluation, consultation, reviewing chart, test results and imaging studies.         Bree Wadsworth MD        Comment: Please note this report has been produced using speech recognition software and

## 2024-07-31 NOTE — ED PROVIDER NOTES
[place] [id number] [facility]  EMERGENCY DEPARTMENT ENCOUNTER        Pt Name: Khalif Pinedo Jr  MRN: [id number]  Birthdate 1940  Date of evaluation: 7/31/2024  Provider: GRACIE Lopes  PCP: Scott Denise MD  Note Started: 8:38 AM EDT 7/31/24       I have seen and evaluated this patient with my supervising physician Katja Ríos MD.      CHIEF COMPLAINT       Chief Complaint   Patient presents with    Hematemesis     Pt reports he felt sick to his stomach this morning and spit up blood. States he's noticed some dark stools lately as well.     Abdominal Pain       HISTORY OF PRESENT ILLNESS: 1 or more Elements     History from : Patient    Limitations to history : None    Khalif Pinedo Jr is a 84 y.o. male who presents to the emergency department complaining of abdominal pain, dark stools, and intermittent emesis.  Patient is taking Plavix.  He states yesterday he had some abdominal pain and noticed black stools.  Today he was outside walking with his walker when he suddenly became very nauseous.  He spit up bright red blood.  He states he was hospitalized in June 2022 for bleeding ulcers.  Denies any recent fevers or chills.  No chest pain, shortness of breath or cough.  He is on iron infusions currently.  Most recently had infusion in June.    Nursing Notes were all reviewed and agreed with or any disagreements were addressed in the HPI.    REVIEW OF SYSTEMS :      Review of Systems    Positives and Pertinent negatives as per HPI.     SURGICAL HISTORY     Past Surgical History:   Procedure Laterality Date    CAPSULE ENDOSCOPY  09/21/2022    BOWEL SMALL CAPSULE ENDOSCOPY performed by Santosh Jerome DO at [facility]    CARDIAC CATHETERIZATION  09/13/2012    diagnostic - Dr. Morris    CARDIAC CATHETERIZATION  09/28/2010    diagnostic - dr. morris    CARDIAC CATHETERIZATION  04/21/2000    diagnostic    CARDIAC CATHETERIZATION  05/17/1998    no in-stent restenosis    CARDIAC  program.  Efforts were made to edit the dictations but occasionally words are mis-transcribed.)    GRACIE Lopes (electronically signed)            Louann Weldon PA  07/31/24 8898

## 2024-07-31 NOTE — ED NOTES
1251- Paged [facility] Line to initiate transfer and consult with GI per Louann Weldon PA   Re- GI bleed with hx of AV malformation  1305- [facility] has no beds, patient agreed to stay at [facility]

## 2024-08-01 ENCOUNTER — ANESTHESIA (OUTPATIENT)
Dept: ENDOSCOPY | Age: 84
DRG: 379 | End: 2024-08-01
Payer: MEDICARE

## 2024-08-01 ENCOUNTER — ANESTHESIA EVENT (OUTPATIENT)
Dept: ENDOSCOPY | Age: 84
DRG: 379 | End: 2024-08-01
Payer: MEDICARE

## 2024-08-01 LAB
APTT BLD: 33.4 SEC (ref 22.1–36.4)
HCT VFR BLD AUTO: 25 % (ref 40.5–52.5)
HCT VFR BLD AUTO: 25.9 % (ref 40.5–52.5)
HCT VFR BLD AUTO: 26.1 % (ref 40.5–52.5)
HGB BLD-MCNC: 8.2 G/DL (ref 13.5–17.5)
HGB BLD-MCNC: 8.3 G/DL (ref 13.5–17.5)
HGB BLD-MCNC: 8.4 G/DL (ref 13.5–17.5)
INR PPP: 1.12 (ref 0.85–1.15)
IRON SATN MFR SERPL: 21 % (ref 20–50)
IRON SERPL-MCNC: 58 UG/DL (ref 59–158)
PROTHROMBIN TIME: 14.6 SEC (ref 11.9–14.9)
TIBC SERPL-MCNC: 273 UG/DL (ref 260–445)

## 2024-08-01 PROCEDURE — 85014 HEMATOCRIT: CPT

## 2024-08-01 PROCEDURE — 94761 N-INVAS EAR/PLS OXIMETRY MLT: CPT

## 2024-08-01 PROCEDURE — 2700000000 HC OXYGEN THERAPY PER DAY

## 2024-08-01 PROCEDURE — 0DJ08ZZ INSPECTION OF UPPER INTESTINAL TRACT, VIA NATURAL OR ARTIFICIAL OPENING ENDOSCOPIC: ICD-10-PCS | Performed by: INTERNAL MEDICINE

## 2024-08-01 PROCEDURE — 36415 COLL VENOUS BLD VENIPUNCTURE: CPT

## 2024-08-01 PROCEDURE — 6370000000 HC RX 637 (ALT 250 FOR IP): Performed by: NURSE PRACTITIONER

## 2024-08-01 PROCEDURE — 1200000000 HC SEMI PRIVATE

## 2024-08-01 PROCEDURE — 85018 HEMOGLOBIN: CPT

## 2024-08-01 PROCEDURE — 6360000002 HC RX W HCPCS: Performed by: INTERNAL MEDICINE

## 2024-08-01 PROCEDURE — 2500000003 HC RX 250 WO HCPCS: Performed by: NURSE ANESTHETIST, CERTIFIED REGISTERED

## 2024-08-01 PROCEDURE — 2709999900 HC NON-CHARGEABLE SUPPLY: Performed by: INTERNAL MEDICINE

## 2024-08-01 PROCEDURE — 3700000000 HC ANESTHESIA ATTENDED CARE: Performed by: INTERNAL MEDICINE

## 2024-08-01 PROCEDURE — 3609017100 HC EGD: Performed by: INTERNAL MEDICINE

## 2024-08-01 PROCEDURE — 85730 THROMBOPLASTIN TIME PARTIAL: CPT

## 2024-08-01 PROCEDURE — 7100000011 HC PHASE II RECOVERY - ADDTL 15 MIN: Performed by: INTERNAL MEDICINE

## 2024-08-01 PROCEDURE — 83540 ASSAY OF IRON: CPT

## 2024-08-01 PROCEDURE — 97165 OT EVAL LOW COMPLEX 30 MIN: CPT

## 2024-08-01 PROCEDURE — 2580000003 HC RX 258: Performed by: INTERNAL MEDICINE

## 2024-08-01 PROCEDURE — 6360000002 HC RX W HCPCS: Performed by: NURSE ANESTHETIST, CERTIFIED REGISTERED

## 2024-08-01 PROCEDURE — 7100000010 HC PHASE II RECOVERY - FIRST 15 MIN: Performed by: INTERNAL MEDICINE

## 2024-08-01 PROCEDURE — 97161 PT EVAL LOW COMPLEX 20 MIN: CPT

## 2024-08-01 PROCEDURE — 85610 PROTHROMBIN TIME: CPT

## 2024-08-01 PROCEDURE — 3700000001 HC ADD 15 MINUTES (ANESTHESIA): Performed by: INTERNAL MEDICINE

## 2024-08-01 PROCEDURE — 83550 IRON BINDING TEST: CPT

## 2024-08-01 RX ORDER — LIDOCAINE HYDROCHLORIDE 20 MG/ML
INJECTION, SOLUTION INFILTRATION; PERINEURAL PRN
Status: DISCONTINUED | OUTPATIENT
Start: 2024-08-01 | End: 2024-08-01 | Stop reason: SDUPTHER

## 2024-08-01 RX ORDER — PROPOFOL 10 MG/ML
INJECTION, EMULSION INTRAVENOUS PRN
Status: DISCONTINUED | OUTPATIENT
Start: 2024-08-01 | End: 2024-08-01 | Stop reason: SDUPTHER

## 2024-08-01 RX ADMIN — PROPOFOL 20 MG: 10 INJECTION, EMULSION INTRAVENOUS at 14:02

## 2024-08-01 RX ADMIN — PANTOPRAZOLE SODIUM 8 MG/HR: 40 INJECTION, POWDER, FOR SOLUTION INTRAVENOUS at 09:01

## 2024-08-01 RX ADMIN — SODIUM CHLORIDE, POTASSIUM CHLORIDE, SODIUM LACTATE AND CALCIUM CHLORIDE: 600; 310; 30; 20 INJECTION, SOLUTION INTRAVENOUS at 22:42

## 2024-08-01 RX ADMIN — SODIUM CHLORIDE, PRESERVATIVE FREE 10 ML: 5 INJECTION INTRAVENOUS at 04:37

## 2024-08-01 RX ADMIN — SODIUM CHLORIDE, PRESERVATIVE FREE 10 ML: 5 INJECTION INTRAVENOUS at 20:19

## 2024-08-01 RX ADMIN — LIDOCAINE HYDROCHLORIDE 60 MG: 20 INJECTION, SOLUTION INFILTRATION; PERINEURAL at 13:54

## 2024-08-01 RX ADMIN — ONDANSETRON 4 MG: 2 INJECTION INTRAMUSCULAR; INTRAVENOUS at 04:37

## 2024-08-01 RX ADMIN — GABAPENTIN 300 MG: 300 CAPSULE ORAL at 20:19

## 2024-08-01 RX ADMIN — GABAPENTIN 300 MG: 300 CAPSULE ORAL at 08:59

## 2024-08-01 RX ADMIN — Medication 1 LOZENGE: at 20:19

## 2024-08-01 RX ADMIN — SODIUM CHLORIDE, POTASSIUM CHLORIDE, SODIUM LACTATE AND CALCIUM CHLORIDE: 600; 310; 30; 20 INJECTION, SOLUTION INTRAVENOUS at 06:38

## 2024-08-01 RX ADMIN — PANTOPRAZOLE SODIUM 8 MG/HR: 40 INJECTION, POWDER, FOR SOLUTION INTRAVENOUS at 22:42

## 2024-08-01 RX ADMIN — PROPOFOL 50 MG: 10 INJECTION, EMULSION INTRAVENOUS at 13:54

## 2024-08-01 RX ADMIN — PROPOFOL 30 MG: 10 INJECTION, EMULSION INTRAVENOUS at 13:57

## 2024-08-01 RX ADMIN — ONDANSETRON 4 MG: 2 INJECTION INTRAMUSCULAR; INTRAVENOUS at 11:12

## 2024-08-01 ASSESSMENT — PAIN DESCRIPTION - LOCATION: LOCATION: THROAT

## 2024-08-01 ASSESSMENT — PAIN SCALES - GENERAL: PAINLEVEL_OUTOF10: 3

## 2024-08-01 ASSESSMENT — PAIN - FUNCTIONAL ASSESSMENT
PAIN_FUNCTIONAL_ASSESSMENT: 0-10
PAIN_FUNCTIONAL_ASSESSMENT: PREVENTS OR INTERFERES SOME ACTIVE ACTIVITIES AND ADLS

## 2024-08-01 ASSESSMENT — PAIN DESCRIPTION - DESCRIPTORS
DESCRIPTORS: ACHING
DESCRIPTORS: CRAMPING;ACHING

## 2024-08-01 ASSESSMENT — ENCOUNTER SYMPTOMS: SHORTNESS OF BREATH: 1

## 2024-08-01 ASSESSMENT — PAIN DESCRIPTION - ORIENTATION: ORIENTATION: MID

## 2024-08-01 NOTE — CARE COORDINATION
Case Management Assessment  Initial Evaluation    Date/Time of Evaluation: 8/1/2024 11:04 AM  Assessment Completed by: Lilo Hou RN    If patient is discharged prior to next notation, then this note serves as note for discharge by case management.    Patient Name: Khalif Pinedo Jr                   YOB: 1940  Diagnosis: GI bleeding [K92.2]  Generalized abdominal pain [R10.84]  Occult blood positive stool [R19.5]  Chronic anemia [D64.9]                   Date / Time: 7/31/2024  7:47 AM    Patient Admission Status: Inpatient   Readmission Risk (Low < 19, Mod (19-27), High > 27): Readmission Risk Score: 22    Current PCP: Scott Denise MD  PCP verified by CM? Yes    Chart Reviewed: Yes      History Provided by: Patient  Patient Orientation: Alert and Oriented    Patient Cognition: Alert    Hospitalization in the last 30 days (Readmission):  No    If yes, Readmission Assessment in CM Navigator will be completed.    Advance Directives:      Code Status: Full Code   Patient's Primary Decision Maker is: Named in Scanned ACP Document    Primary Decision Maker: Anne Pinedo - Spouse - [phone]    Secondary Decision Maker: KhrisKhalif - Child - [phone]    Supplemental (Other) Decision Maker: Luis Manuel Mosqueda - Other Relative - [phone]    Discharge Planning:    Patient lives with: Spouse/Significant Other Type of Home: House  Primary Care Giver: Self  Patient Support Systems include: Spouse/Significant Other   Current Financial resources: Medicare  Current community resources: None  Current services prior to admission: Oxygen Therapy            Current DME:              Type of Home Care services:  None    ADLS  Prior functional level: Independent in ADLs/IADLs  Current functional level: Independent in ADLs/IADLs    PT AM-PAC:   /24  OT AM-PAC:   /24    Family can provide assistance at DC: Yes  Would you like Case Management to discuss the discharge plan with any other family

## 2024-08-01 NOTE — ACP (ADVANCE CARE PLANNING)
Advance Care Planning   Advance Care Planning Clinical Specialist  Conversation Note      Date of ACP Conversation: 8/1/2024    Conversation Conducted with:   Patient with Decision Making Capacity   Healthcare Decision Maker: Named in Advance Directive or Healthcare Power of  as below    ACP Clinical Specialist: Lilo Hou RN      *When Decision Maker makes decisions on behalf of the incapacitated patient: Decision Maker is asked to consider and make decisions based on patient values, known preferences, or best interests.       Current Designated Health Care Decision Maker:   Primary Decision Maker: ShainaAnne napier - Spouse - [phone]    Secondary Decision Maker: KhrisKhalif - Child - [phone]    Supplemental (Other) Decision Maker: Luis Manuel Mosqueda - Other Relative - [phone]  (as entered in ACP Activity Healthcare Decision Maker field.  Validate  this information as still accurate & up-to-date; edit Healthcare Decision Maker field as needed.)   ou\" and \"your\".      Hospitalization  If your health were to worsen and it became clear that your chance of recovery was unlikely, what would your preferences be regarding hospitalization?:    Choice:  [x]  The patient would want hospitalization  []  The patient would prefer comfort-focused treatment without hospitalization.    Ventilation  If you were in your present state of health and suddenly became very ill and were unable to breathe on your own, what would your preference be about the use of a ventilator (breathing machine) if it were available to you?      If patient would desire the use of a ventilator (breathing machine), answer \"yes\", if not \"no\":yes    If your health were to worsen and it became clear that your chance of recovery was unlikely, would that change your answer? Yes    Resuscitation  CPR works best to restart the heart when there is a sudden event, like a heart attack, in someone who is otherwise healthy. Unfortunately, CPR

## 2024-08-01 NOTE — H&P
[facility] ENDO  Outpatient Procedure H&P    Patient: Khalif Pinedo Jr MRN: [id number]     YOB: 1940  Age: 84 y.o.  Sex: male    Unit: [facility] ENDO Room/Bed: [facility] Endo Pool/NONE Location: [facility]     Procedure: Procedure(s):  ESOPHAGOGASTRODUODENOSCOPY    Indication: Hematochezia  Referring  Physician:        Brief history: Anticoagulated    Nurses past medical history notes reviewed and agreed.   Medications reviewed.    Allergies: Crestor [rosuvastatin calcium], Statins, Amlodipine, Budesonide-formoterol fumarate, Codeine, Dilaudid [hydromorphone hcl], Labetalol, Nitrofurantoin macrocrystal, Other, Propoxyphene, Xarelto [rivaroxaban], Zetia [ezetimibe], Apixaban, Benadryl [diphenhydramine], Digoxin, Hydromorphone hcl, and Iodoform     Allergies noted: Yes     Past Medical History:   Past Medical History:   Diagnosis Date    Anemia 9/19/2022    CAD (coronary artery disease) 5/17/1998    Stent in LAD    CAD (coronary artery disease) 9/17/2009    Stent in RCA    COPD (chronic obstructive pulmonary disease) ([facility])     COPD (chronic obstructive pulmonary disease) ([facility]) 2/17/2016    GERD (gastroesophageal reflux disease)     on Nexium    HTN (hypertension) 6/14/2016    Hyperlipidemia     IBS (irritable bowel syndrome)     Stented coronary artery        Past Surgical History:   Past Surgical History:   Procedure Laterality Date    CAPSULE ENDOSCOPY  09/21/2022    BOWEL SMALL CAPSULE ENDOSCOPY performed by Santosh Jerome DO at [facility] ENDOSCOPY    CARDIAC CATHETERIZATION  09/13/2012    diagnostic - Dr. Black    CARDIAC CATHETERIZATION  09/28/2010    diagnostic - dr. black    CARDIAC CATHETERIZATION  04/21/2000    diagnostic    CARDIAC CATHETERIZATION  05/17/1998    no in-stent restenosis    CARDIAC CATHETERIZATION  09/17/2009    atretic LIMA - @[facility]    CAROTID ENDARTERECTOMY  01/01/2014    CHOLECYSTECTOMY      COLONOSCOPY  01/01/2004    COLONOSCOPY N/A 09/21/2022     conditions, and the added risk of COVID 19 in light of these conditions. The patient and I also discussed the risk of further postponing the procedure/surgery and other treatment alternatives, including non-procedural/surgical treatments. Understanding all of the risks, benefits, and alternatives, the patient made an informed decision to proceed with the procedure/surgery.

## 2024-08-01 NOTE — PROGRESS NOTES
concerns at this time. OT rec d/c home PRN A, DC OT.   Prognosis: Good  Decision Making: Low Complexity  REQUIRES OT FOLLOW-UP: No  Activity Tolerance  Activity Tolerance: Patient Tolerated treatment well        Plan   Occupational Therapy Plan  Times Per Week: x1 OT eval/ treat     Restrictions  Restrictions/Precautions  Restrictions/Precautions: Fall Risk, Up Ad Lupis  Required Braces or Orthoses?: No    Subjective   General  Chart Reviewed: Yes  Patient assessed for rehabilitation services?: Yes  Referring Practitioner: Bree Rashid MD  Diagnosis: GI bleed  Subjective  Subjective: pt supine in bed on approach, pleasant and agreeable to session. RN approved.  Pt denies pain.    Social/Functional History  Social/Functional History  Lives With: Spouse  Type of Home: Condo  Home Layout: One level  Home Access: Level entry  Bathroom Shower/Tub: Walk-in shower  Bathroom Toilet: Standard  Bathroom Equipment: Built-in shower seat, Hand-held shower, Grab bars around toilet  Bathroom Accessibility: Walker accessible  Home Equipment: Oxygen, Walker - Standard ([employer] 2L@ HS)  Has the patient had two or more falls in the past year or any fall with injury in the past year?: No  Receives Help From: Family  ADL Assistance: Independent  Homemaking Assistance:  (wife and cleaning service)  Homemaking Responsibilities: No  Ambulation Assistance: Independent  Transfer Assistance: Independent  Active : No  Patient's  Info: wife transports  Mode of Transportation: Car  Occupation: Retired  Type of Occupation: [employer]  Leisure & Hobbies: work on toy trains  Additional Comments: 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     Objective   Temp: 98.3 °F (36.8 °C)  Pulse: 63  Heart Rate Source: Monitor  Respirations: 18  SpO2: 95 %  O2 Device: None (Room air)  BP: (!) 136/59  MAP (Calculated): 85  BP Location: Left upper arm  BP Method: Automatic  Patient Position: Semi fowlers          Observation/Palpation  Posture: Good  Safety  Devices  Type of Devices: Patient at risk for falls;All fall risk precautions in place;Left in bed;Gait belt;Call light within reach;Nurse notified  Restraints  Restraints Initially in Place: No  Bed Mobility Training  Bed Mobility Training: Yes  Supine to Sit: Modified independent (HOB elevated)  Sit to Supine: Modified independent (HOB elevated)  Balance  Sitting: Intact  Standing: Intact  Transfer Training  Transfer Training: Yes  Overall Level of Assistance: Independent  Sit to Stand: Independent  Stand to Sit: Independent     AROM: Within functional limits  Strength: Within functional limits  Coordination: Within functional limits  Tone: Normal  Sensation: Intact  ADL  Feeding: NPO  LE Dressing: Modified independent   LE Dressing Skilled Clinical Factors: don shorts and shoes EOB  Functional Mobility: Supervision  Functional Mobility Skilled Clinical Factors: SUPV for mobility in room/ hallway for IV safety    Vision  Vision: Impaired  Vision Exceptions: Wears glasses at all times  Hearing  Hearing: Within functional limits  Cognition  Overall Cognitive Status: WFL  Orientation  Overall Orientation Status: Within Functional Limits  Orientation Level: Oriented X4    Education Given To: Patient  Education Provided: Role of Therapy;Plan of Care;Precautions;Transfer Training;Fall Prevention Strategies  Education Method: Verbal  Barriers to Learning: None  Education Outcome: Verbalized understanding;Demonstrated understanding  Disease Specific Education: Pt educated on importance of OOB mobility, prevention of complications of bedrest, and general safety during hospitalization. Pt verbalized understanding    AM-PAC - ADL  AM-PAC Daily Activity - Inpatient   How much help is needed for putting on and taking off regular lower body clothing?: None  How much help is needed for bathing (which includes washing, rinsing, drying)?: None  How much help is needed for toileting (which includes using toilet, bedpan, or

## 2024-08-01 NOTE — PROGRESS NOTES
Pt brought to PACU. Report obtained from Endo RN and anesthesia. Pt placed on monitor and 2lpm O2 via nasal cannula. Vitals taken at this time are below:  Vitals:    08/01/24 1412   BP: (!) 103/40   Pulse: 62   Resp: 14   Temp:    SpO2: 96%

## 2024-08-01 NOTE — ANESTHESIA PRE PROCEDURE
Department of Anesthesiology  Preprocedure Note       Name:  Khalif Pinedo Jr   Age:  84 y.o.  :  1940                                          MRN:  [id number]         Date:  2024      Surgeon: Surgeon(s):  Santosh Jerome DO    Procedure: Procedure(s):  ESOPHAGOGASTRODUODENOSCOPY    Medications prior to admission:   Prior to Admission medications    Medication Sig Start Date End Date Taking? Authorizing Provider   clopidogrel (PLAVIX) 75 MG tablet Take 1 tablet by mouth daily 24   Tanja Correia MD   albuterol sulfate HFA (PROVENTIL HFA) 108 (90 Base) MCG/ACT inhaler Inhale 2 puffs into the lungs every 6 hours as needed for Wheezing or Shortness of Breath  Patient not taking: Reported on 2024   Vandana Temple APRN - CNP   sucralfate (CARAFATE) 1 GM tablet Take 1 tablet by mouth every 8 hours 24  Vandana Temple APRN - CNP   amiodarone (CORDARONE) 200 MG tablet Take 2 tablets by mouth daily for 30 doses 24  Vandana Temple APRN - CNP   ipratropium 0.5 mg-albuterol 2.5 mg (DUONEB) 0.5-2.5 (3) MG/3ML SOLN nebulizer solution Inhale 3 mLs into the lungs every 4 hours as needed for Shortness of Breath  Patient not taking: Reported on 2024  Howie Joyce MD   Evolocumab (REPATHA SURECLICK) 140 MG/ML SOAJ ADMINISTER 1 ML UNDER THE SKIN 1 TIME EVERY 2 WEEKS 3/21/24   Tigre Howell DO   traZODone (DESYREL) 50 MG tablet Take 1 tablet by mouth nightly  Patient not taking: Reported on 2024   ProviderMilo MD   nitroGLYCERIN (NITROSTAT) 0.4 MG SL tablet Place 1 tablet under the tongue every 5 minutes as needed for Chest pain 23   Crissy Sprague APRN - CNP   gabapentin (NEURONTIN) 300 MG capsule Take 1 capsule by mouth daily. 23   Provider, MD Milo   rivaroxaban (XARELTO) 20 MG TABS tablet Take 1 tablet by mouth daily (with breakfast) 22  Tanja Correia MD  54 07/31/2024 08:35 AM    LABGLOM 50 04/01/2024 01:07 PM    GLUCOSE 97 07/31/2024 08:35 AM    CALCIUM 9.0 07/31/2024 08:35 AM    BILITOT 0.6 07/31/2024 08:35 AM    ALKPHOS 226 07/31/2024 08:35 AM    AST 26 07/31/2024 08:35 AM    ALT 24 07/31/2024 08:35 AM       POC Tests: No results for input(s): \"POCGLU\", \"POCNA\", \"POCK\", \"POCCL\", \"POCBUN\", \"POCHEMO\", \"POCHCT\" in the last 72 hours.    Coags:   Lab Results   Component Value Date/Time    PROTIME 14.6 08/01/2024 05:49 AM    INR 1.12 08/01/2024 05:49 AM    APTT 33.4 08/01/2024 05:49 AM       HCG (If Applicable): No results found for: \"PREGTESTUR\", \"PREGSERUM\", \"HCG\", \"HCGQUANT\"     ABGs: No results found for: \"PHART\", \"PO2ART\", \"KRY4WJH\", \"LQY5SZR\", \"BEART\", \"W3IVZTAT\"     Type & Screen (If Applicable):  No results found for: \"LABABO\"    Drug/Infectious Status (If Applicable):  No results found for: \"HIV\", \"HEPCAB\"    COVID-19 Screening (If Applicable):   Lab Results   Component Value Date/Time    COVID19 NOT DETECTED 04/01/2024 01:07 PM    COVID19 Not Detected 09/14/2022 11:44 AM           Anesthesia Evaluation    Airway: Mallampati: III  TM distance: <3 FB   Neck ROM: full  Mouth opening: < 3 FB   Dental: normal exam         Pulmonary:normal exam  breath sounds clear to auscultation  (+)  COPD:  shortness of breath:                                    Cardiovascular:    (+) hypertension:, CAD:, dysrhythmias: atrial fibrillation        Rhythm: regular  Rate: normal                    Neuro/Psych:   Negative Neuro/Psych ROS              GI/Hepatic/Renal:   (+) GERD:          Endo/Other: Negative Endo/Other ROS                    Abdominal:             Vascular: negative vascular ROS.         Other Findings:             Anesthesia Plan      MAC     ASA 3       Induction: intravenous.      Anesthetic plan and risks discussed with patient.      Plan discussed with CRNA.                    Dudley Gregory MD   8/1/2024

## 2024-08-01 NOTE — PROGRESS NOTES
EMR and notes reviewed attempted to see pt X 2 today off floor for procedure - will cont to follow

## 2024-08-01 NOTE — FLOWSHEET NOTE
07/31/24 2057   Assessment   Charting Type Shift assessment   Psychosocial   Psychosocial (WDL) WDL   Neurological   Neuro (WDL) WDL   Shira Coma Scale   Eye Opening 4   Best Verbal Response 5   Best Motor Response 6   [place] Coma Scale Score 15   HEENT (Head, Ears, Eyes, Nose, & Throat)   HEENT (WDL) X   Right Eye Glasses   Left Eye Glasses   Respiratory   Respiratory (WDL) X   Breath Sounds   Right Upper Lobe Diminished   Right Middle Lobe Diminished   Right Lower Lobe Diminished   Left Upper Lobe Diminished   Left Lower Lobe Diminished   Cardiac   Cardiac (WDL) WDL   Gastrointestinal   Abdominal (WDL) X  (GIB)   RUQ Bowel Sounds Active   LUQ Bowel Sounds Active   RLQ Bowel Sounds Active   LLQ Bowel Sounds Active   Genitourinary   Genitourinary (WDL) WDL   Peripheral Vascular   Peripheral Vascular (WDL) WDL   Skin Integumentary    Skin Integumentary (WDL) WDL   Musculoskeletal   Musculoskeletal (WDL) X   RL Extremity Weakness   LL Extremity Weakness

## 2024-08-01 NOTE — CONSULTS
Consultation Note    Patient Name: Khalif Pinedo Jr  : 1940  Age: 84 y.o.     Admitting Physician: Nii Calderon MD   Date of Admission: 2024  7:47 AM   Primary Care Physician: Scott Denise MD        Khalif Pinedo Jr is being seen at the request of Nii Calderon MD for hematemesis.    History of Present Illness:  Patient with a sudden onset of vomiting blood.  There was no retching just became.  He has had a history of having GI bleeding in the past undergoing as an extensive workup patient denies any nonsteroidal anti-inflammatory drug use or abdominal pain no heartburn he has a little bit of dysphagia.    GI History:  Procedure: Esophagogastroduodenoscopy with enteroscopy with argon plasma coagulation     Date:  10/21/2022      Surgeon:  Aakash Man MD      Referring Physician: Antwon Jerome and Scott Denise     Preoperative Diagnosis: Abnormal capsule endoscopy     Postoperative Diagnosis: Several AVMs of the jejunum cauterized  1 AVM of the duodenum cauterized  Gastric vascular ectasia cauterized   DATE OF PROCEDURE: 2022  PROCEDURE: Colonoscopy     PREOPERATIVE DIAGNOSIS: Anemia, unspecified type [D64.9]  HPI: This is a 82 y.o. year old male who presents today with Anemia.     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 year old male who presents today with anemia and possible melena.  Previous history of anemia work-up extensively many years ago.  ENDOSCOPIST: Santosh Jerome DO     POSTOPERATIVE DIAGNOSIS: Normal EGD    Past Medical History:  Past Medical History:   Diagnosis Date    Anemia 2022    CAD (coronary artery disease) 1998    Stent in LAD    CAD (coronary artery disease) 2009    Stent in  in  caliber.    Subtle injection of the fat surrounds the bladder.  Recommend correlation  with urinalysis to exclude cystitis    Bilateral pleural effusions are seen with adjacent consolidation of the lung  bases, likely atelectasis in the absence of clinical signs of pneumonia.    Trace free fluid in pelvis, either reactive or due to mild fluid overload    Liver cysts and hepatic cysts    RECOMMENDATIONS:  Abdominal aortic aneurysm measuring 3.1 cm. Recommend follow-up every 3 years.    Reference: J Am Adali Radiol 2013;10:789-794.    Left Bosniak I benign renal cyst. No follow-up imaging is recommended.    JACR 2018 Feb; 264-273, Management of the Incidental Renal Mass on CT,  RadioGraphics 2021; 814-843, Bosniak Classification of Cystic Renal Masses,  Version 2019.  XR CHEST PORTABLE  Narrative: EXAMINATION:  ONE XRAY VIEW OF THE CHEST    7/31/2024 8:23 am    COMPARISON:  04/01/2024    HISTORY:  ORDERING SYSTEM PROVIDED HISTORY: hematemesis  TECHNOLOGIST PROVIDED HISTORY:  Reason for exam:->hematemesis  Reason for Exam: Hematemesis; Abdominal Pain    FINDINGS:  There are small bilateral pleural effusions with atelectasis.  Cardiomegaly  is stable status post median sternotomy and CABG.  There is no pneumothorax.  Impression: 1. Small bilateral pleural effusions.       Labs:   Recent Labs     07/31/24  0835 07/31/24  1609 07/31/24  1901 08/01/24  0105 08/01/24  0549   WBC 4.5  --   --   --   --    HGB 9.4* 9.0* 8.7* 8.3* 8.4*     --   --   --   --    ALKPHOS 226*  --   --   --   --    ALT 24  --   --   --   --    AST 26  --   --   --   --    BILITOT 0.6  --   --   --   --    BUN 34*  --   --   --   --    CREATININE 1.3  --   --   --   --      --   --   --   --    K 4.4  --   --   --   --    INR  --   --   --   --  1.12        Assessment:    Hospital Problems             Last Modified POA    * (Principal) GI bleeding 7/31/2024 Yes       84-year-old gentleman with a past history for coronary artery

## 2024-08-01 NOTE — PROGRESS NOTES
Patient transporting to [facility] at this time. [facility] aware. Nicole Schaffer RN

## 2024-08-01 NOTE — PROGRESS NOTES
Physical Therapy  Facility/Department: [facility] ENDO  Physical Therapy Initial Assessment/Discharge     Name: Khalif Pinedo Jr  : 1940  MRN: [id number]  Date of Service: 2024    Discharge Recommendations:  Home with assist PRN   PT Equipment Recommendations  Equipment Needed: No      Patient Diagnosis(es): The primary encounter diagnosis was Generalized abdominal pain. Diagnoses of Chronic anemia, Occult blood positive stool, and Abdominal aortic aneurysm (AAA) 3.0 cm to 5.5 cm in diameter in male (HCC) were also pertinent to this visit.  Past Medical History:  has a past medical history of Anemia, CAD (coronary artery disease), CAD (coronary artery disease), COPD (chronic obstructive pulmonary disease) (HCC), COPD (chronic obstructive pulmonary disease) (HCC), GERD (gastroesophageal reflux disease), HTN (hypertension), Hyperlipidemia, IBS (irritable bowel syndrome), and Stented coronary artery.  Past Surgical History:  has a past surgical history that includes Coronary artery bypass graft (2000); Coronary angioplasty with stent (2005); Carotid endarterectomy (2014); Colonoscopy (2004); Upper gastrointestinal endoscopy (2004); Cholecystectomy; Upper gastrointestinal endoscopy (07/15/2014); Diagnostic Cardiac Cath Lab Procedure; Upper gastrointestinal endoscopy (N/A, 2022); Colonoscopy (N/A, 2022); Capsule endoscopy (2022); Upper gastrointestinal endoscopy (N/A, 10/21/2022); Cardiac catheterization (2012); Cardiac catheterization (2010); Cardiac catheterization (2000); Cardiac catheterization (1998); Cardiac catheterization (2009); and ep device procedure (N/A, 2024).    Assessment   Assessment: Pt to [facility] with diagnosis of of GI bleed. PTA pt lives with his wife and was independent with transfers and ambulation with no AD sometimes but uses RW PRN. Pt currently presents near baseline function. 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    Vision/Hearing  Vision  Vision: Impaired  Vision Exceptions: Wears glasses at all times  Hearing  Hearing: Within functional limits      Cognition   Orientation  Overall Orientation Status: Within Functional Limits  Orientation Level: Oriented X4  Cognition  Overall Cognitive Status: WFL     Objective   Temp: 98.3 °F (36.8 °C)  Pulse: 63  Heart Rate Source: Monitor  Respirations: 18  SpO2: 95 %  O2 Device: None (Room air)  BP: (!) 136/59  MAP (Calculated): 85  BP Location: Left upper arm  BP Method: Automatic  Patient Position: Semi fowlers     Observation/Palpation  Posture: Good  Gross Assessment  AROM: Within functional limits  Strength: Within functional limits     Bed Mobility Training  Bed Mobility Training: Yes  Supine to Sit: Modified independent (HOB elevated)  Sit to Supine: Modified independent (HOB elevated)  Balance  Sitting: Intact  Standing: Intact  Transfer Training  Transfer Training: Yes  Overall Level of Assistance: Independent  Sit to Stand: Independent  Stand to Sit: Independent  Gait  Gait Training: Yes  Overall Level of Assistance: Independent  Distance (ft): 150 Feet  Assistive Device: Gait belt;None  Gait Abnormalities:  (Gait near baseline per pt, grossly steady with no AD.)    OutComes Score  AM-PAC - Mobility    AM-PAC Basic Mobility - Inpatient   How much help is needed turning from your back to your side while in a flat bed without using bedrails?: None  How much help is needed moving from lying on your back to sitting on the side of a flat bed without using bedrails?: None  How much help is needed moving to and from a bed to a chair?: None  How much help is needed standing up from a chair using your arms?: None  How much help is needed walking in hospital room?: None  How much help is needed climbing

## 2024-08-02 VITALS
OXYGEN SATURATION: 94 % | HEIGHT: 72 IN | TEMPERATURE: 97.6 F | WEIGHT: 189.5 LBS | SYSTOLIC BLOOD PRESSURE: 127 MMHG | HEART RATE: 80 BPM | DIASTOLIC BLOOD PRESSURE: 70 MMHG | RESPIRATION RATE: 16 BRPM | BODY MASS INDEX: 25.67 KG/M2

## 2024-08-02 LAB
HCT VFR BLD AUTO: 23.4 % (ref 40.5–52.5)
HCT VFR BLD AUTO: 23.6 % (ref 40.5–52.5)
HCT VFR BLD AUTO: 24 % (ref 40.5–52.5)
HCT VFR BLD AUTO: 24.2 % (ref 40.5–52.5)
HGB BLD-MCNC: 7.7 G/DL (ref 13.5–17.5)

## 2024-08-02 PROCEDURE — 85018 HEMOGLOBIN: CPT

## 2024-08-02 PROCEDURE — 6370000000 HC RX 637 (ALT 250 FOR IP): Performed by: NURSE PRACTITIONER

## 2024-08-02 PROCEDURE — 36415 COLL VENOUS BLD VENIPUNCTURE: CPT

## 2024-08-02 PROCEDURE — 6360000002 HC RX W HCPCS: Performed by: INTERNAL MEDICINE

## 2024-08-02 PROCEDURE — 2580000003 HC RX 258: Performed by: INTERNAL MEDICINE

## 2024-08-02 PROCEDURE — 85014 HEMATOCRIT: CPT

## 2024-08-02 RX ORDER — PANTOPRAZOLE SODIUM 20 MG/1
20 TABLET, DELAYED RELEASE ORAL DAILY
Qty: 30 TABLET | Refills: 3 | Status: SHIPPED | OUTPATIENT
Start: 2024-08-02

## 2024-08-02 RX ORDER — TRAMADOL HYDROCHLORIDE 50 MG/1
50 TABLET ORAL EVERY 4 HOURS PRN
Status: DISCONTINUED | OUTPATIENT
Start: 2024-08-02 | End: 2024-08-02 | Stop reason: HOSPADM

## 2024-08-02 RX ORDER — TRAMADOL HYDROCHLORIDE 50 MG/1
50 TABLET ORAL EVERY 6 HOURS PRN
Status: DISCONTINUED | OUTPATIENT
Start: 2024-08-02 | End: 2024-08-02

## 2024-08-02 RX ORDER — CLOPIDOGREL BISULFATE 75 MG/1
75 TABLET ORAL DAILY
Qty: 90 TABLET | Refills: 3 | Status: SHIPPED | OUTPATIENT
Start: 2024-08-02

## 2024-08-02 RX ORDER — TRAMADOL HYDROCHLORIDE 50 MG/1
50 TABLET ORAL EVERY 4 HOURS PRN
Qty: 12 TABLET | Refills: 0 | Status: SHIPPED | OUTPATIENT
Start: 2024-08-02 | End: 2024-08-05

## 2024-08-02 RX ADMIN — SODIUM CHLORIDE, POTASSIUM CHLORIDE, SODIUM LACTATE AND CALCIUM CHLORIDE: 600; 310; 30; 20 INJECTION, SOLUTION INTRAVENOUS at 13:28

## 2024-08-02 RX ADMIN — TRAMADOL HYDROCHLORIDE 50 MG: 50 TABLET ORAL at 14:07

## 2024-08-02 RX ADMIN — GABAPENTIN 300 MG: 300 CAPSULE ORAL at 09:47

## 2024-08-02 RX ADMIN — PANTOPRAZOLE SODIUM 8 MG/HR: 40 INJECTION, POWDER, FOR SOLUTION INTRAVENOUS at 09:41

## 2024-08-02 RX ADMIN — TRAMADOL HYDROCHLORIDE 50 MG: 50 TABLET ORAL at 09:47

## 2024-08-02 ASSESSMENT — PAIN DESCRIPTION - DESCRIPTORS
DESCRIPTORS: SHARP
DESCRIPTORS: ACHING;SHARP
DESCRIPTORS: SHOOTING

## 2024-08-02 ASSESSMENT — PAIN DESCRIPTION - ORIENTATION
ORIENTATION: LOWER

## 2024-08-02 ASSESSMENT — PAIN DESCRIPTION - LOCATION
LOCATION: ABDOMEN

## 2024-08-02 ASSESSMENT — PAIN DESCRIPTION - FREQUENCY: FREQUENCY: CONTINUOUS

## 2024-08-02 ASSESSMENT — PAIN SCALES - GENERAL
PAINLEVEL_OUTOF10: 6
PAINLEVEL_OUTOF10: 8
PAINLEVEL_OUTOF10: 6

## 2024-08-02 ASSESSMENT — PAIN DESCRIPTION - PAIN TYPE: TYPE: ACUTE PAIN

## 2024-08-02 ASSESSMENT — PAIN DESCRIPTION - ONSET: ONSET: ON-GOING

## 2024-08-02 ASSESSMENT — PAIN - FUNCTIONAL ASSESSMENT
PAIN_FUNCTIONAL_ASSESSMENT: ACTIVITIES ARE NOT PREVENTED

## 2024-08-02 NOTE — PROGRESS NOTES
IV removed. Tele removed. AVS reviewed with pt and pt spouse, emphasized to pt not to take plavix until instructed by Dr Jerome, both state understanding of instructions. Script for tramadol given to pt spouse. Pt discharged home with spouse, escorted out In wc with PCA. All belongings sent with pt.

## 2024-08-02 NOTE — PROGRESS NOTES
Pt c/o sharp low abdominal pain 9/10. Declines tylenol, requesting something stronger. Perfect serve sent to Lary Skinner NP

## 2024-08-02 NOTE — PROGRESS NOTES
Pt ok to discharge from GI standpoint per Dr. Jerome. Pt wanting to go home. Perfect serve sent to Lary Skinner NP

## 2024-08-02 NOTE — PLAN OF CARE
Problem: Discharge Planning  Goal: Discharge to home or other facility with appropriate resources  Outcome: Adequate for Discharge  Flowsheets (Taken 8/2/2024 6808)  Discharge to home or other facility with appropriate resources:   Identify barriers to discharge with patient and caregiver   Arrange for needed discharge resources and transportation as appropriate     Problem: Safety - Adult  Goal: Free from fall injury  Outcome: Adequate for Discharge     Problem: Pain  Goal: Verbalizes/displays adequate comfort level or baseline comfort level  Outcome: Adequate for Discharge

## 2024-08-02 NOTE — DISCHARGE INSTR - COC
CASE MANAGEMENT/SOCIAL WORK SECTION    Inpatient Status Date: ***    Readmission Risk Assessment Score:  Readmission Risk              Risk of Unplanned Readmission:  17           Discharging to Facility/ Agency   Name:   Address:  Phone:  Fax:    Dialysis Facility (if applicable)   Name:  Address:  Dialysis Schedule:  Phone:  Fax:    / signature: {Esignature:[id number]}    PHYSICIAN SECTION    Prognosis: {Prognosis:4525393835}    Condition at Discharge: { Patient Condition:802714114}    Rehab Potential (if transferring to Rehab): {Prognosis:4678816091}    Recommended Labs or Other Treatments After Discharge: ***    Physician Certification: I certify the above information and transfer of Khalif BLACK Missyregino   is necessary for the continuing treatment of the diagnosis listed and that he requires {Admit to Appropriate Level of Care:67117} for {GREATER/LESS:709769798} 30 days.     Update Admission H&P: {CHP DME Changes in HandP:738449863}    PHYSICIAN SIGNATURE:  {Esignature:384758769}

## 2024-08-02 NOTE — DISCHARGE SUMMARY
Hospital Medicine Discharge Summary    Patient ID: Khalif Pinedo Jr      Patient's PCP: Scott Denise MD    Admit Date: 7/31/2024     Discharge Date:  8/2/24     Admitting Provider: Bree Wadsworth MD     Discharge Provider: Lary Skinner, APRN - CNP     Discharge Diagnoses:       Active Hospital Problems    Diagnosis     GI bleeding [K92.2]        The patient was seen and examined on day of discharge and this discharge summary is in conjunction with any daily progress note from day of discharge.    Hospital Course:     Chief Complaint:       Hematemesis this morning, tarry black dark schools for 2 days  History Of Present Illness:       This is a 84-year-old female who presented to the emergency room with the chief complaint of having tarry black stools ongoing for the last 2 days.  Hemoccult test was positive positive.  Patient's family called his cardiologist Dr. Correia mentioning that patient coughed up or spit up a huge chunk of blood but they were going to bring patient to the emergency room.     Patient denies any loss of consciousness no abdominal pain or cramps no coffee-ground vomiting.  She denies any chills fever nausea vomiting no diarrhea no constipation.  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  Currently on Plavix.     Patient's family initially requested transfer to [facility] because that is where patient had her procedures done due to AVM but [facility] is currently on diversion so they are agreeable to stay here for further care management     ED workup notable for:  Hemoglobin is 9.4 today    Patient was admitted and treated in the following    Melena/GI bleed: Patient has known history of multiple AVM repairs is currently on Plavix monotherapy, GI was consulted underwent EGD no interventions patient's H&H was trended and has trended down to 7.7 but has been stable with stable heart rate and blood pressure was treated with IV  needed for Pain for up to 3 days. Max Daily Amount: 300 mg  Qty: 12 tablet, Refills: 0    Comments: Reduce doses taken as pain becomes manageable  Associated Diagnoses: Lower abdominal pain                Details   pantoprazole (PROTONIX) 20 MG tablet Take 1 tablet by mouth daily  Qty: 30 tablet, Refills: 3      clopidogrel (PLAVIX) 75 MG tablet Take 1 tablet by mouth daily  Qty: 90 tablet, Refills: 3    Comments: Hold until discussed with GI after camera scope  Associated Diagnoses: Coronary artery disease involving native coronary artery of native heart without angina pectoris                Details   Evolocumab (REPATHA SURECLICK) 140 MG/ML SOAJ ADMINISTER 1 ML UNDER THE SKIN 1 TIME EVERY 2 WEEKS  Qty: 6 mL, Refills: 3    Associated Diagnoses: Other hyperlipidemia      nitroGLYCERIN (NITROSTAT) 0.4 MG SL tablet Place 1 tablet under the tongue every 5 minutes as needed for Chest pain  Qty: 25 tablet, Refills: 3      gabapentin (NEURONTIN) 300 MG capsule Take 1 capsule by mouth daily.      Fluticasone-Umeclidin-Vilant (TRELEGY ELLIPTA IN) Inhale into the lungs              Time Spent on discharge: 45 in the examination, evaluation, counseling and review of medications and discharge plan.      Signed:    Lary Skinner, LASHONDA - CNP   8/2/2024      Thank you Scott Denise MD for the opportunity to be involved in this patient's care. If you have any questions or concerns, please feel free to contact me at [phone].

## 2024-08-02 NOTE — PROGRESS NOTES
Progress Note    Patient Khalif Pinedo Jr  MRN: [id number]  YOB: 1940 Age: 84 y.o. Sex: male  Room: [address] [place] [address]       Admitting Physician: Bree Wadsworth MD   Date of Admission: 7/31/2024  7:47 AM   Primary Care Physician: Scott Denise MD     Subjective:  Khalif Pinedo Jr was seen and examined. We are following for hematemesis. .  -- Patient along her having any vomiting.  Hemoglobin holding stable in the upper sevens.    ROS:  Constitutional: Denies fever, no change in appetite  Respiratory: Denies cough or shortness of breath  Cardiovascular: Denies chest pain or edema    Objective:  Vital Signs:   Vitals:    08/02/24 1534   BP: 127/70   Pulse: 80   Resp: 16   Temp: 97.6 °F (36.4 °C)   SpO2: 94%         Physical Exam:  Constitutional: Alert and oriented x 4. No acute distress.   HEENT: Sclera anicteric, mucosal membranes moist  Cardiovascular: Regular rate and rhythm.  No murmurs.  Respiratory: Respirations nonlabored, no crepitus  GI: Abdomen nondistended, soft, and nontender.  Normal active bowel sounds.  No masses palpable.   Rectal: Deferred  Musculoskeletal:  No pitting edema of the lower legs.  Neurological: Gross memory appears intact.       Intake/Output:    Intake/Output Summary (Last 24 hours) at 8/2/2024 1848  Last data filed at 8/2/2024 1717  Gross per 24 hour   Intake 1355 ml   Output 575 ml   Net 780 ml        Current Medications:  Current Facility-Administered Medications   Medication Dose Route Frequency Provider Last Rate Last Admin    fluticasone-umeclidin-vilant (TRELEGY ELLIPTA) 100-62.5-25 MCG/ACT inhaler 1 puff (Patient Supplied)  1 puff Inhalation Daily RT Nii Calderon MD        traMADol (ULTRAM) tablet 50 mg  50 mg Oral Q4H PRN Lary Skinner APRN - CNP   50 mg at 08/02/24 1407    benzocaine-menthol (CEPACOL SORE THROAT) lozenge 1 lozenge  1 lozenge Oral Q2H PRN Carola Ogden APRN - CNP   1 lozenge at 08/01/24 2019    lactated ringers IV soln  infusion   IntraVENous Continuous Lary Skinner APRN - CNP   Stopped at 08/02/24 1801    sodium chloride flush 0.9 % injection 5-40 mL  5-40 mL IntraVENous 2 times per day Bree Rashid MD   10 mL at 08/01/24 2019    sodium chloride flush 0.9 % injection 5-40 mL  5-40 mL IntraVENous PRN Bree Rashid MD   10 mL at 08/01/24 0437    0.9 % sodium chloride infusion   IntraVENous PRN Bree Rashid MD   Stopped at 08/02/24 1801    ondansetron (ZOFRAN-ODT) disintegrating tablet 4 mg  4 mg Oral Q8H PRN Bree Rashid MD   4 mg at 07/31/24 1419    Or    ondansetron (ZOFRAN) injection 4 mg  4 mg IntraVENous Q6H PRN Bree Rashid MD   4 mg at 08/01/24 1112    acetaminophen (TYLENOL) tablet 650 mg  650 mg Oral Q6H PRN Bree Rashid MD        Or    acetaminophen (TYLENOL) suppository 650 mg  650 mg Rectal Q6H PRN Bree Rashid MD        albuterol sulfate HFA (PROVENTIL;VENTOLIN;PROAIR) 108 (90 Base) MCG/ACT inhaler 2 puff  2 puff Inhalation Q6H PRN Bree Rashid MD        pantoprazole (PROTONIX) 80 mg in sodium chloride 0.9 % 100 mL infusion  8 mg/hr IntraVENous Continuous Bree Rashid MD   Stopped at 08/02/24 1802    gabapentin (NEURONTIN) capsule 300 mg  300 mg Oral BID Carola Ogden APRN - CNP   300 mg at 08/02/24 0947     Current Outpatient Medications   Medication Sig Dispense Refill    pantoprazole (PROTONIX) 20 MG tablet Take 1 tablet by mouth daily 30 tablet 3    traMADol (ULTRAM) 50 MG tablet Take 1 tablet by mouth every 4 hours as needed for Pain for up to 3 days. Max Daily Amount: 300 mg 12 tablet 0    clopidogrel (PLAVIX) 75 MG tablet Take 1 tablet by mouth daily 90 tablet 3    Evolocumab (REPATHA SURECLICK) 140 MG/ML SOAJ ADMINISTER 1 ML UNDER THE SKIN 1 TIME EVERY 2 WEEKS 6 mL 3    nitroGLYCERIN (NITROSTAT) 0.4 MG SL tablet Place 1 tablet under the tongue every 5 minutes as needed for Chest pain 25 tablet 3    gabapentin (NEURONTIN) 300 MG capsule

## 2024-08-06 ENCOUNTER — HOSPITAL ENCOUNTER (OUTPATIENT)
Age: 84
Setting detail: OUTPATIENT SURGERY
Discharge: HOME | End: 2024-08-06
Attending: INTERNAL MEDICINE | Admitting: INTERNAL MEDICINE
Payer: COMMERCIAL

## 2024-08-06 VITALS
RESPIRATION RATE: 18 BRPM | OXYGEN SATURATION: 96 % | SYSTOLIC BLOOD PRESSURE: 120 MMHG | HEIGHT: 72 IN | TEMPERATURE: 99.1 F | DIASTOLIC BLOOD PRESSURE: 55 MMHG | WEIGHT: 192 LBS | HEART RATE: 73 BPM | BODY MASS INDEX: 26.01 KG/M2

## 2024-08-06 PROCEDURE — 2720000010 HC SURG SUPPLY STERILE: Performed by: INTERNAL MEDICINE

## 2024-08-06 PROCEDURE — 3609019000 HC CAPSULE ENDOSCOPY: Performed by: INTERNAL MEDICINE

## 2024-08-06 ASSESSMENT — PAIN - FUNCTIONAL ASSESSMENT: PAIN_FUNCTIONAL_ASSESSMENT: NONE - DENIES PAIN

## 2024-08-06 NOTE — DISCHARGE INSTRUCTIONS
AFTER SWALLOWING THE PILLCAM SB CAPSULE    Contact your doctor's office immediately if you suffer from any abdominal pain, nausea or vomiting during or after the procedure    You may drink colorless liquids starting 2 hours after swallowing the SB capsule  You may have a light snack 4 hours after swallowing the SB capsule.  You may return to a normal diet after exam is complete.  Be sure your sensor belt is tight at the waist.  Do not attach it to anything  Avoid strong electromagnetic fields such as MRI devices or ham radios after swallowing the capsule and until you pass it in a bowel movement.  Do Not disconnect the equipment or completely remove the PILLCAM recorder at anytime during the procedure.  Treat the PILLCAM recorder carefully.  Avoid sudden movements and banging of the recorder    RETURN TO THE ENDOSCOPY DEPARTMENT AT **** TO HAVE EQUIPMENT REMOVED    IF YOU ARE NOT SURE THAT THE CAPSULE HAS PASSED OUT OF YOUR BODY AND YOU DEVELOP UNEXPLAINED NAUSEA, ABDOMINAL PAIN, OR VOMITING, CONTACT YOUR DOCTOR FOR EVALUATION    UNDERGOING AN MRI WHILE THE PILLCAM CAPSULE IS INSIDE YOUR BODY MAY RESULT IN DAMAGE TO YOUR INTESTINAL TRACT OR ABDOMINAL CAVITY. IF YOU ARE NOT CERTAIN THE CAPSULE IS OUT OF YOUR BODY, CONTACT YOUR PHYSICIAN FOR EVALUATION AND POSSIBLE ABDOMINAL X-RAY BEFORE UNDERGOING AN MRI EXAMINATION    FOLLOW UP WITH YOUR PHYSICIAN FOR RESULTS OF THE PROCEDURE

## 2024-08-06 NOTE — H&P
COLONOSCOPY N/A 2022    COLONOSCOPY POLYPECTOMY SNARE/HOT BIOPSY performed by Santosh Jerome DO at [facility] ENDOSCOPY    CORONARY ANGIOPLASTY WITH STENT PLACEMENT  2005    CORONARY ARTERY BYPASS GRAFT  2000    at [facility] to LAD, SVG-D1 and and svg - OM (not )    DIAGNOSTIC CARDIAC CATH LAB PROCEDURE      EP DEVICE PROCEDURE N/A 2024    Ablation A-fib w complete ep study performed by JAMISON Pollard Jr., MD at [facility] CARDIAC CATH LAB    UPPER GASTROINTESTINAL ENDOSCOPY  2004    UPPER GASTROINTESTINAL ENDOSCOPY  07/15/2014    gastritis    UPPER GASTROINTESTINAL ENDOSCOPY N/A 2022    EGD DIAGNOSTIC ONLY performed by Santosh Jerome DO at [facility] ENDOSCOPY    UPPER GASTROINTESTINAL ENDOSCOPY N/A 10/21/2022    ENTEROSCOPY WITH INTERVENTION performed by Aakash Man MD at [facility] ENDOSCOPY    UPPER GASTROINTESTINAL ENDOSCOPY N/A 2024    ESOPHAGOGASTRODUODENOSCOPY performed by Santosh Jerome DO at [facility] ENDOSCOPY       Social History:   Social History     Socioeconomic History    Marital status:      Spouse name: Not on file    Number of children: Not on file    Years of education: Not on file    Highest education level: Not on file   Occupational History    Not on file   Tobacco Use    Smoking status: Former     Current packs/day: 0.00     Average packs/day: 0.5 packs/day for 30.0 years (15.0 ttl pk-yrs)     Types: Cigarettes     Start date: 3/16/1966     Quit date: 3/16/1996     Years since quittin.4    Smokeless tobacco: Never   Vaping Use    Vaping Use: Never used   Substance and Sexual Activity    Alcohol use: Not Currently     Comment: glass of wine weekly    Drug use: No    Sexual activity: Yes     Partners: Female   Other Topics Concern    Not on file   Social History Narrative    Not on file     Social Determinants of Health     Financial Resource Strain: Not on file   Food Insecurity: No Food Insecurity (2024)    Hunger Vital Sign

## 2024-08-08 NOTE — ANESTHESIA POSTPROCEDURE EVALUATION
Department of Anesthesiology  Postprocedure Note    Patient: Khalif Pinedo Jr  MRN: [id number]  YOB: 1940  Date of evaluation: 8/8/2024    Procedure Summary       Date: 08/01/24 Room / Location: [place] [address] / [facility]    Anesthesia Start: 1349 Anesthesia Stop: 1411    Procedure: ESOPHAGOGASTRODUODENOSCOPY Diagnosis:       Hematemesis, unspecified whether nausea present      (Hematemesis, unspecified whether nausea present [K92.0])    Surgeons: Santosh Jerome DO Responsible Provider: Jack Guadarrama MD    Anesthesia Type: MAC ASA Status: 3            Anesthesia Type: No value filed.    India Phase I: India Score: 10    India Phase II: India Score: 5    Anesthesia Post Evaluation    Patient location during evaluation: PACU  Patient participation: complete - patient participated  Level of consciousness: awake and alert  Airway patency: patent  Nausea & Vomiting: no vomiting and no nausea  Cardiovascular status: hemodynamically stable and blood pressure returned to baseline  Respiratory status: acceptable  Hydration status: euvolemic  Comments: ---------------------------               08/02/24                      1534         ---------------------------   BP:          127/70         Pulse:         80           Resp:          16           Temp:   97.6 °F (36.4 °C)   SpO2:          94%         ---------------------------    Pain management: adequate    No notable events documented.

## 2024-08-12 ENCOUNTER — HOSPITAL ENCOUNTER (OUTPATIENT)
Dept: GENERAL RADIOLOGY | Age: 84
Discharge: HOME | End: 2024-08-12
Attending: INTERNAL MEDICINE
Payer: COMMERCIAL

## 2024-08-12 ENCOUNTER — HOSPITAL ENCOUNTER (OUTPATIENT)
Age: 84
Discharge: HOME | End: 2024-08-12
Payer: COMMERCIAL

## 2024-08-12 DIAGNOSIS — K92.0 GASTROINTESTINAL HEMORRHAGE WITH HEMATEMESIS: Primary | ICD-10-CM

## 2024-08-12 DIAGNOSIS — K92.0 GASTROINTESTINAL HEMORRHAGE WITH HEMATEMESIS: ICD-10-CM

## 2024-08-12 PROCEDURE — 74018 RADEX ABDOMEN 1 VIEW: CPT

## 2024-08-15 NOTE — PROGRESS NOTES
Khalif Pinedo Jr    Age 84 y.o.    male    1940    [facility] [id number]    8/23/2024  Arrival Time_____________  OR Time____________30 Min     Procedure(s):  ESOPHAGOGASTRODUODENOSCOPY                      Monitor Anesthesia Care    Surgeon(s):  Santosh Jerome, DO       Phone [phone] (home) [phone] (work)    [facility]  Date  Info Source  Home  Cell         Work  _____________________________________________________________________  _____________________________________________________________________  _____________________________________________________________________  _____________________________________________________________________  _____________________________________________________________________    PCP _____________________________ Phone_________________     H&P  ________________  Bringing      Chart              Epic      DOS      Called________  EKG ________________   Bringing      Chart              Epic      DOS      Called________  LABS________________   Bringing     Chart              Epic      DOS      Called________  Cardiac Clearance ______ Bringing      Chart              Epic      DOS      Called________  Pulmonary Clearance____ Bringing      Chart              Epic      DOS      Called________    Cardiologist________________________ Phone___________________________  Pulmonologist_______________________Phone___________________________    ? Advance Directives   ? Jewish concerns / Waiver on Chart            PAT Communications________________  ? Pre-op Instructions Given /Understood          _________________________________  ? Directions to Surgery Center                          _________________________________  ? Transportation Home_______________      __________________________________  ? Crutches/Walker__________________        __________________________________    Orders: Hard copy/ EPIC                 Transcribed/ EPIC              _______Wt.    ________Pharmacy

## 2024-08-21 RX ORDER — CLOPIDOGREL BISULFATE 75 MG/1
75 TABLET ORAL DAILY
COMMUNITY

## 2024-08-21 NOTE — TELEPHONE ENCOUNTER
Reviewed event monitor data. Known AFlutter from office visit yesterday. Let's start him on diltiazem 120 mg once daily for BP and HR.    Thank you, Emily Do 0

## 2024-08-21 NOTE — PROGRESS NOTES
Date and time of surgery : 8/23/24 at 1415             Arrival Time:  1315     Bring Picture ID and insurance card.  Please wear simple, loose fitting clothing to the hospital.   Do not bring valuables (money, credit cards, checkbooks, etc.)   DO NOT wear any jewelry or piercings on day of surgery.  All body piercing jewelry must be removed.  If you have dentures, they will be removed before going to the OR; we will provide you a container.  If you wear contact lenses or glasses, they will be removed; please bring a case for them.  Shower the evening before or morning of surgery with antibacterial soap.  Nothing to eat or drink after midnight the day before surgery.   You may brush your teeth and gargle the morning of surgery.  DO NOT SWALLOW WATER.   The morning of your procedure take only Protonix with a sip of water and use Trelegy inhaler prior to coming.  Aspirin, Ibuprofen, Advil, Naproxen, Vitamin E and other Anti-inflammatory products and supplements should be stopped for 5 -7days before surgery or as directed by your physician.  Has been holding Plavix  Do not smoke or drink any alcoholic beverages 24 hours prior to surgery.  This includes NA Beer. Refrain from the usage of any recreational drugs, including non-prescribed prescription drugs.   You MUST plan for a responsible adult to stay on site while you are here and take you home after your surgery. You will not be allowed to leave alone or drive yourself home. It is strongly suggested someone stay with you the first 24 hrs. Your surgery will be cancelled if you do not have a ride home.  To help prevent infection, change your sheets the night before surgery.   If you  have a Living Will and Durable Power of  for Healthcare, please bring in a copy.  Notify your Surgeon if you develop any illness between now and time of surgery. Cough, cold, fever, sore throat, nausea, vomiting, etc.  Please notify your surgeon if you experience dizziness,  shortness of breath or blurred vision between now & the time of your surgery  To provide excellent care visitors will be limited to two per room at any given time. No visitors under the age of 14.  If you use oxygen and have a portable tank please bring it with you the DOS  For your convenience [facility] has a pharmacy on site to fill your prescriptions.     *Please call pre admission testing if you have any further questions             Hai      [phone]                            Address: [address] [place] [address]     When you pull into the hospital and are looking at the main hospital entrance, turn right.   We are a tan building to the right of the main entrance.   [facility] over the door.  .                                                        Revision History

## 2024-08-22 ENCOUNTER — OFFICE VISIT (OUTPATIENT)
Dept: CARDIOLOGY CLINIC | Age: 84
End: 2024-08-22
Payer: COMMERCIAL

## 2024-08-22 VITALS
HEIGHT: 72 IN | WEIGHT: 182 LBS | OXYGEN SATURATION: 99 % | DIASTOLIC BLOOD PRESSURE: 60 MMHG | BODY MASS INDEX: 24.65 KG/M2 | SYSTOLIC BLOOD PRESSURE: 118 MMHG | HEART RATE: 74 BPM

## 2024-08-22 DIAGNOSIS — I48.0 PAROXYSMAL ATRIAL FIBRILLATION (HCC): Primary | ICD-10-CM

## 2024-08-22 DIAGNOSIS — Z09 HOSPITAL DISCHARGE FOLLOW-UP: ICD-10-CM

## 2024-08-22 PROCEDURE — 1123F ACP DISCUSS/DSCN MKR DOCD: CPT | Performed by: INTERNAL MEDICINE

## 2024-08-22 PROCEDURE — 93000 ELECTROCARDIOGRAM COMPLETE: CPT | Performed by: INTERNAL MEDICINE

## 2024-08-22 PROCEDURE — 99214 OFFICE O/P EST MOD 30 MIN: CPT | Performed by: INTERNAL MEDICINE

## 2024-08-22 PROCEDURE — 3078F DIAST BP <80 MM HG: CPT | Performed by: INTERNAL MEDICINE

## 2024-08-22 PROCEDURE — 3074F SYST BP LT 130 MM HG: CPT | Performed by: INTERNAL MEDICINE

## 2024-08-22 PROCEDURE — 1111F DSCHRG MED/CURRENT MED MERGE: CPT | Performed by: INTERNAL MEDICINE

## 2024-08-22 RX ORDER — AMIODARONE HYDROCHLORIDE 100 MG/1
100 TABLET ORAL DAILY
Qty: 30 TABLET | Refills: 3 | Status: SHIPPED | OUTPATIENT
Start: 2024-08-22

## 2024-08-22 NOTE — PROGRESS NOTES
ectopic atrial rhythm.  We discussed anticoagulation (NOAC and warfarin), rate control, rhythm control, AVN + pacemaker, and atrial fibrillation ablation.  We reviewed risks and benefits of each approach.  We discussed side effects of class IC, class II, class III, and class IV antiarrhythmics.  All questions were answered.  We discussed Watchman and OAC for an extended period.  He would like to try rivaroxaban.  We discussed monitoring systems.  - Stop apixaban.  - Start rivaroxaban.  - Continue digoxin.  - Likely will need marvin agent but will hold off for now given symptoms.  - Follow up with me in 2 months to review and answer questions.    02/13/2024  Patient presents for follow up.  He recently underwent PCI for chest pain and shortness of breath with Dr. Correia.  He now is having persistent symptomatic atrial fibrillation with tachycardia.  He is intolerant of OAC given weight loss and marked anemia seen prior to planned ablation on 09/20/2022.  He was evaluated by Watchman team initially however didn't proceed with procedure at that time.  Given his symptoms patient would like to move forward with restoration of normal sinus rhythm however this is not possible without stroke prophylaxis.  I therefore believe that patient would benefit from moving forward with Watchman which he is agreeable to.  There has been no significant change in his medical status that I believe would preclude him from moving forward as previous discussed.  His mortality, as far as I can tell is greater than 1 year.  He remains in persistent atrial fibrillation with controlled rates.  - Continue DAPT per IC.  - We will arrange for Watchman implantation.  - Follow up with me in 3 months.    8/22/2024  Patient is for follow-up.  He underwent atrial fibrillation ablation on 05/13/2024.  Postoperative course complicated by hematoma and bleeding.  He was discharged on amiodarone.  He presents now for follow-up.  He remains in normal sinus

## 2024-08-22 NOTE — PATIENT INSTRUCTIONS
RECOMMENDATIONS:  Will reach out to Dr Mclain and Dr Correia regarding plavix/continuing bleeding.  Restart amiodarone 100 mg daily.   Follow up in 6 months with EP NP.

## 2024-08-23 ENCOUNTER — ANESTHESIA EVENT (OUTPATIENT)
Dept: ENDOSCOPY | Age: 84
End: 2024-08-23
Payer: COMMERCIAL

## 2024-08-23 ENCOUNTER — ANESTHESIA (OUTPATIENT)
Dept: ENDOSCOPY | Age: 84
End: 2024-08-23
Payer: COMMERCIAL

## 2024-08-23 ENCOUNTER — HOSPITAL ENCOUNTER (OUTPATIENT)
Age: 84
Setting detail: OUTPATIENT SURGERY
Discharge: HOME | End: 2024-08-23
Attending: INTERNAL MEDICINE | Admitting: INTERNAL MEDICINE
Payer: COMMERCIAL

## 2024-08-23 VITALS
WEIGHT: 194 LBS | OXYGEN SATURATION: 98 % | TEMPERATURE: 100 F | BODY MASS INDEX: 26.28 KG/M2 | RESPIRATION RATE: 18 BRPM | SYSTOLIC BLOOD PRESSURE: 116 MMHG | HEART RATE: 66 BPM | HEIGHT: 72 IN | DIASTOLIC BLOOD PRESSURE: 57 MMHG

## 2024-08-23 LAB
DEPRECATED RDW RBC AUTO: 21.9 % (ref 12.4–15.4)
HCT VFR BLD AUTO: 23.6 % (ref 40.5–52.5)
HGB BLD-MCNC: 7.5 G/DL (ref 13.5–17.5)
MCH RBC QN AUTO: 29.9 PG (ref 26–34)
MCHC RBC AUTO-ENTMCNC: 31.7 G/DL (ref 31–36)
MCV RBC AUTO: 94.2 FL (ref 80–100)
PLATELET # BLD AUTO: 318 K/UL (ref 135–450)
PMV BLD AUTO: 6.6 FL (ref 5–10.5)
RBC # BLD AUTO: 2.51 M/UL (ref 4.2–5.9)
WBC # BLD AUTO: 5.1 K/UL (ref 4–11)

## 2024-08-23 PROCEDURE — 7100000011 HC PHASE II RECOVERY - ADDTL 15 MIN: Performed by: INTERNAL MEDICINE

## 2024-08-23 PROCEDURE — 3700000000 HC ANESTHESIA ATTENDED CARE: Performed by: INTERNAL MEDICINE

## 2024-08-23 PROCEDURE — 7100000010 HC PHASE II RECOVERY - FIRST 15 MIN: Performed by: INTERNAL MEDICINE

## 2024-08-23 PROCEDURE — C1889 IMPLANT/INSERT DEVICE, NOC: HCPCS | Performed by: INTERNAL MEDICINE

## 2024-08-23 PROCEDURE — 2500000003 HC RX 250 WO HCPCS: Performed by: NURSE ANESTHETIST, CERTIFIED REGISTERED

## 2024-08-23 PROCEDURE — 3609014100 HC ENTEROSCOPY > 2ND PRTN W/CONTROL BLEEDING: Performed by: INTERNAL MEDICINE

## 2024-08-23 PROCEDURE — 2580000003 HC RX 258: Performed by: NURSE ANESTHETIST, CERTIFIED REGISTERED

## 2024-08-23 PROCEDURE — 2709999900 HC NON-CHARGEABLE SUPPLY: Performed by: INTERNAL MEDICINE

## 2024-08-23 PROCEDURE — 6360000002 HC RX W HCPCS: Performed by: NURSE ANESTHETIST, CERTIFIED REGISTERED

## 2024-08-23 PROCEDURE — 2720000010 HC SURG SUPPLY STERILE: Performed by: INTERNAL MEDICINE

## 2024-08-23 PROCEDURE — 2580000003 HC RX 258: Performed by: ANESTHESIOLOGY

## 2024-08-23 PROCEDURE — 3700000001 HC ADD 15 MINUTES (ANESTHESIA): Performed by: INTERNAL MEDICINE

## 2024-08-23 PROCEDURE — 36415 COLL VENOUS BLD VENIPUNCTURE: CPT

## 2024-08-23 PROCEDURE — 85027 COMPLETE CBC AUTOMATED: CPT

## 2024-08-23 DEVICE — WORKING LENGTH 235CM, WORKING CHANNEL 2.8MM
Type: IMPLANTABLE DEVICE | Site: DUODENUM | Status: FUNCTIONAL
Brand: RESOLUTION 360 CLIP

## 2024-08-23 RX ORDER — SODIUM CHLORIDE 0.9 % (FLUSH) 0.9 %
5-40 SYRINGE (ML) INJECTION EVERY 12 HOURS SCHEDULED
Status: DISCONTINUED | OUTPATIENT
Start: 2024-08-23 | End: 2024-08-23 | Stop reason: HOSPADM

## 2024-08-23 RX ORDER — SODIUM CHLORIDE, SODIUM LACTATE, POTASSIUM CHLORIDE, CALCIUM CHLORIDE 600; 310; 30; 20 MG/100ML; MG/100ML; MG/100ML; MG/100ML
INJECTION, SOLUTION INTRAVENOUS CONTINUOUS PRN
Status: DISCONTINUED | OUTPATIENT
Start: 2024-08-23 | End: 2024-08-23 | Stop reason: SDUPTHER

## 2024-08-23 RX ORDER — SODIUM CHLORIDE 0.9 % (FLUSH) 0.9 %
5-40 SYRINGE (ML) INJECTION PRN
Status: DISCONTINUED | OUTPATIENT
Start: 2024-08-23 | End: 2024-08-23 | Stop reason: HOSPADM

## 2024-08-23 RX ORDER — PHENYLEPHRINE HCL IN 0.9% NACL 1 MG/10 ML
SYRINGE (ML) INTRAVENOUS PRN
Status: DISCONTINUED | OUTPATIENT
Start: 2024-08-23 | End: 2024-08-23 | Stop reason: SDUPTHER

## 2024-08-23 RX ORDER — SODIUM CHLORIDE 9 MG/ML
INJECTION, SOLUTION INTRAVENOUS PRN
Status: DISCONTINUED | OUTPATIENT
Start: 2024-08-23 | End: 2024-08-23 | Stop reason: HOSPADM

## 2024-08-23 RX ORDER — PROPOFOL 10 MG/ML
INJECTION, EMULSION INTRAVENOUS PRN
Status: DISCONTINUED | OUTPATIENT
Start: 2024-08-23 | End: 2024-08-23 | Stop reason: SDUPTHER

## 2024-08-23 RX ORDER — LIDOCAINE HYDROCHLORIDE 10 MG/ML
1 INJECTION, SOLUTION EPIDURAL; INFILTRATION; INTRACAUDAL; PERINEURAL
Status: DISCONTINUED | OUTPATIENT
Start: 2024-08-23 | End: 2024-08-23 | Stop reason: HOSPADM

## 2024-08-23 RX ORDER — SODIUM CHLORIDE, SODIUM LACTATE, POTASSIUM CHLORIDE, CALCIUM CHLORIDE 600; 310; 30; 20 MG/100ML; MG/100ML; MG/100ML; MG/100ML
INJECTION, SOLUTION INTRAVENOUS CONTINUOUS
Status: DISCONTINUED | OUTPATIENT
Start: 2024-08-23 | End: 2024-08-23 | Stop reason: HOSPADM

## 2024-08-23 RX ORDER — LIDOCAINE HYDROCHLORIDE 20 MG/ML
INJECTION, SOLUTION EPIDURAL; INFILTRATION; INTRACAUDAL; PERINEURAL PRN
Status: DISCONTINUED | OUTPATIENT
Start: 2024-08-23 | End: 2024-08-23 | Stop reason: SDUPTHER

## 2024-08-23 RX ADMIN — Medication 50 MCG: at 14:55

## 2024-08-23 RX ADMIN — PROPOFOL 25 MG: 10 INJECTION, EMULSION INTRAVENOUS at 14:45

## 2024-08-23 RX ADMIN — PROPOFOL 25 MG: 10 INJECTION, EMULSION INTRAVENOUS at 14:49

## 2024-08-23 RX ADMIN — Medication 50 MCG: at 15:07

## 2024-08-23 RX ADMIN — Medication 50 MCG: at 15:14

## 2024-08-23 RX ADMIN — Medication 50 MCG: at 15:00

## 2024-08-23 RX ADMIN — SODIUM CHLORIDE, POTASSIUM CHLORIDE, SODIUM LACTATE AND CALCIUM CHLORIDE: 600; 310; 30; 20 INJECTION, SOLUTION INTRAVENOUS at 14:30

## 2024-08-23 RX ADMIN — PROPOFOL 25 MG: 10 INJECTION, EMULSION INTRAVENOUS at 14:52

## 2024-08-23 RX ADMIN — PROPOFOL 25 MG: 10 INJECTION, EMULSION INTRAVENOUS at 14:47

## 2024-08-23 RX ADMIN — PROPOFOL 25 MG: 10 INJECTION, EMULSION INTRAVENOUS at 14:57

## 2024-08-23 RX ADMIN — PROPOFOL 25 MG: 10 INJECTION, EMULSION INTRAVENOUS at 14:50

## 2024-08-23 RX ADMIN — LIDOCAINE HYDROCHLORIDE 50 MG: 20 INJECTION, SOLUTION EPIDURAL; INFILTRATION; INTRACAUDAL; PERINEURAL at 14:43

## 2024-08-23 RX ADMIN — Medication 100 MCG: at 15:01

## 2024-08-23 RX ADMIN — PROPOFOL 25 MG: 10 INJECTION, EMULSION INTRAVENOUS at 14:59

## 2024-08-23 RX ADMIN — PROPOFOL 50 MG: 10 INJECTION, EMULSION INTRAVENOUS at 14:44

## 2024-08-23 RX ADMIN — PROPOFOL 25 MG: 10 INJECTION, EMULSION INTRAVENOUS at 15:11

## 2024-08-23 RX ADMIN — PROPOFOL 25 MG: 10 INJECTION, EMULSION INTRAVENOUS at 15:13

## 2024-08-23 RX ADMIN — SODIUM CHLORIDE, SODIUM LACTATE, POTASSIUM CHLORIDE, AND CALCIUM CHLORIDE: .6; .31; .03; .02 INJECTION, SOLUTION INTRAVENOUS at 14:39

## 2024-08-23 RX ADMIN — PROPOFOL 25 MG: 10 INJECTION, EMULSION INTRAVENOUS at 15:08

## 2024-08-23 RX ADMIN — PROPOFOL 25 MG: 10 INJECTION, EMULSION INTRAVENOUS at 15:03

## 2024-08-23 ASSESSMENT — PAIN SCALES - GENERAL
PAINLEVEL_OUTOF10: 0
PAINLEVEL_OUTOF10: 0

## 2024-08-23 ASSESSMENT — PAIN - FUNCTIONAL ASSESSMENT
PAIN_FUNCTIONAL_ASSESSMENT: 0-10
PAIN_FUNCTIONAL_ASSESSMENT: NONE - DENIES PAIN

## 2024-08-23 ASSESSMENT — ENCOUNTER SYMPTOMS: SHORTNESS OF BREATH: 1

## 2024-08-23 NOTE — ANESTHESIA POSTPROCEDURE EVALUATION
Department of Anesthesiology  Postprocedure Note    Patient: Khalif Pinedo Jr  MRN: [id number]  YOB: 1940  Date of evaluation: 8/23/2024    Procedure Summary       Date: 08/23/24 Room / Location: [place] [address] / [facility]    Anesthesia Start: 1439 Anesthesia Stop: 1517    Procedure: ENTEROSCOPY PUSH CONTROL HEMORRHAGE Diagnosis:       Gastrointestinal hemorrhage, unspecified gastrointestinal hemorrhage type      (Gastrointestinal hemorrhage, unspecified gastrointestinal hemorrhage type [K92.2])    Surgeons: Santosh Jerome DO Responsible Provider: Richardson Gerard MD    Anesthesia Type: general ASA Status: 3            Anesthesia Type: No value filed.    India Phase I: India Score: 10    India Phase II: India Score: 10    Anesthesia Post Evaluation    Patient location during evaluation: PACU  Level of consciousness: awake  Airway patency: patent  Nausea & Vomiting: no vomiting  Cardiovascular status: blood pressure returned to baseline  Respiratory status: acceptable  Hydration status: stable  Pain management: adequate    No notable events documented.

## 2024-08-23 NOTE — H&P
[facility] ENDO  Outpatient Procedure H&P    Patient: Khalif Pinedo Jr MRN: [id number]     YOB: 1940  Age: 84 y.o.  Sex: male    Unit: [facility] ENDO Room/Bed: Room/bed info not found Location: [facility]     Procedure: Procedure(s):  PUSH ENTEROSCOPY    Indication:gi bleed  Referring  Physician:  Santosh Jerome     Brief history: + capsule    Nurses past medical history notes reviewed and agreed.   Medications reviewed.    Allergies: Crestor [rosuvastatin calcium], Statins, Amlodipine, Budesonide-formoterol fumarate, Codeine, Dilaudid [hydromorphone hcl], Labetalol, Nitrofurantoin macrocrystal, Other, Propoxyphene, Xarelto [rivaroxaban], Zetia [ezetimibe], Apixaban, Benadryl [diphenhydramine], Cefuroxime, Digoxin, Hydromorphone hcl, Iodoform, and Levofloxacin     Allergies noted: Yes     Past Medical History:   Past Medical History:   Diagnosis Date    Anemia 09/19/2022    CAD (coronary artery disease) 05/17/1998    Stent in LAD    CAD (coronary artery disease) 09/17/2009    Stent in RCA    COPD (chronic obstructive pulmonary disease) (HCC) 02/17/2016    GERD (gastroesophageal reflux disease)     on Nexium    HTN (hypertension) 06/14/2016    Hyperlipidemia     IBS (irritable bowel syndrome)     On home oxygen therapy     O2 2L at HS    Stented coronary artery        Past Surgical History:   Past Surgical History:   Procedure Laterality Date    ABLATION OF DYSRHYTHMIC FOCUS  05/13/2024    afib    CAPSULE ENDOSCOPY  09/21/2022    BOWEL SMALL CAPSULE ENDOSCOPY performed by Santosh Jerome DO at [facility] ENDOSCOPY    CAPSULE ENDOSCOPY N/A 08/06/2024    PILLCAM (7:30) [facility]    CARDIAC CATHETERIZATION  09/13/2012    diagnostic - Dr. Black    CARDIAC CATHETERIZATION  09/28/2010    diagnostic - dr. black    CARDIAC CATHETERIZATION  04/21/2000    diagnostic    CARDIAC CATHETERIZATION  05/17/1998    no in-stent restenosis        Specific Gravity, UA 07/31/2024 1.020  1.005 - 1.030 Final    Blood, Urine 07/31/2024 Negative  Negative Final    pH, Urine 07/31/2024 6.0  5.0 - 8.0 Final    Protein, UA 07/31/2024 Negative  Negative mg/dL Final    Urobilinogen, Urine 07/31/2024 0.2  <2.0 E.U./dL Final    Nitrite, Urine 07/31/2024 Negative  Negative Final    Leukocyte Esterase, Urine 07/31/2024 Negative  Negative Final    Microscopic Examination 07/31/2024 Not Indicated   Final    Urine Type 07/31/2024 NotGiven   Final    Urine Reflex to Culture 07/31/2024 Not Indicated   Final    ABO/Rh 07/31/2024 O POS   Final    Antibody Screen 07/31/2024 NEG   Final    Occult Blood Screening 07/31/2024  (A)   Final                    Value:Result: POSITIVE  Normal range: Negative      Pro-BNP 07/31/2024 1,141 (H)  0 - 449 pg/mL Final    Hemoglobin 07/31/2024 9.0 (L)  13.5 - 17.5 g/dL Final    Hematocrit 07/31/2024 28.2 (L)  40.5 - 52.5 % Final    Hemoglobin 07/31/2024 8.7 (L)  13.5 - 17.5 g/dL Final    Hematocrit 07/31/2024 27.1 (L)  40.5 - 52.5 % Final    Protime 08/01/2024 14.6  11.9 - 14.9 sec Final    INR 08/01/2024 1.12  0.85 - 1.15 Final    aPTT 08/01/2024 33.4  22.1 - 36.4 sec Final    Iron 08/01/2024 58 (L)  59 - 158 ug/dL Final    TIBC 08/01/2024 273  260 - 445 ug/dL Final    Iron % Saturation 08/01/2024 21  20 - 50 % Final    Lactic Acid 07/31/2024 0.9  0.4 - 2.0 mmol/L Final    Hemoglobin 08/01/2024 8.4 (L)  13.5 - 17.5 g/dL Final    Hematocrit 08/01/2024 26.1 (L)  40.5 - 52.5 % Final    Hemoglobin 08/01/2024 8.3 (L)  13.5 - 17.5 g/dL Final    Hematocrit 08/01/2024 25.9 (L)  40.5 - 52.5 % Final    Hemoglobin 08/01/2024 8.2 (L)  13.5 - 17.5 g/dL Final    Hematocrit 08/01/2024 25.0 (L)  40.5 - 52.5 % Final    Hemoglobin 08/02/2024 7.7 (L)  13.5 - 17.5 g/dL Final    Hematocrit 08/02/2024 24.0 (L)  40.5 - 52.5 % Final    Hemoglobin 08/02/2024 7.7 (L)  13.5 - 17.5 g/dL Final    Hematocrit 08/02/2024 23.6 (L)  40.5 - 52.5 % Final    Hemoglobin

## 2024-08-23 NOTE — ANESTHESIA PRE PROCEDURE
07/31/2024 08:35 AM    ALT 24 07/31/2024 08:35 AM       POC Tests: No results for input(s): \"POCGLU\", \"POCNA\", \"POCK\", \"POCCL\", \"POCBUN\", \"POCHEMO\", \"POCHCT\" in the last 72 hours.    Coags:   Lab Results   Component Value Date/Time    PROTIME 14.6 08/01/2024 05:49 AM    INR 1.12 08/01/2024 05:49 AM    APTT 33.4 08/01/2024 05:49 AM       HCG (If Applicable): No results found for: \"PREGTESTUR\", \"PREGSERUM\", \"HCG\", \"HCGQUANT\"     ABGs: No results found for: \"PHART\", \"PO2ART\", \"APT2UZP\", \"RLT2DAY\", \"BEART\", \"F4CIQQLS\"     Type & Screen (If Applicable):  No results found for: \"LABABO\"    Drug/Infectious Status (If Applicable):  No results found for: \"HIV\", \"HEPCAB\"    COVID-19 Screening (If Applicable):   Lab Results   Component Value Date/Time    COVID19 NOT DETECTED 04/01/2024 01:07 PM    COVID19 Not Detected 09/14/2022 11:44 AM           Anesthesia Evaluation  Patient summary reviewed and Nursing notes reviewed   no history of anesthetic complications:   Airway: Mallampati: II     Neck ROM: full     Dental:    (+) upper dentures      Pulmonary:Negative Pulmonary ROS and normal exam    (+)  COPD (2L):  shortness of breath:                                    Cardiovascular:Negative CV ROS    (+) hypertension:, CAD:, CABG/stent:, dysrhythmias: atrial fibrillation and atrial flutter, hyperlipidemia                  Neuro/Psych:   Negative Neuro/Psych ROS              GI/Hepatic/Renal: Neg GI/Hepatic/Renal ROS  (+) GERD: well controlled     (-) hiatal hernia       Endo/Other: Negative Endo/Other ROS                    Abdominal:             Vascular:   + PVD, aortic or cerebral.       Other Findings:             Anesthesia Plan      general     ASA 3     (I discussed with the patient the risks and benefits of PIV, general anesthesia, IV Narcotics, PACU.  All questions were answered the patient agrees with the plan and wishes to proceed.  )  Induction: intravenous.                        12/23  CONCLUSIONS:     1.

## 2024-08-23 NOTE — DISCHARGE INSTRUCTIONS
Endoscopy: What to Expect at Home  Your Recovery  After you have an endoscopy, you will stay at the hospital or clinic for 1 to 2 hours. This will allow the medicine to wear off. You will be able to go home after your doctor or nurse checks to make sure you are not having any problems.  You may have to stay overnight if you had treatment during the test. You may have a sore throat for a day or two after the test.  This care sheet gives you a general idea about what to expect after the test.  How can you care for yourself at home?  Activity  Rest as much as you need to after you go home.  You should be able to go back to your usual activities the day after the test.  Diet  Follow your doctor's directions for eating after the test.  Drink plenty of fluids (unless your doctor has told you not to).  Medications  If you have a sore throat the day after the test, use an over-the-counter spray to numb your throat.  Follow-up care is a key part of your treatment and safety. Be sure to make and go to all appointments, and call your doctor if you are having problems. It's also a good idea to know your test results and keep a list of the medicines you take.  When should you call for help?  Call 911 anytime you think you may need emergency care. For example, call if:    You passed out (loses consciousness).     You have trouble breathing.     You pass maroon or bloody stools.    Call your doctor now or seek immediate medical care if:    You have pain that does not get better after your take pain medicine.     You have new or worse belly pain.     You have blood in your stools.     You are sick to your stomach and cannot keep fluids down.     You have a fever.     You cannot pass stools or gas.    Watch closely for changes in your health, and be sure to contact your doctor if:    Your throat still hurts after a day or two.     You do not get better as expected.   Where can you learn more?  Go to

## 2024-09-05 ENCOUNTER — TELEPHONE (OUTPATIENT)
Dept: CARDIOLOGY CLINIC | Age: 84
End: 2024-09-05

## 2024-09-05 NOTE — TELEPHONE ENCOUNTER
----- Message from Dr. JAMISON Pollard MD sent at 9/4/2024  4:25 PM EDT -----  Regarding: FW: GI Bleeding  Can we check with Dr. Jerome to see what antiplatelet patient may start/continue on?    ----- Message -----  From: Tanja Correia MD  Sent: 8/22/2024   1:04 PM EDT  To: Santosh Jerome DO; #  Subject: RE: GI Bleeding                                  At this point, whenever Gi feels he can tolerate any single anti-platelet, that would be best from interventional cardiology standpoint.    ----- Message -----  From: JAMISON Pollard Jr., MD  Sent: 8/22/2024  12:02 PM EDT  To: Santosh Jerome DO; Tanja Correia MD; #  Subject: GI Bleeding                                      Good afternoon gentleman.  Patient presented today in the office for follow-up after his atrial fibrillation ablation.  He continues to have recurrent episodes of GI bleeding.  He is intolerant of aspirin.  He continues to bleed on Plavix.  Patient and family would like us to come up with a plan together as far as antiplatelet therapy moving forward.  Recommendations greatly appreciated.

## 2024-09-19 ENCOUNTER — TELEPHONE (OUTPATIENT)
Dept: CARDIOLOGY CLINIC | Age: 84
End: 2024-09-19

## 2024-09-19 DIAGNOSIS — I48.0 PAROXYSMAL A-FIB (HCC): Primary | ICD-10-CM

## 2024-09-20 DIAGNOSIS — I48.0 PAROXYSMAL A-FIB (HCC): ICD-10-CM

## 2024-09-21 LAB
ANION GAP SERPL CALCULATED.3IONS-SCNC: 13 MMOL/L (ref 3–16)
BUN SERPL-MCNC: 35 MG/DL (ref 7–20)
CALCIUM SERPL-MCNC: 9.8 MG/DL (ref 8.3–10.6)
CHLORIDE SERPL-SCNC: 103 MMOL/L (ref 99–110)
CO2 SERPL-SCNC: 20 MMOL/L (ref 21–32)
CREAT SERPL-MCNC: 1.4 MG/DL (ref 0.8–1.3)
DEPRECATED RDW RBC AUTO: 22.6 % (ref 12.4–15.4)
GFR SERPLBLD CREATININE-BSD FMLA CKD-EPI: 49 ML/MIN/{1.73_M2}
GLUCOSE SERPL-MCNC: 121 MG/DL (ref 70–99)
HCT VFR BLD AUTO: 29.5 % (ref 40.5–52.5)
HGB BLD-MCNC: 9.1 G/DL (ref 13.5–17.5)
MCH RBC QN AUTO: 28.8 PG (ref 26–34)
MCHC RBC AUTO-ENTMCNC: 30.8 G/DL (ref 31–36)
MCV RBC AUTO: 93.4 FL (ref 80–100)
PLATELET # BLD AUTO: 321 K/UL (ref 135–450)
PMV BLD AUTO: 7.7 FL (ref 5–10.5)
POTASSIUM SERPL-SCNC: 5 MMOL/L (ref 3.5–5.1)
RBC # BLD AUTO: 3.15 M/UL (ref 4.2–5.9)
SODIUM SERPL-SCNC: 136 MMOL/L (ref 136–145)
WBC # BLD AUTO: 4.7 K/UL (ref 4–11)

## 2024-11-21 DIAGNOSIS — E78.49 OTHER HYPERLIPIDEMIA: ICD-10-CM

## 2024-11-22 RX ORDER — EVOLOCUMAB 140 MG/ML
INJECTION, SOLUTION SUBCUTANEOUS
Qty: 6 ML | Refills: 3 | OUTPATIENT
Start: 2024-11-22

## 2024-11-22 RX ORDER — EVOLOCUMAB 140 MG/ML
INJECTION, SOLUTION SUBCUTANEOUS
Qty: 6 ML | Refills: 3 | Status: SHIPPED | OUTPATIENT
Start: 2024-11-22

## 2024-11-22 NOTE — TELEPHONE ENCOUNTER
Last Office Visit: 6/11/24 Provider: SAM  Is provider OOT? No    Next Office Visit:12/11/2024 Provider: SAM    **Has patient already had 30 day supply? No    LAST LABS:   CBC:   Lab Results   Component Value Date    WBC 4.7 09/20/2024    HGB 9.1 (L) 09/20/2024    HCT 29.5 (L) 09/20/2024    MCV 93.4 09/20/2024     09/20/2024     CMP:    Lab Results   Component Value Date     09/20/2024    K 5.0 09/20/2024     09/20/2024    CO2 20 (L) 09/20/2024    BUN 35 (H) 09/20/2024    CREATININE 1.4 (H) 09/20/2024    GLUCOSE 121 (H) 09/20/2024    CALCIUM 9.8 09/20/2024    BILITOT 0.6 07/31/2024    ALKPHOS 226 (H) 07/31/2024    AST 26 07/31/2024    ALT 24 07/31/2024    LABGLOM 49 (A) 09/20/2024    GFRAA >60 09/21/2022    AGRATIO 1.4 07/31/2024    GLOB 3.6 02/28/2017          Last 3 Lipids:   Lab Results   Component Value Date    CHOL 85 05/28/2024    CHOL 90 12/21/2023    CHOL 102 03/20/2020    CHOL 55 03/20/2020     Lab Results   Component Value Date    TRIG 39 05/28/2024    TRIG 58 12/21/2023    TRIG 75 03/20/2020     Lab Results   Component Value Date    HDL 49 05/28/2024    HDL 46 12/21/2023    HDL 72 (H) 06/05/2023     Lab Results   Component Value Date    LDL 28 05/28/2024    LDL 32 12/21/2023    LDL 44 06/05/2023     Lab Results   Component Value Date    VLDL 8 05/28/2024    VLDL 12 12/21/2023    VLDL 15 06/05/2023         Is encounter provider correct?   No  Does refill dosage match last filled?   Yes  Changes to script from Tele Encounters or LOV Plan?   no  Did you adjust dispensed amount or refills to reflect last and upcoming OV?  Yes    Requested Prescriptions     Pending Prescriptions Disp Refills    REPATHA SURECLICK 140 MG/ML SOAJ [Pharmacy Med Name: REPATHA SRCLK 140MG/ML PF AUTO INJ] 6 mL 3     Sig: ADMINISTER 1 ML UNDER THE SKIN 1 TIME EVERY 2 WEEKS

## 2024-12-02 NOTE — PROGRESS NOTES
all other review of symptoms negative as below.    Review of Systems - History obtained from the patient  General ROS: negative for - chills, fever or night sweats  Psychological ROS: negative for - disorientation or hallucinations  Ophthalmic ROS: negative for - dry eyes, eye pain or loss of vision  ENT ROS: negative for - nasal discharge or sore throat  Allergy and Immunology ROS: negative for - hives or itchy/watery eyes  Hematological and Lymphatic ROS: negative for - jaundice or night sweats  Endocrine ROS: negative for - mood swings or temperature intolerance  Breast ROS: deferred  Respiratory ROS: negative for - hemoptysis or stridor  Cardiovascular ROS: negative for - chest pain, dyspnea on exertion or palpitations  Gastrointestinal ROS: no abdominal pain, change in bowel habits, or black or bloody stools  Genito-Urinary ROS: no dysuria, trouble voiding, or hematuria  Musculoskeletal ROS: negative for - gait disturbance, joint pain or joint stiffness  Neurological ROS: negative for - seizures or speech problems  Dermatological ROS: negative for - rash or skin lesion changes     Physical Examination:    Vitals:    12/11/24 0908   BP: 126/68   Pulse: 78   SpO2: 96%   Weight: 92.1 kg (203 lb)   Height: 1.829 m (6')                 Weight - Scale: 92.1 kg (203 lb)     Wt Readings from Last 3 Encounters:   12/11/24 92.1 kg (203 lb)   08/21/24 88 kg (194 lb)   08/22/24 82.6 kg (182 lb)         General Appearance:  Alert, cooperative, no distress, appears stated age   Head:  Normocephalic, without obvious abnormality, atraumatic   Eyes:  PERRL, conjunctiva/corneas clear       Nose: Nares normal, no drainage or sinus tenderness   Throat: Lips, mucosa, and tongue normal   Neck: Supple, symmetrical, trachea midline, no adenopathy, thyroid: not enlarged, symmetric, no tenderness/mass/nodules, no carotid bruit or JVD       Lungs:   Clear to auscultation bilaterally, respirations unlabored   Chest Wall:  No tenderness or

## 2024-12-11 ENCOUNTER — OFFICE VISIT (OUTPATIENT)
Dept: CARDIOLOGY CLINIC | Age: 84
End: 2024-12-11
Payer: COMMERCIAL

## 2024-12-11 VITALS
WEIGHT: 203 LBS | BODY MASS INDEX: 27.5 KG/M2 | DIASTOLIC BLOOD PRESSURE: 68 MMHG | SYSTOLIC BLOOD PRESSURE: 126 MMHG | HEIGHT: 72 IN | OXYGEN SATURATION: 96 % | HEART RATE: 78 BPM

## 2024-12-11 DIAGNOSIS — Z95.818 PRESENCE OF WATCHMAN LEFT ATRIAL APPENDAGE CLOSURE DEVICE: ICD-10-CM

## 2024-12-11 DIAGNOSIS — Z95.5 HISTORY OF CORONARY ARTERY STENT PLACEMENT: ICD-10-CM

## 2024-12-11 DIAGNOSIS — R06.02 SOB (SHORTNESS OF BREATH): ICD-10-CM

## 2024-12-11 DIAGNOSIS — D50.0 IRON DEFICIENCY ANEMIA DUE TO CHRONIC BLOOD LOSS: ICD-10-CM

## 2024-12-11 DIAGNOSIS — Z79.899 MEDICATION MANAGEMENT: ICD-10-CM

## 2024-12-11 DIAGNOSIS — E78.2 MIXED HYPERLIPIDEMIA: Chronic | ICD-10-CM

## 2024-12-11 DIAGNOSIS — I25.10 CORONARY ARTERY DISEASE INVOLVING NATIVE CORONARY ARTERY OF NATIVE HEART WITHOUT ANGINA PECTORIS: Primary | Chronic | ICD-10-CM

## 2024-12-11 DIAGNOSIS — I50.9 ACUTE CONGESTIVE HEART FAILURE, UNSPECIFIED HEART FAILURE TYPE (HCC): ICD-10-CM

## 2024-12-11 LAB
ANION GAP SERPL CALCULATED.3IONS-SCNC: 10 MMOL/L (ref 3–16)
BUN SERPL-MCNC: 29 MG/DL (ref 7–20)
CALCIUM SERPL-MCNC: 9.9 MG/DL (ref 8.3–10.6)
CHLORIDE SERPL-SCNC: 105 MMOL/L (ref 99–110)
CO2 SERPL-SCNC: 29 MMOL/L (ref 21–32)
CREAT SERPL-MCNC: 1.6 MG/DL (ref 0.8–1.3)
GFR SERPLBLD CREATININE-BSD FMLA CKD-EPI: 42 ML/MIN/{1.73_M2}
GLUCOSE SERPL-MCNC: 111 MG/DL (ref 70–99)
POTASSIUM SERPL-SCNC: 4.7 MMOL/L (ref 3.5–5.1)
SODIUM SERPL-SCNC: 144 MMOL/L (ref 136–145)

## 2024-12-11 PROCEDURE — 99214 OFFICE O/P EST MOD 30 MIN: CPT | Performed by: INTERNAL MEDICINE

## 2024-12-11 PROCEDURE — 1123F ACP DISCUSS/DSCN MKR DOCD: CPT | Performed by: INTERNAL MEDICINE

## 2024-12-11 PROCEDURE — 3074F SYST BP LT 130 MM HG: CPT | Performed by: INTERNAL MEDICINE

## 2024-12-11 PROCEDURE — 3078F DIAST BP <80 MM HG: CPT | Performed by: INTERNAL MEDICINE

## 2024-12-11 RX ORDER — FUROSEMIDE 20 MG/1
20 TABLET ORAL 2 TIMES DAILY
COMMUNITY
End: 2024-12-11 | Stop reason: SDUPTHER

## 2024-12-11 RX ORDER — FUROSEMIDE 20 MG/1
TABLET ORAL
Qty: 60 TABLET | Refills: 3 | Status: SHIPPED | OUTPATIENT
Start: 2024-12-11

## 2024-12-11 NOTE — PATIENT INSTRUCTIONS
Plan:  Discussion of importance of consulting with me regarding plan for if coronary intervention is needed in the future given significant bleeding history and intolerance to antiplatelet therapy.  ~    Unable to tolerate aspirin or clopidogrel (Plavix) due to GI irritation and anemia history    Great job on not smoking, continue to avoid smoking as a risk factor to heart attack or stroke  Instructed to take furosemide (Lasix) may take 40 mg daily for 1 week and thereafter take 20 mg daily as needed ~ assist in shortness of breath, personal report of pleural effusions   Order BMP today   ~    Discussed potentially adding potassium supplement based on results  5.   Patient given detailed instructions on addressing diet, regular exercise, weight control, smoking abstention, medication compliance, and stress minimization. The patient was provided written and verbal instructions regarding risk factor modification.    6.    Continue to follow with Electrophysiology as planned in February 2025  7.    Follow up with me in 6 months, call office in 1-2 weeks if shortness of breath not improved

## 2025-01-13 DIAGNOSIS — Z79.899 MEDICATION MANAGEMENT: Primary | ICD-10-CM

## 2025-01-13 DIAGNOSIS — Z79.899 MEDICATION MANAGEMENT: ICD-10-CM

## 2025-01-13 RX ORDER — AMIODARONE HYDROCHLORIDE 100 MG/1
100 TABLET ORAL DAILY
Qty: 30 TABLET | Refills: 0 | Status: SHIPPED | OUTPATIENT
Start: 2025-01-13

## 2025-01-13 NOTE — TELEPHONE ENCOUNTER
Last ov 08/22/2024  Upcoming ov 02/26/2025  BMP 12/2024  CBC 09/2024  TSH 2017  EKG 08/2024    Spoke with pt wife Anne, informed labs needed complete. Anne v/u.     Labs placed in epic     PT NEEDS REFILL

## 2025-01-14 LAB
T4 FREE SERPL-MCNC: 0.6 NG/DL (ref 0.9–1.8)
TSH SERPL DL<=0.005 MIU/L-ACNC: 51.3 UIU/ML (ref 0.27–4.2)

## 2025-01-15 NOTE — RESULT ENCOUNTER NOTE
Spoke with pt wife Anne yulissa SINGER and relayed result notes. Pt wife v/u. Pt and wife would like a call from you on Friday whenever you're available. TSH order put in for next month.

## 2025-01-27 ENCOUNTER — TELEPHONE (OUTPATIENT)
Dept: CARDIOLOGY CLINIC | Age: 85
End: 2025-01-27

## 2025-01-27 NOTE — TELEPHONE ENCOUNTER
Sending you a message to remind you to call pt about lab results. If pt doesn't answer his wife Anne can be reached at [phone]

## 2025-01-29 NOTE — TELEPHONE ENCOUNTER
I've spoken with patient's wife.  She understands.  Patient's son has been hypothyroid since birth and patient may not be opposed to thyroid supplementation as he feels so well on amiodarone and has not had relief like this in some time.  I also discussed dronedarone however given his history of nausea vomiting his wife does not think he be out for this.  He is scheduled to see NP BLUE in February I think this is reasonable as the improved when she still been assistant and then.

## 2025-02-24 NOTE — PROGRESS NOTES
[facility]   Electrophysiology Outpatient Note              Date:  February 24, 2025  Patient name: Khalif Pinedo Jr  YOB: 1940    Primary Care physician: Scott Denise MD    HISTORY OF PRESENT ILLNESS: The patient is a 84 y.o.  male with a history of persistent atrial fibrillation, atrial fibrillation ablation, Watchman device CAD/PCI and CABG, ischemic cardiomyopathy, hyperlipidemia, hypertension, CAD, COPD, anemia    Patient follows with Dr. Pollard in EP clinic history of persistent atrial fibrillation.  3/25/2024 patient underwent Watchman device implant due to issues with anemia.  Patient was admitted 5/13/2024 for EP studies, typical atrial flutter ablation and atrial fibrillation ablation.  Amiodarone was initiated.  Post procedure was complicated by JOSE DE JESUS & right groin bleed.  He received 1 unit of PRBC prior to discharge    Today he is being seen for management of atrial fibrillation. ECG shows SR with a HR of 80. Patient is accompanied by his wife today.  Patient states he and his wife just recently returned from a trip to the [facility].  He denies chest pain and palpitations.  He does have shortness of breath at times.  Patient is significant history of emphysema and requires O2 at night and occasionally during the day.  Patient also states he has had no taste since last June.  He was unsure if this was related to having ablation performed.  I asked him if he has had COVID recently.  Patient states if he did he was unaware.  He was also recently taken off his amiodarone due to elevated TSH level.  Repeat level was still elevated.  I instructed patient and his wife to call their primary care physician today with results as they may want to start Synthroid.  Patient also states that he is no longer taking OAC or antiplatelet agent due to history of recurrent GI bleed .   and Dr. Correia are aware.  Patient states he is 84 years old and he would rather deal with

## 2025-02-25 DIAGNOSIS — I48.19 PERSISTENT ATRIAL FIBRILLATION (HCC): ICD-10-CM

## 2025-02-25 LAB
T4 FREE SERPL-MCNC: 0.6 NG/DL (ref 0.9–1.8)
TSH SERPL DL<=0.005 MIU/L-ACNC: 58.2 UIU/ML (ref 0.27–4.2)

## 2025-02-26 ENCOUNTER — OFFICE VISIT (OUTPATIENT)
Dept: CARDIOLOGY CLINIC | Age: 85
End: 2025-02-26

## 2025-02-26 VITALS
HEIGHT: 72 IN | DIASTOLIC BLOOD PRESSURE: 60 MMHG | OXYGEN SATURATION: 94 % | BODY MASS INDEX: 26.49 KG/M2 | HEART RATE: 73 BPM | SYSTOLIC BLOOD PRESSURE: 122 MMHG | WEIGHT: 195.6 LBS

## 2025-02-26 DIAGNOSIS — I25.10 CORONARY ARTERY DISEASE INVOLVING NATIVE CORONARY ARTERY OF NATIVE HEART WITHOUT ANGINA PECTORIS: Chronic | ICD-10-CM

## 2025-02-26 DIAGNOSIS — I65.23 BILATERAL CAROTID ARTERY STENOSIS: Chronic | ICD-10-CM

## 2025-02-26 DIAGNOSIS — J43.9 PULMONARY EMPHYSEMA, UNSPECIFIED EMPHYSEMA TYPE (HCC): ICD-10-CM

## 2025-02-26 DIAGNOSIS — I48.0 PAROXYSMAL ATRIAL FIBRILLATION (HCC): ICD-10-CM

## 2025-02-26 DIAGNOSIS — E78.2 MIXED HYPERLIPIDEMIA: Primary | ICD-10-CM

## 2025-02-26 RX ORDER — DEXTROMETHORPHAN HYDROBROMIDE AND PROMETHAZINE HYDROCHLORIDE 15; 6.25 MG/5ML; MG/5ML
SYRUP ORAL 4 TIMES DAILY PRN
COMMUNITY
Start: 2025-01-24

## 2025-02-26 RX ORDER — ONDANSETRON 4 MG/1
4 TABLET, FILM COATED ORAL EVERY 8 HOURS PRN
COMMUNITY
Start: 2025-01-25

## 2025-02-26 NOTE — PATIENT INSTRUCTIONS
Continue Repatha as prescribed  Continue Lasix as directed  No amiodaone  Blood work: CBC, BMP, Liver function test--now; Lipids in May 2025  Referral Dr Mackenzie for history of lung disease    Follow up in 6 months Vandana DONOHUE

## 2025-02-27 DIAGNOSIS — I48.0 PAROXYSMAL ATRIAL FIBRILLATION (HCC): ICD-10-CM

## 2025-02-27 LAB
ALBUMIN SERPL-MCNC: 4.1 G/DL (ref 3.4–5)
ALP SERPL-CCNC: 144 U/L (ref 40–129)
ALT SERPL-CCNC: 13 U/L (ref 10–40)
ANION GAP SERPL CALCULATED.3IONS-SCNC: 9 MMOL/L (ref 3–16)
AST SERPL-CCNC: 26 U/L (ref 15–37)
BASOPHILS # BLD: 0.1 K/UL (ref 0–0.2)
BASOPHILS NFR BLD: 0.9 %
BILIRUB DIRECT SERPL-MCNC: 0.5 MG/DL (ref 0–0.3)
BILIRUB INDIRECT SERPL-MCNC: 0.2 MG/DL (ref 0–1)
BILIRUB SERPL-MCNC: 0.7 MG/DL (ref 0–1)
BUN SERPL-MCNC: 32 MG/DL (ref 7–20)
CALCIUM SERPL-MCNC: 9.6 MG/DL (ref 8.3–10.6)
CHLORIDE SERPL-SCNC: 102 MMOL/L (ref 99–110)
CO2 SERPL-SCNC: 31 MMOL/L (ref 21–32)
CREAT SERPL-MCNC: 1.7 MG/DL (ref 0.8–1.3)
DEPRECATED RDW RBC AUTO: 19.1 % (ref 12.4–15.4)
EOSINOPHIL # BLD: 0.1 K/UL (ref 0–0.6)
EOSINOPHIL NFR BLD: 2.2 %
GFR SERPLBLD CREATININE-BSD FMLA CKD-EPI: 39 ML/MIN/{1.73_M2}
GLUCOSE SERPL-MCNC: 119 MG/DL (ref 70–99)
HCT VFR BLD AUTO: 35.1 % (ref 40.5–52.5)
HGB BLD-MCNC: 11.2 G/DL (ref 13.5–17.5)
LYMPHOCYTES # BLD: 1.4 K/UL (ref 1–5.1)
LYMPHOCYTES NFR BLD: 24.4 %
MCH RBC QN AUTO: 29.4 PG (ref 26–34)
MCHC RBC AUTO-ENTMCNC: 32 G/DL (ref 31–36)
MCV RBC AUTO: 92 FL (ref 80–100)
MONOCYTES # BLD: 1 K/UL (ref 0–1.3)
MONOCYTES NFR BLD: 16.7 %
NEUTROPHILS # BLD: 3.2 K/UL (ref 1.7–7.7)
NEUTROPHILS NFR BLD: 55.8 %
PLATELET # BLD AUTO: 222 K/UL (ref 135–450)
PMV BLD AUTO: 8.9 FL (ref 5–10.5)
POTASSIUM SERPL-SCNC: 4.2 MMOL/L (ref 3.5–5.1)
PROT SERPL-MCNC: 7 G/DL (ref 6.4–8.2)
RBC # BLD AUTO: 3.81 M/UL (ref 4.2–5.9)
SODIUM SERPL-SCNC: 142 MMOL/L (ref 136–145)
WBC # BLD AUTO: 5.8 K/UL (ref 4–11)

## 2025-03-05 NOTE — RESULT ENCOUNTER NOTE
Can we please start the patient on Synthroid 25 mcg daily and have him repeat his TSH/T4 level in 3 weeks.  Thank you!

## 2025-03-05 NOTE — RESULT ENCOUNTER NOTE
Spoke with patient- PCP already ordered synthroid 25 MCG daily and is having patient recheck labs in 3 weeks.

## 2025-04-09 ENCOUNTER — HOSPITAL ENCOUNTER (INPATIENT)
Age: 85
LOS: 2 days | Discharge: HOME | DRG: 291 | End: 2025-04-11
Attending: EMERGENCY MEDICINE | Admitting: INTERNAL MEDICINE
Payer: MEDICARE

## 2025-04-09 ENCOUNTER — APPOINTMENT (OUTPATIENT)
Dept: GENERAL RADIOLOGY | Age: 85
DRG: 291 | End: 2025-04-09
Payer: MEDICARE

## 2025-04-09 DIAGNOSIS — I48.91 ATRIAL FIBRILLATION, UNSPECIFIED TYPE (HCC): ICD-10-CM

## 2025-04-09 DIAGNOSIS — Z87.09 HISTORY OF COPD: ICD-10-CM

## 2025-04-09 DIAGNOSIS — R06.02 SHORTNESS OF BREATH: ICD-10-CM

## 2025-04-09 DIAGNOSIS — I50.33 ACUTE ON CHRONIC DIASTOLIC CONGESTIVE HEART FAILURE (HCC): ICD-10-CM

## 2025-04-09 DIAGNOSIS — J90 PLEURAL EFFUSION ON RIGHT: ICD-10-CM

## 2025-04-09 DIAGNOSIS — R09.02 HYPOXEMIA: Primary | ICD-10-CM

## 2025-04-09 LAB
ALBUMIN SERPL-MCNC: 4.3 G/DL (ref 3.4–5)
ALBUMIN/GLOB SERPL: 1.3 {RATIO} (ref 1.1–2.2)
ALP SERPL-CCNC: 208 U/L (ref 40–129)
ALT SERPL-CCNC: 11 U/L (ref 10–40)
ANION GAP SERPL CALCULATED.3IONS-SCNC: 9 MMOL/L (ref 3–16)
AST SERPL-CCNC: 24 U/L (ref 15–37)
BASE EXCESS BLDV CALC-SCNC: -1 MMOL/L (ref -3–3)
BASOPHILS # BLD: 0.1 K/UL (ref 0–0.2)
BASOPHILS NFR BLD: 0.7 %
BILIRUB SERPL-MCNC: 1.2 MG/DL (ref 0–1)
BUN SERPL-MCNC: 24 MG/DL (ref 7–20)
CALCIUM SERPL-MCNC: 9.7 MG/DL (ref 8.3–10.6)
CHLORIDE SERPL-SCNC: 105 MMOL/L (ref 99–110)
CO2 BLDV-SCNC: 25 MMOL/L
CO2 SERPL-SCNC: 27 MMOL/L (ref 21–32)
COHGB MFR BLDV: 3 % (ref 0–1.5)
CREAT SERPL-MCNC: 1.5 MG/DL (ref 0.8–1.3)
DEPRECATED RDW RBC AUTO: 21.4 % (ref 12.4–15.4)
EKG DIAGNOSIS: NORMAL
EKG Q-T INTERVAL: 432 MS
EKG QRS DURATION: 122 MS
EKG QTC CALCULATION (BAZETT): 498 MS
EKG R AXIS: 196 DEGREES
EKG T AXIS: -13 DEGREES
EKG VENTRICULAR RATE: 80 BPM
EOSINOPHIL # BLD: 0.1 K/UL (ref 0–0.6)
EOSINOPHIL NFR BLD: 1.5 %
FLUAV RNA RESP QL NAA+PROBE: NOT DETECTED
FLUBV RNA RESP QL NAA+PROBE: NOT DETECTED
GFR SERPLBLD CREATININE-BSD FMLA CKD-EPI: 45 ML/MIN/{1.73_M2}
GLUCOSE SERPL-MCNC: 86 MG/DL (ref 70–99)
HCO3 BLDV-SCNC: 23.5 MMOL/L (ref 23–29)
HCT VFR BLD AUTO: 37 % (ref 40.5–52.5)
HGB BLD-MCNC: 12.3 G/DL (ref 13.5–17.5)
LYMPHOCYTES # BLD: 1.3 K/UL (ref 1–5.1)
LYMPHOCYTES NFR BLD: 15.7 %
MCH RBC QN AUTO: 30.7 PG (ref 26–34)
MCHC RBC AUTO-ENTMCNC: 33.1 G/DL (ref 31–36)
MCV RBC AUTO: 92.7 FL (ref 80–100)
METHGB MFR BLDV: 0.3 %
MONOCYTES # BLD: 1.3 K/UL (ref 0–1.3)
MONOCYTES NFR BLD: 16.2 %
NEUTROPHILS # BLD: 5.3 K/UL (ref 1.7–7.7)
NEUTROPHILS NFR BLD: 65.9 %
NT-PROBNP SERPL-MCNC: 1762 PG/ML (ref 0–449)
O2 THERAPY: ABNORMAL
PCO2 BLDV: 38.6 MMHG (ref 40–50)
PH BLDV: 7.4 [PH] (ref 7.35–7.45)
PLATELET # BLD AUTO: 208 K/UL (ref 135–450)
PMV BLD AUTO: 8.1 FL (ref 5–10.5)
PO2 BLDV: 35.7 MMHG (ref 25–40)
POTASSIUM SERPL-SCNC: 4.8 MMOL/L (ref 3.5–5.1)
PROT SERPL-MCNC: 7.5 G/DL (ref 6.4–8.2)
RBC # BLD AUTO: 3.99 M/UL (ref 4.2–5.9)
SAO2 % BLDV: 69 %
SARS-COV-2 RNA RESP QL NAA+PROBE: NOT DETECTED
SODIUM SERPL-SCNC: 141 MMOL/L (ref 136–145)
TROPONIN, HIGH SENSITIVITY: 71 NG/L (ref 0–22)
TROPONIN, HIGH SENSITIVITY: 75 NG/L (ref 0–22)
WBC # BLD AUTO: 8 K/UL (ref 4–11)

## 2025-04-09 PROCEDURE — 1200000000 HC SEMI PRIVATE

## 2025-04-09 PROCEDURE — 83880 ASSAY OF NATRIURETIC PEPTIDE: CPT

## 2025-04-09 PROCEDURE — 94640 AIRWAY INHALATION TREATMENT: CPT

## 2025-04-09 PROCEDURE — 6370000000 HC RX 637 (ALT 250 FOR IP): Performed by: INTERNAL MEDICINE

## 2025-04-09 PROCEDURE — 94761 N-INVAS EAR/PLS OXIMETRY MLT: CPT

## 2025-04-09 PROCEDURE — 80053 COMPREHEN METABOLIC PANEL: CPT

## 2025-04-09 PROCEDURE — 82803 BLOOD GASES ANY COMBINATION: CPT

## 2025-04-09 PROCEDURE — 2500000003 HC RX 250 WO HCPCS: Performed by: INTERNAL MEDICINE

## 2025-04-09 PROCEDURE — 84484 ASSAY OF TROPONIN QUANT: CPT

## 2025-04-09 PROCEDURE — 85025 COMPLETE CBC W/AUTO DIFF WBC: CPT

## 2025-04-09 PROCEDURE — 93005 ELECTROCARDIOGRAM TRACING: CPT

## 2025-04-09 PROCEDURE — 87636 SARSCOV2 & INF A&B AMP PRB: CPT

## 2025-04-09 PROCEDURE — 6370000000 HC RX 637 (ALT 250 FOR IP)

## 2025-04-09 PROCEDURE — 93010 ELECTROCARDIOGRAM REPORT: CPT | Performed by: INTERNAL MEDICINE

## 2025-04-09 PROCEDURE — 2700000000 HC OXYGEN THERAPY PER DAY

## 2025-04-09 PROCEDURE — 6360000002 HC RX W HCPCS: Performed by: INTERNAL MEDICINE

## 2025-04-09 PROCEDURE — 6360000002 HC RX W HCPCS: Performed by: EMERGENCY MEDICINE

## 2025-04-09 PROCEDURE — 96374 THER/PROPH/DIAG INJ IV PUSH: CPT

## 2025-04-09 PROCEDURE — 71045 X-RAY EXAM CHEST 1 VIEW: CPT

## 2025-04-09 PROCEDURE — 99285 EMERGENCY DEPT VISIT HI MDM: CPT

## 2025-04-09 RX ORDER — LEVOTHYROXINE SODIUM 50 UG/1
50 TABLET ORAL DAILY
Status: DISCONTINUED | OUTPATIENT
Start: 2025-04-10 | End: 2025-04-11 | Stop reason: HOSPADM

## 2025-04-09 RX ORDER — SODIUM CHLORIDE 0.9 % (FLUSH) 0.9 %
5-40 SYRINGE (ML) INJECTION EVERY 12 HOURS SCHEDULED
Status: DISCONTINUED | OUTPATIENT
Start: 2025-04-09 | End: 2025-04-11 | Stop reason: HOSPADM

## 2025-04-09 RX ORDER — LEVOTHYROXINE SODIUM 50 UG/1
50 TABLET ORAL DAILY
COMMUNITY
Start: 2025-03-18

## 2025-04-09 RX ORDER — ONDANSETRON 2 MG/ML
4 INJECTION INTRAMUSCULAR; INTRAVENOUS EVERY 6 HOURS PRN
Status: DISCONTINUED | OUTPATIENT
Start: 2025-04-09 | End: 2025-04-11 | Stop reason: HOSPADM

## 2025-04-09 RX ORDER — GABAPENTIN 300 MG/1
300 CAPSULE ORAL 2 TIMES DAILY
Status: DISCONTINUED | OUTPATIENT
Start: 2025-04-09 | End: 2025-04-11 | Stop reason: HOSPADM

## 2025-04-09 RX ORDER — POLYETHYLENE GLYCOL 3350 17 G/17G
17 POWDER, FOR SOLUTION ORAL DAILY PRN
Status: DISCONTINUED | OUTPATIENT
Start: 2025-04-09 | End: 2025-04-11 | Stop reason: HOSPADM

## 2025-04-09 RX ORDER — SODIUM CHLORIDE 9 MG/ML
INJECTION, SOLUTION INTRAVENOUS PRN
Status: DISCONTINUED | OUTPATIENT
Start: 2025-04-09 | End: 2025-04-11 | Stop reason: HOSPADM

## 2025-04-09 RX ORDER — FUROSEMIDE 10 MG/ML
40 INJECTION INTRAMUSCULAR; INTRAVENOUS ONCE
Status: COMPLETED | OUTPATIENT
Start: 2025-04-09 | End: 2025-04-09

## 2025-04-09 RX ORDER — ACETAMINOPHEN 325 MG/1
650 TABLET ORAL EVERY 6 HOURS PRN
Status: DISCONTINUED | OUTPATIENT
Start: 2025-04-09 | End: 2025-04-11 | Stop reason: HOSPADM

## 2025-04-09 RX ORDER — FUROSEMIDE 10 MG/ML
40 INJECTION INTRAMUSCULAR; INTRAVENOUS 2 TIMES DAILY
Status: DISCONTINUED | OUTPATIENT
Start: 2025-04-09 | End: 2025-04-11

## 2025-04-09 RX ORDER — ENOXAPARIN SODIUM 100 MG/ML
40 INJECTION SUBCUTANEOUS DAILY
Status: DISCONTINUED | OUTPATIENT
Start: 2025-04-09 | End: 2025-04-11 | Stop reason: HOSPADM

## 2025-04-09 RX ORDER — ONDANSETRON 4 MG/1
4 TABLET, ORALLY DISINTEGRATING ORAL EVERY 8 HOURS PRN
Status: DISCONTINUED | OUTPATIENT
Start: 2025-04-09 | End: 2025-04-11 | Stop reason: HOSPADM

## 2025-04-09 RX ORDER — IPRATROPIUM BROMIDE AND ALBUTEROL SULFATE 2.5; .5 MG/3ML; MG/3ML
2 SOLUTION RESPIRATORY (INHALATION) ONCE
Status: COMPLETED | OUTPATIENT
Start: 2025-04-09 | End: 2025-04-09

## 2025-04-09 RX ORDER — ACETAMINOPHEN 650 MG/1
650 SUPPOSITORY RECTAL EVERY 6 HOURS PRN
Status: DISCONTINUED | OUTPATIENT
Start: 2025-04-09 | End: 2025-04-11 | Stop reason: HOSPADM

## 2025-04-09 RX ORDER — SODIUM CHLORIDE 0.9 % (FLUSH) 0.9 %
5-40 SYRINGE (ML) INJECTION PRN
Status: DISCONTINUED | OUTPATIENT
Start: 2025-04-09 | End: 2025-04-11 | Stop reason: HOSPADM

## 2025-04-09 RX ADMIN — FUROSEMIDE 40 MG: 10 INJECTION, SOLUTION INTRAMUSCULAR; INTRAVENOUS at 14:05

## 2025-04-09 RX ADMIN — FUROSEMIDE 40 MG: 10 INJECTION, SOLUTION INTRAMUSCULAR; INTRAVENOUS at 18:20

## 2025-04-09 RX ADMIN — GABAPENTIN 300 MG: 300 CAPSULE ORAL at 21:27

## 2025-04-09 RX ADMIN — IPRATROPIUM BROMIDE AND ALBUTEROL SULFATE 2 DOSE: 2.5; .5 SOLUTION RESPIRATORY (INHALATION) at 13:09

## 2025-04-09 RX ADMIN — Medication 10 ML: at 21:27

## 2025-04-09 ASSESSMENT — LIFESTYLE VARIABLES
HOW MANY STANDARD DRINKS CONTAINING ALCOHOL DO YOU HAVE ON A TYPICAL DAY: PATIENT DOES NOT DRINK
HOW OFTEN DO YOU HAVE A DRINK CONTAINING ALCOHOL: NEVER

## 2025-04-09 ASSESSMENT — PAIN SCALES - GENERAL: PAINLEVEL_OUTOF10: 0

## 2025-04-09 ASSESSMENT — PAIN - FUNCTIONAL ASSESSMENT: PAIN_FUNCTIONAL_ASSESSMENT: 0-10

## 2025-04-09 NOTE — ED NOTES
Khalif Pinedo Jr is a 84 y.o. male admitted for  Principal Problem:    Acute on chronic diastolic CHF (congestive heart failure) (HCC)  Resolved Problems:    * No resolved hospital problems. *  .   Patient Home via family with   Chief Complaint   Patient presents with    Shortness of Breath     Pt reports he usually wears oxygen only at night, however says this morning he felt the need to keep the oxygen on at all times due to SOB. Reports his PCP is through [facility] and he was referred to a [facility] pulmonologist, however was not satisfied with the group. He sees Dr Tillman and Dr Islas with [facility] and would like a referral to a new pulmonologist. Patient reporting he's wearing himself out due to the increase in his demand of breathing.     .  Patient is alert and Person, Place, Time, and Situation  Patient's baseline mobility: Baseline Mobility: Independent   Code Status: Prior   Cardiac Rhythm: Cardiac Rhythm: Atrial fib  O2 Flow Rate (L/min): 2 L/min  Is patient on baseline Oxygen: yes how many Liters2:   Abnormal Assessment Findings: none    Isolation: None      NIH Score:    C-SSRS: Risk of Suicide: No Risk  Bedside swallow:        Active LDA's:   Peripheral IV 04/09/25 Right Antecubital (Active)   Site Assessment Clean, dry & intact 04/09/25 1303   Line Status Blood return noted;Flushed 04/09/25 1303   Phlebitis Assessment No symptoms 04/09/25 1303   Infiltration Assessment 0 04/09/25 1303   Alcohol Cap Used No 04/09/25 1303   Dressing Status New dressing applied 04/09/25 1303   Dressing Type Transparent 04/09/25 1303   Dressing Intervention New 04/09/25 1303     Patient admitted with a castro:  If the castro is chronic was it exchanged:  Reason for castro:   Patient admitted with Central Line:  . PICC line placement confirmed:   Reason for Central line:   Was central line Inserted from an outside facility:       Family/Caregiver Present yes Any Concerns: no   Restraints no  Sitter no         Vitals: MEWS  usually wears oxygen at home at 2 liters during night time only but in the last few days he has been using it for the whole day. Noted some dyspnea on exertion.   If any further questions, please call Sending RN at [phone]

## 2025-04-09 NOTE — ED PROVIDER NOTES
[facility] EMERGENCY DEPARTMENT  Emergency Department Encounter    Patient Name: Khalif Pinedo Jr  MRN: [id number]  YOB: 1940  Date of Evaluation: 4/9/2025  Provider: Scott Denise MD  Note Started: 2:25 PM EDT 4/9/25    CHIEF COMPLAINT  Shortness of Breath (Pt reports he usually wears oxygen only at night, however says this morning he felt the need to keep the oxygen on at all times due to SOB. Reports his PCP is through [facility] and he was referred to a [facility] pulmonologist, however was not satisfied with the group. He sees Dr Tillman and Dr Islas with [facility] and would like a referral to a new pulmonologist. Patient reporting he's wearing himself out due to the increase in his demand of breathing.  )    SHARED SERVICE VISIT   I have seen and evaluated this patient with my supervising physician, Dr. Brice.    HISTORY OF PRESENT ILLNESS  History From: Patient    Limitations to history : None    Khalif Pinedo Jr is a 84 y.o. male who presents to the ED for evaluation of shortness of breath onset approximately 2 weeks.  Patient states that he has been having progressively worsening cough over the past several weeks.  Patient states that over the past several days he has been developing shortness of breath.  Patient states that he does have baseline shortness of breath and uses nasal cannula oxygen at night however has had to use his nasal cannula oxygen during the day.  Patient does admit to a history of A-fib however had an ablation and no longer has A-fib.  No longer on blood thinners.  Patient does appear to be in A-fib on monitor.  Patient denies any lower extremity swelling at this time.    No other complaints, modifying factors or associated symptoms.     Nursing notes reviewed were all reviewed and agreed with or any disagreements were addressed in the HPI.    PMH:  Past Medical History:   Diagnosis Date    Anemia 09/19/2022    CAD (coronary artery disease) 05/17/1998     than 2 seconds.   Neurological:      General: No focal deficit present.      Mental Status: He is alert. Mental status is at baseline.   Psychiatric:         Mood and Affect: Mood normal.         EKG  When ordered, EKG's are interpreted by the Cardiology and ED physician.  Please see their note for interpretation of EKG.    LABS  Labs Reviewed   CBC WITH AUTO DIFFERENTIAL - Abnormal; Notable for the following components:       Result Value    RBC 3.99 (*)     Hemoglobin 12.3 (*)     Hematocrit 37.0 (*)     RDW 21.4 (*)     All other components within normal limits   COMPREHENSIVE METABOLIC PANEL W/ REFLEX TO MG FOR LOW K - Abnormal; Notable for the following components:    BUN 24 (*)     Creatinine 1.5 (*)     Est, Glom Filt Rate 45 (*)     Total Bilirubin 1.2 (*)     Alkaline Phosphatase 208 (*)     All other components within normal limits   TROPONIN - Abnormal; Notable for the following components:    Troponin, High Sensitivity 75 (*)     All other components within normal limits   BLOOD GAS, VENOUS - Abnormal; Notable for the following components:    pCO2, Wei 38.6 (*)     Carboxyhemoglobin 3.0 (*)     All other components within normal limits   BRAIN NATRIURETIC PEPTIDE - Abnormal; Notable for the following components:    NT Pro-BNP 1,762 (*)     All other components within normal limits   TROPONIN - Abnormal; Notable for the following components:    Troponin, High Sensitivity 71 (*)     All other components within normal limits   COVID-19 & INFLUENZA COMBO     When ordered, only abnormal lab results are displayed.  All other labs were within normal range or not returned as of this dictation.     RADIOLOGY  Non-plain film images such as CT, U/S, and MRI are read by the radiologist.  Plain radiographic images are visualized and preliminarily interpreted by the ED Provider with the below findings:     Interpretation per the Radiologist below, if available at the time of this note:  XR CHEST PORTABLE   Final

## 2025-04-09 NOTE — ED PROVIDER NOTES
Emergency Department Attending SUPERVISORY Provider Note  Location: [facility] EMERGENCY DEPARTMENT  4/9/2025     Chief Complaint   Patient presents with    Shortness of Breath     Pt reports he usually wears oxygen only at night, however says this morning he felt the need to keep the oxygen on at all times due to SOB. Reports his PCP is through [facility] and he was referred to a [facility] pulmonologist, however was not satisfied with the group. He sees Dr Tillman and Dr Islas with [facility] and would like a referral to a new pulmonologist. Patient reporting he's wearing himself out due to the increase in his demand of breathing.          PATIENT ID:  Khalif Pinedo Jr is a 84 y.o. male    Past Medical History:   Diagnosis Date    Anemia 09/19/2022    CAD (coronary artery disease) 05/17/1998    Stent in LAD    CAD (coronary artery disease) 09/17/2009    Stent in RCA    COPD (chronic obstructive pulmonary disease) (HCC) 02/17/2016    GERD (gastroesophageal reflux disease)     on Nexium    HTN (hypertension) 06/14/2016    Hyperlipidemia     IBS (irritable bowel syndrome)     On home oxygen therapy     O2 2L at HS    Stented coronary artery        Khalif Pinedo Jr was evaluated in the Emergency Department for concerns of worsening shortness of breath.  When I showed him his x-ray, with what appears to be an impressive right greater than left pleural effusion, compared to his previous x-rays, he says \"I feel like I am drowning.\"  He says he very much wants to see emergency pulmonologist.  That is why he came to this ER.  Says he is very exertionally short of breath, says that he gets \"worn out,\" when he even just walks across his house.  No falls or injuries.  Never any chest pain.  No leg swelling.  Denies any other concerns today including any headaches, fevers, chills, cough.  Says he got a breathing treatment here, and he does feel little bit better.      Vitals:    04/09/25 1212   BP:    Pulse: 90   Resp:   provided.    This chart was generated in part by using Dragon Dictation system and may contain errors related to that system including errors in grammar, punctuation, and spelling, as well as words and phrases that may be inappropriate. If there are any questions or concerns please feel free to contact the dictating provider for clarification.     SERA MARTINEZ DO   Acute [facility]       Sera Martinez,   04/09/25 1401       Sera Martinez,   04/09/25 1402

## 2025-04-09 NOTE — CONSULTS
Consult Placed     Who: CARDIOLOGY, [place]  Date:4/9/2025  Time:1722     Electronically signed by Kevin Izaguirre on 4/9/2025 at 5:22 PM

## 2025-04-09 NOTE — PLAN OF CARE
Problem: Chronic Conditions and Co-morbidities  Goal: Patient's chronic conditions and co-morbidity symptoms are monitored and maintained or improved  Outcome: Progressing     Problem: Discharge Planning  Goal: Discharge to home or other facility with appropriate resources  Outcome: Progressing     Problem: Pain  Goal: Verbalizes/displays adequate comfort level or baseline comfort level  Outcome: Progressing     Problem: Safety - Adult  Goal: Free from fall injury  Outcome: Progressing     Problem: ABCDS Injury Assessment  Goal: Absence of physical injury  Outcome: Progressing     Problem: Risk for Elopement  Goal: Patient will not exit the unit/facility without proper excort  Outcome: Progressing  Flowsheets (Taken 4/9/2025 1700)  Nursing Interventions for Elopement Risk: Collaborate with family members/caregivers to mitigate the elopement risk

## 2025-04-09 NOTE — PROGRESS NOTES
4 Eyes Skin Assessment and Patient belongings     The patient is being assess for  Admission    I agree that 2 Nurses have performed a thorough Head to Toe Skin Assessment on the patient. ALL assessment sites listed below have been assessed.       Areas assessed by both nurses: ***  [x]   Head, Face, and Ears   [x]   Shoulders, Back, and Chest  [x]   Arms, Elbows, and Hands   [x]   Coccyx, Sacrum, and IschIum  [x]   Legs, Feet, and Heels        Does the Patient have Skin Breakdown?  No         Daniel Prevention initiated:  No   Wound Care Orders initiated:  No      [facility] nurse consulted for Pressure Injury (Stage 3,4, Unstageable, DTI, NWPT, and Complex wounds), New and Established Ostomies:  No      I agree that 2 Nurses have reviewed patient belongings with the patient/family and documented in the flowsheet upon admission or transfer to the unit.     Belongings  Dental Appliances: Uppers, At home  Vision - Corrective Lenses: Eyeglasses, At bedside  Hearing Aid: None  Clothing: Pants, Footwear, Shirt, Hat, Jacket/Coat, Socks, Undergarments  Jewelry: None  Electronic Devices: Cell Phone  Weapons (Notify Protective Services/Security): None  Other Valuables: Sent home, Wallet, Keys, Credit/Debit Card  Home Medications: None  Valuables Given To: Family (Comment) (Anne)  Provide Name(s) of Who Valuable(s) Were Given To: Anne       Nurse 1 eSignature: Electronically signed by Carla Mc RN on 4/9/25 at 5:52 PM EDT    **SHARE this note so that the co-signing nurse is able to place an eSignature**    Nurse 2 eSignature: {Esignature:[id number]}

## 2025-04-09 NOTE — ED NOTES
Patient's wife refused to let the patient take the Lovenox, as per her he reacted with Xarelto and Eliquis before that led to hemorrhage. Tommy Galindo.

## 2025-04-09 NOTE — PROGRESS NOTES
Discussed risks, benefits, and alternatives with patient.    Patient reason for declining bed alarm treatment: patient independent at home, is not weak or impaired, just short of breath.    The following provider was notified of patient's intent to refuse: Dr. Mckay    Feedback from provider: awaiting response    Alternative interventions used in the meantime: Bed low to floor, nonskid footwear, call light and belongings within reach, fall bracelet, frequent rounding, clear pathways in room, minimize trip hazards in room, remind patient to be cautious when ambulating, wife at bedside for assist.

## 2025-04-10 ENCOUNTER — APPOINTMENT (OUTPATIENT)
Dept: GENERAL RADIOLOGY | Age: 85
DRG: 291 | End: 2025-04-10
Payer: MEDICARE

## 2025-04-10 ENCOUNTER — APPOINTMENT (OUTPATIENT)
Age: 85
DRG: 291 | End: 2025-04-10
Attending: INTERNAL MEDICINE
Payer: MEDICARE

## 2025-04-10 ENCOUNTER — APPOINTMENT (OUTPATIENT)
Dept: ULTRASOUND IMAGING | Age: 85
DRG: 291 | End: 2025-04-10
Attending: STUDENT IN AN ORGANIZED HEALTH CARE EDUCATION/TRAINING PROGRAM
Payer: MEDICARE

## 2025-04-10 PROBLEM — J90 PLEURAL EFFUSION ON RIGHT: Status: ACTIVE | Noted: 2025-04-10

## 2025-04-10 LAB
ALBUMIN FLD-MCNC: 2.3 G/DL
ALBUMIN SERPL-MCNC: 3.9 G/DL (ref 3.4–5)
ALP SERPL-CCNC: 182 U/L (ref 40–129)
ALT SERPL-CCNC: 9 U/L (ref 10–40)
ANION GAP SERPL CALCULATED.3IONS-SCNC: 10 MMOL/L (ref 3–16)
APPEARANCE FLUID: CLEAR
AST SERPL-CCNC: 21 U/L (ref 15–37)
BDY FLUID QUALITY: NORMAL
BILIRUB DIRECT SERPL-MCNC: 0.7 MG/DL (ref 0–0.3)
BILIRUB INDIRECT SERPL-MCNC: 0.4 MG/DL (ref 0–1)
BILIRUB SERPL-MCNC: 1.1 MG/DL (ref 0–1)
BUN SERPL-MCNC: 26 MG/DL (ref 7–20)
CALCIUM SERPL-MCNC: 9.2 MG/DL (ref 8.3–10.6)
CELL COUNT FLUID TYPE: NORMAL
CHLORIDE SERPL-SCNC: 102 MMOL/L (ref 99–110)
CHOLEST FLD-MCNC: 33 MG/DL
CHOLEST SERPL-MCNC: 89 MG/DL (ref 0–199)
CO2 SERPL-SCNC: 28 MMOL/L (ref 21–32)
COLOR FLUID: NORMAL
CREAT SERPL-MCNC: 1.5 MG/DL (ref 0.8–1.3)
ECHO AO ASC DIAM: 3 CM
ECHO AO ASCENDING AORTA INDEX: 1.46 CM/M2
ECHO AO ROOT DIAM: 2.9 CM
ECHO AO ROOT INDEX: 1.41 CM/M2
ECHO AV AREA PEAK VELOCITY: 1.9 CM2
ECHO AV AREA VTI: 2 CM2
ECHO AV AREA/BSA PEAK VELOCITY: 0.9 CM2/M2
ECHO AV AREA/BSA VTI: 1 CM2/M2
ECHO AV MEAN GRADIENT: 4 MMHG
ECHO AV MEAN GRADIENT: 4 MMHG
ECHO AV MEAN VELOCITY: 0.9 M/S
ECHO AV PEAK GRADIENT: 8 MMHG
ECHO AV PEAK VELOCITY: 1.4 M/S
ECHO AV VELOCITY RATIO: 0.57
ECHO AV VTI: 25.3 CM
ECHO BSA: 2.06 M2
ECHO EST RA PRESSURE: 8 MMHG
ECHO LA AREA 2C: 18.9 CM2
ECHO LA AREA 4C: 18.1 CM2
ECHO LA DIAMETER INDEX: 1.89 CM/M2
ECHO LA DIAMETER: 3.9 CM
ECHO LA MAJOR AXIS: 6.2 CM
ECHO LA MINOR AXIS: 6.2 CM
ECHO LA TO AORTIC ROOT RATIO: 1.34
ECHO LA VOL BP: 45 ML (ref 18–58)
ECHO LA VOL MOD A2C: 48 ML (ref 18–58)
ECHO LA VOL MOD A4C: 44 ML (ref 18–58)
ECHO LA VOL/BSA BIPLANE: 22 ML/M2 (ref 16–34)
ECHO LA VOLUME INDEX MOD A2C: 23 ML/M2 (ref 16–34)
ECHO LA VOLUME INDEX MOD A4C: 21 ML/M2 (ref 16–34)
ECHO LV EDV A2C: 119 ML
ECHO LV EDV A4C: 138 ML
ECHO LV EDV INDEX A4C: 67 ML/M2
ECHO LV EDV NDEX A2C: 58 ML/M2
ECHO LV EF PHYSICIAN: 55 %
ECHO LV EJECTION FRACTION A2C: 55 %
ECHO LV EJECTION FRACTION A4C: 56 %
ECHO LV EJECTION FRACTION BIPLANE: 55 % (ref 55–100)
ECHO LV ESV A2C: 53 ML
ECHO LV ESV A4C: 61 ML
ECHO LV ESV INDEX A2C: 26 ML/M2
ECHO LV ESV INDEX A4C: 30 ML/M2
ECHO LV FRACTIONAL SHORTENING: 24 % (ref 28–44)
ECHO LV INTERNAL DIMENSION DIASTOLE INDEX: 1.99 CM/M2
ECHO LV INTERNAL DIMENSION DIASTOLIC: 4.1 CM (ref 4.2–5.9)
ECHO LV INTERNAL DIMENSION SYSTOLIC INDEX: 1.5 CM/M2
ECHO LV INTERNAL DIMENSION SYSTOLIC: 3.1 CM
ECHO LV IVSD: 1.6 CM (ref 0.6–1)
ECHO LV MASS 2D: 266.9 G (ref 88–224)
ECHO LV MASS INDEX 2D: 129.6 G/M2 (ref 49–115)
ECHO LV POSTERIOR WALL DIASTOLIC: 1.6 CM (ref 0.6–1)
ECHO LV RELATIVE WALL THICKNESS RATIO: 0.78
ECHO LVOT AREA: 3.5 CM2
ECHO LVOT AV VTI INDEX: 0.57
ECHO LVOT DIAM: 2.1 CM
ECHO LVOT MEAN GRADIENT: 1 MMHG
ECHO LVOT PEAK GRADIENT: 2 MMHG
ECHO LVOT PEAK VELOCITY: 0.8 M/S
ECHO LVOT STROKE VOLUME INDEX: 24.2 ML/M2
ECHO LVOT SV: 49.9 ML
ECHO LVOT VTI: 14.4 CM
ECHO MV A VELOCITY: 0.24 M/S
ECHO MV E DECELERATION TIME (DT): 246 MS
ECHO MV E VELOCITY: 0.83 M/S
ECHO MV E/A RATIO: 3.46
ECHO PV MAX VELOCITY: 1.3 M/S
ECHO PV PEAK GRADIENT: 7 MMHG
ECHO RA AREA 4C: 18.6 CM2
ECHO RA END SYSTOLIC VOLUME APICAL 4 CHAMBER INDEX BSA: 22 ML/M2
ECHO RA VOLUME: 45 ML
ECHO RIGHT VENTRICULAR SYSTOLIC PRESSURE (RVSP): 29 MMHG
ECHO RV BASAL DIMENSION: 4.2 CM
ECHO RV FREE WALL PEAK S': 6.9 CM/S
ECHO RV LONGITUDINAL DIMENSION: 8.8 CM
ECHO RV MID DIMENSION: 3.6 CM
ECHO RV TAPSE: 0.5 CM (ref 1.7–?)
ECHO TV REGURGITANT MAX VELOCITY: 2.31 M/S
ECHO TV REGURGITANT PEAK GRADIENT: 21 MMHG
GFR SERPLBLD CREATININE-BSD FMLA CKD-EPI: 45 ML/MIN/{1.73_M2}
GLUCOSE FLD-MCNC: 116 MG/DL
GLUCOSE SERPL-MCNC: 92 MG/DL (ref 70–99)
HDLC SERPL-MCNC: 46 MG/DL (ref 40–60)
INR PPP: 1.16 (ref 0.85–1.15)
LDH FLD L TO P-CCNC: 100 U/L
LDH SERPL L TO P-CCNC: 173 U/L (ref 100–190)
LDLC SERPL CALC-MCNC: 34 MG/DL
LYMPHOCYTES NFR FLD: 29 %
MAGNESIUM SERPL-MCNC: 2.05 MG/DL (ref 1.8–2.4)
MONOCYTES NFR FLD: 63 %
NEUTROPHIL, FLUID: 8 %
NUC CELL # FLD: 203 /CUMM
POTASSIUM SERPL-SCNC: 3.9 MMOL/L (ref 3.5–5.1)
PROT FLD-MCNC: 3.5 G/DL
PROT SERPL-MCNC: 6.8 G/DL (ref 6.4–8.2)
PROTHROMBIN TIME: 15 SEC (ref 11.9–14.9)
RBC FLUID: 200 /CUMM
SODIUM SERPL-SCNC: 140 MMOL/L (ref 136–145)
SPECIMEN SOURCE FLD: NORMAL
SPECIMEN SOURCE FLD: NORMAL
T4 FREE SERPL-MCNC: 0.9 NG/DL (ref 0.9–1.8)
TOTAL CELLS COUNTED FLD: 100
TRIGL SERPL-MCNC: 47 MG/DL (ref 0–150)
TSH SERPL DL<=0.005 MIU/L-ACNC: 9.72 UIU/ML (ref 0.27–4.2)
VLDLC SERPL CALC-MCNC: 9 MG/DL

## 2025-04-10 PROCEDURE — 32555 ASPIRATE PLEURA W/ IMAGING: CPT

## 2025-04-10 PROCEDURE — 87070 CULTURE OTHR SPECIMN AEROBIC: CPT

## 2025-04-10 PROCEDURE — 80076 HEPATIC FUNCTION PANEL: CPT

## 2025-04-10 PROCEDURE — 2500000003 HC RX 250 WO HCPCS: Performed by: INTERNAL MEDICINE

## 2025-04-10 PROCEDURE — 76376 3D RENDER W/INTRP POSTPROCES: CPT | Performed by: INTERNAL MEDICINE

## 2025-04-10 PROCEDURE — 6360000002 HC RX W HCPCS: Performed by: INTERNAL MEDICINE

## 2025-04-10 PROCEDURE — 85610 PROTHROMBIN TIME: CPT

## 2025-04-10 PROCEDURE — 93306 TTE W/DOPPLER COMPLETE: CPT

## 2025-04-10 PROCEDURE — 82042 OTHER SOURCE ALBUMIN QUAN EA: CPT

## 2025-04-10 PROCEDURE — 87205 SMEAR GRAM STAIN: CPT

## 2025-04-10 PROCEDURE — 80061 LIPID PANEL: CPT

## 2025-04-10 PROCEDURE — 36415 COLL VENOUS BLD VENIPUNCTURE: CPT

## 2025-04-10 PROCEDURE — 82945 GLUCOSE OTHER FLUID: CPT

## 2025-04-10 PROCEDURE — 1200000000 HC SEMI PRIVATE

## 2025-04-10 PROCEDURE — 2700000000 HC OXYGEN THERAPY PER DAY

## 2025-04-10 PROCEDURE — 93306 TTE W/DOPPLER COMPLETE: CPT | Performed by: INTERNAL MEDICINE

## 2025-04-10 PROCEDURE — 71045 X-RAY EXAM CHEST 1 VIEW: CPT

## 2025-04-10 PROCEDURE — 99222 1ST HOSP IP/OBS MODERATE 55: CPT | Performed by: INTERNAL MEDICINE

## 2025-04-10 PROCEDURE — 84443 ASSAY THYROID STIM HORMONE: CPT

## 2025-04-10 PROCEDURE — 83615 LACTATE (LD) (LDH) ENZYME: CPT

## 2025-04-10 PROCEDURE — 99222 1ST HOSP IP/OBS MODERATE 55: CPT | Performed by: STUDENT IN AN ORGANIZED HEALTH CARE EDUCATION/TRAINING PROGRAM

## 2025-04-10 PROCEDURE — 6370000000 HC RX 637 (ALT 250 FOR IP): Performed by: INTERNAL MEDICINE

## 2025-04-10 PROCEDURE — 84157 ASSAY OF PROTEIN OTHER: CPT

## 2025-04-10 PROCEDURE — 80048 BASIC METABOLIC PNL TOTAL CA: CPT

## 2025-04-10 PROCEDURE — 84439 ASSAY OF FREE THYROXINE: CPT

## 2025-04-10 PROCEDURE — 88305 TISSUE EXAM BY PATHOLOGIST: CPT

## 2025-04-10 PROCEDURE — 94761 N-INVAS EAR/PLS OXIMETRY MLT: CPT

## 2025-04-10 PROCEDURE — 0W993ZZ DRAINAGE OF RIGHT PLEURAL CAVITY, PERCUTANEOUS APPROACH: ICD-10-PCS | Performed by: RADIOLOGY

## 2025-04-10 PROCEDURE — 89051 BODY FLUID CELL COUNT: CPT

## 2025-04-10 PROCEDURE — 83735 ASSAY OF MAGNESIUM: CPT

## 2025-04-10 PROCEDURE — 88112 CYTOPATH CELL ENHANCE TECH: CPT

## 2025-04-10 RX ADMIN — Medication 10 ML: at 20:07

## 2025-04-10 RX ADMIN — LEVOTHYROXINE SODIUM 50 MCG: 0.05 TABLET ORAL at 06:12

## 2025-04-10 RX ADMIN — GABAPENTIN 300 MG: 300 CAPSULE ORAL at 20:08

## 2025-04-10 RX ADMIN — GABAPENTIN 300 MG: 300 CAPSULE ORAL at 08:50

## 2025-04-10 RX ADMIN — Medication 10 ML: at 08:56

## 2025-04-10 RX ADMIN — FUROSEMIDE 40 MG: 10 INJECTION, SOLUTION INTRAMUSCULAR; INTRAVENOUS at 18:00

## 2025-04-10 RX ADMIN — FUROSEMIDE 40 MG: 10 INJECTION, SOLUTION INTRAMUSCULAR; INTRAVENOUS at 08:50

## 2025-04-10 ASSESSMENT — ENCOUNTER SYMPTOMS
ABDOMINAL DISTENTION: 0
BACK PAIN: 0
WHEEZING: 0
CONSTIPATION: 0
SORE THROAT: 0
SHORTNESS OF BREATH: 1
COUGH: 1
DIARRHEA: 0
NAUSEA: 0
VOMITING: 0
EYE REDNESS: 0
EYE ITCHING: 0
STRIDOR: 0
ABDOMINAL PAIN: 0
EYE PAIN: 0
COLOR CHANGE: 0
EYE DISCHARGE: 0
TROUBLE SWALLOWING: 0

## 2025-04-10 ASSESSMENT — PAIN SCALES - GENERAL: PAINLEVEL_OUTOF10: 3

## 2025-04-10 ASSESSMENT — PAIN DESCRIPTION - LOCATION: LOCATION: BACK

## 2025-04-10 NOTE — ACP (ADVANCE CARE PLANNING)
Advance Care Planning   General Advance Care Planning (ACP) Conversation    Date of Conversation: 4/9/2025  Conducted with: Patient with Decision Making Capacity  Other persons present: Spouse Anne    Healthcare Decision Maker:    Primary Decision Maker: Anne Pinedo - Spouse - [phone]    Secondary Decision Maker: Khalif Pinedo - Child - [phone]    Supplemental (Other) Decision Maker: CorwinyokoLuis Manuel - Other Relative - [phone]    Today we documented Decision Maker(s) consistent with ACP documents on file.  Content/Action Overview:  Has ACP document(s) on file - reflects the patient's care preferences  Reviewed DNR/DNI and patient elects Full Code (Attempt Resuscitation)      Length of Voluntary ACP Conversation in minutes:  <16 minutes (Non-Billable)    Jessy Omer RN

## 2025-04-10 NOTE — PLAN OF CARE
increase activity tolerance, better understand heart failure and disease management, be more comfortable, and reduce lower extremity edema. prior to discharge        :

## 2025-04-10 NOTE — PROGRESS NOTES
Perfect serve sent to Dr. Galindo regarding patient's refusal of lovenox shots for DVT prophylaxis. Patient educated on risk/benefit and purpose of shots. Awaiting MD response.

## 2025-04-10 NOTE — PLAN OF CARE
Problem: Chronic Conditions and Co-morbidities  Goal: Patient's chronic conditions and co-morbidity symptoms are monitored and maintained or improved  4/9/2025 2303 by Jennifer Tello RN  Outcome: Progressing     Problem: Discharge Planning  Goal: Discharge to home or other facility with appropriate resources  4/9/2025 2303 by Jennifer Tello RN  Outcome: Progressing     Problem: Pain  Goal: Verbalizes/displays adequate comfort level or baseline comfort level  4/9/2025 2303 by Jennifer Tello RN  Outcome: Progressing     Problem: Safety - Adult  Goal: Free from fall injury  4/9/2025 2303 by Jennifer Tello RN  Outcome: Progressing     Problem: ABCDS Injury Assessment  Goal: Absence of physical injury  4/9/2025 2303 by Jennifer Tello RN  Outcome: Progressing     Problem: Risk for Elopement  Goal: Patient will not exit the unit/facility without proper excort  4/9/2025 2303 by Jennifer Tello RN  Outcome: Progressing

## 2025-04-10 NOTE — PLAN OF CARE
Problem: Chronic Conditions and Co-morbidities  Goal: Patient's chronic conditions and co-morbidity symptoms are monitored and maintained or improved  4/10/2025 0814 by Carla Mc RN  Outcome: Progressing  4/9/2025 2303 by Jennifer Tello RN  Outcome: Progressing     Problem: Discharge Planning  Goal: Discharge to home or other facility with appropriate resources  4/10/2025 0814 by Carla Mc RN  Outcome: Progressing  4/9/2025 2303 by Jennifer Tello RN  Outcome: Progressing     Problem: Pain  Goal: Verbalizes/displays adequate comfort level or baseline comfort level  4/10/2025 0814 by Carla Mc RN  Outcome: Progressing  4/9/2025 2303 by Jennifer Tello RN  Outcome: Progressing     Problem: Safety - Adult  Goal: Free from fall injury  4/10/2025 0814 by Carla Mc RN  Outcome: Progressing  4/9/2025 2303 by Jennifer Tello RN  Outcome: Progressing     Problem: ABCDS Injury Assessment  Goal: Absence of physical injury  4/10/2025 0814 by Carla Mc RN  Outcome: Progressing  4/9/2025 2303 by Jennifer Tello RN  Outcome: Progressing     Problem: Risk for Elopement  Goal: Patient will not exit the unit/facility without proper excort  4/10/2025 0814 by Carla Mc RN  Outcome: Progressing  4/9/2025 2303 by Jennifer Tello RN  Outcome: Progressing

## 2025-04-10 NOTE — DISCHARGE INSTRUCTIONS
Heart Failure Resources:  Heart Failure Interactive Workbook:  Go to [email or web address].Mytopia/publication/?p=846778 for a Free Heart Failure Interactive Workbook provided by The American Heart Association. This interactive workbook will provide information on Healthier Living with Heart Failure. Please copy and paste link into search bar. Use your mouse to scroll through the pages.    HF [place] tammie:   Heart Failure Free smart phone tammie available for iPhone and Android download. Use your phone to track sodium intake, fluid intake, symptoms, and weight.     Low Sodium Diet / Recipes:  Go to www.[employer] website for “renal” diet which is Low Sodium! [employer] is a dialysis company, but this website offers free seasonal cookbooks. Each quarter, they will release 25-30 new recipes with a breakdown of calories, sodium, and glucose. You can also go to www.[employer]/recipes website for free recipes.     Home Exercise Program:   Identification of Green/Yellow/Red zones:  You should be able to identify when you feel good (green zone), if you have 1-2 symptoms of HF (yellow zone), or if you are in need of medical attention (red zone).  In your CHF education folder you were provided a “stop light tool” to outline this information.     We want to you to rate your exertion levels:    Our therapy team has discussed means of identification with you such as the \"Chaya scale.\"  The Chaya rating scale ranges from 6 to 20, where 6 means \"no exertion at all\" and 20 means \"maximal exertion.\" The goal is to use this to gauge how much effort it is taking for you to do your normal daily tasks.   You should be able to recognize when too much exertion is being expended.    Elements of Energy Conservation:   Prioritize/Plan: Decide what needs to be done today, and what can wait for a later date, write to do lists, plan ahead to avoid extra trips, and gather supplies and equipment needed before starting an activity.   Position:  Avoid tiring and awkward posture that may impair breathing.  Pace: Slow and steady pace, never rushing!  Pursed lip breathing.  Pursed Lip Breathing: \"Smell the roses, blow out the candles\".

## 2025-04-10 NOTE — CONSULTS
Consult Placed     Who: Shayne Mackenzie  Date:4/10/2025  Time:0941     Electronically signed by Kevin Izaguirre on 4/10/2025 at 9:41 AM

## 2025-04-10 NOTE — H&P
Hospital Medicine History & Physical    V 1.6    Date of Admission: 4/9/2025    Date of Service:  Pt seen/examined on 4/9/2025     [x]Admitted to Inpatient with expected LOS greater than two midnights due to medical therapy.  []Placed in Observation status.    Chief Admission Complaint:  SOB, hypoxia from baseline    Presenting Admission History:      84 y.o. male who presented to [facility] with SOB, hypoxia from baseline.  PMHx significant for CHF, Afib, CAD, COPD, HTN, chronic hypoxic respiratory failure on nocturnal home O2 at baseline. He reports subtle worsening of SOB recently, with AVENDAÑO. Now using home O2 around the clock. Denies fevers, chills, or new cough. No LE edema.       Assessment/Plan:      CHF - acute on chronic diastolic w/ preserved EF 55% by Echo dated 5/2024.  Etiology unclear. Hx of Persistent Afib despite prior Afib/flutter ablation. Will consult Cardiology and EP if necessary.  Continue diuresis as currently ordered w/ close monitoring of BUN/Creatinine and electrolytes for ADR.  Continue current medical management and follow input and output as well as daily weights as recorded and clinical response. Consider GDMT at discharge unless contraindicated.     Permanent Atrial Fibrillation: Cardiology consulted, consider EP eval if needed. Rate controlled. S/p Watchman. Monitor.     Discussed management and the need for Hospitalization of the patient w/ the Emergency Department Provider: Dr. Brice.     CXR: I have reviewed the CXR with the following interpretation: Right > left pleural effusion  EKG:  I have reviewed the EKG with the following interpretation: Irregular rhythm, likely Atrial Fibrillation    Physical Exam Performed:      /60   Pulse 78   Temp 98.1 °F (36.7 °C) (Oral)   Resp 18   Ht 1.829 m (6')   Wt 86 kg (189 lb 8 oz)   SpO2 98%   BMI 25.70 kg/m²     General appearance:  No apparent distress, appears stated age and cooperative.  Respiratory:  Normal    BUN 24*   CREATININE 1.5*   CALCIUM 9.7     Recent Labs     04/09/25  1145 04/09/25  1303   PROBNP 1,762*  --    TROPHS 75* 71*     No results for input(s): \"LABA1C\" in the last 72 hours.  Recent Labs     04/09/25  1145   AST 24   ALT 11   BILITOT 1.2*   ALKPHOS 208*     No results for input(s): \"INR\", \"LACTA\", \"TSH\" in the last 72 hours.     Santhosh Galindo MD

## 2025-04-10 NOTE — CONSULTS
Pulmonary New Consultation       Patient Name:  Khalif Pinedo Jr    REASONFOR ADMISSION/CC: Shortness of breath    PCP: Scott Denise MD    HISTORY OF PRESENT ILLNESS:   84 y.o. year old male with significant past medical history of CAD, hypertension, former smoker, chronic hypoxic respiratory failure on 2 L at night that presents to [facility] for shortness of breath.  Patient states that he has been getting shortness of breath with exertion and when he lies down flat.  He has a cough with nonproductive sputum.  He denies any additional symptoms such as fever, chills, chest pains, nausea, vomiting, abdominal pain.  He is not on oxygen therapy and on Trelegy at home.     Patient quit smoking over 30 years ago.  He was smoking half a pack per day for about 10 years.  He denies any illicit drug use.  He denies any occupational or household exposures    Past Medical History:   Diagnosis Date    Anemia 09/19/2022    CAD (coronary artery disease) 05/17/1998    Stent in LAD    CAD (coronary artery disease) 09/17/2009    Stent in RCA    COPD (chronic obstructive pulmonary disease) (HCC) 02/17/2016    GERD (gastroesophageal reflux disease)     on Nexium    HTN (hypertension) 06/14/2016    Hyperlipidemia     IBS (irritable bowel syndrome)     On home oxygen therapy     O2 2L at HS    Stented coronary artery        Past Surgical History:   Procedure Laterality Date    ABLATION OF DYSRHYTHMIC FOCUS  05/13/2024    afib    CAPSULE ENDOSCOPY  09/21/2022    BOWEL SMALL CAPSULE ENDOSCOPY performed by Santosh Jerome DO at [facility] ENDOSCOPY    CAPSULE ENDOSCOPY N/A 08/06/2024    PILLCAM (7:30) performed by Santosh Jerome DO at [facility] ENDOSCOPY    CARDIAC CATHETERIZATION  09/13/2012    diagnostic - Dr. Morris    CARDIAC CATHETERIZATION  09/28/2010    diagnostic - dr. morris    CARDIAC CATHETERIZATION  04/21/2000    diagnostic    CARDIAC CATHETERIZATION  05/17/1998    no in-stent restenosis    CARDIAC

## 2025-04-10 NOTE — CARE COORDINATION
Case Management Assessment  Initial Evaluation    Date/Time of Evaluation: 4/10/2025 4:00 PM  Assessment Completed by: Jessy Omer RN    If patient is discharged prior to next notation, then this note serves as note for discharge by case management.    Patient Name: Khalif Pinedo Jr                   YOB: 1940  Diagnosis: Shortness of breath [R06.02]  Hypoxemia [R09.02]  Pleural effusion on right [J90]  Acute on chronic diastolic congestive heart failure (HCC) [I50.33]  History of COPD [Z87.09]  Acute on chronic diastolic CHF (congestive heart failure) (HCC) [I50.33]  Atrial fibrillation, unspecified type (HCC) [I48.91]                   Date / Time: 4/9/2025 11:18 AM    Patient Admission Status: Inpatient   Readmission Risk (Low < 19, Mod (19-27), High > 27): Readmission Risk Score: 14.5    Current PCP: Scott Denise MD  PCP verified by CM? Yes    Chart Reviewed: Yes      History Provided by: Patient  Patient Orientation: Alert and Oriented    Patient Cognition: Alert    Hospitalization in the last 30 days (Readmission):  No    If yes, Readmission Assessment in CM Navigator will be completed.    Advance Directives:      Code Status: Full Code   Patient's Primary Decision Maker is: Named in Scanned ACP Document    Primary Decision Maker: Anne Pinedo - Spouse - [phone]    Secondary Decision Maker: MissyreginoKhalif - Child - [phone]    Supplemental (Other) Decision Maker: Luis Manuel Mosqueda - Other Relative - [phone]    Discharge Planning:    Patient lives with: Spouse/Significant Other Type of Home: Other (Comment) (condo)  Primary Care Giver: Self  Patient Support Systems include: Spouse/Significant Other   Current Financial resources: Medicare  Current community resources: None  Current services prior to admission: Oxygen Therapy            Current DME:              Type of Home Care services:  None    ADLS  Prior functional level: Independent in ADLs/IADLs  Current  plan of care/goals and shares the quality data associated with the providers was provided to: Patient   Patient Representative Name:       The Patient and/or Patient Representative Agree with the Discharge Plan? Yes    Jessy Omer RN  Case Management Department

## 2025-04-10 NOTE — CONSULTS
smoking use included cigarettes. He started smoking about 59 years ago. He has a 15 pack-year smoking history. He has never used smokeless tobacco. He reports that he does not currently use alcohol. He reports that he does not use drugs.     Family History:  No evidence for sudden cardiac death or premature CAD    Medications:  Reviewed and are listed in nursing record. and/or listed below  Outpatient Medications:  Prior to Admission medications    Medication Sig Start Date End Date Taking? Authorizing Provider   levothyroxine (SYNTHROID) 50 MCG tablet Take 1 tablet by mouth daily 3/18/25  Yes Milo Plascencia MD   ondansetron (ZOFRAN) 4 MG tablet Take 1 tablet by mouth every 8 hours as needed 1/25/25  Yes Milo Plascencia MD   furosemide (LASIX) 20 MG tablet Take 2 tablets daily by mouth for 7 days, thereafter take 1 tablet by mouth daily as needed for shortness of breath, swelling, or weight gain. 12/11/24  Yes Tanja Correia MD   REPATHA SURECLICK 140 MG/ML SOAJ ADMINISTER 1 ML UNDER THE SKIN 1 TIME EVERY 2 WEEKS 11/22/24  Yes Tanja Correia MD   gabapentin (NEURONTIN) 300 MG capsule Take 1 capsule by mouth in the morning and at bedtime. 8/7/23  Yes Milo Plascencia MD   Fluticasone-Umeclidin-Vilant (TRELEGY ELLIPTA IN) Inhale into the lungs daily   Yes Milo Plascencia MD       In-patient schedule medications:   fluticasone-umeclidin-vilant  1 puff Inhalation Daily    gabapentin  300 mg Oral BID    levothyroxine  50 mcg Oral Daily    sodium chloride flush  5-40 mL IntraVENous 2 times per day    enoxaparin  40 mg SubCUTAneous Daily    furosemide  40 mg IntraVENous BID         Infusion Medications:   sodium chloride           Allergies:  Crestor [rosuvastatin calcium], Statins, Amiodarone, Amlodipine, Budesonide-formoterol fumarate, Codeine, Dilaudid [hydromorphone hcl], Labetalol, Nitrofurantoin macrocrystal, Other, Propoxyphene, Xarelto [rivaroxaban], Zetia [ezetimibe], Apixaban,  patient/family. Alternatives to my treatment were discussed. The note was completed using EMR. Every effort was made to ensure accuracy; however, inadvertent computerized transcription errors may be present.    Tanja Correia MD, GAY, [facility], [facility]  [phone] [facility]  [phone] [facility]  4/10/2025  8:29 AM

## 2025-04-10 NOTE — PROGRESS NOTES
[facility] Medicine Progress Note  V 1.6      Date of Admission: 4/9/2025    Hospital Day: 2      Chief Admission Complaint:  SOB    Subjective:  SOB    Presenting Admission History:       P/w SOB, AVENDAÑO, fatigue x few days. Presents from home. Has been wearing home O2 continuously over the last week. Reports usually only wear O2 at night at baseline. No CP, syncope, falls, wheezing, fevers, cough, swelling, wt gain. Compliant with home meds. Follows with Pulm and Card as outpatient.     Assessment/Plan:      Current Principal Problem:  Pleural effusion on right    Patient reports feeling much better today. SOB and AVENDAÑO much improved. He is now on room air and has been ambulating in garcia. Wife at bedside.     CHF - acute on chronic diastolic w/ preserved EF 55% by Echo dated 5/2024.  Etiology unclear. Hx of Persistent Afib despite prior Afib/flutter ablation. Cardiology is following. Improving with IV lasix. Echo is ordered. Pulmonology is consulted today for thoracentesis. Continue diuresis as currently ordered w/ close monitoring of BUN/Creatinine and electrolytes for ADR.  Continue current medical management and follow input and output as well as daily weights as recorded and clinical response. Consider GDMT at discharge unless contraindicated.     Bilateral pleural effusions  Acute on chronic hypoxemic respiratory failure   Severe COPD, not in exacerbation   Former smoker  Atrial Fibrillation s/p Watchman  History of GIB        Ongoing threat to life and/or bodily function without ongoing treatment due to:  CHF, hypoxia, pleural effusions    Consults:      IP CONSULT TO HEART FAILURE NURSE/COORDINATOR  IP CONSULT TO CARDIOLOGY  IP CONSULT TO PULMONOLOGY  IP CONSULT TO PULMONOLOGY        --------------------------------------------------      Medications:        Infusion Medications    sodium chloride       Scheduled Medications    fluticasone-umeclidin-vilant  1 puff Inhalation Daily    gabapentin  300 mg Oral BID  Drugs/treatments that require intensive monitoring for toxicity    [] IV ABX (Vancomycin, Aminoglycosides, etc)     [] Post-Cath/Contrast study requiring serial monitoring    [] IV Narcotic analgesia    [x] Aggressive IV diuresis    [] Hypertonic Saline    [] Critical electrolyte abnormalities requiring IV replacement    [] Insulin - Scheduled/SSI or Insulin gtt    [] Anticoagulation (Heparin gtt or Coumadin - other anticoagulants in special circumstances)    [] Cardiac Medications (IV Amiodarone/Diltiazem, Tikosyn, etc)    [] Hemodialysis    [x] Other -  thoracentesis  [] Change in code status    [] Decision to escalate care    [] Major surgery/procedure with associated risk factors    --------------------------------------------------  C. Data (any 2)    [] Data Review (any 3)    [x] Consultant notes from yesterday/today    [x] All available current labs reviewed interpreted for clinical significance    [x] Appropriate follow-up labs were ordered  [] Collateral history obtained     [] Independent Interpretation of tests (any 1)    [] Telemetry (Rhythm Strip) personally reviewed and interpreted        [] Imaging personally reviewed and interpreted     [x] Discussion (any 1)  [x] Multi-Disciplinary Rounds with Case Management  [x] Discussed management of the case with  Pulmonology.       Labs:  Personally reviewed on 4/10/2025 and interpreted for clinical significance as documented above.     Recent Labs     04/09/25  1145   WBC 8.0   HGB 12.3*   HCT 37.0*        Recent Labs     04/09/25  1145 04/10/25  0541    140   K 4.8 3.9    102   CO2 27 28   BUN 24* 26*   CREATININE 1.5* 1.5*   CALCIUM 9.7 9.2   MG  --  2.05     Recent Labs     04/09/25 1145 04/09/25  1303   PROBNP 1,762*  --    TROPHS 75* 71*     No results for input(s): \"LABA1C\" in the last 72 hours.  Recent Labs     04/09/25  1145 04/10/25  0541   AST 24 21   ALT 11 9*   BILIDIR  --  0.7*   BILITOT 1.2* 1.1*   ALKPHOS 208* 182*     No

## 2025-04-11 VITALS
SYSTOLIC BLOOD PRESSURE: 108 MMHG | HEART RATE: 75 BPM | DIASTOLIC BLOOD PRESSURE: 62 MMHG | OXYGEN SATURATION: 92 % | WEIGHT: 178 LBS | HEIGHT: 72 IN | RESPIRATION RATE: 16 BRPM | TEMPERATURE: 97.7 F | BODY MASS INDEX: 24.11 KG/M2

## 2025-04-11 LAB
ANION GAP SERPL CALCULATED.3IONS-SCNC: 10 MMOL/L (ref 3–16)
BUN SERPL-MCNC: 28 MG/DL (ref 7–20)
CALCIUM SERPL-MCNC: 9.2 MG/DL (ref 8.3–10.6)
CHLORIDE SERPL-SCNC: 101 MMOL/L (ref 99–110)
CO2 SERPL-SCNC: 28 MMOL/L (ref 21–32)
CREAT SERPL-MCNC: 1.6 MG/DL (ref 0.8–1.3)
GFR SERPLBLD CREATININE-BSD FMLA CKD-EPI: 42 ML/MIN/{1.73_M2}
GLUCOSE SERPL-MCNC: 99 MG/DL (ref 70–99)
MAGNESIUM SERPL-MCNC: 2.05 MG/DL (ref 1.8–2.4)
NT-PROBNP SERPL-MCNC: 1618 PG/ML (ref 0–449)
POTASSIUM SERPL-SCNC: 3.7 MMOL/L (ref 3.5–5.1)
SODIUM SERPL-SCNC: 139 MMOL/L (ref 136–145)

## 2025-04-11 PROCEDURE — 80048 BASIC METABOLIC PNL TOTAL CA: CPT

## 2025-04-11 PROCEDURE — 2700000000 HC OXYGEN THERAPY PER DAY

## 2025-04-11 PROCEDURE — 99233 SBSQ HOSP IP/OBS HIGH 50: CPT | Performed by: INTERNAL MEDICINE

## 2025-04-11 PROCEDURE — 83735 ASSAY OF MAGNESIUM: CPT

## 2025-04-11 PROCEDURE — 94761 N-INVAS EAR/PLS OXIMETRY MLT: CPT

## 2025-04-11 PROCEDURE — 6370000000 HC RX 637 (ALT 250 FOR IP): Performed by: INTERNAL MEDICINE

## 2025-04-11 PROCEDURE — 2500000003 HC RX 250 WO HCPCS: Performed by: INTERNAL MEDICINE

## 2025-04-11 PROCEDURE — 83880 ASSAY OF NATRIURETIC PEPTIDE: CPT

## 2025-04-11 RX ORDER — FUROSEMIDE 40 MG/1
40 TABLET ORAL DAILY
Qty: 30 TABLET | Refills: 1 | Status: SHIPPED | OUTPATIENT
Start: 2025-04-12

## 2025-04-11 RX ORDER — FUROSEMIDE 40 MG/1
40 TABLET ORAL DAILY
Status: DISCONTINUED | OUTPATIENT
Start: 2025-04-11 | End: 2025-04-11 | Stop reason: HOSPADM

## 2025-04-11 RX ADMIN — GABAPENTIN 300 MG: 300 CAPSULE ORAL at 09:47

## 2025-04-11 RX ADMIN — LEVOTHYROXINE SODIUM 50 MCG: 0.05 TABLET ORAL at 06:26

## 2025-04-11 RX ADMIN — ACETAMINOPHEN 650 MG: 325 TABLET ORAL at 02:36

## 2025-04-11 RX ADMIN — Medication 10 ML: at 09:50

## 2025-04-11 RX ADMIN — FUROSEMIDE 40 MG: 40 TABLET ORAL at 09:47

## 2025-04-11 NOTE — CARE COORDINATION
CASE MANAGEMENT DISCHARGE SUMMARY      Discharge to: Home    IMM given: 4/9/2025    Transportation:    Family/car: personal    Confirmed discharge plan with:     Patient: yes     Family:  yes     RN, name: Nilsa Omer RN

## 2025-04-11 NOTE — PLAN OF CARE
Problem: Chronic Conditions and Co-morbidities  Goal: Patient's chronic conditions and co-morbidity symptoms are monitored and maintained or improved  4/11/2025 1055 by Nilsa Chirinos RN  Outcome: Completed  4/11/2025 0634 by Nick Grace RN  Outcome: Progressing     Problem: Discharge Planning  Goal: Discharge to home or other facility with appropriate resources  4/11/2025 1055 by Nilsa Chirinos RN  Outcome: Completed  Flowsheets (Taken 4/11/2025 0951)  Discharge to home or other facility with appropriate resources: Identify barriers to discharge with patient and caregiver  4/11/2025 0634 by Nick Grace RN  Outcome: Progressing     Problem: Pain  Goal: Verbalizes/displays adequate comfort level or baseline comfort level  4/11/2025 1055 by Nilsa Chirinos RN  Outcome: Completed  4/11/2025 0634 by Nick Grace RN  Outcome: Progressing     Problem: Safety - Adult  Goal: Free from fall injury  4/11/2025 1055 by Nilsa Chirinos RN  Outcome: Completed  4/11/2025 0634 by Nick Grace RN  Outcome: Progressing     Problem: ABCDS Injury Assessment  Goal: Absence of physical injury  4/11/2025 1055 by Nilsa Chirinos RN  Outcome: Completed  4/11/2025 0634 by Nick Grace RN  Outcome: Progressing     Problem: Risk for Elopement  Goal: Patient will not exit the unit/facility without proper excort  4/11/2025 1055 by Nilsa Chirinos RN  Outcome: Completed  Flowsheets (Taken 4/11/2025 0951)  Nursing Interventions for Elopement Risk: Collaborate with family members/caregivers to mitigate the elopement risk  4/11/2025 0634 by Nick Grace RN  Outcome: Progressing

## 2025-04-11 NOTE — DISCHARGE SUMMARY
Hospital Medicine Discharge Summary    Patient: Khalif Pinedo Jr   : 1940     Hospital:  [facility]  Admit Date: 2025   Discharge Date:   25  Disposition:  Home   Code status:  Full  Condition at Discharge: Stable  Primary Care Provider: Scott Denise MD    Admitting Provider: Santhosh Galindo MD  Discharge Provider: Xochilt Richardson MD     Discharge Diagnoses:      Active Hospital Problems    Diagnosis     Pleural effusion on right [J90]     Acute on chronic diastolic CHF (congestive heart failure) ([facility]) [I50.33]     Shortness of breath [R06.02]        Presenting Admission History:      P/w SOB, AVENDAÑO, fatigue x few days. Presents from home. Has been wearing home O2 continuously over the last week. chronic hypoxic respiratory failure on 2 L at night. Reports usually only wear O2 at night at baseline. No CP, syncope, falls, wheezing, fevers, cough, swelling, wt gain. Compliant with home meds. Follows with Pulm and Card as outpatient.      Assessment/Plan:      CHF - acute on chronic diastolic w/ preserved EF 55% by Echo dated 2024.  Etiology unclear. Hx of Persistent Afib despite prior Afib/flutter ablation. Cardiology is following. Improving with IV lasix. Echo is ordered. Pulmonology is consulted today for thoracentesis. Continue diuresis as currently ordered w/ close monitoring of BUN/Creatinine and electrolytes for ADR.  Continue current medical management and follow input and output as well as daily weights as recorded and clinical response. Consider GDMT at discharge unless contraindicated.   Cardiology recommendations at discharge:  CHF education reinforced.              ~salt restriction              ~fluid restriction              ~medication compliance              ~daily weights and notify of any significant weight gain/loss              ~establish with CHF nurse              ~outpatient follow-up with our CHF team  Would send home on lasix 40mg daily    Ventricle: Not well visualized. Right ventricle is dilated. Reduced systolic function. TAPSE is abnormal. TAPSE is 0.5 cm.   Aortic Valve: Not well visualized. Thickened cusps. Moderate sclerosis of the aortic valve cusps.   Mitral Valve: Mild annular calcification at the posterior leaflet.   Tricuspid Valve: The estimated RVSP is 29 mmHg.   Right Atrium: Not well visualized. Right atrium is dilated. Density in apex of right ventricle.     XR CHEST PORTABLE  Result Date: 4/9/2025  EXAM: XR CHEST PORTABLE INDICATION: SOB COMPARISON: 7/31/2024 and other prior studies FINDINGS: Medical Devices: Postsurgical changes of the mediastinum. Lungs: Right infrahilar and bibasilar opacities with silhouetting of the hemidiaphragms. Pleura: No pneumothorax. Blunting of the costophrenic angles. Heart and Mediastinum: The cardiomediastinal silhouette is within normal limits. Bones: No acute suspicious abnormality.     1.  Right infrahilar atelectasis or pneumonia. 2.  Small bilateral pleural effusions, right greater than left. Electronically signed by Phil Moise      Consults:     IP CONSULT TO HEART FAILURE NURSE/COORDINATOR  IP CONSULT TO CARDIOLOGY  IP CONSULT TO PULMONOLOGY  IP CONSULT TO PULMONOLOGY    Labs:     Recent Labs     04/09/25  1145   WBC 8.0   HGB 12.3*   HCT 37.0*        Recent Labs     04/09/25  1145 04/10/25  0541 04/11/25  0608    140 139   K 4.8 3.9 3.7    102 101   CO2 27 28 28   BUN 24* 26* 28*   CREATININE 1.5* 1.5* 1.6*   CALCIUM 9.7 9.2 9.2   MG  --  2.05 2.05     Recent Labs     04/09/25  1145 04/09/25  1303 04/11/25  0608   PROBNP 1,762*  --  1,618*   TROPHS 75* 71*  --      No results for input(s): \"LABA1C\" in the last 72 hours.  Recent Labs     04/09/25  1145 04/10/25  0541   AST 24 21   ALT 11 9*   BILIDIR  --  0.7*   BILITOT 1.2* 1.1*   ALKPHOS 208* 182*     Recent Labs     04/10/25  1141   INR 1.16*       Urine Cultures:   Lab Results   Component Value Date/Time    LABURIN No

## 2025-04-11 NOTE — PROGRESS NOTES
Patient discharged to home. IV removed with no complications, telemetry removed CM[facility] notified. Discharge education complete with verbal understanding. All belongings sent home with patient. Patient transported via wheelchair to [facility].

## 2025-04-11 NOTE — PROGRESS NOTES
Physician Progress Note      PATIENT:               JUDY BECERRA JR  CSN #:                  [id number]  :                       1940  ADMIT DATE:       2025 11:18 AM  DISCH DATE:        2025 11:26 AM  RESPONDING  PROVIDER #:        Xochilt Richardson MD          QUERY TEXT:    Acute respiratory failure is documented in the medical record on attending   note 4/10. Please provide additional clinical indicators supportive of your   documentation. Or please document if the diagnosis of acute respiratory   failure has been ruled out after study.    The clinical indicators include:  -per ED provider -\"No acute distress.  Currently on 2 L\"  -per ED provider -\"Effort: Pulmonary effort is normal. No respiratory   distress.\"  -per H/P- \"PMH....chronic hypoxic respiratory failure on nocturnal home O2 at   baseline.\"  -per attending note 4/10-\"Acute on chronic hypoxemic respiratory failure...   Normal respiratory effort.\"  -per VS flow sheet- maintained on 1-2 L NC during admission sat 92-97% , RR   16-22  Options provided:  -- Acute Respiratory Failure as evidenced by, Please document evidence.  -- Acute Respiratory Failure ruled out after study and Chronic Respiratory   Failure confirmed  -- Other - I will add my own diagnosis  -- Disagree - Not applicable / Not valid  -- Disagree - Clinically unable to determine / Unknown  -- Refer to Clinical Documentation Reviewer    PROVIDER RESPONSE TEXT:    Acute Respiratory Failure ruled out after study and Chronic Respiratory   Failure confirmed.    Query created by: Loreta Vizcarra on 2025 2:34 PM      Electronically signed by:  Xochilt Richardson MD 2025 4:25 PM

## 2025-04-11 NOTE — PLAN OF CARE
CHF Care Plan      Patient's EF (Ejection Fraction) is greater than 40%    Heart Failure Medications:  Diuretics:: Furosemide    (One of the following REQUIRED for EF </= 40%/SYSTOLIC FAILURE but MAY be used in EF% >40%/DIASTOLIC FAILURE)        ACE:: None        ARB:: None         ARNI:: None    (Beta Blockers)  NON- Evidenced Based Beta Blocker (for EF% >40%/DIASTOLIC FAILURE): None    Evidenced Based Beta Blocker::(REQUIRED for EF% <40%/SYSTOLIC FAILURE) None  ...................................................................................................................................................    Failed to redirect to the Timeline version of the "[employer] SmartLink.      Patient's weights and intake/output reviewed    Daily Weight log at bedside, patient/family participation in use of log: \"yes    Patient's current weight today shows a difference of 6 lbs less than last documented weight.      Intake/Output Summary (Last 24 hours) at 4/11/2025 0635  Last data filed at 4/10/2025 2329  Gross per 24 hour   Intake 240 ml   Output 1200 ml   Net -960 ml       Education Booklet Provided: yes    Comorbidities Reviewed Yes    Patient has a past medical history of Anemia, CAD (coronary artery disease), CAD (coronary artery disease), COPD (chronic obstructive pulmonary disease) (HCC), GERD (gastroesophageal reflux disease), HTN (hypertension), Hyperlipidemia, IBS (irritable bowel syndrome), On home oxygen therapy, and Stented coronary artery.     >>For CHF and Comorbidity documentation on Education Time and Topics, please see Education Tab      CHF Education    Learners:  Patient and Significant Other  Readineess:   Acceptance  Method:   Explanation and Handout  Response:   Verbalizes Understanding  Comments:     Time Spent: 10 mins      Pt resting in bed at this time on  2 L O2. Pt with complaints of shortness of breath. Pt with nonpitting lower extremity edema.     Patient and/or Family's stated Goal of Care this  Admission: reduce shortness of breath, increase activity tolerance, better understand heart failure and disease management, be more comfortable, and reduce lower extremity edema prior to discharge        :   Problem: Chronic Conditions and Co-morbidities  Goal: Patient's chronic conditions and co-morbidity symptoms are monitored and maintained or improved  Outcome: Progressing     Problem: Discharge Planning  Goal: Discharge to home or other facility with appropriate resources  Outcome: Progressing     Problem: Pain  Goal: Verbalizes/displays adequate comfort level or baseline comfort level  Outcome: Progressing     Problem: Safety - Adult  Goal: Free from fall injury  Outcome: Progressing     Problem: ABCDS Injury Assessment  Goal: Absence of physical injury  Outcome: Progressing     Problem: Risk for Elopement  Goal: Patient will not exit the unit/facility without proper excort  Outcome: Progressing

## 2025-04-11 NOTE — PROGRESS NOTES
[place]   Cardiovascular Evaluation    PATIENT: Khalif Pinedo Jr  DATE: 2025  MRN: [id number]  CSN: [id number]  : 1940    Primary Care Doctor/Referring provider: Scott Denise MD, Santhosh Galindo MD     Reason for evaluation/Chief complaint:   Shortness of Breath (Pt reports he usually wears oxygen only at night, however says this morning he felt the need to keep the oxygen on at all times due to SOB. Reports his PCP is through [facility] and he was referred to a [facility] pulmonologist, however was not satisfied with the group. He sees Dr Tillman and Dr Islas with [facility] and would like a referral to a new pulmonologist. Patient reporting he's wearing himself out due to the increase in his demand of breathing.  )      Subjective: Feels much better after lasix and thoracentesis.     History of present illness on initial date of evaluation:   Khalif Pinedo Jr is a 84 y.o. patient who presents to the hospital for the evaluation of progressive shortness of breath for the past several weeks.  Patient states that his shortness of breath initially started with mild restrictions and activity.  Over the past several days the shortness of breath has significantly worsened.  Patient states that he cannot ambulate without getting out of breath.  He denies any chest pain.  States that the shortness of breath is.        Patient Active Problem List   Diagnosis    Coronary artery disease involving native coronary artery of native heart without angina pectoris    Hyperlipidemia    Nausea & vomiting    Carotid disease, bilateral    Chest pain    COPD (chronic obstructive pulmonary disease) (HCC)    HTN (hypertension)    History of coronary artery stent placement    IBS (irritable bowel syndrome)    GERD (gastroesophageal reflux disease)    CRP elevated    Weakness    Shortness of breath    Centrilobular emphysema (HCC)    Atrial fibrillation (HCC)    Atrial flutter by

## 2025-04-11 NOTE — PLAN OF CARE
CHF Care Plan      Patient's EF (Ejection Fraction) is greater than 40%    Heart Failure Medications:  Diuretics:: Furosemide    (One of the following REQUIRED for EF </= 40%/SYSTOLIC FAILURE but MAY be used in EF% >40%/DIASTOLIC FAILURE)        ACE:: None        ARB:: None         ARNI:: None    (Beta Blockers)  NON- Evidenced Based Beta Blocker (for EF% >40%/DIASTOLIC FAILURE): None    Evidenced Based Beta Blocker::(REQUIRED for EF% <40%/SYSTOLIC FAILURE) None  ...................................................................................................................................................    Failed to redirect to the Timeline version of the [employer].      Patient's weights and intake/output reviewed    Daily Weight log at bedside, patient/family participation in use of log: \"yes    Patient's current weight today shows a difference of 6 lbs less than last documented weight.      Intake/Output Summary (Last 24 hours) at 4/11/2025 1054  Last data filed at 4/11/2025 0951  Gross per 24 hour   Intake 840 ml   Output 1700 ml   Net -860 ml       Education Booklet Provided: yes    Comorbidities Reviewed Yes    Patient has a past medical history of Anemia, CAD (coronary artery disease), CAD (coronary artery disease), COPD (chronic obstructive pulmonary disease) ([facility]), GERD (gastroesophageal reflux disease), HTN (hypertension), Hyperlipidemia, IBS (irritable bowel syndrome), On home oxygen therapy, and Stented coronary artery.     >>For CHF and Comorbidity documentation on Education Time and Topics, please see Education Tab      CHF Education    Learners:  Patient and Significant Other  Readineess:   Acceptance  Method:   Explanation and Handout  Response:   Verbalizes Understanding  Comments:     Time Spent: 5 min       Pt up in chair at this time on room air. Pt denies shortness of breath. Pt without lower extremity edema.     Patient and/or Family's stated Goal of Care this Admission: increase  activity tolerance, better understand heart failure and disease management, and be more comfortable prior to discharge        :

## 2025-04-13 LAB
BACTERIA FLD AEROBE CULT: NORMAL
GRAM STN SPEC: NORMAL

## 2025-04-17 ENCOUNTER — OFFICE VISIT (OUTPATIENT)
Dept: PULMONOLOGY | Age: 85
End: 2025-04-17
Payer: MEDICARE

## 2025-04-17 ENCOUNTER — HOSPITAL ENCOUNTER (OUTPATIENT)
Dept: GENERAL RADIOLOGY | Age: 85
Discharge: HOME | End: 2025-04-17
Payer: MEDICARE

## 2025-04-17 ENCOUNTER — HOSPITAL ENCOUNTER (OUTPATIENT)
Age: 85
Discharge: HOME | End: 2025-04-17
Payer: MEDICARE

## 2025-04-17 VITALS
TEMPERATURE: 97.7 F | HEART RATE: 76 BPM | DIASTOLIC BLOOD PRESSURE: 55 MMHG | OXYGEN SATURATION: 91 % | HEIGHT: 72 IN | WEIGHT: 191.25 LBS | BODY MASS INDEX: 25.9 KG/M2 | SYSTOLIC BLOOD PRESSURE: 101 MMHG | RESPIRATION RATE: 16 BRPM

## 2025-04-17 DIAGNOSIS — J90 BILATERAL PLEURAL EFFUSION: Primary | ICD-10-CM

## 2025-04-17 DIAGNOSIS — J90 BILATERAL PLEURAL EFFUSION: ICD-10-CM

## 2025-04-17 DIAGNOSIS — R07.81 PLEURITIC CHEST PAIN: ICD-10-CM

## 2025-04-17 DIAGNOSIS — R06.02 SOB (SHORTNESS OF BREATH): ICD-10-CM

## 2025-04-17 DIAGNOSIS — Z87.891 FORMER SMOKER: ICD-10-CM

## 2025-04-17 DIAGNOSIS — J44.9 COPD, SEVERE (HCC): ICD-10-CM

## 2025-04-17 PROCEDURE — 3074F SYST BP LT 130 MM HG: CPT | Performed by: STUDENT IN AN ORGANIZED HEALTH CARE EDUCATION/TRAINING PROGRAM

## 2025-04-17 PROCEDURE — G2211 COMPLEX E/M VISIT ADD ON: HCPCS | Performed by: STUDENT IN AN ORGANIZED HEALTH CARE EDUCATION/TRAINING PROGRAM

## 2025-04-17 PROCEDURE — 1123F ACP DISCUSS/DSCN MKR DOCD: CPT | Performed by: STUDENT IN AN ORGANIZED HEALTH CARE EDUCATION/TRAINING PROGRAM

## 2025-04-17 PROCEDURE — 71046 X-RAY EXAM CHEST 2 VIEWS: CPT

## 2025-04-17 PROCEDURE — 1036F TOBACCO NON-USER: CPT | Performed by: STUDENT IN AN ORGANIZED HEALTH CARE EDUCATION/TRAINING PROGRAM

## 2025-04-17 PROCEDURE — 1111F DSCHRG MED/CURRENT MED MERGE: CPT | Performed by: STUDENT IN AN ORGANIZED HEALTH CARE EDUCATION/TRAINING PROGRAM

## 2025-04-17 PROCEDURE — 3023F SPIROM DOC REV: CPT | Performed by: STUDENT IN AN ORGANIZED HEALTH CARE EDUCATION/TRAINING PROGRAM

## 2025-04-17 PROCEDURE — G8419 CALC BMI OUT NRM PARAM NOF/U: HCPCS | Performed by: STUDENT IN AN ORGANIZED HEALTH CARE EDUCATION/TRAINING PROGRAM

## 2025-04-17 PROCEDURE — 99213 OFFICE O/P EST LOW 20 MIN: CPT | Performed by: STUDENT IN AN ORGANIZED HEALTH CARE EDUCATION/TRAINING PROGRAM

## 2025-04-17 PROCEDURE — 3078F DIAST BP <80 MM HG: CPT | Performed by: STUDENT IN AN ORGANIZED HEALTH CARE EDUCATION/TRAINING PROGRAM

## 2025-04-17 PROCEDURE — G8427 DOCREV CUR MEDS BY ELIG CLIN: HCPCS | Performed by: STUDENT IN AN ORGANIZED HEALTH CARE EDUCATION/TRAINING PROGRAM

## 2025-04-17 PROCEDURE — 1159F MED LIST DOCD IN RCRD: CPT | Performed by: STUDENT IN AN ORGANIZED HEALTH CARE EDUCATION/TRAINING PROGRAM

## 2025-04-17 RX ORDER — LIDOCAINE 4 G/G
1 PATCH TOPICAL DAILY
Qty: 15 EACH | Refills: 0 | Status: SHIPPED | OUTPATIENT
Start: 2025-04-17 | End: 2025-05-02

## 2025-04-17 ASSESSMENT — ENCOUNTER SYMPTOMS
ABDOMINAL DISTENTION: 0
TROUBLE SWALLOWING: 0
SORE THROAT: 0
COUGH: 0
BACK PAIN: 0
EYE ITCHING: 0
STRIDOR: 0
EYE DISCHARGE: 0
COLOR CHANGE: 0
CONSTIPATION: 0
EYE REDNESS: 0
EYE PAIN: 0
NAUSEA: 0
SHORTNESS OF BREATH: 0
ABDOMINAL PAIN: 0
WHEEZING: 0
DIARRHEA: 0
VOMITING: 0

## 2025-04-17 NOTE — PATIENT INSTRUCTIONS
Remember to bring a list of pulmonary medications and any CPAP or BiPAP machines to your next appointment with the office.     Please keep all of your future appointments scheduled by [facility] [place] [facility] office. Out of respect for other patients and providers, you may be asked to reschedule your appointment if you arrive later than your scheduled appointment time. Appointments cancelled less than 24hrs in advance will be considered a no show. Patients with three missed appointments within 1 year or four missed appointments within 2 years can be dismissed from the practice.     Please be aware that our physicians are required to work in the Intensive Care Unit at [facility].  Your appointment may need to be rescheduled if they are designated to work during your appointment time.      You may receive a survey regarding the care you received during your visit.  Your input is valuable to us.  We encourage you to complete and return your survey.  We hope you will choose us in the future for your healthcare needs.     Pt instructed of all future appointment dates & times, including radiology, labs, procedures & referrals. If procedures were scheduled preparation instructions provided. Instructions on future appointments with [facility] were given.     In the next few weeks, you will be receiving a survey from [facility] regarding your visit today.  We would greatly appreciate it if you would take just a few minutes to fill that out.  It is very important to us that our patients receive top notch care and our surveys help keep us accountable. However, if your experience was not a good one, we want to hear about that as well. This is a key way we can keep track of problems and strive to correct any for future visits.    Again, we appreciate your time and thank you for choosing [facility]!    Sudha VALDOVINOS

## 2025-04-17 NOTE — PROGRESS NOTES
[facility] Follow-up  [address] [place] [address]  [phone]        Khalif Pinedo Jr (: 1940 ) is a 84 y.o. male here for an evaluation of   Chief Complaint   Patient presents with    Follow-up    COPD    Results     Thoracentesis         SUBJECTIVE/OBJECTIVE:  84 y.o. year old male with significant past medical history of CAD, hypertension, former smoker, chronic hypoxic respiratory failure on 2 L at night that presents to [facility] pulmonary clinic for follow-up visit.  Patient had a thoracentesis for shortness of breath at hospitalization.  He says that his breathing felt better and he is able to exert himself farther distances.  Recently, he has been having left-sided chest pains and back pain.  He is on Trelegy with albuterol as needed for shortness of breath.  He is here for follow-up of thoracentesis results.     Patient quit smoking over 30 years ago.  He was smoking half a pack per day for about 10 years.  He denies any illicit drug use.  He denies any occupational or household exposures      Review of Systems   Constitutional:  Negative for activity change, appetite change, chills, diaphoresis and fatigue.   HENT:  Negative for congestion, sore throat and trouble swallowing.    Eyes:  Negative for pain, discharge, redness and itching.   Respiratory:  Negative for cough, shortness of breath, wheezing and stridor.    Cardiovascular:  Positive for chest pain. Negative for palpitations and leg swelling.   Gastrointestinal:  Negative for abdominal distention, abdominal pain, constipation, diarrhea, nausea and vomiting.   Endocrine: Negative for polydipsia, polyphagia and polyuria.   Genitourinary:  Negative for difficulty urinating.   Musculoskeletal:  Negative for back pain, myalgias and neck pain.   Skin:  Negative for color change.   Neurological:  Negative for dizziness, weakness and light-headedness.   Psychiatric/Behavioral:  Negative for agitation and behavioral

## 2025-04-17 NOTE — PROGRESS NOTES
MA Communication:  The following orders are received by verbal communication from   Shayne Mackenzie MD    Orders include:  Chest xray     FU 3 mo

## 2025-05-13 ENCOUNTER — OFFICE VISIT (OUTPATIENT)
Dept: CARDIOLOGY CLINIC | Age: 85
End: 2025-05-13
Payer: MEDICARE

## 2025-05-13 VITALS
HEIGHT: 72 IN | DIASTOLIC BLOOD PRESSURE: 56 MMHG | SYSTOLIC BLOOD PRESSURE: 120 MMHG | OXYGEN SATURATION: 96 % | HEART RATE: 67 BPM | WEIGHT: 195 LBS | BODY MASS INDEX: 26.41 KG/M2

## 2025-05-13 DIAGNOSIS — I25.10 CORONARY ARTERY DISEASE INVOLVING NATIVE CORONARY ARTERY OF NATIVE HEART WITHOUT ANGINA PECTORIS: Chronic | ICD-10-CM

## 2025-05-13 DIAGNOSIS — J90 PLEURAL EFFUSION ON RIGHT: ICD-10-CM

## 2025-05-13 DIAGNOSIS — I50.33 ACUTE ON CHRONIC DIASTOLIC CHF (CONGESTIVE HEART FAILURE) (HCC): Primary | ICD-10-CM

## 2025-05-13 DIAGNOSIS — J44.9 CHRONIC OBSTRUCTIVE PULMONARY DISEASE, UNSPECIFIED COPD TYPE (HCC): ICD-10-CM

## 2025-05-13 PROCEDURE — G8427 DOCREV CUR MEDS BY ELIG CLIN: HCPCS | Performed by: NURSE PRACTITIONER

## 2025-05-13 PROCEDURE — 1159F MED LIST DOCD IN RCRD: CPT | Performed by: NURSE PRACTITIONER

## 2025-05-13 PROCEDURE — G2211 COMPLEX E/M VISIT ADD ON: HCPCS | Performed by: NURSE PRACTITIONER

## 2025-05-13 PROCEDURE — 1123F ACP DISCUSS/DSCN MKR DOCD: CPT | Performed by: NURSE PRACTITIONER

## 2025-05-13 PROCEDURE — 3023F SPIROM DOC REV: CPT | Performed by: NURSE PRACTITIONER

## 2025-05-13 PROCEDURE — 1160F RVW MEDS BY RX/DR IN RCRD: CPT | Performed by: NURSE PRACTITIONER

## 2025-05-13 PROCEDURE — 99214 OFFICE O/P EST MOD 30 MIN: CPT | Performed by: NURSE PRACTITIONER

## 2025-05-13 PROCEDURE — 3078F DIAST BP <80 MM HG: CPT | Performed by: NURSE PRACTITIONER

## 2025-05-13 PROCEDURE — 1036F TOBACCO NON-USER: CPT | Performed by: NURSE PRACTITIONER

## 2025-05-13 PROCEDURE — G8419 CALC BMI OUT NRM PARAM NOF/U: HCPCS | Performed by: NURSE PRACTITIONER

## 2025-05-13 PROCEDURE — 3074F SYST BP LT 130 MM HG: CPT | Performed by: NURSE PRACTITIONER

## 2025-05-13 RX ORDER — SPIRONOLACTONE 25 MG/1
25 TABLET ORAL DAILY
Qty: 30 TABLET | Refills: 1 | Status: SHIPPED | OUTPATIENT
Start: 2025-05-13

## 2025-05-13 NOTE — PATIENT INSTRUCTIONS
Continue daily weights  Recommend adding Spironolactone (aldactone) 25mg daily  Continue Lasix 40mg daily for now and okay to take an extra 20mg of the lasix until you get the new medication Spironolactone (aldactone) and then reduce to 40mg daily.   Check labs in about 1 week and again in 1 month.   Follow up with Dr. Correia as planned  Follow up with pulmonary as planned   Follow up with CHF team 3 months

## 2025-05-14 NOTE — PROGRESS NOTES
Called and spoke with wife.   Recommend she reach out to pulmonary to see if they have any further recommendations regarding medications that were adjusted.     
5/13/2025, 2:08 PM

## 2025-06-04 DIAGNOSIS — J90 PLEURAL EFFUSION ON RIGHT: ICD-10-CM

## 2025-06-04 DIAGNOSIS — I50.33 ACUTE ON CHRONIC DIASTOLIC CHF (CONGESTIVE HEART FAILURE) (HCC): ICD-10-CM

## 2025-06-05 ENCOUNTER — TELEPHONE (OUTPATIENT)
Dept: CARDIOLOGY CLINIC | Age: 85
End: 2025-06-05

## 2025-06-05 RX ORDER — FUROSEMIDE 40 MG/1
40 TABLET ORAL DAILY
Qty: 90 TABLET | Refills: 1 | Status: SHIPPED | OUTPATIENT
Start: 2025-06-05

## 2025-06-05 NOTE — TELEPHONE ENCOUNTER
Medication Refill    Medication needing refilled:  Lasix    Dosage of the medication:  40mg  How are you taking this medication (QD, BID, TID, QID, PRN):  QD  30 or 90 day supply called in:  30  When will you run out of your medication:    Which Pharmacy are we sending the medication to?:  THEY HAVE CHANGED PHARMACY TO   [id number] - [place], [place] - [address] [place] [phone] - F [phone] [[id number]]       LAST OV 05/13/2025 NPRB  NEXT OV 08/13/2025 NPRB

## 2025-06-05 NOTE — TELEPHONE ENCOUNTER
----- Message from LASHONDA Malhotra CNP sent at 6/5/2025  6:54 AM EDT -----  Please tell patient he needs appointment with me in August.  I am happy to see him the same day he is seeing Germania Almanzar which I believe is August 13.  If he can do 11:15 after he sees her I am happy to see him at that time.  Thank you!

## 2025-06-20 ENCOUNTER — PATIENT MESSAGE (OUTPATIENT)
Dept: CARDIOLOGY CLINIC | Age: 85
End: 2025-06-20

## 2025-06-20 DIAGNOSIS — R79.89 ABNORMAL TSH: Primary | ICD-10-CM

## 2025-06-26 NOTE — PROGRESS NOTES
4. Chronic obstructive pulmonary disease, unspecified COPD type (HCC)        5. Primary hypertension        6. History of coronary artery stent placement        7. Centrilobular emphysema (HCC)        8. Persistent atrial fibrillation (HCC)        9. Status post ablation of atrial fibrillation        10. Pleural effusion on right          Plan:  Appears to be not retaining fluid in lung or LE.  Okay to continue with taking lasix as  needed for weight gain >3 lbs in 24 hours or >5 lbs in 3 days and/or increased swelling/shortness of breath   Continue taking repatha  Keep follow up with Germania Almanzar NP and Vandana Temple NP  5. F/U with me as directed by NP team      Scribe's attestation:  This note was scribed in the presence of Dr. Tanja Correia MD. By Chelsy Grijalva RN        I, Dr. Tanja Correia, personally performed the services described in this documentation, as scribed by the above signed scribe in my presence. It is both accurate and complete to my knowledge. I agree with the details independently gathered by the clinical support staff, while the remaining scribed note accurately describes my personal service to the patient.    Medical Decision Making:  The following items were considered in medical decision making:  Independent review of images  Review / order clinical lab tests  Review / order radiology tests  Decision to obtain old records  Review and summation of old records as accessed through [employer] (a summary of my findings in these old records)      Time Based Itemization  A total of 30 minutes was spent on today's patient encounter.  If applicable, non-patient-facing activities:  (X)Preparing to see the patient and reviewing records  (X) Individual interpretation of results  ( ) Discussion or coordination of care with other health care professionals  () Ordering of unique tests, medications, or procedures  (X) Documentation within the EHR               All questions and concerns were 
GAY, FAC, [facility]  [phone] [facility]  [phone] Main central  6/25/2025  1:16 PM

## 2025-06-27 DIAGNOSIS — I50.33 ACUTE ON CHRONIC DIASTOLIC CHF (CONGESTIVE HEART FAILURE) (HCC): ICD-10-CM

## 2025-06-27 DIAGNOSIS — J90 PLEURAL EFFUSION ON RIGHT: ICD-10-CM

## 2025-06-27 DIAGNOSIS — R79.89 ABNORMAL TSH: ICD-10-CM

## 2025-06-28 LAB
ANION GAP SERPL CALCULATED.3IONS-SCNC: 10 MMOL/L (ref 3–16)
BUN SERPL-MCNC: 37 MG/DL (ref 7–20)
CALCIUM SERPL-MCNC: 9.5 MG/DL (ref 8.3–10.6)
CHLORIDE SERPL-SCNC: 100 MMOL/L (ref 99–110)
CO2 SERPL-SCNC: 28 MMOL/L (ref 21–32)
CREAT SERPL-MCNC: 1.9 MG/DL (ref 0.8–1.3)
GFR SERPLBLD CREATININE-BSD FMLA CKD-EPI: 34 ML/MIN/{1.73_M2}
GLUCOSE SERPL-MCNC: 86 MG/DL (ref 70–99)
NT-PROBNP SERPL-MCNC: 1682 PG/ML (ref 0–449)
POTASSIUM SERPL-SCNC: 5.2 MMOL/L (ref 3.5–5.1)
SODIUM SERPL-SCNC: 138 MMOL/L (ref 136–145)
T4 FREE SERPL-MCNC: 1.1 NG/DL (ref 0.9–1.8)
TSH SERPL DL<=0.005 MIU/L-ACNC: 16.7 UIU/ML (ref 0.27–4.2)

## 2025-07-01 ENCOUNTER — RESULTS FOLLOW-UP (OUTPATIENT)
Dept: CARDIOLOGY CLINIC | Age: 85
End: 2025-07-01

## 2025-07-01 DIAGNOSIS — I50.33 ACUTE ON CHRONIC DIASTOLIC CHF (CONGESTIVE HEART FAILURE) (HCC): ICD-10-CM

## 2025-07-01 DIAGNOSIS — Z79.899 MEDICATION MANAGEMENT: Primary | ICD-10-CM

## 2025-07-01 RX ORDER — SPIRONOLACTONE 25 MG/1
TABLET ORAL
Qty: 30 TABLET | Refills: 1
Start: 2025-07-01 | End: 2025-07-09 | Stop reason: SINTOL

## 2025-07-09 ENCOUNTER — OFFICE VISIT (OUTPATIENT)
Age: 85
End: 2025-07-09
Payer: MEDICARE

## 2025-07-09 VITALS
SYSTOLIC BLOOD PRESSURE: 136 MMHG | OXYGEN SATURATION: 99 % | HEIGHT: 72 IN | DIASTOLIC BLOOD PRESSURE: 72 MMHG | HEART RATE: 69 BPM | WEIGHT: 186.4 LBS | BODY MASS INDEX: 25.25 KG/M2

## 2025-07-09 DIAGNOSIS — Z98.890 STATUS POST ABLATION OF ATRIAL FIBRILLATION: ICD-10-CM

## 2025-07-09 DIAGNOSIS — J90 PLEURAL EFFUSION ON RIGHT: ICD-10-CM

## 2025-07-09 DIAGNOSIS — Z95.5 HISTORY OF CORONARY ARTERY STENT PLACEMENT: ICD-10-CM

## 2025-07-09 DIAGNOSIS — Z86.79 STATUS POST ABLATION OF ATRIAL FIBRILLATION: ICD-10-CM

## 2025-07-09 DIAGNOSIS — I25.10 CORONARY ARTERY DISEASE INVOLVING NATIVE CORONARY ARTERY OF NATIVE HEART WITHOUT ANGINA PECTORIS: Primary | Chronic | ICD-10-CM

## 2025-07-09 DIAGNOSIS — I48.19 PERSISTENT ATRIAL FIBRILLATION (HCC): ICD-10-CM

## 2025-07-09 DIAGNOSIS — J43.2 CENTRILOBULAR EMPHYSEMA (HCC): ICD-10-CM

## 2025-07-09 DIAGNOSIS — J44.9 CHRONIC OBSTRUCTIVE PULMONARY DISEASE, UNSPECIFIED COPD TYPE (HCC): Chronic | ICD-10-CM

## 2025-07-09 DIAGNOSIS — E78.2 MIXED HYPERLIPIDEMIA: Chronic | ICD-10-CM

## 2025-07-09 DIAGNOSIS — I65.23 BILATERAL CAROTID ARTERY STENOSIS: Chronic | ICD-10-CM

## 2025-07-09 DIAGNOSIS — I10 PRIMARY HYPERTENSION: Chronic | ICD-10-CM

## 2025-07-09 PROCEDURE — 3078F DIAST BP <80 MM HG: CPT | Performed by: INTERNAL MEDICINE

## 2025-07-09 PROCEDURE — 1123F ACP DISCUSS/DSCN MKR DOCD: CPT | Performed by: INTERNAL MEDICINE

## 2025-07-09 PROCEDURE — 99214 OFFICE O/P EST MOD 30 MIN: CPT | Performed by: INTERNAL MEDICINE

## 2025-07-09 PROCEDURE — 1159F MED LIST DOCD IN RCRD: CPT | Performed by: INTERNAL MEDICINE

## 2025-07-09 PROCEDURE — 3075F SYST BP GE 130 - 139MM HG: CPT | Performed by: INTERNAL MEDICINE

## 2025-07-09 PROCEDURE — 3023F SPIROM DOC REV: CPT | Performed by: INTERNAL MEDICINE

## 2025-07-09 PROCEDURE — G8427 DOCREV CUR MEDS BY ELIG CLIN: HCPCS | Performed by: INTERNAL MEDICINE

## 2025-07-09 PROCEDURE — 1036F TOBACCO NON-USER: CPT | Performed by: INTERNAL MEDICINE

## 2025-07-09 PROCEDURE — G8419 CALC BMI OUT NRM PARAM NOF/U: HCPCS | Performed by: INTERNAL MEDICINE

## 2025-07-09 NOTE — PATIENT INSTRUCTIONS
Continue taking repatha  Keep follow up with Germania Almanzar NP and Vandana Temple NP  Okay to continue with taking lasix as  needed for weight gain >3 lbs in 24 hours or >5 lbs in 3 days and/or increased swelling/shortness of breath

## 2025-07-16 DIAGNOSIS — I50.33 ACUTE ON CHRONIC DIASTOLIC CHF (CONGESTIVE HEART FAILURE) (HCC): ICD-10-CM

## 2025-07-16 DIAGNOSIS — J90 PLEURAL EFFUSION ON RIGHT: ICD-10-CM

## 2025-07-16 DIAGNOSIS — R79.89 ABNORMAL TSH: ICD-10-CM

## 2025-07-16 LAB
ANION GAP SERPL CALCULATED.3IONS-SCNC: 10 MMOL/L (ref 3–16)
BUN SERPL-MCNC: 30 MG/DL (ref 7–20)
CALCIUM SERPL-MCNC: 10 MG/DL (ref 8.3–10.6)
CHLORIDE SERPL-SCNC: 106 MMOL/L (ref 99–110)
CO2 SERPL-SCNC: 26 MMOL/L (ref 21–32)
CREAT SERPL-MCNC: 1.4 MG/DL (ref 0.8–1.3)
GFR SERPLBLD CREATININE-BSD FMLA CKD-EPI: 49 ML/MIN/{1.73_M2}
GLUCOSE SERPL-MCNC: 87 MG/DL (ref 70–99)
NT-PROBNP SERPL-MCNC: 2169 PG/ML (ref 0–449)
POTASSIUM SERPL-SCNC: 5 MMOL/L (ref 3.5–5.1)
SODIUM SERPL-SCNC: 142 MMOL/L (ref 136–145)
T4 FREE SERPL-MCNC: 1.1 NG/DL (ref 0.9–1.8)
TSH SERPL DL<=0.005 MIU/L-ACNC: 13.6 UIU/ML (ref 0.27–4.2)

## 2025-07-18 ENCOUNTER — RESULTS FOLLOW-UP (OUTPATIENT)
Dept: CARDIOLOGY CLINIC | Age: 85
End: 2025-07-18

## 2025-07-18 DIAGNOSIS — I25.10 CORONARY ARTERY DISEASE INVOLVING NATIVE CORONARY ARTERY OF NATIVE HEART WITHOUT ANGINA PECTORIS: ICD-10-CM

## 2025-07-18 DIAGNOSIS — I10 PRIMARY HYPERTENSION: ICD-10-CM

## 2025-07-18 DIAGNOSIS — Z79.899 MEDICATION MANAGEMENT: Primary | ICD-10-CM

## 2025-07-18 RX ORDER — FUROSEMIDE 40 MG/1
20 TABLET ORAL DAILY
Qty: 90 TABLET | Refills: 0
Start: 2025-07-18

## 2025-07-23 ENCOUNTER — OFFICE VISIT (OUTPATIENT)
Dept: PULMONOLOGY | Age: 85
End: 2025-07-23
Payer: MEDICARE

## 2025-07-23 VITALS
DIASTOLIC BLOOD PRESSURE: 72 MMHG | TEMPERATURE: 97.6 F | OXYGEN SATURATION: 95 % | HEART RATE: 76 BPM | SYSTOLIC BLOOD PRESSURE: 145 MMHG | BODY MASS INDEX: 25.47 KG/M2 | RESPIRATION RATE: 16 BRPM | HEIGHT: 72 IN | WEIGHT: 188 LBS

## 2025-07-23 DIAGNOSIS — R06.02 SOB (SHORTNESS OF BREATH): ICD-10-CM

## 2025-07-23 DIAGNOSIS — J90 BILATERAL PLEURAL EFFUSION: Primary | ICD-10-CM

## 2025-07-23 DIAGNOSIS — J44.9 COPD, SEVERE (HCC): ICD-10-CM

## 2025-07-23 DIAGNOSIS — Z87.891 FORMER SMOKER: ICD-10-CM

## 2025-07-23 PROCEDURE — G8419 CALC BMI OUT NRM PARAM NOF/U: HCPCS | Performed by: STUDENT IN AN ORGANIZED HEALTH CARE EDUCATION/TRAINING PROGRAM

## 2025-07-23 PROCEDURE — 3078F DIAST BP <80 MM HG: CPT | Performed by: STUDENT IN AN ORGANIZED HEALTH CARE EDUCATION/TRAINING PROGRAM

## 2025-07-23 PROCEDURE — 3077F SYST BP >= 140 MM HG: CPT | Performed by: STUDENT IN AN ORGANIZED HEALTH CARE EDUCATION/TRAINING PROGRAM

## 2025-07-23 PROCEDURE — 1036F TOBACCO NON-USER: CPT | Performed by: STUDENT IN AN ORGANIZED HEALTH CARE EDUCATION/TRAINING PROGRAM

## 2025-07-23 PROCEDURE — G8427 DOCREV CUR MEDS BY ELIG CLIN: HCPCS | Performed by: STUDENT IN AN ORGANIZED HEALTH CARE EDUCATION/TRAINING PROGRAM

## 2025-07-23 PROCEDURE — G2211 COMPLEX E/M VISIT ADD ON: HCPCS | Performed by: STUDENT IN AN ORGANIZED HEALTH CARE EDUCATION/TRAINING PROGRAM

## 2025-07-23 PROCEDURE — 99213 OFFICE O/P EST LOW 20 MIN: CPT | Performed by: STUDENT IN AN ORGANIZED HEALTH CARE EDUCATION/TRAINING PROGRAM

## 2025-07-23 PROCEDURE — 1159F MED LIST DOCD IN RCRD: CPT | Performed by: STUDENT IN AN ORGANIZED HEALTH CARE EDUCATION/TRAINING PROGRAM

## 2025-07-23 PROCEDURE — 3023F SPIROM DOC REV: CPT | Performed by: STUDENT IN AN ORGANIZED HEALTH CARE EDUCATION/TRAINING PROGRAM

## 2025-07-23 PROCEDURE — 1123F ACP DISCUSS/DSCN MKR DOCD: CPT | Performed by: STUDENT IN AN ORGANIZED HEALTH CARE EDUCATION/TRAINING PROGRAM

## 2025-07-23 ASSESSMENT — ENCOUNTER SYMPTOMS
ABDOMINAL DISTENTION: 0
EYE ITCHING: 0
TROUBLE SWALLOWING: 0
COLOR CHANGE: 0
NAUSEA: 0
SORE THROAT: 0
DIARRHEA: 0
EYE REDNESS: 0
STRIDOR: 0
ABDOMINAL PAIN: 0
EYE PAIN: 0
SHORTNESS OF BREATH: 0
COUGH: 0
EYE DISCHARGE: 0
VOMITING: 0
WHEEZING: 0
CONSTIPATION: 0
BACK PAIN: 0

## 2025-07-23 NOTE — PROGRESS NOTES
MA Communication:  The following orders are received by verbal communication from Shayne Mackenzie MD    Orders include:    6 month f/u

## 2025-07-23 NOTE — PROGRESS NOTES
[facility] Follow-up  [address] [place] [address]  [phone]        Khalif Pinedo Jr (: 1940 ) is a 85 y.o. male here for an evaluation of   Chief Complaint   Patient presents with    Follow-up    COPD         SUBJECTIVE/OBJECTIVE:  85 y.o. year old male with significant past medical history of CAD, hypertension, former smoker, chronic hypoxic respiratory failure on 2 L at night that presents to [facility] pulmonary clinic for follow-up visit.  Patient had a thoracentesis for shortness of breath at hospitalization.  He says that his breathing felt better and he is able to exert himself farther distances.  He had a chest x-ray after last visit which showed persistence of right consolidation, which is likely pleural effusion.  He has no complaints of shortness of breath.  He is on diuretic therapy.     Patient quit smoking over 30 years ago.  He was smoking half a pack per day for about 10 years.  He denies any illicit drug use.  He denies any occupational or household exposures      Review of Systems   Constitutional:  Negative for activity change, appetite change, chills, diaphoresis and fatigue.   HENT:  Negative for congestion, sore throat and trouble swallowing.    Eyes:  Negative for pain, discharge, redness and itching.   Respiratory:  Negative for cough, shortness of breath, wheezing and stridor.    Cardiovascular:  Negative for chest pain, palpitations and leg swelling.   Gastrointestinal:  Negative for abdominal distention, abdominal pain, constipation, diarrhea, nausea and vomiting.   Endocrine: Negative for polydipsia, polyphagia and polyuria.   Genitourinary:  Negative for difficulty urinating.   Musculoskeletal:  Negative for back pain, myalgias and neck pain.   Skin:  Negative for color change.   Neurological:  Negative for dizziness, weakness and light-headedness.   Psychiatric/Behavioral:  Negative for agitation and behavioral problems.          Vitals:

## 2025-08-13 ENCOUNTER — OFFICE VISIT (OUTPATIENT)
Dept: CARDIOLOGY CLINIC | Age: 85
End: 2025-08-13
Payer: MEDICARE

## 2025-08-13 VITALS
DIASTOLIC BLOOD PRESSURE: 60 MMHG | OXYGEN SATURATION: 94 % | HEIGHT: 72 IN | DIASTOLIC BLOOD PRESSURE: 60 MMHG | OXYGEN SATURATION: 97 % | WEIGHT: 189.5 LBS | HEART RATE: 78 BPM | BODY MASS INDEX: 25.67 KG/M2 | SYSTOLIC BLOOD PRESSURE: 134 MMHG | SYSTOLIC BLOOD PRESSURE: 134 MMHG | BODY MASS INDEX: 25.67 KG/M2 | WEIGHT: 189.5 LBS | HEIGHT: 72 IN | HEART RATE: 78 BPM

## 2025-08-13 DIAGNOSIS — I50.32 CHRONIC HEART FAILURE WITH PRESERVED EJECTION FRACTION (HCC): Primary | ICD-10-CM

## 2025-08-13 DIAGNOSIS — I10 PRIMARY HYPERTENSION: ICD-10-CM

## 2025-08-13 DIAGNOSIS — R79.89 ABNORMAL TSH: ICD-10-CM

## 2025-08-13 DIAGNOSIS — Z98.890 STATUS POST ABLATION OF ATRIAL FIBRILLATION: ICD-10-CM

## 2025-08-13 DIAGNOSIS — I25.5 ISCHEMIC CARDIOMYOPATHY: ICD-10-CM

## 2025-08-13 DIAGNOSIS — I25.10 ASHD (ARTERIOSCLEROTIC HEART DISEASE): ICD-10-CM

## 2025-08-13 DIAGNOSIS — E78.2 MIXED HYPERLIPIDEMIA: ICD-10-CM

## 2025-08-13 DIAGNOSIS — Z95.818 PRESENCE OF WATCHMAN LEFT ATRIAL APPENDAGE CLOSURE DEVICE: ICD-10-CM

## 2025-08-13 DIAGNOSIS — I25.10 CORONARY ARTERY DISEASE INVOLVING NATIVE CORONARY ARTERY OF NATIVE HEART WITHOUT ANGINA PECTORIS: ICD-10-CM

## 2025-08-13 DIAGNOSIS — I48.0 PAROXYSMAL ATRIAL FIBRILLATION (HCC): Primary | ICD-10-CM

## 2025-08-13 DIAGNOSIS — Z86.79 STATUS POST ABLATION OF ATRIAL FIBRILLATION: ICD-10-CM

## 2025-08-13 LAB
T4 FREE SERPL-MCNC: 1.1 NG/DL (ref 0.9–1.8)
TSH SERPL DL<=0.005 MIU/L-ACNC: 4.76 UIU/ML (ref 0.27–4.2)

## 2025-08-13 PROCEDURE — G2211 COMPLEX E/M VISIT ADD ON: HCPCS | Performed by: NURSE PRACTITIONER

## 2025-08-13 PROCEDURE — 3075F SYST BP GE 130 - 139MM HG: CPT

## 2025-08-13 PROCEDURE — 99214 OFFICE O/P EST MOD 30 MIN: CPT | Performed by: NURSE PRACTITIONER

## 2025-08-13 PROCEDURE — 3078F DIAST BP <80 MM HG: CPT

## 2025-08-13 PROCEDURE — G8419 CALC BMI OUT NRM PARAM NOF/U: HCPCS

## 2025-08-13 PROCEDURE — 1036F TOBACCO NON-USER: CPT

## 2025-08-13 PROCEDURE — 1123F ACP DISCUSS/DSCN MKR DOCD: CPT

## 2025-08-13 PROCEDURE — G8419 CALC BMI OUT NRM PARAM NOF/U: HCPCS | Performed by: NURSE PRACTITIONER

## 2025-08-13 PROCEDURE — 1036F TOBACCO NON-USER: CPT | Performed by: NURSE PRACTITIONER

## 2025-08-13 PROCEDURE — 93000 ELECTROCARDIOGRAM COMPLETE: CPT

## 2025-08-13 PROCEDURE — G2211 COMPLEX E/M VISIT ADD ON: HCPCS

## 2025-08-13 PROCEDURE — 99214 OFFICE O/P EST MOD 30 MIN: CPT

## 2025-08-13 PROCEDURE — 1159F MED LIST DOCD IN RCRD: CPT

## 2025-08-13 PROCEDURE — 1123F ACP DISCUSS/DSCN MKR DOCD: CPT | Performed by: NURSE PRACTITIONER

## 2025-08-13 PROCEDURE — 1160F RVW MEDS BY RX/DR IN RCRD: CPT

## 2025-08-13 PROCEDURE — 1159F MED LIST DOCD IN RCRD: CPT | Performed by: NURSE PRACTITIONER

## 2025-08-13 PROCEDURE — 3075F SYST BP GE 130 - 139MM HG: CPT | Performed by: NURSE PRACTITIONER

## 2025-08-13 PROCEDURE — G8427 DOCREV CUR MEDS BY ELIG CLIN: HCPCS | Performed by: NURSE PRACTITIONER

## 2025-08-13 PROCEDURE — G8427 DOCREV CUR MEDS BY ELIG CLIN: HCPCS

## 2025-08-13 PROCEDURE — 1160F RVW MEDS BY RX/DR IN RCRD: CPT | Performed by: NURSE PRACTITIONER

## 2025-08-13 PROCEDURE — 3078F DIAST BP <80 MM HG: CPT | Performed by: NURSE PRACTITIONER

## 2025-08-13 RX ORDER — FUROSEMIDE 20 MG/1
20 TABLET ORAL DAILY
COMMUNITY

## 2025-08-13 RX ORDER — LEVOTHYROXINE SODIUM 75 UG/1
75 TABLET ORAL DAILY
COMMUNITY

## 2025-08-13 RX ORDER — FUROSEMIDE 20 MG/1
20 TABLET ORAL DAILY
COMMUNITY
End: 2025-08-13 | Stop reason: ALTCHOICE

## 2025-08-13 ASSESSMENT — LIFESTYLE VARIABLES
HOW MANY STANDARD DRINKS CONTAINING ALCOHOL DO YOU HAVE ON A TYPICAL DAY: PATIENT DOES NOT DRINK
HOW OFTEN DO YOU HAVE A DRINK CONTAINING ALCOHOL: 2-4 TIMES A MONTH

## (undated) DEVICE — SOUNDSTAR REPROC ECO 8F DGNSTC ULTRSND CATH USE GE IMGNG SSTM

## (undated) DEVICE — PINNACLE INTRODUCER SHEATH: Brand: PINNACLE

## (undated) DEVICE — NEEDLE INTRO L98CM OD18GA 30DEG BRK-1 XS S STL TRANSSEPTAL

## (undated) DEVICE — CATHETER MAP D CRV 3-3-3-3-3 MM SPC GALAXY OCTARAY

## (undated) DEVICE — CANNULA NSL AD TBNG L7FT PVC STR NONFLARED PRNG O2 DEL W STD

## (undated) DEVICE — CATHETER MAP 5FR L110CM TIP L1MM 2-8-2 SPC D CRV DECAPOLAR

## (undated) DEVICE — PRESSURE MONITORING SET: Brand: TRUWAVE

## (undated) DEVICE — CATHETER EP 7FR L115CM 2-8-2MM SPC TIP 2MM 10 ELECTRD F L

## (undated) DEVICE — ENDOSCOPIC KIT 2 12 FT OP4 DE2 GWN SYR

## (undated) DEVICE — PATCH REF EXT FOR CARTO 3 SYS (EA = 6 PACKS)

## (undated) DEVICE — GLIDESHEATH SLENDER STAINLESS STEEL KIT: Brand: GLIDESHEATH SLENDER

## (undated) DEVICE — TUBING PMP FOR CARTO SYS SMARTABLATE

## (undated) DEVICE — Device

## (undated) DEVICE — Device: Brand: DISPOSABLE ELECTROSURGICAL SNARE

## (undated) DEVICE — ERBE NESSY® OMEGA PLATE USA (85+23)CM² , WITH CABLE 3 M: Brand: ERBE

## (undated) DEVICE — CONMED SCOPE SAVER BITE BLOCK, 20X27 MM: Brand: SCOPE SAVER

## (undated) DEVICE — FIAPC® PROBE W/ FILTER 2200 A OD 2.3MM/6.9FR; L 2.2M/7.2FT: Brand: ERBE

## (undated) DEVICE — CAPSULE ENDOSCP L26.2MM DIA11.4MM BIOCOMPATIBLE PLAS SB 3

## (undated) DEVICE — TRAP SPEC POLYPR SGL CHMBR FN MESH SCRN

## (undated) DEVICE — MASK O2 ADULT POM PROCEDURAL OXYGEN MASK 7FT O2 TUBING 10FT

## (undated) DEVICE — ELECTRODE,ECG,STRESS,FOAM,3PK: Brand: MEDLINE

## (undated) DEVICE — DEVICE THERM REG SIL ESOPH MLTI LUMEN COLDER PLC CONN 5 DEG

## (undated) DEVICE — SYSTEM CLOSURE 6-12 FR VEN VASC VASCADE MVP

## (undated) DEVICE — CATHETER ABLAT 8FR L115CM 1-6-2MM SPC TIP 3.5MM DF CRV

## (undated) DEVICE — EP VASCULAR PACK: Brand: MEDLINE INDUSTRIES, INC.

## (undated) DEVICE — 5FR MICRO INTRODUCER KIT

## (undated) DEVICE — SHEATH GUID 11.5X8.5FR L71MM M CRV L22MM BIDIR STEER CARTO

## (undated) DEVICE — CATHETER EP 7FR L115CM 2-8-2MM SPC TIP 2MM 10 ELECTRD D CRV